# Patient Record
Sex: FEMALE | Race: WHITE | NOT HISPANIC OR LATINO | Employment: OTHER | URBAN - METROPOLITAN AREA
[De-identification: names, ages, dates, MRNs, and addresses within clinical notes are randomized per-mention and may not be internally consistent; named-entity substitution may affect disease eponyms.]

---

## 2017-03-06 ENCOUNTER — AMBULATORY - HEALTHEAST (OUTPATIENT)
Dept: ENDOCRINOLOGY | Facility: CLINIC | Age: 62
End: 2017-03-06

## 2017-04-06 ENCOUNTER — COMMUNICATION - HEALTHEAST (OUTPATIENT)
Dept: ADMINISTRATIVE | Facility: CLINIC | Age: 62
End: 2017-04-06

## 2017-08-07 ENCOUNTER — COMMUNICATION - HEALTHEAST (OUTPATIENT)
Dept: LAB | Facility: CLINIC | Age: 62
End: 2017-08-07

## 2017-08-08 ENCOUNTER — AMBULATORY - HEALTHEAST (OUTPATIENT)
Dept: ENDOCRINOLOGY | Facility: CLINIC | Age: 62
End: 2017-08-08

## 2017-08-08 DIAGNOSIS — M81.0 OSTEOPOROSIS: ICD-10-CM

## 2017-08-09 ENCOUNTER — COMMUNICATION - HEALTHEAST (OUTPATIENT)
Dept: ENDOCRINOLOGY | Facility: CLINIC | Age: 62
End: 2017-08-09

## 2017-08-25 ENCOUNTER — AMBULATORY - HEALTHEAST (OUTPATIENT)
Dept: LAB | Facility: CLINIC | Age: 62
End: 2017-08-25

## 2017-08-25 DIAGNOSIS — M81.0 OSTEOPOROSIS: ICD-10-CM

## 2017-09-01 ENCOUNTER — OFFICE VISIT - HEALTHEAST (OUTPATIENT)
Dept: ENDOCRINOLOGY | Facility: CLINIC | Age: 62
End: 2017-09-01

## 2017-09-01 DIAGNOSIS — M81.0 OSTEOPOROSIS: ICD-10-CM

## 2017-09-01 ASSESSMENT — MIFFLIN-ST. JEOR: SCORE: 961.6

## 2017-09-07 ENCOUNTER — AMBULATORY - HEALTHEAST (OUTPATIENT)
Dept: NURSING | Facility: CLINIC | Age: 62
End: 2017-09-07

## 2017-09-18 ENCOUNTER — RECORDS - HEALTHEAST (OUTPATIENT)
Dept: BONE DENSITY | Facility: CLINIC | Age: 62
End: 2017-09-18

## 2017-09-18 ENCOUNTER — RECORDS - HEALTHEAST (OUTPATIENT)
Dept: ADMINISTRATIVE | Facility: OTHER | Age: 62
End: 2017-09-18

## 2017-09-18 DIAGNOSIS — M81.0 AGE-RELATED OSTEOPOROSIS WITHOUT CURRENT PATHOLOGICAL FRACTURE: ICD-10-CM

## 2017-09-20 ENCOUNTER — COMMUNICATION - HEALTHEAST (OUTPATIENT)
Dept: ENDOCRINOLOGY | Facility: CLINIC | Age: 62
End: 2017-09-20

## 2017-09-26 ENCOUNTER — COMMUNICATION - HEALTHEAST (OUTPATIENT)
Dept: ADMINISTRATIVE | Facility: CLINIC | Age: 62
End: 2017-09-26

## 2017-09-26 ENCOUNTER — COMMUNICATION - HEALTHEAST (OUTPATIENT)
Dept: ENDOCRINOLOGY | Facility: CLINIC | Age: 62
End: 2017-09-26

## 2018-03-01 ENCOUNTER — AMBULATORY - HEALTHEAST (OUTPATIENT)
Dept: PEDIATRICS | Facility: CLINIC | Age: 63
End: 2018-03-01

## 2018-03-08 ENCOUNTER — AMBULATORY - HEALTHEAST (OUTPATIENT)
Dept: NURSING | Facility: CLINIC | Age: 63
End: 2018-03-08

## 2018-03-29 ENCOUNTER — OFFICE VISIT - HEALTHEAST (OUTPATIENT)
Dept: VASCULAR SURGERY | Facility: CLINIC | Age: 63
End: 2018-03-29

## 2018-03-29 DIAGNOSIS — L89.623 DECUBITUS ULCER OF LEFT HEEL, STAGE 3 (H): ICD-10-CM

## 2018-03-29 ASSESSMENT — MIFFLIN-ST. JEOR: SCORE: 957.52

## 2018-04-03 ENCOUNTER — AMBULATORY - HEALTHEAST (OUTPATIENT)
Dept: NURSING | Facility: CLINIC | Age: 63
End: 2018-04-03

## 2018-04-12 ENCOUNTER — OFFICE VISIT - HEALTHEAST (OUTPATIENT)
Dept: VASCULAR SURGERY | Facility: CLINIC | Age: 63
End: 2018-04-12

## 2018-04-12 DIAGNOSIS — L89.623 DECUBITUS ULCER OF LEFT HEEL, STAGE 3 (H): ICD-10-CM

## 2018-04-16 ENCOUNTER — COMMUNICATION - HEALTHEAST (OUTPATIENT)
Dept: VASCULAR SURGERY | Facility: CLINIC | Age: 63
End: 2018-04-16

## 2018-04-17 ENCOUNTER — COMMUNICATION - HEALTHEAST (OUTPATIENT)
Dept: OTHER | Facility: CLINIC | Age: 63
End: 2018-04-17

## 2018-08-23 ENCOUNTER — COMMUNICATION - HEALTHEAST (OUTPATIENT)
Dept: ENDOCRINOLOGY | Facility: CLINIC | Age: 63
End: 2018-08-23

## 2018-08-23 ENCOUNTER — AMBULATORY - HEALTHEAST (OUTPATIENT)
Dept: ENDOCRINOLOGY | Facility: CLINIC | Age: 63
End: 2018-08-23

## 2018-08-23 DIAGNOSIS — M81.0 OSTEOPOROSIS: ICD-10-CM

## 2018-09-27 ENCOUNTER — AMBULATORY - HEALTHEAST (OUTPATIENT)
Dept: LAB | Facility: CLINIC | Age: 63
End: 2018-09-27

## 2018-09-27 DIAGNOSIS — M81.0 OSTEOPOROSIS: ICD-10-CM

## 2018-09-27 LAB — CALCIUM SERPL-MCNC: 9.3 MG/DL (ref 8.5–10.5)

## 2018-09-28 ENCOUNTER — AMBULATORY - HEALTHEAST (OUTPATIENT)
Dept: ENDOCRINOLOGY | Facility: CLINIC | Age: 63
End: 2018-09-28

## 2018-09-28 DIAGNOSIS — E55.9 VITAMIN D DEFICIENCY: ICD-10-CM

## 2018-09-28 LAB
25(OH)D3 SERPL-MCNC: 19.4 NG/ML (ref 30–80)
25(OH)D3 SERPL-MCNC: 19.4 NG/ML (ref 30–80)

## 2018-10-01 ENCOUNTER — COMMUNICATION - HEALTHEAST (OUTPATIENT)
Dept: ADMINISTRATIVE | Facility: CLINIC | Age: 63
End: 2018-10-01

## 2018-10-01 DIAGNOSIS — E55.9 VITAMIN D DEFICIENCY: ICD-10-CM

## 2018-10-05 ENCOUNTER — OFFICE VISIT - HEALTHEAST (OUTPATIENT)
Dept: ENDOCRINOLOGY | Facility: CLINIC | Age: 63
End: 2018-10-05

## 2018-10-05 DIAGNOSIS — M81.0 OSTEOPOROSIS: ICD-10-CM

## 2018-10-05 ASSESSMENT — MIFFLIN-ST. JEOR: SCORE: 963.19

## 2018-11-16 ENCOUNTER — OFFICE VISIT - HEALTHEAST (OUTPATIENT)
Dept: VASCULAR SURGERY | Facility: CLINIC | Age: 63
End: 2018-11-16

## 2018-11-16 DIAGNOSIS — M21.372 LEFT FOOT DROP: ICD-10-CM

## 2018-11-16 DIAGNOSIS — M21.621 TAILOR'S BUNION OF RIGHT FOOT: ICD-10-CM

## 2019-01-25 ENCOUNTER — AMBULATORY - HEALTHEAST (OUTPATIENT)
Dept: ENDOCRINOLOGY | Facility: CLINIC | Age: 64
End: 2019-01-25

## 2019-01-25 DIAGNOSIS — M81.0 OSTEOPOROSIS: ICD-10-CM

## 2019-03-12 ENCOUNTER — OFFICE VISIT - HEALTHEAST (OUTPATIENT)
Dept: VASCULAR SURGERY | Facility: CLINIC | Age: 64
End: 2019-03-12

## 2019-03-12 ENCOUNTER — RECORDS - HEALTHEAST (OUTPATIENT)
Dept: ADMINISTRATIVE | Facility: OTHER | Age: 64
End: 2019-03-12

## 2019-03-12 DIAGNOSIS — L30.9 DERMATITIS: ICD-10-CM

## 2019-03-12 ASSESSMENT — MIFFLIN-ST. JEOR: SCORE: 963.19

## 2019-03-29 ENCOUNTER — AMBULATORY - HEALTHEAST (OUTPATIENT)
Dept: LAB | Facility: CLINIC | Age: 64
End: 2019-03-29

## 2019-03-29 DIAGNOSIS — M81.0 OSTEOPOROSIS: ICD-10-CM

## 2019-03-29 LAB — CALCIUM SERPL-MCNC: 10.7 MG/DL (ref 8.5–10.5)

## 2019-04-01 LAB
25(OH)D3 SERPL-MCNC: 45.5 NG/ML (ref 30–80)
25(OH)D3 SERPL-MCNC: 45.5 NG/ML (ref 30–80)

## 2019-04-05 ENCOUNTER — OFFICE VISIT - HEALTHEAST (OUTPATIENT)
Dept: ENDOCRINOLOGY | Facility: CLINIC | Age: 64
End: 2019-04-05

## 2019-04-05 DIAGNOSIS — M81.0 OSTEOPOROSIS WITHOUT CURRENT PATHOLOGICAL FRACTURE, UNSPECIFIED OSTEOPOROSIS TYPE: ICD-10-CM

## 2019-04-05 ASSESSMENT — MIFFLIN-ST. JEOR: SCORE: 1092.02

## 2019-04-10 ENCOUNTER — COMMUNICATION - HEALTHEAST (OUTPATIENT)
Dept: ENDOCRINOLOGY | Facility: CLINIC | Age: 64
End: 2019-04-10

## 2019-04-10 DIAGNOSIS — E55.9 VITAMIN D DEFICIENCY: ICD-10-CM

## 2019-06-07 ENCOUNTER — RECORDS - HEALTHEAST (OUTPATIENT)
Dept: ADMINISTRATIVE | Facility: OTHER | Age: 64
End: 2019-06-07

## 2019-06-07 ENCOUNTER — COMMUNICATION - HEALTHEAST (OUTPATIENT)
Dept: ENDOCRINOLOGY | Facility: CLINIC | Age: 64
End: 2019-06-07

## 2019-06-07 DIAGNOSIS — E55.9 VITAMIN D DEFICIENCY: ICD-10-CM

## 2019-06-24 ENCOUNTER — AMBULATORY - HEALTHEAST (OUTPATIENT)
Dept: SCHEDULING | Facility: CLINIC | Age: 64
End: 2019-06-24

## 2019-06-24 DIAGNOSIS — M81.0 OSTEOPOROSIS WITHOUT CURRENT PATHOLOGICAL FRACTURE, UNSPECIFIED OSTEOPOROSIS TYPE: ICD-10-CM

## 2019-07-01 ENCOUNTER — OFFICE VISIT - HEALTHEAST (OUTPATIENT)
Dept: PODIATRY | Facility: CLINIC | Age: 64
End: 2019-07-01

## 2019-07-01 DIAGNOSIS — S93.601A SPRAIN OF RIGHT FOOT, INITIAL ENCOUNTER: ICD-10-CM

## 2019-07-01 DIAGNOSIS — S93.401A SPRAIN OF RIGHT ANKLE, UNSPECIFIED LIGAMENT, INITIAL ENCOUNTER: ICD-10-CM

## 2019-07-01 DIAGNOSIS — M76.61 ACHILLES TENDINITIS OF RIGHT LOWER EXTREMITY: ICD-10-CM

## 2019-07-09 ENCOUNTER — OFFICE VISIT - HEALTHEAST (OUTPATIENT)
Dept: PODIATRY | Facility: CLINIC | Age: 64
End: 2019-07-09

## 2019-07-09 ENCOUNTER — RECORDS - HEALTHEAST (OUTPATIENT)
Dept: GENERAL RADIOLOGY | Facility: CLINIC | Age: 64
End: 2019-07-09

## 2019-07-09 DIAGNOSIS — S92.014A CLOSED NONDISPLACED FRACTURE OF BODY OF RIGHT CALCANEUS, INITIAL ENCOUNTER: ICD-10-CM

## 2019-07-09 DIAGNOSIS — M84.374A STRESS FRACTURE OF RIGHT FOOT, INITIAL ENCOUNTER: ICD-10-CM

## 2019-07-09 DIAGNOSIS — S92.014A NONDISPLACED FRACTURE OF BODY OF RIGHT CALCANEUS, INITIAL ENCOUNTER FOR CLOSED FRACTURE: ICD-10-CM

## 2019-07-09 ASSESSMENT — MIFFLIN-ST. JEOR: SCORE: 1094.24

## 2019-07-15 ENCOUNTER — COMMUNICATION - HEALTHEAST (OUTPATIENT)
Dept: ADMINISTRATIVE | Facility: CLINIC | Age: 64
End: 2019-07-15

## 2019-07-16 ENCOUNTER — AMBULATORY - HEALTHEAST (OUTPATIENT)
Dept: SURGERY | Facility: CLINIC | Age: 64
End: 2019-07-16

## 2019-07-16 ENCOUNTER — COMMUNICATION - HEALTHEAST (OUTPATIENT)
Dept: ENDOCRINOLOGY | Facility: CLINIC | Age: 64
End: 2019-07-16

## 2019-07-16 DIAGNOSIS — S92.014A CLOSED NONDISPLACED FRACTURE OF BODY OF RIGHT CALCANEUS, INITIAL ENCOUNTER: ICD-10-CM

## 2019-07-17 ENCOUNTER — AMBULATORY - HEALTHEAST (OUTPATIENT)
Dept: ENDOCRINOLOGY | Facility: CLINIC | Age: 64
End: 2019-07-17

## 2019-07-17 DIAGNOSIS — M81.0 OSTEOPOROSIS WITHOUT CURRENT PATHOLOGICAL FRACTURE, UNSPECIFIED OSTEOPOROSIS TYPE: ICD-10-CM

## 2019-07-26 ENCOUNTER — AMBULATORY - HEALTHEAST (OUTPATIENT)
Dept: LAB | Facility: CLINIC | Age: 64
End: 2019-07-26

## 2019-07-26 DIAGNOSIS — M81.0 OSTEOPOROSIS WITHOUT CURRENT PATHOLOGICAL FRACTURE, UNSPECIFIED OSTEOPOROSIS TYPE: ICD-10-CM

## 2019-07-29 ENCOUNTER — COMMUNICATION - HEALTHEAST (OUTPATIENT)
Dept: ENDOCRINOLOGY | Facility: CLINIC | Age: 64
End: 2019-07-29

## 2019-07-29 LAB
25(OH)D3 SERPL-MCNC: 54 NG/ML (ref 30–80)
25(OH)D3 SERPL-MCNC: 54 NG/ML (ref 30–80)

## 2019-07-30 ENCOUNTER — OFFICE VISIT - HEALTHEAST (OUTPATIENT)
Dept: PODIATRY | Facility: CLINIC | Age: 64
End: 2019-07-30

## 2019-07-30 ENCOUNTER — COMMUNICATION - HEALTHEAST (OUTPATIENT)
Dept: ENDOCRINOLOGY | Facility: CLINIC | Age: 64
End: 2019-07-30

## 2019-07-30 DIAGNOSIS — M25.371 ANKLE INSTABILITY, RIGHT: ICD-10-CM

## 2019-07-30 DIAGNOSIS — S92.014A CLOSED NONDISPLACED FRACTURE OF BODY OF RIGHT CALCANEUS, INITIAL ENCOUNTER: ICD-10-CM

## 2019-08-12 ENCOUNTER — COMMUNICATION - HEALTHEAST (OUTPATIENT)
Dept: VASCULAR SURGERY | Facility: CLINIC | Age: 64
End: 2019-08-12

## 2019-08-13 ENCOUNTER — OFFICE VISIT - HEALTHEAST (OUTPATIENT)
Dept: PODIATRY | Facility: CLINIC | Age: 64
End: 2019-08-13

## 2019-08-13 DIAGNOSIS — M79.2 NERVE PAIN: ICD-10-CM

## 2019-08-13 DIAGNOSIS — S92.014A CLOSED NONDISPLACED FRACTURE OF BODY OF RIGHT CALCANEUS, INITIAL ENCOUNTER: ICD-10-CM

## 2019-08-13 DIAGNOSIS — S90.31XA CONTUSION OF RIGHT FOOT, INITIAL ENCOUNTER: ICD-10-CM

## 2019-08-13 ASSESSMENT — MIFFLIN-ST. JEOR: SCORE: 1085.67

## 2019-09-10 ENCOUNTER — OFFICE VISIT - HEALTHEAST (OUTPATIENT)
Dept: PODIATRY | Facility: CLINIC | Age: 64
End: 2019-09-10

## 2019-09-10 DIAGNOSIS — R60.1 GENERALIZED EDEMA: ICD-10-CM

## 2019-09-10 DIAGNOSIS — M84.374D STRESS FRACTURE OF RIGHT FOOT WITH ROUTINE HEALING, SUBSEQUENT ENCOUNTER: ICD-10-CM

## 2019-09-10 DIAGNOSIS — S92.014A CLOSED NONDISPLACED FRACTURE OF BODY OF RIGHT CALCANEUS, INITIAL ENCOUNTER: ICD-10-CM

## 2019-09-10 RX ORDER — VILAZODONE HYDROCHLORIDE 20 MG/1
20 TABLET ORAL DAILY
Refills: 2 | Status: SHIPPED | COMMUNITY
Start: 2019-08-21 | End: 2022-05-09

## 2019-09-10 ASSESSMENT — MIFFLIN-ST. JEOR: SCORE: 1162.78

## 2019-09-25 ENCOUNTER — COMMUNICATION - HEALTHEAST (OUTPATIENT)
Dept: ENDOCRINOLOGY | Facility: CLINIC | Age: 64
End: 2019-09-25

## 2019-10-04 ENCOUNTER — COMMUNICATION - HEALTHEAST (OUTPATIENT)
Dept: ADMINISTRATIVE | Facility: CLINIC | Age: 64
End: 2019-10-04

## 2019-10-11 ENCOUNTER — COMMUNICATION - HEALTHEAST (OUTPATIENT)
Dept: ADMINISTRATIVE | Facility: CLINIC | Age: 64
End: 2019-10-11

## 2019-10-17 ENCOUNTER — OFFICE VISIT - HEALTHEAST (OUTPATIENT)
Dept: ENDOCRINOLOGY | Facility: CLINIC | Age: 64
End: 2019-10-17

## 2019-10-17 DIAGNOSIS — M81.0 OSTEOPOROSIS WITHOUT CURRENT PATHOLOGICAL FRACTURE, UNSPECIFIED OSTEOPOROSIS TYPE: ICD-10-CM

## 2019-11-11 ENCOUNTER — SURGERY - HEALTHEAST (OUTPATIENT)
Dept: SURGERY | Facility: CLINIC | Age: 64
End: 2019-11-11

## 2019-11-11 ENCOUNTER — ANESTHESIA - HEALTHEAST (OUTPATIENT)
Dept: SURGERY | Facility: CLINIC | Age: 64
End: 2019-11-11

## 2019-11-11 ASSESSMENT — MIFFLIN-ST. JEOR: SCORE: 1101.54

## 2019-11-15 ENCOUNTER — OFFICE VISIT - HEALTHEAST (OUTPATIENT)
Dept: GERIATRICS | Facility: CLINIC | Age: 64
End: 2019-11-15

## 2019-11-15 DIAGNOSIS — S06.9X0S TRAUMATIC BRAIN INJURY, WITHOUT LOSS OF CONSCIOUSNESS, SEQUELA (H): ICD-10-CM

## 2019-11-15 DIAGNOSIS — G43.909 MIGRAINE WITHOUT STATUS MIGRAINOSUS, NOT INTRACTABLE, UNSPECIFIED MIGRAINE TYPE: ICD-10-CM

## 2019-11-15 DIAGNOSIS — F41.9 ANXIETY: ICD-10-CM

## 2019-11-15 DIAGNOSIS — N32.81 OAB (OVERACTIVE BLADDER): ICD-10-CM

## 2019-11-15 DIAGNOSIS — M21.372 LEFT FOOT DROP: ICD-10-CM

## 2019-11-15 DIAGNOSIS — D62 ANEMIA DUE TO BLOOD LOSS, ACUTE: ICD-10-CM

## 2019-11-15 DIAGNOSIS — S72.142A CLOSED INTERTROCHANTERIC FRACTURE OF HIP, LEFT, INITIAL ENCOUNTER (H): ICD-10-CM

## 2019-11-18 ENCOUNTER — OFFICE VISIT - HEALTHEAST (OUTPATIENT)
Dept: GERIATRICS | Facility: CLINIC | Age: 64
End: 2019-11-18

## 2019-11-18 ENCOUNTER — RECORDS - HEALTHEAST (OUTPATIENT)
Dept: LAB | Facility: CLINIC | Age: 64
End: 2019-11-18

## 2019-11-18 ENCOUNTER — AMBULATORY - HEALTHEAST (OUTPATIENT)
Dept: ENDOCRINOLOGY | Facility: CLINIC | Age: 64
End: 2019-11-18

## 2019-11-18 DIAGNOSIS — F41.9 ANXIETY: ICD-10-CM

## 2019-11-18 DIAGNOSIS — M21.372 LEFT FOOT DROP: ICD-10-CM

## 2019-11-18 DIAGNOSIS — N18.1 CHRONIC KIDNEY DISEASE, STAGE I: ICD-10-CM

## 2019-11-18 DIAGNOSIS — D62 ANEMIA DUE TO BLOOD LOSS, ACUTE: ICD-10-CM

## 2019-11-18 DIAGNOSIS — M25.552 LEFT HIP PAIN: ICD-10-CM

## 2019-11-18 DIAGNOSIS — S06.9X0S TRAUMATIC BRAIN INJURY, WITHOUT LOSS OF CONSCIOUSNESS, SEQUELA (H): ICD-10-CM

## 2019-11-18 DIAGNOSIS — S72.142A CLOSED INTERTROCHANTERIC FRACTURE OF HIP, LEFT, INITIAL ENCOUNTER (H): ICD-10-CM

## 2019-11-18 DIAGNOSIS — S72.90XA FRACTURE OF FEMUR (H): ICD-10-CM

## 2019-11-18 DIAGNOSIS — M81.0 OSTEOPOROSIS: ICD-10-CM

## 2019-11-18 LAB
ANION GAP SERPL CALCULATED.3IONS-SCNC: 10 MMOL/L (ref 5–18)
BUN SERPL-MCNC: 10 MG/DL (ref 8–22)
CALCIUM SERPL-MCNC: 9.3 MG/DL (ref 8.5–10.5)
CHLORIDE BLD-SCNC: 104 MMOL/L (ref 98–107)
CO2 SERPL-SCNC: 25 MMOL/L (ref 22–31)
CREAT SERPL-MCNC: 0.77 MG/DL (ref 0.6–1.1)
ERYTHROCYTE [DISTWIDTH] IN BLOOD BY AUTOMATED COUNT: 14.7 % (ref 11–14.5)
GFR SERPL CREATININE-BSD FRML MDRD: >60 ML/MIN/1.73M2
GLUCOSE BLD-MCNC: 68 MG/DL (ref 70–125)
HCT VFR BLD AUTO: 27.9 % (ref 35–47)
HGB BLD-MCNC: 8.5 G/DL (ref 12–16)
MCH RBC QN AUTO: 27.3 PG (ref 27–34)
MCHC RBC AUTO-ENTMCNC: 30.5 G/DL (ref 32–36)
MCV RBC AUTO: 90 FL (ref 80–100)
PLATELET # BLD AUTO: 407 THOU/UL (ref 140–440)
PMV BLD AUTO: 10.3 FL (ref 8.5–12.5)
POTASSIUM BLD-SCNC: 4.4 MMOL/L (ref 3.5–5)
RBC # BLD AUTO: 3.11 MILL/UL (ref 3.8–5.4)
SODIUM SERPL-SCNC: 139 MMOL/L (ref 136–145)
VIT B12 SERPL-MCNC: 368 PG/ML (ref 213–816)
WBC: 9.5 THOU/UL (ref 4–11)

## 2019-11-20 ENCOUNTER — OFFICE VISIT - HEALTHEAST (OUTPATIENT)
Dept: GERIATRICS | Facility: CLINIC | Age: 64
End: 2019-11-20

## 2019-11-20 DIAGNOSIS — S72.142A CLOSED INTERTROCHANTERIC FRACTURE OF HIP, LEFT, INITIAL ENCOUNTER (H): ICD-10-CM

## 2019-11-20 DIAGNOSIS — M21.372 LEFT FOOT DROP: ICD-10-CM

## 2019-11-20 DIAGNOSIS — S06.9X0S TRAUMATIC BRAIN INJURY, WITHOUT LOSS OF CONSCIOUSNESS, SEQUELA (H): ICD-10-CM

## 2019-11-20 DIAGNOSIS — F41.9 ANXIETY: ICD-10-CM

## 2019-11-20 DIAGNOSIS — D62 ANEMIA DUE TO BLOOD LOSS, ACUTE: ICD-10-CM

## 2019-11-20 LAB — TSH RECEP AB SER-ACNC: <0.9 IU/L

## 2019-11-22 ENCOUNTER — COMMUNICATION - HEALTHEAST (OUTPATIENT)
Dept: ENDOCRINOLOGY | Facility: CLINIC | Age: 64
End: 2019-11-22

## 2019-11-22 ENCOUNTER — OFFICE VISIT - HEALTHEAST (OUTPATIENT)
Dept: ENDOCRINOLOGY | Facility: CLINIC | Age: 64
End: 2019-11-22

## 2019-11-22 DIAGNOSIS — M80.00XA OSTEOPOROSIS WITH CURRENT PATHOLOGICAL FRACTURE, UNSPECIFIED OSTEOPOROSIS TYPE, INITIAL ENCOUNTER: ICD-10-CM

## 2019-11-22 DIAGNOSIS — S72.142A CLOSED INTERTROCHANTERIC FRACTURE OF HIP, LEFT, INITIAL ENCOUNTER (H): ICD-10-CM

## 2019-11-25 ENCOUNTER — OFFICE VISIT - HEALTHEAST (OUTPATIENT)
Dept: GERIATRICS | Facility: CLINIC | Age: 64
End: 2019-11-25

## 2019-11-25 ENCOUNTER — RECORDS - HEALTHEAST (OUTPATIENT)
Dept: LAB | Facility: CLINIC | Age: 64
End: 2019-11-25

## 2019-11-25 DIAGNOSIS — N32.81 OAB (OVERACTIVE BLADDER): ICD-10-CM

## 2019-11-25 DIAGNOSIS — D62 ANEMIA DUE TO BLOOD LOSS, ACUTE: ICD-10-CM

## 2019-11-25 DIAGNOSIS — S72.142A CLOSED INTERTROCHANTERIC FRACTURE OF HIP, LEFT, INITIAL ENCOUNTER (H): ICD-10-CM

## 2019-11-25 DIAGNOSIS — G43.909 MIGRAINE WITHOUT STATUS MIGRAINOSUS, NOT INTRACTABLE, UNSPECIFIED MIGRAINE TYPE: ICD-10-CM

## 2019-11-25 DIAGNOSIS — F41.9 ANXIETY: ICD-10-CM

## 2019-11-25 DIAGNOSIS — G89.4 CHRONIC PAIN SYNDROME: ICD-10-CM

## 2019-11-25 DIAGNOSIS — F51.01 PRIMARY INSOMNIA: ICD-10-CM

## 2019-11-25 LAB — TSH SERPL DL<=0.005 MIU/L-ACNC: 2.56 UIU/ML (ref 0.3–5)

## 2019-11-27 ENCOUNTER — OFFICE VISIT - HEALTHEAST (OUTPATIENT)
Dept: GERIATRICS | Facility: CLINIC | Age: 64
End: 2019-11-27

## 2019-11-27 DIAGNOSIS — S06.9X0S TRAUMATIC BRAIN INJURY, WITHOUT LOSS OF CONSCIOUSNESS, SEQUELA (H): ICD-10-CM

## 2019-11-27 DIAGNOSIS — S72.142A CLOSED INTERTROCHANTERIC FRACTURE OF HIP, LEFT, INITIAL ENCOUNTER (H): ICD-10-CM

## 2019-11-27 DIAGNOSIS — G43.909 MIGRAINE WITHOUT STATUS MIGRAINOSUS, NOT INTRACTABLE, UNSPECIFIED MIGRAINE TYPE: ICD-10-CM

## 2019-11-27 DIAGNOSIS — M25.552 LEFT HIP PAIN: ICD-10-CM

## 2019-11-27 DIAGNOSIS — D62 ANEMIA DUE TO BLOOD LOSS, ACUTE: ICD-10-CM

## 2019-12-02 ENCOUNTER — AMBULATORY - HEALTHEAST (OUTPATIENT)
Dept: GERIATRICS | Facility: CLINIC | Age: 64
End: 2019-12-02

## 2019-12-06 ENCOUNTER — AMBULATORY - HEALTHEAST (OUTPATIENT)
Dept: ENDOCRINOLOGY | Facility: CLINIC | Age: 64
End: 2019-12-06

## 2020-01-02 ENCOUNTER — COMMUNICATION - HEALTHEAST (OUTPATIENT)
Dept: ADMINISTRATIVE | Facility: CLINIC | Age: 65
End: 2020-01-02

## 2020-01-21 ENCOUNTER — AMBULATORY - HEALTHEAST (OUTPATIENT)
Dept: ENDOCRINOLOGY | Facility: CLINIC | Age: 65
End: 2020-01-21

## 2020-02-10 ENCOUNTER — OFFICE VISIT - HEALTHEAST (OUTPATIENT)
Dept: INTERNAL MEDICINE | Facility: CLINIC | Age: 65
End: 2020-02-10

## 2020-02-10 DIAGNOSIS — F51.01 PRIMARY INSOMNIA: ICD-10-CM

## 2020-02-10 DIAGNOSIS — S72.142A CLOSED INTERTROCHANTERIC FRACTURE OF HIP, LEFT, INITIAL ENCOUNTER (H): ICD-10-CM

## 2020-02-10 DIAGNOSIS — F41.9 ANXIETY: ICD-10-CM

## 2020-02-10 DIAGNOSIS — F32.A DEPRESSION, UNSPECIFIED DEPRESSION TYPE: ICD-10-CM

## 2020-02-10 DIAGNOSIS — M81.0 AGE RELATED OSTEOPOROSIS, UNSPECIFIED PATHOLOGICAL FRACTURE PRESENCE: ICD-10-CM

## 2020-02-10 DIAGNOSIS — G89.4 CHRONIC PAIN SYNDROME: ICD-10-CM

## 2020-02-10 DIAGNOSIS — M21.372 LEFT FOOT DROP: ICD-10-CM

## 2020-02-10 DIAGNOSIS — M54.40 ACUTE LEFT-SIDED LOW BACK PAIN WITH SCIATICA, SCIATICA LATERALITY UNSPECIFIED: ICD-10-CM

## 2020-02-10 ASSESSMENT — PATIENT HEALTH QUESTIONNAIRE - PHQ9: SUM OF ALL RESPONSES TO PHQ QUESTIONS 1-9: 17

## 2020-02-10 ASSESSMENT — ANXIETY QUESTIONNAIRES
GAD7 TOTAL SCORE: 16
5. BEING SO RESTLESS THAT IT IS HARD TO SIT STILL: SEVERAL DAYS
6. BECOMING EASILY ANNOYED OR IRRITABLE: NEARLY EVERY DAY
IF YOU CHECKED OFF ANY PROBLEMS ON THIS QUESTIONNAIRE, HOW DIFFICULT HAVE THESE PROBLEMS MADE IT FOR YOU TO DO YOUR WORK, TAKE CARE OF THINGS AT HOME, OR GET ALONG WITH OTHER PEOPLE: EXTREMELY DIFFICULT
3. WORRYING TOO MUCH ABOUT DIFFERENT THINGS: NEARLY EVERY DAY
2. NOT BEING ABLE TO STOP OR CONTROL WORRYING: NEARLY EVERY DAY
7. FEELING AFRAID AS IF SOMETHING AWFUL MIGHT HAPPEN: MORE THAN HALF THE DAYS
4. TROUBLE RELAXING: MORE THAN HALF THE DAYS
1. FEELING NERVOUS, ANXIOUS, OR ON EDGE: MORE THAN HALF THE DAYS

## 2020-02-10 ASSESSMENT — MIFFLIN-ST. JEOR: SCORE: 1113.55

## 2020-02-17 ENCOUNTER — COMMUNICATION - HEALTHEAST (OUTPATIENT)
Dept: INTERNAL MEDICINE | Facility: CLINIC | Age: 65
End: 2020-02-17

## 2020-02-17 DIAGNOSIS — M54.40 ACUTE LEFT-SIDED LOW BACK PAIN WITH SCIATICA, SCIATICA LATERALITY UNSPECIFIED: ICD-10-CM

## 2020-02-17 DIAGNOSIS — S72.142A CLOSED INTERTROCHANTERIC FRACTURE OF HIP, LEFT, INITIAL ENCOUNTER (H): ICD-10-CM

## 2020-02-21 ENCOUNTER — AMBULATORY - HEALTHEAST (OUTPATIENT)
Dept: ENDOCRINOLOGY | Facility: CLINIC | Age: 65
End: 2020-02-21

## 2020-02-26 ENCOUNTER — RECORDS - HEALTHEAST (OUTPATIENT)
Dept: ADMINISTRATIVE | Facility: OTHER | Age: 65
End: 2020-02-26

## 2020-02-27 ENCOUNTER — AMBULATORY - HEALTHEAST (OUTPATIENT)
Dept: ENDOCRINOLOGY | Facility: CLINIC | Age: 65
End: 2020-02-27

## 2020-02-27 DIAGNOSIS — M81.0 OSTEOPOROSIS: ICD-10-CM

## 2020-02-28 ENCOUNTER — RECORDS - HEALTHEAST (OUTPATIENT)
Dept: ADMINISTRATIVE | Facility: OTHER | Age: 65
End: 2020-02-28

## 2020-03-20 ENCOUNTER — AMBULATORY - HEALTHEAST (OUTPATIENT)
Dept: ENDOCRINOLOGY | Facility: CLINIC | Age: 65
End: 2020-03-20

## 2020-03-20 ENCOUNTER — RECORDS - HEALTHEAST (OUTPATIENT)
Dept: ADMINISTRATIVE | Facility: OTHER | Age: 65
End: 2020-03-20

## 2020-03-20 DIAGNOSIS — M81.0 OSTEOPOROSIS: ICD-10-CM

## 2020-04-13 ENCOUNTER — COMMUNICATION - HEALTHEAST (OUTPATIENT)
Dept: INTERNAL MEDICINE | Facility: CLINIC | Age: 65
End: 2020-04-13

## 2020-04-13 DIAGNOSIS — G90.522 COMPLEX REGIONAL PAIN SYNDROME I OF LEFT LOWER LIMB: ICD-10-CM

## 2020-04-20 ENCOUNTER — COMMUNICATION - HEALTHEAST (OUTPATIENT)
Dept: INTERNAL MEDICINE | Facility: CLINIC | Age: 65
End: 2020-04-20

## 2020-04-20 DIAGNOSIS — B00.9 HERPES SIMPLEX VIRUS INFECTION: ICD-10-CM

## 2020-04-23 ENCOUNTER — AMBULATORY - HEALTHEAST (OUTPATIENT)
Dept: ENDOCRINOLOGY | Facility: CLINIC | Age: 65
End: 2020-04-23

## 2020-04-28 ENCOUNTER — HOSPITAL ENCOUNTER (OUTPATIENT)
Dept: PALLIATIVE MEDICINE | Facility: OTHER | Age: 65
Discharge: HOME | End: 2020-04-28
Attending: PHYSICIAN ASSISTANT

## 2020-04-28 DIAGNOSIS — M54.40 CHRONIC LEFT-SIDED LOW BACK PAIN WITH SCIATICA, SCIATICA LATERALITY UNSPECIFIED: ICD-10-CM

## 2020-04-28 DIAGNOSIS — G89.29 CHRONIC LEFT-SIDED LOW BACK PAIN WITH SCIATICA, SCIATICA LATERALITY UNSPECIFIED: ICD-10-CM

## 2020-04-28 DIAGNOSIS — G62.9 PERIPHERAL POLYNEUROPATHY: ICD-10-CM

## 2020-04-28 DIAGNOSIS — M25.552 LEFT HIP PAIN: ICD-10-CM

## 2020-04-28 DIAGNOSIS — Z79.891 LONG TERM (CURRENT) USE OF OPIATE ANALGESIC: ICD-10-CM

## 2020-05-01 ENCOUNTER — AMBULATORY - HEALTHEAST (OUTPATIENT)
Dept: ENDOCRINOLOGY | Facility: CLINIC | Age: 65
End: 2020-05-01

## 2020-05-01 DIAGNOSIS — M80.00XA OSTEOPOROSIS WITH CURRENT PATHOLOGICAL FRACTURE, UNSPECIFIED OSTEOPOROSIS TYPE, INITIAL ENCOUNTER: ICD-10-CM

## 2020-05-01 DIAGNOSIS — S72.90XA FRACTURE OF FEMUR (H): ICD-10-CM

## 2020-05-01 DIAGNOSIS — N18.1 CHRONIC KIDNEY DISEASE, STAGE I: ICD-10-CM

## 2020-05-04 ENCOUNTER — COMMUNICATION - HEALTHEAST (OUTPATIENT)
Dept: PALLIATIVE MEDICINE | Facility: OTHER | Age: 65
End: 2020-05-04

## 2020-05-04 DIAGNOSIS — G89.4 CHRONIC PAIN SYNDROME: ICD-10-CM

## 2020-05-15 ENCOUNTER — COMMUNICATION - HEALTHEAST (OUTPATIENT)
Dept: INTERNAL MEDICINE | Facility: CLINIC | Age: 65
End: 2020-05-15

## 2020-05-15 DIAGNOSIS — G90.522 COMPLEX REGIONAL PAIN SYNDROME I OF LEFT LOWER LIMB: ICD-10-CM

## 2020-05-18 ENCOUNTER — OFFICE VISIT - HEALTHEAST (OUTPATIENT)
Dept: INTERNAL MEDICINE | Facility: CLINIC | Age: 65
End: 2020-05-18

## 2020-05-18 DIAGNOSIS — E87.1 HYPONATREMIA: ICD-10-CM

## 2020-05-18 DIAGNOSIS — G89.4 CHRONIC PAIN SYNDROME: ICD-10-CM

## 2020-05-18 LAB
AMPHETAMINES UR QL SCN: ABNORMAL
ANION GAP SERPL CALCULATED.3IONS-SCNC: 10 MMOL/L (ref 5–18)
BARBITURATES UR QL: ABNORMAL
BASOPHILS # BLD AUTO: 0 THOU/UL (ref 0–0.2)
BASOPHILS NFR BLD AUTO: 1 % (ref 0–2)
BENZODIAZ UR QL: ABNORMAL
BUN SERPL-MCNC: 13 MG/DL (ref 8–22)
CALCIUM SERPL-MCNC: 9.1 MG/DL (ref 8.5–10.5)
CANNABINOIDS UR QL SCN: ABNORMAL
CHLORIDE BLD-SCNC: 101 MMOL/L (ref 98–107)
CO2 SERPL-SCNC: 25 MMOL/L (ref 22–31)
COCAINE UR QL: ABNORMAL
CREAT SERPL-MCNC: 0.79 MG/DL (ref 0.6–1.1)
CREAT UR-MCNC: 47.4 MG/DL
EOSINOPHIL # BLD AUTO: 0.2 THOU/UL (ref 0–0.4)
EOSINOPHIL NFR BLD AUTO: 3 % (ref 0–6)
ERYTHROCYTE [DISTWIDTH] IN BLOOD BY AUTOMATED COUNT: 14.1 % (ref 11–14.5)
GFR SERPL CREATININE-BSD FRML MDRD: >60 ML/MIN/1.73M2
GLUCOSE BLD-MCNC: 96 MG/DL (ref 70–125)
HCT VFR BLD AUTO: 38.5 % (ref 35–47)
HGB BLD-MCNC: 12.5 G/DL (ref 12–16)
LYMPHOCYTES # BLD AUTO: 2 THOU/UL (ref 0.8–4.4)
LYMPHOCYTES NFR BLD AUTO: 27 % (ref 20–40)
MCH RBC QN AUTO: 28.5 PG (ref 27–34)
MCHC RBC AUTO-ENTMCNC: 32.5 G/DL (ref 32–36)
MCV RBC AUTO: 88 FL (ref 80–100)
METHADONE UR QL SCN: ABNORMAL
MONOCYTES # BLD AUTO: 0.5 THOU/UL (ref 0–0.9)
MONOCYTES NFR BLD AUTO: 6 % (ref 2–10)
NEUTROPHILS # BLD AUTO: 4.8 THOU/UL (ref 2–7.7)
NEUTROPHILS NFR BLD AUTO: 64 % (ref 50–70)
OPIATES UR QL SCN: ABNORMAL
OXYCODONE UR QL: ABNORMAL
PCP UR QL SCN: ABNORMAL
PLATELET # BLD AUTO: 278 THOU/UL (ref 140–440)
PMV BLD AUTO: 8.3 FL (ref 7–10)
POTASSIUM BLD-SCNC: 4.5 MMOL/L (ref 3.5–5)
RBC # BLD AUTO: 4.38 MILL/UL (ref 3.8–5.4)
SODIUM SERPL-SCNC: 136 MMOL/L (ref 136–145)
WBC: 7.5 THOU/UL (ref 4–11)

## 2020-05-19 ENCOUNTER — COMMUNICATION - HEALTHEAST (OUTPATIENT)
Dept: INTERNAL MEDICINE | Facility: CLINIC | Age: 65
End: 2020-05-19

## 2020-05-19 ENCOUNTER — COMMUNICATION - HEALTHEAST (OUTPATIENT)
Dept: PALLIATIVE MEDICINE | Facility: OTHER | Age: 65
End: 2020-05-19

## 2020-05-19 DIAGNOSIS — R11.0 NAUSEA: ICD-10-CM

## 2020-05-19 LAB — ETHANOL UR CFM-MCNC: <10 MG/DL

## 2020-05-21 ENCOUNTER — AMBULATORY - HEALTHEAST (OUTPATIENT)
Dept: ENDOCRINOLOGY | Facility: CLINIC | Age: 65
End: 2020-05-21

## 2020-05-21 LAB
6MAM UR CFM-MCNC: <10 NG/ML
CODEINE UR CFM-MCNC: <20 NG/ML
HYDROCODONE UR CFM-MCNC: >4000 NG/ML
HYDROMORPHONE UR CFM-MCNC: 234 NG/ML
MORPHINE UR CFM-MCNC: <20 NG/ML
NORHYDROCODONE UR CFM-MCNC: >4000 NG/ML
NOROXYCODONE UR CFM-MCNC: <20 NG/ML
NOROXYMORPHONE UR QL SCN: <20 NG/ML
OXYCODONE UR CFM-MCNC: <20 NG/ML
OXYMORPHONE UR CFM-MCNC: <20 NG/ML

## 2020-05-26 ENCOUNTER — COMMUNICATION - HEALTHEAST (OUTPATIENT)
Dept: INTERNAL MEDICINE | Facility: CLINIC | Age: 65
End: 2020-05-26

## 2020-05-28 ENCOUNTER — COMMUNICATION - HEALTHEAST (OUTPATIENT)
Dept: SCHEDULING | Facility: CLINIC | Age: 65
End: 2020-05-28

## 2020-06-03 ENCOUNTER — HOSPITAL ENCOUNTER (OUTPATIENT)
Dept: PALLIATIVE MEDICINE | Facility: OTHER | Age: 65
Discharge: HOME | End: 2020-06-03
Attending: PHYSICIAN ASSISTANT

## 2020-06-03 DIAGNOSIS — M21.372 LEFT FOOT DROP: ICD-10-CM

## 2020-06-03 DIAGNOSIS — G89.4 CHRONIC PAIN SYNDROME: ICD-10-CM

## 2020-06-03 DIAGNOSIS — G90.522 COMPLEX REGIONAL PAIN SYNDROME I OF LEFT LOWER LIMB: ICD-10-CM

## 2020-06-03 DIAGNOSIS — F11.90 CHRONIC, CONTINUOUS USE OF OPIOIDS: ICD-10-CM

## 2020-06-03 DIAGNOSIS — M51.369 DEGENERATION OF LUMBAR INTERVERTEBRAL DISC: ICD-10-CM

## 2020-06-04 ENCOUNTER — COMMUNICATION - HEALTHEAST (OUTPATIENT)
Dept: INTERNAL MEDICINE | Facility: CLINIC | Age: 65
End: 2020-06-04

## 2020-06-04 ENCOUNTER — COMMUNICATION - HEALTHEAST (OUTPATIENT)
Dept: PALLIATIVE MEDICINE | Facility: OTHER | Age: 65
End: 2020-06-04

## 2020-06-04 DIAGNOSIS — J30.2 SEASONAL ALLERGIC RHINITIS, UNSPECIFIED TRIGGER: ICD-10-CM

## 2020-06-09 RX ORDER — IPRATROPIUM BROMIDE 42 UG/1
SPRAY, METERED NASAL
Qty: 15 ML | Refills: 2 | Status: SHIPPED | OUTPATIENT
Start: 2020-06-09 | End: 2023-09-25

## 2020-06-16 ENCOUNTER — RECORDS - HEALTHEAST (OUTPATIENT)
Dept: ADMINISTRATIVE | Facility: OTHER | Age: 65
End: 2020-06-16

## 2020-06-17 ENCOUNTER — COMMUNICATION - HEALTHEAST (OUTPATIENT)
Dept: PALLIATIVE MEDICINE | Facility: OTHER | Age: 65
End: 2020-06-17

## 2020-06-17 DIAGNOSIS — G89.4 CHRONIC PAIN SYNDROME: ICD-10-CM

## 2020-06-25 ENCOUNTER — AMBULATORY - HEALTHEAST (OUTPATIENT)
Dept: ENDOCRINOLOGY | Facility: CLINIC | Age: 65
End: 2020-06-25

## 2020-06-26 ENCOUNTER — COMMUNICATION - HEALTHEAST (OUTPATIENT)
Dept: ADMINISTRATIVE | Facility: CLINIC | Age: 65
End: 2020-06-26

## 2020-06-30 ENCOUNTER — COMMUNICATION - HEALTHEAST (OUTPATIENT)
Dept: ENDOCRINOLOGY | Facility: CLINIC | Age: 65
End: 2020-06-30

## 2020-07-01 ENCOUNTER — HOSPITAL ENCOUNTER (OUTPATIENT)
Dept: PALLIATIVE MEDICINE | Facility: OTHER | Age: 65
Discharge: HOME | End: 2020-07-01
Attending: PHYSICIAN ASSISTANT

## 2020-07-01 DIAGNOSIS — G89.4 CHRONIC PAIN SYNDROME: ICD-10-CM

## 2020-07-01 DIAGNOSIS — G89.29 CHRONIC LEFT-SIDED LOW BACK PAIN WITH SCIATICA, SCIATICA LATERALITY UNSPECIFIED: ICD-10-CM

## 2020-07-01 DIAGNOSIS — M54.40 CHRONIC LEFT-SIDED LOW BACK PAIN WITH SCIATICA, SCIATICA LATERALITY UNSPECIFIED: ICD-10-CM

## 2020-07-01 DIAGNOSIS — G62.9 PERIPHERAL POLYNEUROPATHY: ICD-10-CM

## 2020-07-01 DIAGNOSIS — F11.90 CHRONIC, CONTINUOUS USE OF OPIOIDS: ICD-10-CM

## 2020-07-01 ASSESSMENT — MIFFLIN-ST. JEOR: SCORE: 1089.06

## 2020-07-15 ENCOUNTER — COMMUNICATION - HEALTHEAST (OUTPATIENT)
Dept: PALLIATIVE MEDICINE | Facility: OTHER | Age: 65
End: 2020-07-15

## 2020-07-15 DIAGNOSIS — G89.4 CHRONIC PAIN SYNDROME: ICD-10-CM

## 2020-07-23 ENCOUNTER — COMMUNICATION - HEALTHEAST (OUTPATIENT)
Dept: PALLIATIVE MEDICINE | Facility: OTHER | Age: 65
End: 2020-07-23

## 2020-07-23 DIAGNOSIS — G89.4 CHRONIC PAIN SYNDROME: ICD-10-CM

## 2020-07-30 ENCOUNTER — AMBULATORY - HEALTHEAST (OUTPATIENT)
Dept: ENDOCRINOLOGY | Facility: CLINIC | Age: 65
End: 2020-07-30

## 2020-08-07 ENCOUNTER — COMMUNICATION - HEALTHEAST (OUTPATIENT)
Dept: PALLIATIVE MEDICINE | Facility: OTHER | Age: 65
End: 2020-08-07

## 2020-08-07 DIAGNOSIS — G89.4 CHRONIC PAIN SYNDROME: ICD-10-CM

## 2020-08-11 ENCOUNTER — COMMUNICATION - HEALTHEAST (OUTPATIENT)
Dept: INTERNAL MEDICINE | Facility: CLINIC | Age: 65
End: 2020-08-11

## 2020-08-11 DIAGNOSIS — N32.81 OVERACTIVE BLADDER: ICD-10-CM

## 2020-08-13 ENCOUNTER — HOSPITAL ENCOUNTER (OUTPATIENT)
Dept: PALLIATIVE MEDICINE | Facility: OTHER | Age: 65
Discharge: HOME | End: 2020-08-13
Attending: PHYSICIAN ASSISTANT

## 2020-08-13 DIAGNOSIS — G90.522 COMPLEX REGIONAL PAIN SYNDROME I OF LEFT LOWER LIMB: ICD-10-CM

## 2020-08-13 DIAGNOSIS — G89.4 CHRONIC PAIN SYNDROME: ICD-10-CM

## 2020-08-13 DIAGNOSIS — Z79.891 LONG TERM (CURRENT) USE OF OPIATE ANALGESIC: ICD-10-CM

## 2020-08-14 ENCOUNTER — COMMUNICATION - HEALTHEAST (OUTPATIENT)
Dept: PALLIATIVE MEDICINE | Facility: OTHER | Age: 65
End: 2020-08-14

## 2020-08-20 ENCOUNTER — COMMUNICATION - HEALTHEAST (OUTPATIENT)
Dept: PALLIATIVE MEDICINE | Facility: OTHER | Age: 65
End: 2020-08-20

## 2020-08-27 ENCOUNTER — COMMUNICATION - HEALTHEAST (OUTPATIENT)
Dept: PALLIATIVE MEDICINE | Facility: OTHER | Age: 65
End: 2020-08-27

## 2020-08-27 DIAGNOSIS — G89.4 CHRONIC PAIN SYNDROME: ICD-10-CM

## 2020-08-31 ENCOUNTER — AMBULATORY - HEALTHEAST (OUTPATIENT)
Dept: ENDOCRINOLOGY | Facility: CLINIC | Age: 65
End: 2020-08-31

## 2020-09-01 ENCOUNTER — COMMUNICATION - HEALTHEAST (OUTPATIENT)
Dept: ENDOCRINOLOGY | Facility: CLINIC | Age: 65
End: 2020-09-01

## 2020-09-14 ENCOUNTER — COMMUNICATION - HEALTHEAST (OUTPATIENT)
Dept: ADMINISTRATIVE | Facility: CLINIC | Age: 65
End: 2020-09-14

## 2020-09-18 ENCOUNTER — COMMUNICATION - HEALTHEAST (OUTPATIENT)
Dept: ADMINISTRATIVE | Facility: CLINIC | Age: 65
End: 2020-09-18

## 2020-09-25 ENCOUNTER — OFFICE VISIT - HEALTHEAST (OUTPATIENT)
Dept: INTERNAL MEDICINE | Facility: CLINIC | Age: 65
End: 2020-09-25

## 2020-09-25 DIAGNOSIS — R11.0 NAUSEA: ICD-10-CM

## 2020-09-25 DIAGNOSIS — R10.13 ABDOMINAL PAIN, EPIGASTRIC: ICD-10-CM

## 2020-09-25 DIAGNOSIS — K21.9 GASTROESOPHAGEAL REFLUX DISEASE WITHOUT ESOPHAGITIS: ICD-10-CM

## 2020-09-25 LAB
ALBUMIN SERPL-MCNC: 4.3 G/DL (ref 3.5–5)
ALP SERPL-CCNC: 109 U/L (ref 45–120)
ALT SERPL W P-5'-P-CCNC: 14 U/L (ref 0–45)
ANION GAP SERPL CALCULATED.3IONS-SCNC: 11 MMOL/L (ref 5–18)
AST SERPL W P-5'-P-CCNC: 15 U/L (ref 0–40)
BASOPHILS # BLD AUTO: 0.1 THOU/UL (ref 0–0.2)
BASOPHILS NFR BLD AUTO: 1 % (ref 0–2)
BILIRUB SERPL-MCNC: 0.3 MG/DL (ref 0–1)
BUN SERPL-MCNC: 17 MG/DL (ref 8–22)
C REACTIVE PROTEIN LHE: 0.1 MG/DL (ref 0–0.8)
CALCIUM SERPL-MCNC: 9.9 MG/DL (ref 8.5–10.5)
CHLORIDE BLD-SCNC: 104 MMOL/L (ref 98–107)
CO2 SERPL-SCNC: 26 MMOL/L (ref 22–31)
CREAT SERPL-MCNC: 1.26 MG/DL (ref 0.6–1.1)
EOSINOPHIL # BLD AUTO: 0.1 THOU/UL (ref 0–0.4)
EOSINOPHIL NFR BLD AUTO: 1 % (ref 0–6)
ERYTHROCYTE [DISTWIDTH] IN BLOOD BY AUTOMATED COUNT: 13.3 % (ref 11–14.5)
GFR SERPL CREATININE-BSD FRML MDRD: 43 ML/MIN/1.73M2
GLUCOSE BLD-MCNC: 94 MG/DL (ref 70–125)
HCT VFR BLD AUTO: 40.5 % (ref 35–47)
HGB BLD-MCNC: 13.3 G/DL (ref 12–16)
LIPASE SERPL-CCNC: <9 U/L (ref 0–52)
LYMPHOCYTES # BLD AUTO: 2 THOU/UL (ref 0.8–4.4)
LYMPHOCYTES NFR BLD AUTO: 19 % (ref 20–40)
MCH RBC QN AUTO: 29.8 PG (ref 27–34)
MCHC RBC AUTO-ENTMCNC: 32.8 G/DL (ref 32–36)
MCV RBC AUTO: 91 FL (ref 80–100)
MONOCYTES # BLD AUTO: 0.5 THOU/UL (ref 0–0.9)
MONOCYTES NFR BLD AUTO: 4 % (ref 2–10)
NEUTROPHILS # BLD AUTO: 7.7 THOU/UL (ref 2–7.7)
NEUTROPHILS NFR BLD AUTO: 75 % (ref 50–70)
PLATELET # BLD AUTO: 367 THOU/UL (ref 140–440)
PMV BLD AUTO: 7.6 FL (ref 7–10)
POTASSIUM BLD-SCNC: 4 MMOL/L (ref 3.5–5)
PROT SERPL-MCNC: 7.4 G/DL (ref 6–8)
RBC # BLD AUTO: 4.45 MILL/UL (ref 3.8–5.4)
SODIUM SERPL-SCNC: 141 MMOL/L (ref 136–145)
WBC: 10.3 THOU/UL (ref 4–11)

## 2020-09-28 ENCOUNTER — HOSPITAL ENCOUNTER (OUTPATIENT)
Dept: ULTRASOUND IMAGING | Facility: CLINIC | Age: 65
Discharge: HOME | End: 2020-09-28

## 2020-09-28 ENCOUNTER — COMMUNICATION - HEALTHEAST (OUTPATIENT)
Dept: INTERNAL MEDICINE | Facility: CLINIC | Age: 65
End: 2020-09-28

## 2020-09-28 DIAGNOSIS — R10.13 ABDOMINAL PAIN, EPIGASTRIC: ICD-10-CM

## 2020-09-29 ENCOUNTER — AMBULATORY - HEALTHEAST (OUTPATIENT)
Dept: LAB | Facility: CLINIC | Age: 65
End: 2020-09-29

## 2020-09-29 DIAGNOSIS — R11.0 NAUSEA: ICD-10-CM

## 2020-09-29 DIAGNOSIS — K21.9 GASTROESOPHAGEAL REFLUX DISEASE WITHOUT ESOPHAGITIS: ICD-10-CM

## 2020-09-30 ENCOUNTER — COMMUNICATION - HEALTHEAST (OUTPATIENT)
Dept: INTERNAL MEDICINE | Facility: CLINIC | Age: 65
End: 2020-09-30

## 2020-09-30 ENCOUNTER — COMMUNICATION - HEALTHEAST (OUTPATIENT)
Dept: PALLIATIVE MEDICINE | Facility: OTHER | Age: 65
End: 2020-09-30

## 2020-09-30 DIAGNOSIS — G89.4 CHRONIC PAIN SYNDROME: ICD-10-CM

## 2020-09-30 LAB — H PYLORI AG STL QL IA: NEGATIVE

## 2020-10-01 ENCOUNTER — AMBULATORY - HEALTHEAST (OUTPATIENT)
Dept: ENDOCRINOLOGY | Facility: CLINIC | Age: 65
End: 2020-10-01

## 2020-10-07 ENCOUNTER — HOSPITAL ENCOUNTER (OUTPATIENT)
Dept: PALLIATIVE MEDICINE | Facility: OTHER | Age: 65
Discharge: HOME | End: 2020-10-07
Attending: PHYSICIAN ASSISTANT

## 2020-10-07 DIAGNOSIS — F41.9 ANXIETY: ICD-10-CM

## 2020-10-07 DIAGNOSIS — G89.4 CHRONIC PAIN SYNDROME: ICD-10-CM

## 2020-10-07 DIAGNOSIS — G90.522 COMPLEX REGIONAL PAIN SYNDROME I OF LEFT LOWER LIMB: ICD-10-CM

## 2020-10-07 DIAGNOSIS — F11.90 CHRONIC, CONTINUOUS USE OF OPIOIDS: ICD-10-CM

## 2020-10-07 ASSESSMENT — MIFFLIN-ST. JEOR: SCORE: 1118.09

## 2020-10-08 ENCOUNTER — COMMUNICATION - HEALTHEAST (OUTPATIENT)
Dept: PALLIATIVE MEDICINE | Facility: OTHER | Age: 65
End: 2020-10-08

## 2020-10-08 DIAGNOSIS — G89.4 CHRONIC PAIN SYNDROME: ICD-10-CM

## 2020-10-09 ENCOUNTER — AMBULATORY - HEALTHEAST (OUTPATIENT)
Dept: LAB | Facility: CLINIC | Age: 65
End: 2020-10-09

## 2020-10-09 DIAGNOSIS — M81.0 OSTEOPOROSIS: ICD-10-CM

## 2020-10-09 LAB
6MAM UR QL: NOT DETECTED
7AMINOCLONAZEPAM UR QL: NOT DETECTED
A-OH ALPRAZ UR QL: NOT DETECTED
ALPRAZ UR QL: NOT DETECTED
AMPHET UR QL SCN: NOT DETECTED
BARBITURATES UR QL: NOT DETECTED
BUPRENORPHINE UR QL: NOT DETECTED
BZE UR QL: NOT DETECTED
CALCIUM SERPL-MCNC: 9.1 MG/DL (ref 8.5–10.5)
CARBOXYTHC UR QL: NOT DETECTED
CARISOPRODOL UR QL: NOT DETECTED
CLONAZEPAM UR QL: NOT DETECTED
CODEINE UR QL: PRESENT
CREAT UR-MCNC: 107.5 MG/DL (ref 20–400)
DIAZEPAM UR QL: NOT DETECTED
ETHYL GLUCURONIDE UR QL: NOT DETECTED
FENTANYL UR QL: NOT DETECTED
HYDROCODONE UR QL: PRESENT
HYDROMORPHONE UR QL: NOT DETECTED
LORAZEPAM UR QL: NOT DETECTED
MDA UR QL: NOT DETECTED
MDEA UR QL: NOT DETECTED
MDMA UR QL: NOT DETECTED
ME-PHENIDATE UR QL: NOT DETECTED
MEPERIDINE UR QL: NOT DETECTED
METHADONE UR QL: NOT DETECTED
METHAMPHET UR QL: NOT DETECTED
MIDAZOLAM UR QL SCN: NOT DETECTED
MORPHINE UR QL: PRESENT
NORBUPRENORPHINE UR QL CFM: NOT DETECTED
NORDIAZEPAM UR QL: NOT DETECTED
NORFENTANYL UR QL: NOT DETECTED
NORHYDROCODONE UR QL CFM: PRESENT
NOROXYCODONE UR QL CFM: NOT DETECTED
NOROXYMORPHONE UR QL SCN: NOT DETECTED
OXAZEPAM UR QL: NOT DETECTED
OXYCODONE UR QL: NOT DETECTED
OXYMORPHONE UR QL: NOT DETECTED
PATHOLOGY STUDY: NORMAL
PCP UR QL: NOT DETECTED
PHENTERMINE UR QL: NOT DETECTED
PROPOXYPH UR QL: NOT DETECTED
SERVICE CMNT-IMP: NORMAL
TAPENTADOL UR QL SCN: NOT DETECTED
TAPENTADOL UR QL SCN: NOT DETECTED
TEMAZEPAM UR QL: NOT DETECTED
TRAMADOL UR QL: NOT DETECTED
ZOLPIDEM UR QL: NOT DETECTED

## 2020-10-12 LAB — 25(OH)D3 SERPL-MCNC: 29.6 NG/ML (ref 30–80)

## 2020-10-13 ENCOUNTER — COMMUNICATION - HEALTHEAST (OUTPATIENT)
Dept: PALLIATIVE MEDICINE | Facility: OTHER | Age: 65
End: 2020-10-13

## 2020-10-13 DIAGNOSIS — F11.90 CHRONIC, CONTINUOUS USE OF OPIOIDS: ICD-10-CM

## 2020-10-15 ENCOUNTER — COMMUNICATION - HEALTHEAST (OUTPATIENT)
Dept: ENDOCRINOLOGY | Facility: CLINIC | Age: 65
End: 2020-10-15

## 2020-10-15 ENCOUNTER — AMBULATORY - HEALTHEAST (OUTPATIENT)
Dept: ENDOCRINOLOGY | Facility: CLINIC | Age: 65
End: 2020-10-15

## 2020-10-15 DIAGNOSIS — M81.0 AGE RELATED OSTEOPOROSIS, UNSPECIFIED PATHOLOGICAL FRACTURE PRESENCE: ICD-10-CM

## 2020-10-17 ENCOUNTER — COMMUNICATION - HEALTHEAST (OUTPATIENT)
Dept: INTERNAL MEDICINE | Facility: CLINIC | Age: 65
End: 2020-10-17

## 2020-10-17 DIAGNOSIS — K21.9 GASTROESOPHAGEAL REFLUX DISEASE WITHOUT ESOPHAGITIS: ICD-10-CM

## 2020-10-17 LAB
6MAM UR CFM-MCNC: <10 NG/ML
CODEINE UR CFM-MCNC: 1813 NG/ML
HYDROCODONE UR CFM-MCNC: 433 NG/ML
HYDROMORPHONE UR CFM-MCNC: <20 NG/ML
MORPHINE UR CFM-MCNC: 270 NG/ML
NORHYDROCODONE UR CFM-MCNC: 467 NG/ML
NOROXYCODONE UR CFM-MCNC: <20 NG/ML
NOROXYMORPHONE UR QL SCN: <20 NG/ML
OXYCODONE UR CFM-MCNC: <20 NG/ML
OXYMORPHONE UR CFM-MCNC: <20 NG/ML

## 2020-10-23 ENCOUNTER — OFFICE VISIT - HEALTHEAST (OUTPATIENT)
Dept: ENDOCRINOLOGY | Facility: CLINIC | Age: 65
End: 2020-10-23

## 2020-10-23 DIAGNOSIS — M81.0 AGE RELATED OSTEOPOROSIS, UNSPECIFIED PATHOLOGICAL FRACTURE PRESENCE: ICD-10-CM

## 2020-10-23 DIAGNOSIS — E56.9 VITAMIN DEFICIENCY: ICD-10-CM

## 2020-10-26 ENCOUNTER — COMMUNICATION - HEALTHEAST (OUTPATIENT)
Dept: INTERNAL MEDICINE | Facility: CLINIC | Age: 65
End: 2020-10-26

## 2020-10-26 DIAGNOSIS — R11.0 NAUSEA: ICD-10-CM

## 2020-10-28 ENCOUNTER — COMMUNICATION - HEALTHEAST (OUTPATIENT)
Dept: PALLIATIVE MEDICINE | Facility: OTHER | Age: 65
End: 2020-10-28

## 2020-10-28 DIAGNOSIS — G89.4 CHRONIC PAIN SYNDROME: ICD-10-CM

## 2020-11-05 ENCOUNTER — AMBULATORY - HEALTHEAST (OUTPATIENT)
Dept: ENDOCRINOLOGY | Facility: CLINIC | Age: 65
End: 2020-11-05

## 2020-11-05 DIAGNOSIS — M81.0 AGE RELATED OSTEOPOROSIS, UNSPECIFIED PATHOLOGICAL FRACTURE PRESENCE: ICD-10-CM

## 2020-11-10 ENCOUNTER — COMMUNICATION - HEALTHEAST (OUTPATIENT)
Dept: PALLIATIVE MEDICINE | Facility: OTHER | Age: 65
End: 2020-11-10

## 2020-11-12 ENCOUNTER — COMMUNICATION - HEALTHEAST (OUTPATIENT)
Dept: PHARMACY | Facility: OTHER | Age: 65
End: 2020-11-12

## 2020-11-12 ENCOUNTER — HOSPITAL ENCOUNTER (OUTPATIENT)
Dept: PHARMACY | Facility: OTHER | Age: 65
Discharge: HOME | End: 2020-11-12
Attending: PHARMACIST

## 2020-11-12 DIAGNOSIS — N32.81 OAB (OVERACTIVE BLADDER): ICD-10-CM

## 2020-11-12 DIAGNOSIS — G89.4 CHRONIC PAIN SYNDROME: ICD-10-CM

## 2020-11-12 DIAGNOSIS — M81.0 AGE RELATED OSTEOPOROSIS, UNSPECIFIED PATHOLOGICAL FRACTURE PRESENCE: ICD-10-CM

## 2020-11-12 DIAGNOSIS — F32.A DEPRESSION, UNSPECIFIED DEPRESSION TYPE: ICD-10-CM

## 2020-11-12 DIAGNOSIS — F41.9 ANXIETY: ICD-10-CM

## 2020-11-12 DIAGNOSIS — Z79.891 LONG TERM (CURRENT) USE OF OPIATE ANALGESIC: ICD-10-CM

## 2020-11-12 DIAGNOSIS — R11.0 NAUSEA: ICD-10-CM

## 2020-11-12 DIAGNOSIS — E56.9 VITAMIN DEFICIENCY: ICD-10-CM

## 2020-11-12 DIAGNOSIS — H10.45 CHRONIC ALLERGIC CONJUNCTIVITIS: ICD-10-CM

## 2020-11-12 DIAGNOSIS — G90.522 COMPLEX REGIONAL PAIN SYNDROME I OF LEFT LOWER LIMB: ICD-10-CM

## 2020-11-12 PROCEDURE — 99607 MTMS BY PHARM ADDL 15 MIN: CPT | Performed by: PHARMACIST

## 2020-11-12 PROCEDURE — 99605 MTMS BY PHARM NP 15 MIN: CPT | Performed by: PHARMACIST

## 2020-11-24 ENCOUNTER — COMMUNICATION - HEALTHEAST (OUTPATIENT)
Dept: PALLIATIVE MEDICINE | Facility: OTHER | Age: 65
End: 2020-11-24

## 2020-11-24 DIAGNOSIS — G89.4 CHRONIC PAIN SYNDROME: ICD-10-CM

## 2020-12-03 ENCOUNTER — HOSPITAL ENCOUNTER (OUTPATIENT)
Dept: PHARMACY | Facility: OTHER | Age: 65
Discharge: HOME | End: 2020-12-03
Attending: PHARMACIST

## 2020-12-03 ENCOUNTER — COMMUNICATION - HEALTHEAST (OUTPATIENT)
Dept: PHARMACY | Facility: OTHER | Age: 65
End: 2020-12-03

## 2020-12-03 DIAGNOSIS — F32.A DEPRESSION, UNSPECIFIED DEPRESSION TYPE: ICD-10-CM

## 2020-12-03 DIAGNOSIS — G90.522 COMPLEX REGIONAL PAIN SYNDROME I OF LEFT LOWER LIMB: ICD-10-CM

## 2020-12-03 DIAGNOSIS — Z79.891 LONG TERM (CURRENT) USE OF OPIATE ANALGESIC: ICD-10-CM

## 2020-12-03 DIAGNOSIS — F41.9 ANXIETY: ICD-10-CM

## 2020-12-03 PROCEDURE — 99607 MTMS BY PHARM ADDL 15 MIN: CPT | Performed by: PHARMACIST

## 2020-12-03 PROCEDURE — 99606 MTMS BY PHARM EST 15 MIN: CPT | Performed by: PHARMACIST

## 2020-12-08 ENCOUNTER — COMMUNICATION - HEALTHEAST (OUTPATIENT)
Dept: PALLIATIVE MEDICINE | Facility: OTHER | Age: 65
End: 2020-12-08

## 2020-12-08 DIAGNOSIS — G89.4 CHRONIC PAIN SYNDROME: ICD-10-CM

## 2020-12-10 ENCOUNTER — AMBULATORY - HEALTHEAST (OUTPATIENT)
Dept: ENDOCRINOLOGY | Facility: CLINIC | Age: 65
End: 2020-12-10

## 2020-12-16 ENCOUNTER — HOSPITAL ENCOUNTER (OUTPATIENT)
Dept: PALLIATIVE MEDICINE | Facility: OTHER | Age: 65
Discharge: HOME | End: 2020-12-16
Attending: PHYSICIAN ASSISTANT

## 2020-12-16 DIAGNOSIS — F11.90 CHRONIC, CONTINUOUS USE OF OPIOIDS: ICD-10-CM

## 2020-12-16 DIAGNOSIS — G89.4 CHRONIC PAIN SYNDROME: ICD-10-CM

## 2020-12-16 DIAGNOSIS — G90.522 COMPLEX REGIONAL PAIN SYNDROME I OF LEFT LOWER LIMB: ICD-10-CM

## 2020-12-16 RX ORDER — LAMOTRIGINE 200 MG/1
200 TABLET ORAL DAILY
Status: SHIPPED | COMMUNITY
Start: 2020-11-24 | End: 2021-10-28

## 2020-12-16 ASSESSMENT — MIFFLIN-ST. JEOR: SCORE: 1116.28

## 2020-12-22 ENCOUNTER — COMMUNICATION - HEALTHEAST (OUTPATIENT)
Dept: PALLIATIVE MEDICINE | Facility: OTHER | Age: 65
End: 2020-12-22

## 2020-12-30 ENCOUNTER — COMMUNICATION - HEALTHEAST (OUTPATIENT)
Dept: PALLIATIVE MEDICINE | Facility: OTHER | Age: 65
End: 2020-12-30

## 2021-01-13 ENCOUNTER — COMMUNICATION - HEALTHEAST (OUTPATIENT)
Dept: ENDOCRINOLOGY | Facility: CLINIC | Age: 66
End: 2021-01-13

## 2021-01-13 ENCOUNTER — HOSPITAL ENCOUNTER (OUTPATIENT)
Dept: PALLIATIVE MEDICINE | Facility: OTHER | Age: 66
Discharge: HOME | End: 2021-01-13
Attending: PHYSICIAN ASSISTANT

## 2021-01-13 DIAGNOSIS — G90.522 COMPLEX REGIONAL PAIN SYNDROME I OF LEFT LOWER LIMB: ICD-10-CM

## 2021-01-13 DIAGNOSIS — F11.90 CHRONIC, CONTINUOUS USE OF OPIOIDS: ICD-10-CM

## 2021-01-13 DIAGNOSIS — G89.4 CHRONIC PAIN SYNDROME: ICD-10-CM

## 2021-01-13 ASSESSMENT — MIFFLIN-ST. JEOR: SCORE: 1116.28

## 2021-01-15 ENCOUNTER — AMBULATORY - HEALTHEAST (OUTPATIENT)
Dept: ENDOCRINOLOGY | Facility: CLINIC | Age: 66
End: 2021-01-15

## 2021-01-15 DIAGNOSIS — Z92.29 PERSONAL HISTORY OF OTHER DRUG THERAPY: ICD-10-CM

## 2021-01-15 DIAGNOSIS — M81.0 AGE RELATED OSTEOPOROSIS, UNSPECIFIED PATHOLOGICAL FRACTURE PRESENCE: ICD-10-CM

## 2021-01-22 ENCOUNTER — AMBULATORY - HEALTHEAST (OUTPATIENT)
Dept: LAB | Facility: CLINIC | Age: 66
End: 2021-01-22

## 2021-01-22 DIAGNOSIS — M81.0 AGE RELATED OSTEOPOROSIS, UNSPECIFIED PATHOLOGICAL FRACTURE PRESENCE: ICD-10-CM

## 2021-01-22 DIAGNOSIS — E56.9 VITAMIN DEFICIENCY: ICD-10-CM

## 2021-01-25 LAB
25(OH)D3 SERPL-MCNC: 56.1 NG/ML (ref 30–80)
25(OH)D3 SERPL-MCNC: 56.1 NG/ML (ref 30–80)

## 2021-01-26 ENCOUNTER — AMBULATORY - HEALTHEAST (OUTPATIENT)
Dept: ENDOCRINOLOGY | Facility: CLINIC | Age: 66
End: 2021-01-26

## 2021-02-05 ENCOUNTER — COMMUNICATION - HEALTHEAST (OUTPATIENT)
Dept: INTERNAL MEDICINE | Facility: CLINIC | Age: 66
End: 2021-02-05

## 2021-02-11 ENCOUNTER — HOSPITAL ENCOUNTER (OUTPATIENT)
Dept: PALLIATIVE MEDICINE | Facility: OTHER | Age: 66
Discharge: HOME | End: 2021-02-11
Attending: PHYSICIAN ASSISTANT

## 2021-02-11 DIAGNOSIS — F11.90 CHRONIC, CONTINUOUS USE OF OPIOIDS: ICD-10-CM

## 2021-02-11 DIAGNOSIS — G89.4 CHRONIC PAIN SYNDROME: ICD-10-CM

## 2021-02-11 DIAGNOSIS — G90.522 COMPLEX REGIONAL PAIN SYNDROME I OF LEFT LOWER LIMB: ICD-10-CM

## 2021-02-11 ASSESSMENT — MIFFLIN-ST. JEOR: SCORE: 1116.28

## 2021-03-11 ENCOUNTER — HOSPITAL ENCOUNTER (OUTPATIENT)
Dept: PALLIATIVE MEDICINE | Facility: OTHER | Age: 66
Discharge: HOME | End: 2021-03-11
Attending: PHYSICIAN ASSISTANT

## 2021-03-11 ENCOUNTER — COMMUNICATION - HEALTHEAST (OUTPATIENT)
Dept: PALLIATIVE MEDICINE | Facility: OTHER | Age: 66
End: 2021-03-11

## 2021-03-11 DIAGNOSIS — F11.90 CHRONIC, CONTINUOUS USE OF OPIOIDS: ICD-10-CM

## 2021-03-11 DIAGNOSIS — G89.4 CHRONIC PAIN SYNDROME: ICD-10-CM

## 2021-03-11 DIAGNOSIS — G90.522 COMPLEX REGIONAL PAIN SYNDROME I OF LEFT LOWER LIMB: ICD-10-CM

## 2021-03-11 LAB
ANION GAP SERPL CALCULATED.3IONS-SCNC: 9 MMOL/L (ref 5–18)
BUN SERPL-MCNC: 16 MG/DL (ref 8–22)
CALCIUM SERPL-MCNC: 9.1 MG/DL (ref 8.5–10.5)
CHLORIDE BLD-SCNC: 104 MMOL/L (ref 98–107)
CO2 SERPL-SCNC: 28 MMOL/L (ref 22–31)
CREAT SERPL-MCNC: 0.88 MG/DL (ref 0.6–1.1)
GFR SERPL CREATININE-BSD FRML MDRD: >60 ML/MIN/1.73M2
GLUCOSE BLD-MCNC: 88 MG/DL (ref 70–125)
POTASSIUM BLD-SCNC: 4.5 MMOL/L (ref 3.5–5)
SODIUM SERPL-SCNC: 141 MMOL/L (ref 136–145)

## 2021-03-11 ASSESSMENT — MIFFLIN-ST. JEOR: SCORE: 1116.28

## 2021-03-12 ENCOUNTER — COMMUNICATION - HEALTHEAST (OUTPATIENT)
Dept: PALLIATIVE MEDICINE | Facility: OTHER | Age: 66
End: 2021-03-12

## 2021-03-15 ENCOUNTER — COMMUNICATION - HEALTHEAST (OUTPATIENT)
Dept: INTERNAL MEDICINE | Facility: CLINIC | Age: 66
End: 2021-03-15

## 2021-03-15 DIAGNOSIS — Z12.31 VISIT FOR SCREENING MAMMOGRAM: ICD-10-CM

## 2021-03-18 ENCOUNTER — OFFICE VISIT - HEALTHEAST (OUTPATIENT)
Dept: INTERNAL MEDICINE | Facility: CLINIC | Age: 66
End: 2021-03-18

## 2021-03-18 DIAGNOSIS — M54.50 CHRONIC BILATERAL LOW BACK PAIN WITHOUT SCIATICA: ICD-10-CM

## 2021-03-18 DIAGNOSIS — G89.29 CHRONIC BILATERAL LOW BACK PAIN WITHOUT SCIATICA: ICD-10-CM

## 2021-03-18 DIAGNOSIS — G89.4 CHRONIC PAIN SYNDROME: ICD-10-CM

## 2021-04-01 ENCOUNTER — COMMUNICATION - HEALTHEAST (OUTPATIENT)
Dept: PALLIATIVE MEDICINE | Facility: OTHER | Age: 66
End: 2021-04-01

## 2021-04-02 ENCOUNTER — COMMUNICATION - HEALTHEAST (OUTPATIENT)
Dept: PALLIATIVE MEDICINE | Facility: OTHER | Age: 66
End: 2021-04-02

## 2021-04-02 ENCOUNTER — HOSPITAL ENCOUNTER (OUTPATIENT)
Dept: MRI IMAGING | Facility: CLINIC | Age: 66
Discharge: HOME | End: 2021-04-02

## 2021-04-02 DIAGNOSIS — M54.50 CHRONIC BILATERAL LOW BACK PAIN WITHOUT SCIATICA: ICD-10-CM

## 2021-04-02 DIAGNOSIS — G89.29 CHRONIC BILATERAL LOW BACK PAIN WITHOUT SCIATICA: ICD-10-CM

## 2021-04-06 ENCOUNTER — COMMUNICATION - HEALTHEAST (OUTPATIENT)
Dept: PALLIATIVE MEDICINE | Facility: OTHER | Age: 66
End: 2021-04-06

## 2021-04-06 ENCOUNTER — COMMUNICATION - HEALTHEAST (OUTPATIENT)
Dept: INTERNAL MEDICINE | Facility: CLINIC | Age: 66
End: 2021-04-06

## 2021-04-06 DIAGNOSIS — G89.4 CHRONIC PAIN SYNDROME: ICD-10-CM

## 2021-04-07 ENCOUNTER — COMMUNICATION - HEALTHEAST (OUTPATIENT)
Dept: INTERNAL MEDICINE | Facility: CLINIC | Age: 66
End: 2021-04-07

## 2021-04-15 ENCOUNTER — HOSPITAL ENCOUNTER (OUTPATIENT)
Dept: PALLIATIVE MEDICINE | Facility: OTHER | Age: 66
Discharge: HOME | End: 2021-04-15
Attending: PHYSICIAN ASSISTANT

## 2021-04-15 DIAGNOSIS — M47.816 SPONDYLOSIS OF LUMBAR REGION WITHOUT MYELOPATHY OR RADICULOPATHY: ICD-10-CM

## 2021-04-15 DIAGNOSIS — G90.522 COMPLEX REGIONAL PAIN SYNDROME I OF LEFT LOWER LIMB: ICD-10-CM

## 2021-04-15 DIAGNOSIS — F11.90 CHRONIC, CONTINUOUS USE OF OPIOIDS: ICD-10-CM

## 2021-04-15 DIAGNOSIS — G89.4 CHRONIC PAIN SYNDROME: ICD-10-CM

## 2021-04-15 RX ORDER — DIAZEPAM 5 MG
TABLET ORAL
Qty: 2 TABLET | Refills: 0 | Status: SHIPPED | OUTPATIENT
Start: 2021-04-15 | End: 2021-10-28

## 2021-04-15 ASSESSMENT — MIFFLIN-ST. JEOR: SCORE: 1116.28

## 2021-04-16 ENCOUNTER — COMMUNICATION - HEALTHEAST (OUTPATIENT)
Dept: INTERNAL MEDICINE | Facility: CLINIC | Age: 66
End: 2021-04-16

## 2021-04-16 DIAGNOSIS — R11.0 NAUSEA: ICD-10-CM

## 2021-04-19 RX ORDER — ONDANSETRON 4 MG/1
TABLET, FILM COATED ORAL
Qty: 30 TABLET | Refills: 0 | Status: SHIPPED | OUTPATIENT
Start: 2021-04-19 | End: 2021-07-23

## 2021-05-04 ENCOUNTER — COMMUNICATION - HEALTHEAST (OUTPATIENT)
Dept: INTERNAL MEDICINE | Facility: CLINIC | Age: 66
End: 2021-05-04

## 2021-05-04 DIAGNOSIS — N32.81 OVERACTIVE BLADDER: ICD-10-CM

## 2021-05-05 ENCOUNTER — COMMUNICATION - HEALTHEAST (OUTPATIENT)
Dept: ENDOCRINOLOGY | Facility: CLINIC | Age: 66
End: 2021-05-05

## 2021-05-05 DIAGNOSIS — M81.0 AGE RELATED OSTEOPOROSIS, UNSPECIFIED PATHOLOGICAL FRACTURE PRESENCE: ICD-10-CM

## 2021-05-05 RX ORDER — TOLTERODINE 4 MG/1
4 CAPSULE, EXTENDED RELEASE ORAL
Qty: 90 CAPSULE | Refills: 1 | Status: SHIPPED | OUTPATIENT
Start: 2021-05-05 | End: 2021-10-31

## 2021-05-13 ENCOUNTER — HOSPITAL ENCOUNTER (OUTPATIENT)
Dept: PALLIATIVE MEDICINE | Facility: OTHER | Age: 66
Discharge: HOME | End: 2021-05-13
Attending: PHYSICIAN ASSISTANT
Payer: MEDICARE

## 2021-05-13 DIAGNOSIS — G90.522 COMPLEX REGIONAL PAIN SYNDROME I OF LEFT LOWER LIMB: ICD-10-CM

## 2021-05-13 DIAGNOSIS — G89.4 CHRONIC PAIN SYNDROME: ICD-10-CM

## 2021-05-13 DIAGNOSIS — F11.90 CHRONIC, CONTINUOUS USE OF OPIOIDS: ICD-10-CM

## 2021-05-13 DIAGNOSIS — M47.817 LUMBOSACRAL SPONDYLOSIS WITHOUT MYELOPATHY: ICD-10-CM

## 2021-05-13 ASSESSMENT — MIFFLIN-ST. JEOR: SCORE: 1116.28

## 2021-05-24 ENCOUNTER — RECORDS - HEALTHEAST (OUTPATIENT)
Dept: ADMINISTRATIVE | Facility: CLINIC | Age: 66
End: 2021-05-24

## 2021-05-26 ENCOUNTER — RECORDS - HEALTHEAST (OUTPATIENT)
Dept: ADMINISTRATIVE | Facility: CLINIC | Age: 66
End: 2021-05-26

## 2021-05-27 VITALS — HEIGHT: 60 IN | BODY MASS INDEX: 28.47 KG/M2 | WEIGHT: 145 LBS

## 2021-05-27 VITALS — OXYGEN SATURATION: 95 % | SYSTOLIC BLOOD PRESSURE: 140 MMHG | DIASTOLIC BLOOD PRESSURE: 82 MMHG | HEART RATE: 81 BPM

## 2021-05-27 ASSESSMENT — PATIENT HEALTH QUESTIONNAIRE - PHQ9: SUM OF ALL RESPONSES TO PHQ QUESTIONS 1-9: 17

## 2021-05-27 NOTE — PROGRESS NOTES
[facility]  ENDOCRINOLOGY    Osteoporosis Follow Up 4/7/2019    Conrad Zhu, 1955, [id number]          Reason for visit      1. Osteoporosis without current pathological fracture, unspecified osteoporosis type        History     Conrad Zhu is a very pleasant 63 y.o. old female who presents for follow up.   SUMMARY:  1. OSTEOPOROSIS. The diagnosis was made based on fragility fracture of her left 5th metatarsals, Right wrist-Colles type s/p ORIF in 2014. She also had a baseline DXA scan confirming osteoporosis.   The patient has the following risk factors for osteoporosis:   Age, gender, , BMI-she keeps her weight close to underweight range since adolescence but denied eating disorders. The patient is not on high risk medications such as glucocorticoids, anti-coagulants, chemotherapy, levothyroxine. BUT she is on gabapentin.   The following high- risk conditions have been ruled out: celiac disease, gastric bypass, hyperparathyroidism, inflammatory bowel disease, hyperthyroidism, rheumatoid arthritis, lupus, chronic kidney disease. I suspect an eating disorder but she vehemently denies.   Gyn History: Patient denied any prior hysterectomy, ovariectomy, breast cancer or family history of breast cancer Menopause was at age: 48 years. There has been a height loss of 0 inches.   Patient is currently on calcium supplements: 600 mg/day and vitamin D supplements 1000 IU/day. Dairy intake: She has lactose intolerance so no dairy for many years.   Investigations so far:   DXA scan dated 3/26/2014: (attached to chart):   Left Femoral Neck T-Score: -2.5   Right femoral Neck T-Score: -2.3   Total Left femoral T-Score: -2.8   Total Right femoral T-score: -2.4   A-P Spine T-Score: -1.9        TODAY:  Conrad returns today in f/u for Osteoporosis.  She reports that she is not interested in getting a Prolia injection today.  She had a tooth extraction last year, and it didn't heal as fast as the Dentist thought  "that it should have.  He felt that it was because of the Prolia. She has not had an injection now since 2017, and had 5 in total.  She reports that she has continued to have problems with her jaw and is now getting expensive implants done. She does not mention any further problems with healing, however. She has been on no treatment since 2017, and is due for another Dexa Scan this year. Her last scan did show improvement in her T scores with the Prolia injections.     Risk Factors     The following high- risk conditions have been ruled out: celiac disease, eating disorders, gastric bypass, hyperparathyroidism, inflammatory bowel disease, hyperthyroidism, rheumatoid arthritis, lupus, chronic kidney disease.    Conrad Zhu has the following risk factors: Age, Female gender and     She is not on high risk medications such as glucocorticoids, anti-coagulants, anti-convulsants, chemotherapy or levothyroxine.    Patient deniesHysterectomy, Oophrectomy, Breast cancer and Family history of breast cancer.        Past Medical History     Patient Active Problem List   Diagnosis     History of cocaine abuse     Anxiety     Nausea     OAB (overactive bladder)     Low back pain     Chronic pain     PN (peripheral neuropathy)     Insomnia     Migraine headache     Osteoporosis     Former smoker     Decubitus ulcer of left heel, stage 3 (H)       Family History       family history includes Cancer in her brother, father, mother, and sister.    Social History      reports that she has quit smoking. She has never used smokeless tobacco. She reports that she does not drink alcohol or use drugs.      Review of Systems     Patient denies current pain, limited mobility, fractures.   Remainder per HPI.      Vital Signs     BP 96/64 (Patient Site: Right Arm, Patient Position: Sitting, Cuff Size: Adult Regular)   Pulse 78   Ht 4' 11\" (1.499 m)   Wt 141 lb 6.4 oz (64.1 kg)   BMI 28.56 kg/m      Physical Exam     GENERAL:  " Normal, NIRD  EYES:  Pupils equal, round and reactive to light; no proptosis, lid lag or  periorbital edema.  THYROID:  Thyroid is normal.  No tenderness or bruit  NECK: No lymph nodes  MUSCULOSKELETAL: No joint abnormalities, FROM in all four extremities. No kyphosis. Muscle strength grossly normal without evidence of wasting.  HEART:  Regular rate and rhythm without murmur.  LUNGS:  Clear to auscultation.  ABDOMEN:Soft, non-tender, no masses or organomegaly  NEURO:  Patella Reflexes were normal.No tremors  SKIN:  No acanthosis nigricans or vitiligo        Assessment     1. Osteoporosis without current pathological fracture, unspecified osteoporosis type        Plan     Pt will get another Dexa Scan done this year. Based upon findings, we will determine what needs to happen next, whether further treatment or continue on Holiday.         Total visit minutes:25  Time spent counseling and coordination of care:23    Blanca Duarte   Endocrinology  4/7/2019  8:13 AM      Current Medications     Outpatient Medications Prior to Visit   Medication Sig Dispense Refill     hydrOXYzine HCl (ATARAX) 25 MG tablet Take 25 mg by mouth 3 (three) times a day as needed for itching.       asenapine (SAPHRIS) 5 mg Subl SL tablet Place 5 mg under the tongue as needed.       buPROPion (WELLBUTRIN XL) 150 MG 24 hr tablet Take 150 mg by mouth daily.  2     clonazePAM (KLONOPIN) 0.5 MG tablet Take 1 tablet (0.5 mg total) by mouth 3 (three) times a day as needed for anxiety. 60 tablet 3     DENOSUMAB (PROLIA SUBQ) Inject under the skin every 6 (six) months.       desvenlafaxine succinate (PRISTIQ) 100 MG 24 hr tablet Take 100 mg by mouth daily.       ergocalciferol (ERGOCALCIFEROL) 50,000 unit capsule Take one cap on Sunday and Thursday 24 capsule 2     famciclovir (FAMVIR) 250 MG tablet TAKE 1 TABLET BY MOUTH 2 TIMES DAILY FOR 10 DAYS.       ipratropium (ATROVENT) 42 mcg (0.06 %) nasal spray 2 sprays into each nostril.       lamoTRIgine  (LAMICTAL) 100 MG tablet Take 1 tablet (100 mg total) by mouth daily. 90 tablet 3     ondansetron (ZOFRAN) 4 MG tablet Take 4 mg by mouth.       traZODone (DESYREL) 100 MG tablet Take 1 tablet (100 mg total) by mouth bedtime. (Patient taking differently: Take 200 mg by mouth at bedtime. ) 90 tablet 1     ammonium lactate (AMLACTIN) 12 % cream Apply two times a day 385 g 2     FLUZONE QUAD [id number], PF, 60 mcg (15 mcg x 4)/0.5 mL Syrg injection TO BE ADMINISTERED BY PHARMACIST FOR IMMUNIZATION  0     SHINGRIX, PF, 50 mcg/0.5 mL SusR TO BE ADMINISTERED BY PHARMACIST FOR IMMUNIZATION  0     Facility-Administered Medications Prior to Visit   Medication Dose Route Frequency Provider Last Rate Last Dose     lidocaine 2 % jelly (XYLOCAINE)   Topical PRN Mark Lechuga DPM   1 application at 11/16/18 1051         Lab Results     TSH   Date Value Ref Range Status   11/23/2015 0.51 0.30 - 5.00 uIU/mL Final     PTH   Date Value Ref Range Status   03/19/2015 38 10 - 86 pg/mL Final     Calcium   Date Value Ref Range Status   03/29/2019 10.7 (H) 8.5 - 10.5 mg/dL Final     Phosphorus   Date Value Ref Range Status   03/19/2015 4.1 2.5 - 4.5 mg/dL Final     Iron   Date Value Ref Range Status   06/12/2014 86 42 - 175 ug/dL Final           Imaging Results   Last DEXA scan:  Results for orders placed in visit on 09/18/17   DXA Bone Density Scan    Narrative 9/18/2017      RE: Conrad Zhu  YOB: 1955        Dear Blanca Duarte,    Patient Profile:  62 y.o. female, postmenopausal, is here for the follow up bone density   test.   History of fractures - Yes;  Wrist and Foot. Family history of   osteoporosis - None.  Family history of hip fracture: None. Smoking   history - Past. Osteoporosis treatment past -  Yes;  HRT. Osteoporosis   treatment current - Yes;  Prolia.  Chronic medical problems - Liver   disease and Spine surgery. High risk medications -  None.      Assessment:    1. The spine bone density L1-L4  with T-score -0.9.  2. Femoral bone densities show left femoral neck T- score -1.7 and right   femoral neck T-score -1.8.  3. Trabecular bone score indicates moderate trabecular bone architecture.      62 y.o. female with LOW BONE DENSITY (OSTEOPENIA) currently receiving   Prolia.     Since the previous scan done on July 28, 2015, there has been a 3.8%   increase in the lumbar spine.  Additionally, there has been a 5.5%   increase in the left total hip along with a 5.3% increase in the right   total hip.  This represents an excellent response to treatment.    Recommendations:  Ongoing evaluation of current treatment is recommended with follow up bone   density scan in 2 years.      Bone densitometry was performed on your patient using our [employer]   densitometer. The results are summarized and a copy of the actual scans   are included for your review. In conformity with the International Society   of Clinical Densitometry's most recent position statement for DXA   interpretation (2015), the diagnosis will be made on the lowest measured   T-score of the lumbar spine, femoral neck, total proximal femur or 33%   radius. Note the change in terminology for diagnostic classification from   OSTEOPENIA to LOW BONE MASS. All trending for sequential exams will be   done using multiple vertebrae or the total proximal femur. Fracture risk   is based on the WHO Fracture Risk Assessment Tool (FRAX). If additional   information is needed or if you would like to discuss the results, please   do not hesitate to call me.       Thank you for referring this patient to [facility] Osteoporosis Services.   We are happy to be of service in support of you and your practice. If you   have any questions or suggestions to improve our service, please call me   at [phone].     Sincerely,     Kamila Day M.D. [place].  Osteoporosis Services, [facility]

## 2021-05-28 ASSESSMENT — ANXIETY QUESTIONNAIRES: GAD7 TOTAL SCORE: 16

## 2021-05-29 ENCOUNTER — RECORDS - HEALTHEAST (OUTPATIENT)
Dept: ADMINISTRATIVE | Facility: CLINIC | Age: 66
End: 2021-05-29

## 2021-05-30 ENCOUNTER — RECORDS - HEALTHEAST (OUTPATIENT)
Dept: ADMINISTRATIVE | Facility: CLINIC | Age: 66
End: 2021-05-30

## 2021-05-30 NOTE — TELEPHONE ENCOUNTER
She asked if a refill of Tylenol 3 could be sent to [id number].

## 2021-05-30 NOTE — PROGRESS NOTES
FOOT AND ANKLE SURGERY/PODIATRY CONSULT NOTE         ASSESSMENT:   Achilles tendinitis right  Peroneal Tendinitis right      TREATMENT:  -Patient's symptoms appear to be greatest along the distal achilles tendon and along peroneal tendons. There is mild forefoot pain along the central metatarsals. I personally reviewed her x-rays of the right ankle which are negative for fracture, dislocation.     -I have referred her for an MRI of the right ankle. Recommend limited walking in a CAM boot. Rx Naproxen and Norco.    -She will follow-up with me after the MRI report is available and we will be guided by the results.      Mark Lechuga, SHENG  [facility] Foot & Ankle Surgery/Podiatry      HPI: I was asked to see Conrad Zhu today for right foot and ankle pain which started one month ago after the patient injured her foot. She describes toe toes bending back towards her foot. She has been seen in urgent care and with an orthopedic surgeon but continues to have pain. Previous x-rays with [facility] were negative. She currently describes severe pain along the right achilles tendon and requests pain medication and anti-inflammatory medication.     Past Medical History:   Diagnosis Date     Anxiety      Chronic pain 8/14/2016     Depression      Former smoker      History of cocaine abuse      Insomnia 8/14/2016     Liver disease      Low back pain 8/14/2016     Migraine headache 8/14/2016     Osteoporosis 8/14/2016     PN (peripheral neuropathy) 8/14/2016       Past Surgical History:   Procedure Laterality Date     EXPLORATORY LAPAROTOMY       TUBAL LIGATION         Allergies   Allergen Reactions     Compazine [Prochlorperazine]      Pt stated mouth freezes         Current Outpatient Medications:      buPROPion (WELLBUTRIN XL) 150 MG 24 hr tablet, Take 150 mg by mouth daily., Disp: , Rfl: 2     clonazePAM (KLONOPIN) 0.5 MG tablet, Take 1 tablet (0.5 mg total) by mouth 3 (three) times a day as needed for anxiety., Disp: 60  tablet, Rfl: 3     desvenlafaxine succinate (PRISTIQ) 100 MG 24 hr tablet, Take 100 mg by mouth daily., Disp: , Rfl:      ergocalciferol (VITAMIN D2) 50,000 unit capsule, TAKE ONE CAPSULE BY MOUTH ON SUNDAY AND THURSDAY., Disp: 16 capsule, Rfl: 0     ipratropium (ATROVENT) 42 mcg (0.06 %) nasal spray, 2 sprays into each nostril., Disp: , Rfl:      lamoTRIgine (LAMICTAL) 100 MG tablet, Take 1 tablet (100 mg total) by mouth daily., Disp: 90 tablet, Rfl: 3     ondansetron (ZOFRAN) 4 MG tablet, Take 4 mg by mouth., Disp: , Rfl:      traZODone (DESYREL) 100 MG tablet, Take 1 tablet (100 mg total) by mouth bedtime. (Patient taking differently: Take 200 mg by mouth at bedtime. ), Disp: 90 tablet, Rfl: 1     asenapine (SAPHRIS) 5 mg Subl SL tablet, Place 5 mg under the tongue as needed., Disp: , Rfl:      famciclovir (FAMVIR) 250 MG tablet, TAKE 1 TABLET BY MOUTH 2 TIMES DAILY FOR 10 DAYS., Disp: , Rfl:      HYDROcodone-acetaminophen 5-325 mg per tablet, Take 1 tablet by mouth every 4 (four) hours as needed for pain., Disp: 18 tablet, Rfl: 0     naproxen (NAPROSYN) 500 MG tablet, Take 1 tablet (500 mg total) by mouth 2 (two) times a day with meals for 14 days., Disp: 28 tablet, Rfl: 0    Current Facility-Administered Medications:      lidocaine 2 % jelly (XYLOCAINE), , Topical, PRN, Mark Lechuga, DPM, 1 application at 11/16/18 1051    Family History   Problem Relation Age of Onset     Cancer Mother      Cancer Father      Cancer Sister      Cancer Brother        Social History     Socioeconomic History     Marital status: Single     Spouse name: Not on file     Number of children: Not on file     Years of education: Not on file     Highest education level: Not on file   Occupational History     Not on file   Social Needs     Financial resource strain: Not on file     Food insecurity:     Worry: Not on file     Inability: Not on file     Transportation needs:     Medical: Not on file     Non-medical: Not on file    Tobacco Use     Smoking status: Former Smoker     Smokeless tobacco: Never Used   Substance and Sexual Activity     Alcohol use: No     Drug use: No     Sexual activity: Not on file   Lifestyle     Physical activity:     Days per week: Not on file     Minutes per session: Not on file     Stress: Not on file   Relationships     Social connections:     Talks on phone: Not on file     Gets together: Not on file     Attends Hindu service: Not on file     Active member of club or organization: Not on file     Attends meetings of clubs or organizations: Not on file     Relationship status: Not on file     Intimate partner violence:     Fear of current or ex partner: Not on file     Emotionally abused: Not on file     Physically abused: Not on file     Forced sexual activity: Not on file   Other Topics Concern     Not on file   Social History Narrative     Not on file       Review of Systems - Patient denies fever, chills, rash, wound, stiffness, limping, numbness, weakness, heart burn, blood in stool, chest pain with activity, calf pain when walking, shortness of breath with activity, chronic cough, easy bleeding/bruising, swelling of ankles, excessive thirst, fatigue, depression, anxiety.       OBJECTIVE:  Appearance: alert, well appearing, and in no distress.    Vitals:    07/01/19 0919   BP: 148/78   Pulse: 84   Resp: 18   Temp: 98.5  F (36.9  C)       BMI= There is no height or weight on file to calculate BMI.    General appearance: Patient is alert and fully cooperative with history & exam.  No sign of distress is noted during the visit.     Psychiatric: Affect is pleasant & appropriate.  Patient appears motivated to improve health.     Respiratory: Breathing is regular & unlabored while sitting.     HEENT: Hearing is intact to spoken word.  Speech is clear.  No gross evidence of visual impairment that would impact ambulation.      Vascular: Dorsalis pedis and posterior tibial pulses are palpable. There is  pedal hair growth right.  CFT < 3 sec from anterior tibial surface to distal digits right. There is no appreciable edema noted.  Dermatologic: Turgor and texture are within normal limits. No coloration or temperature changes. No primary or secondary lesions noted.  Neurologic: All epicritic and proprioceptive sensations are grossly intact right.  Musculoskeletal: Pain along distal achilles tendon both proximal and at insertion to calcaneus right. Pain to palpation also along peroneal tendons right proximal and distal to lateral malleolus. No pain along distal tibia/fibula right. Very mild tenderness along central metatarsals right.     Imaging:     No results found.

## 2021-05-30 NOTE — PROGRESS NOTES
FOOT AND ANKLE SURGERY/PODIATRY Progress Note        ASSESSMENT:   Calcaneal Fracture right   Fracture 4th metatarsal right   Stress fracture right foot      TREATMENT:  -MRI report indicates: 1. nondisplaced incomplete fracture of the calcaneus posterosuperiorly near the posterior subtalar joint. 2.  Contusions or severe stress reactions of the second and third metatarsal shafts. 3.  Trabecular fracture of the distal shaft of the fourth metatarsal. 4.  Low-grade sprains of the anterior talofibular and calcaneofibular ligaments.      -I personally reviewed the patient's x-rays which are negative for cortical reaction.     -Based on the above, I recommend non-weight bearing with immobilization in a CAM boot. Referred for a rolling knee walker. Surgical treatment is not indicated at this time.     -She will follow-up with me in 3 weeks for repeat x-rays on the right foot.    Mark Lechuga DPM  [facility] Foot & Ankle Surgery/Podiatry       HPI: Conrad Zhu was seen again today for right foot pain and to review the MRI report. She reports having increased foot pain over the past few days.       Past Medical History:   Diagnosis Date     Anxiety      Chronic pain 8/14/2016     Depression      Former smoker      History of cocaine abuse      Insomnia 8/14/2016     Liver disease      Low back pain 8/14/2016     Migraine headache 8/14/2016     Osteoporosis 8/14/2016     PN (peripheral neuropathy) 8/14/2016       Past Surgical History:   Procedure Laterality Date     EXPLORATORY LAPAROTOMY       TUBAL LIGATION         Allergies   Allergen Reactions     Compazine [Prochlorperazine]      Pt stated mouth freezes         Current Outpatient Medications:      asenapine (SAPHRIS) 5 mg Subl SL tablet, Place 5 mg under the tongue as needed., Disp: , Rfl:      buPROPion (WELLBUTRIN XL) 150 MG 24 hr tablet, Take 150 mg by mouth daily., Disp: , Rfl: 2     clonazePAM (KLONOPIN) 0.5 MG tablet, Take 1 tablet (0.5 mg total) by mouth  3 (three) times a day as needed for anxiety., Disp: 60 tablet, Rfl: 3     desvenlafaxine succinate (PRISTIQ) 100 MG 24 hr tablet, Take 100 mg by mouth daily., Disp: , Rfl:      ergocalciferol (VITAMIN D2) 50,000 unit capsule, TAKE ONE CAPSULE BY MOUTH ON SUNDAY AND THURSDAY., Disp: 16 capsule, Rfl: 0     famciclovir (FAMVIR) 250 MG tablet, TAKE 1 TABLET BY MOUTH 2 TIMES DAILY FOR 10 DAYS., Disp: , Rfl:      HYDROcodone-acetaminophen 5-325 mg per tablet, Take 1 tablet by mouth every 4 (four) hours as needed for pain., Disp: 18 tablet, Rfl: 0     ipratropium (ATROVENT) 42 mcg (0.06 %) nasal spray, 2 sprays into each nostril., Disp: , Rfl:      lamoTRIgine (LAMICTAL) 100 MG tablet, Take 1 tablet (100 mg total) by mouth daily., Disp: 90 tablet, Rfl: 3     naproxen (NAPROSYN) 500 MG tablet, Take 1 tablet (500 mg total) by mouth 2 (two) times a day with meals for 14 days., Disp: 28 tablet, Rfl: 0     ondansetron (ZOFRAN) 4 MG tablet, Take 4 mg by mouth., Disp: , Rfl:      traZODone (DESYREL) 100 MG tablet, Take 1 tablet (100 mg total) by mouth bedtime. (Patient taking differently: Take 200 mg by mouth at bedtime. ), Disp: 90 tablet, Rfl: 1    Current Facility-Administered Medications:      lidocaine 2 % jelly (XYLOCAINE), , Topical, PRN, Mark Lechuga, DPM, 1 application at 11/16/18 1051    Family History   Problem Relation Age of Onset     Cancer Mother      Cancer Father      Cancer Sister      Cancer Brother        Social History     Socioeconomic History     Marital status: Single     Spouse name: Not on file     Number of children: Not on file     Years of education: Not on file     Highest education level: Not on file   Occupational History     Not on file   Social Needs     Financial resource strain: Not on file     Food insecurity:     Worry: Not on file     Inability: Not on file     Transportation needs:     Medical: Not on file     Non-medical: Not on file   Tobacco Use     Smoking status: Former Smoker      Smokeless tobacco: Never Used   Substance and Sexual Activity     Alcohol use: No     Drug use: No     Sexual activity: Not on file   Lifestyle     Physical activity:     Days per week: Not on file     Minutes per session: Not on file     Stress: Not on file   Relationships     Social connections:     Talks on phone: Not on file     Gets together: Not on file     Attends Sikh service: Not on file     Active member of club or organization: Not on file     Attends meetings of clubs or organizations: Not on file     Relationship status: Not on file     Intimate partner violence:     Fear of current or ex partner: Not on file     Emotionally abused: Not on file     Physically abused: Not on file     Forced sexual activity: Not on file   Other Topics Concern     Not on file   Social History Narrative     Not on file       10 point Review of Systems is negative        Vitals:    07/09/19 0804   BP: 144/79   Pulse: 70   Temp: 98.7  F (37.1  C)       BMI= Body mass index is 27.77 kg/m .    OBJECTIVE:  General appearance: Patient is alert and fully cooperative with history & exam.  No sign of distress is noted during the visit.  Vascular: Dorsalis pedis and posterior tibial pulses are palpable. There is pedal hair growth right.  CFT < 3 sec from anterior tibial surface to distal digits right. There is no appreciable edema noted.  Dermatologic: Turgor and texture are within normal limits. No coloration or temperature changes. No primary or secondary lesions noted.  Neurologic: All epicritic and proprioceptive sensations are grossly intact right.  Musculoskeletal: Moderate pain along lateral ankle and calcaneus, distal achilles tendon and central metatarsals right. Full ROM STJ/ankle right.     Imaging:     Mr Ankle With Contrast Right    Result Date: 7/7/2019  EXAM DATE:         07/06/2019 EXAM: MRI ANKLE RIGHT WITHOUT CONTRAST LOCATION: [facility] DATE/TIME: 07/06/2019, 7:30 AM INDICATION: Ankle pain,  recent injury. COMPARISON: 06/26/2019 x-ray. TECHNIQUE: Unenhanced. INTERPRETATION: TENDONS: Peroneal tendons are intact. No tendinopathy. No significant tendon sheath fluid. Medial flexor tendons are intact. No tendinopathy. Trace tibialis posterior tendon sheath fluid. Anterior tendons are intact. No tendinopathy. No abnormal tendon sheath fluid. Achilles tendon is intact. No tendinopathy. LIGAMENTS: Edema along the anterior talofibular ligament compatible with low-grade sprain. Edema along the calcaneofibular ligament compatible with low-grade sprain. Posterior talofibular ligament intact. Syndesmotic inferior tibiofibular ligaments are intact. Deltoid ligament intact. JOINTS AND BONES: No focal osteochondral lesion. No articular cartilage defect. No effusion. Nondisplaced incomplete fracture through the calcaneus body posteriorly and superiorly near and deep to the posterior facet. Surrounding marrow edema. Findings are best seen on series 4 and 7 images 11 and 12. Small posterior subtalar joint effusion. Adjacent soft tissue edema. Marrow edema involving the second and third metatarsal shafts. Marrow edema and likely trabecular fracture involving the distal shaft of the fourth metatarsal. CONCLUSION: 1.  Nondisplaced incomplete fracture of the calcaneus posterosuperiorly near the posterior subtalar joint. 2.  Contusions or severe stress reactions of the second and third metatarsal shafts. 3.  Trabecular fracture of the distal shaft of the fourth metatarsal. 4.  Low-grade sprains of the anterior talofibular and calcaneofibular ligaments.

## 2021-05-30 NOTE — PROGRESS NOTES
FOOT AND ANKLE SURGERY/PODIATRY Progress Note        ASSESSMENT:   Calcaneal Fracture right   Fracture 4th metatarsal right   Stress fracture right foot  Ankle Instability right         TREATMENT:  -Moderate pain continues along right 2nd and 3rd metatarsal shafts, mild pain along lateral subtalar joint. I personally reviewed the patient's x-rays which are negative for cortical reaction or fracture.     -Based on the patient's symptoms, I recommend she continue to stay limited walking in a CAM boot. She was referred for a new CAM boot today.     -She will follow-up with me in 3 weeks for repeat x-rays on the right foot.     Mark Lechuga, SHENG  [facility] Foot & Ankle Surgery/Podiatry       HPI: Conrad Zhu was seen again today for right calcaneal fracture and metatarsal stress fractures. She describes having moderate to severe pain and requests pain medication today. She has used the CAM boot but intermittently and would like a new CAM boot.    Past Medical History:   Diagnosis Date     Anxiety      Chronic pain 8/14/2016     Depression      Former smoker      History of cocaine abuse      Insomnia 8/14/2016     Liver disease      Low back pain 8/14/2016     Migraine headache 8/14/2016     Osteoporosis 8/14/2016     PN (peripheral neuropathy) 8/14/2016       Past Surgical History:   Procedure Laterality Date     EXPLORATORY LAPAROTOMY       TUBAL LIGATION         Allergies   Allergen Reactions     Compazine [Prochlorperazine]      Pt stated mouth freezes         Current Outpatient Medications:      asenapine (SAPHRIS) 5 mg Subl SL tablet, Place 5 mg under the tongue as needed., Disp: , Rfl:      clonazePAM (KLONOPIN) 0.5 MG tablet, Take 1 tablet (0.5 mg total) by mouth 3 (three) times a day as needed for anxiety. (Patient taking differently: Take 0.5 mg by mouth 2 (two) times a day as needed for anxiety.    ), Disp: 60 tablet, Rfl: 3     desvenlafaxine succinate (PRISTIQ) 100 MG 24 hr tablet, Take 100 mg by  mouth daily.    , Disp: , Rfl:      ergocalciferol (VITAMIN D2) 50,000 unit capsule, TAKE ONE CAPSULE BY MOUTH ON SUNDAY AND THURSDAY., Disp: 16 capsule, Rfl: 0     ipratropium (ATROVENT) 42 mcg (0.06 %) nasal spray, 2 sprays into each nostril., Disp: , Rfl:      lamoTRIgine (LAMICTAL) 100 MG tablet, Take 1 tablet (100 mg total) by mouth daily., Disp: 90 tablet, Rfl: 3     ondansetron (ZOFRAN) 4 MG tablet, Take 4 mg by mouth., Disp: , Rfl:      traZODone (DESYREL) 100 MG tablet, Take 1 tablet (100 mg total) by mouth bedtime. (Patient taking differently: Take 200 mg by mouth at bedtime. ), Disp: 90 tablet, Rfl: 1    Current Facility-Administered Medications:      lidocaine 2 % jelly (XYLOCAINE), , Topical, PRN, Mark Lechuga DPMAYA, 1 application at 11/16/18 1051    Family History   Problem Relation Age of Onset     Cancer Mother      Cancer Father      Cancer Sister      Cancer Brother        Social History     Socioeconomic History     Marital status: Single     Spouse name: Not on file     Number of children: Not on file     Years of education: Not on file     Highest education level: Not on file   Occupational History     Not on file   Social Needs     Financial resource strain: Not on file     Food insecurity:     Worry: Not on file     Inability: Not on file     Transportation needs:     Medical: Not on file     Non-medical: Not on file   Tobacco Use     Smoking status: Former Smoker     Smokeless tobacco: Never Used   Substance and Sexual Activity     Alcohol use: No     Drug use: No     Sexual activity: Not on file   Lifestyle     Physical activity:     Days per week: Not on file     Minutes per session: Not on file     Stress: Not on file   Relationships     Social connections:     Talks on phone: Not on file     Gets together: Not on file     Attends Confucianism service: Not on file     Active member of club or organization: Not on file     Attends meetings of clubs or organizations: Not on file      Relationship status: Not on file     Intimate partner violence:     Fear of current or ex partner: Not on file     Emotionally abused: Not on file     Physically abused: Not on file     Forced sexual activity: Not on file   Other Topics Concern     Not on file   Social History Narrative     Not on file       10 point Review of Systems is negative        Vitals:    07/30/19 1012   BP: 138/63   Pulse: 75   Temp: 98.5  F (36.9  C)       BMI= There is no height or weight on file to calculate BMI.    OBJECTIVE:  General appearance: Patient is alert and fully cooperative with history & exam.  No sign of distress is noted during the visit.  Vascular: Dorsalis pedis and posterior tibial pulses are palpable. There is pedal hair growth right.  CFT < 3 sec from anterior tibial surface to distal digits right. There is no appreciable edema noted.  Dermatologic: Turgor and texture are within normal limits. No coloration or temperature changes. No primary or secondary lesions noted.  Neurologic: All epicritic and proprioceptive sensations are grossly intact right.  Musculoskeletal: Pain along 2nd and 3rd metatarsal shafts right and along lateral calcaneus right. There is available ROM of the right STJ and ankle joint with mild discomfort.     Imaging:   Xr Foot Right 3 Or More Vws Standing    Result Date: 7/9/2019  Increased calcaneal inclination. No fracture or cortical reaction noted.     Mr Ankle With Contrast Right    Result Date: 7/7/2019  EXAM DATE:         07/06/2019 EXAM: MRI ANKLE RIGHT WITHOUT CONTRAST LOCATION: [facility] DATE/TIME: 07/06/2019, 7:30 AM INDICATION: Ankle pain, recent injury. COMPARISON: 06/26/2019 x-ray. TECHNIQUE: Unenhanced. INTERPRETATION: TENDONS: Peroneal tendons are intact. No tendinopathy. No significant tendon sheath fluid. Medial flexor tendons are intact. No tendinopathy. Trace tibialis posterior tendon sheath fluid. Anterior tendons are intact. No tendinopathy. No abnormal  tendon sheath fluid. Achilles tendon is intact. No tendinopathy. LIGAMENTS: Edema along the anterior talofibular ligament compatible with low-grade sprain. Edema along the calcaneofibular ligament compatible with low-grade sprain. Posterior talofibular ligament intact. Syndesmotic inferior tibiofibular ligaments are intact. Deltoid ligament intact. JOINTS AND BONES: No focal osteochondral lesion. No articular cartilage defect. No effusion. Nondisplaced incomplete fracture through the calcaneus body posteriorly and superiorly near and deep to the posterior facet. Surrounding marrow edema. Findings are best seen on series 4 and 7 images 11 and 12. Small posterior subtalar joint effusion. Adjacent soft tissue edema. Marrow edema involving the second and third metatarsal shafts. Marrow edema and likely trabecular fracture involving the distal shaft of the fourth metatarsal. CONCLUSION: 1.  Nondisplaced incomplete fracture of the calcaneus posterosuperiorly near the posterior subtalar joint. 2.  Contusions or severe stress reactions of the second and third metatarsal shafts. 3.  Trabecular fracture of the distal shaft of the fourth metatarsal. 4.  Low-grade sprains of the anterior talofibular and calcaneofibular ligaments.

## 2021-05-31 ENCOUNTER — RECORDS - HEALTHEAST (OUTPATIENT)
Dept: ADMINISTRATIVE | Facility: CLINIC | Age: 66
End: 2021-05-31

## 2021-05-31 VITALS — WEIGHT: 110.9 LBS | BODY MASS INDEX: 21.77 KG/M2 | HEIGHT: 60 IN
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
## 2021-06-01 VITALS — WEIGHT: 110 LBS | BODY MASS INDEX: 21.6 KG/M2 | HEIGHT: 60 IN

## 2021-06-01 NOTE — PROGRESS NOTES
FOOT AND ANKLE SURGERY/PODIATRY Progress Note        ASSESSMENT:   Contusion right foot   Calcaneal Fracture right   Fracture 4th metatarsal right   Stress fracture right foot      TREATMENT:  Contusion right foot: No pain along right forefoot. I recommend she continue to ambulate in regular shoes and gradually increase activity level.     Calcaneal Fracture right: No pain along the right calcaneus today. She will return to regular shoes at this time.     -I have referred her to Tanisha Mayorga for left leg swelling. She is discharged from my care at this time but encouraged to return as needed.     Mark Lechuga, SHENG  [facility] Foot & Ankle Surgery/Podiatry       HPI: Conrad Zhu was seen again today for follow-up right foot pain. Since our last visit she has noticed improvement with no pain with walking. She is concerned about swelling in her left leg and foot. Denies trauma.     Past Medical History:   Diagnosis Date     Anxiety      Chronic pain 8/14/2016     Depression      Former smoker      History of cocaine abuse      Insomnia 8/14/2016     Liver disease      Low back pain 8/14/2016     Migraine headache 8/14/2016     Osteoporosis 8/14/2016     PN (peripheral neuropathy) 8/14/2016       Past Surgical History:   Procedure Laterality Date     EXPLORATORY LAPAROTOMY       TUBAL LIGATION         Allergies   Allergen Reactions     Compazine [Prochlorperazine]      Pt stated mouth freezes         Current Outpatient Medications:      calcium carbonate-vitamin D3 (OS-JUSTIN 250+ D) 250-125 mg-unit Tab per tablet, Take 1 tablet by mouth 2 (two) times a day., Disp: , Rfl:      clonazePAM (KLONOPIN) 0.5 MG tablet, Take 0.5 mg by mouth 2 (two) times a day., Disp: , Rfl:      tolterodine (DETROL LA) 4 MG ER capsule, Take 4 mg by mouth daily., Disp: , Rfl:      ZOLMitriptan (ZOMIG) 5 mg nasal solution, 1 spray into each nostril as needed., Disp: , Rfl:      acetaminophen-codeine (TYLENOL #4) 300-60 mg per tablet, Take  300 mg of codeine by mouth daily., Disp: , Rfl: 0     AFLURIA QD 2019-20,3YR UP,,PF, 60 mcg (15 mcg x 4)/0.5 mL Syrg, 60 mcg by SUBMUCOSAL INJ route once., Disp: , Rfl: 0     asenapine (SAPHRIS) 5 mg Subl SL tablet, Place 5 mg under the tongue as needed., Disp: , Rfl:      clonazePAM (KLONOPIN) 0.5 MG tablet, Take 1 tablet (0.5 mg total) by mouth 3 (three) times a day as needed for anxiety. (Patient taking differently: Take 0.5 mg by mouth 2 (two) times a day as needed for anxiety.    ), Disp: 60 tablet, Rfl: 3     ipratropium (ATROVENT) 42 mcg (0.06 %) nasal spray, 2 sprays into each nostril., Disp: , Rfl:      lamoTRIgine (LAMICTAL) 100 MG tablet, Take 1 tablet (100 mg total) by mouth daily., Disp: 90 tablet, Rfl: 3     ondansetron (ZOFRAN) 4 MG tablet, Take 4 mg by mouth., Disp: , Rfl:      penicillin VK (PEN VK) 500 MG tablet, Take 500 mg by mouth daily., Disp: , Rfl: 0     traZODone (DESYREL) 100 MG tablet, Take 1 tablet (100 mg total) by mouth bedtime. (Patient taking differently: Take 200 mg by mouth at bedtime. ), Disp: 90 tablet, Rfl: 1     VIIBRYD 20 mg Tab tablet, Take 20 mg by mouth daily., Disp: , Rfl: 2    Current Facility-Administered Medications:      lidocaine 2 % jelly (XYLOCAINE), , Topical, PRN, Mark Lechuga, DPMAYA, 1 application at 11/16/18 1051    Family History   Problem Relation Age of Onset     Cancer Mother      Cancer Father      Cancer Sister      Cancer Brother        Social History     Socioeconomic History     Marital status: Single     Spouse name: Not on file     Number of children: Not on file     Years of education: Not on file     Highest education level: Not on file   Occupational History     Not on file   Social Needs     Financial resource strain: Not on file     Food insecurity:     Worry: Not on file     Inability: Not on file     Transportation needs:     Medical: Not on file     Non-medical: Not on file   Tobacco Use     Smoking status: Former Smoker     Smokeless  tobacco: Never Used   Substance and Sexual Activity     Alcohol use: No     Drug use: No     Sexual activity: Not on file   Lifestyle     Physical activity:     Days per week: Not on file     Minutes per session: Not on file     Stress: Not on file   Relationships     Social connections:     Talks on phone: Not on file     Gets together: Not on file     Attends Jainism service: Not on file     Active member of club or organization: Not on file     Attends meetings of clubs or organizations: Not on file     Relationship status: Not on file     Intimate partner violence:     Fear of current or ex partner: Not on file     Emotionally abused: Not on file     Physically abused: Not on file     Forced sexual activity: Not on file   Other Topics Concern     Not on file   Social History Narrative     Not on file       10 point Review of Systems is negative        Vitals:    09/10/19 1005   BP: 119/76   Pulse: 69   Resp: 16   Temp: 98.3  F (36.8  C)       BMI= Body mass index is 31.71 kg/m .    OBJECTIVE:  General appearance: Patient is alert and fully cooperative with history & exam.  No sign of distress is noted during the visit.  Vascular: Dorsalis pedis and posterior tibial pulses are palpable. There is pedal hair growth bilateral.  CFT < 3 sec from anterior tibial surface to distal digits bilateral. Mild edema left leg and foot.   Dermatologic: Turgor and texture are within normal limits. No coloration or temperature changes. No primary or secondary lesions noted.  Neurologic: All epicritic and proprioceptive sensations are grossly intact bilateral.  Musculoskeletal: No pain along base of the 2nd digit right and 2nd and 3rd MPJ's or metatarsal heads. No pain along right calcaneus. Full STJ ROM right without pain. No pain along left calf.     Imaging:     Xr Foot Right 3 Or More Vws Standing    Result Date: 7/30/2019  No cortical reaction, fracture, neoplasm noted.

## 2021-06-02 ENCOUNTER — RECORDS - HEALTHEAST (OUTPATIENT)
Dept: ADMINISTRATIVE | Facility: CLINIC | Age: 66
End: 2021-06-02

## 2021-06-02 VITALS — BODY MASS INDEX: 22.78 KG/M2 | HEIGHT: 59 IN | WEIGHT: 113 LBS

## 2021-06-02 VITALS — BODY MASS INDEX: 28.51 KG/M2 | WEIGHT: 141.4 LBS | HEIGHT: 59 IN

## 2021-06-02 VITALS — BODY MASS INDEX: 22.78 KG/M2 | WEIGHT: 113 LBS | HEIGHT: 59 IN

## 2021-06-02 NOTE — PROGRESS NOTES
[facility]  ENDOCRINOLOGY    Osteoporosis Follow Up 10/21/2019    Conrad Zhu, 1955, [id number]          Reason for visit      1. Osteoporosis without current pathological fracture, unspecified osteoporosis type        History     Conrad Zhu is a very pleasant 64 y.o. old female who presents for follow up.   SUMMARY:  1. OSTEOPOROSIS. The diagnosis was made based on fragility fracture of her left 5th metatarsals, Right wrist-Colles type s/p ORIF in 2014. She also had a baseline DXA scan confirming osteoporosis.   The patient has the following risk factors for osteoporosis:   Age, gender, , BMI-she keeps her weight close to underweight range since adolescence but denied eating disorders. The patient is not on high risk medications such as glucocorticoids, anti-coagulants, chemotherapy, levothyroxine. BUT she is on gabapentin.   The following high- risk conditions have been ruled out: celiac disease, gastric bypass, hyperparathyroidism, inflammatory bowel disease, hyperthyroidism, rheumatoid arthritis, lupus, chronic kidney disease. I suspect an eating disorder but she vehemently denies.   Gyn History: Patient denied any prior hysterectomy, ovariectomy, breast cancer or family history of breast cancer Menopause was at age: 48 years. There has been a height loss of 0 inches.   Patient is currently on calcium supplements: 600 mg/day and vitamin D supplements 1000 IU/day. Dairy intake: She has lactose intolerance so no dairy for many years.   Investigations so far:   DXA scan dated 3/26/2014: (attached to chart):   Left Femoral Neck T-Score: -2.5   Right femoral Neck T-Score: -2.3   Total Left femoral T-Score: -2.8   Total Right femoral T-score: -2.4   A-P Spine T-Score: -1.9   TODAY:  Conrad returns today in f/u for Osteoporosis. She has recently had a Dexa Scan done, which shows: Since the previous bone density dated  September 18, 2017, there has been a   -5.7 % change in the bone density of  the spine.  Additionally there has been no statistically significant % change in the hips bilaterally. 64 y.o. female with LOW BONE DENSITY (OSTEOPENIA) and MODERATE fracture   risk, adjusted for the TBS, with major osteoporotic fracture risk 13.5 % and hip fracture risk 1.6 %. She has been off of the Prolia after only 3 years on the treatment. She stopped in 2017 after she had some dental work done and it didn't heal in a timely manner.  She is currently in the midst of getting an implant, but the post is in an healed.  Her last Vit D level was 54 and Calcium level was 9.6.     Risk Factors     The following high- risk conditions have been ruled out: celiac disease, eating disorders, gastric bypass, hyperparathyroidism, inflammatory bowel disease, hyperthyroidism, rheumatoid arthritis, lupus, chronic kidney disease.     Conrad Zhu has the following risk factors: Age, Female gender and      She is not on high risk medications such as glucocorticoids, anti-coagulants, anti-convulsants, chemotherapy or levothyroxine.    Patient deniesHysterectomy, Oophrectomy, Breast cancer and Family history of breast cancer.      Past Medical History     Patient Active Problem List   Diagnosis     Anxiety     Nausea     OAB (overactive bladder)     Low back pain     Chronic pain     PN (peripheral neuropathy)     Insomnia     Migraine headache     Osteoporosis     Former smoker     Decubitus ulcer of left heel, stage 3 (H)     Achilles tendinitis of right lower extremity     Sprain of right ankle, unspecified ligament, initial encounter     Sprain of right foot, initial encounter     Closed nondisplaced fracture of body of right calcaneus, initial encounter     Corneal scarring     Left foot drop     Left leg weakness     Leukocytosis     TBI (traumatic brain injury) (H)     Tear film insufficiency       Family History       family history includes Cancer in her brother, father, mother, and sister.    Social History       reports that she has quit smoking. She has never used smokeless tobacco. She reports that she does not drink alcohol or use drugs.      Review of Systems     Patient denies current pain, limited mobility, fractures.   Remainder per HPI.      Vital Signs     /80 (Patient Site: Right Arm, Patient Position: Sitting, Cuff Size: Adult Regular)   Pulse 88     Physical Exam     GENERAL:  Normal, NIRD  EYES:  Pupils equal, round and reactive to light; no proptosis, lid lag or  periorbital edema.  THYROID:  Thyroid is normal.  No tenderness or bruit  NECK: No lymph nodes  MUSCULOSKELETAL: No joint abnormalities, FROM in all four extremities. No kyphosis. Muscle strength grossly normal without evidence of wasting.  HEART:  Regular rate and rhythm without murmur.  LUNGS:  Clear to auscultation.  ABDOMEN:Soft, non-tender, no masses or organomegaly  NEURO:  Patella Reflexes were normal.No tremors  SKIN:  No acanthosis nigricans or vitiligo        Assessment     1. Osteoporosis without current pathological fracture, unspecified osteoporosis type        Plan     She has agreed to restart Prolia therapy, in December when she will have had full time for healing in her mouth. We will get a PA started and this will be in place before she restarts therapy.       Total visit minutes:25  Time spent counseling and coordination of care:23    Blanca TORRES Endocrinology  10/21/2019  11:53 AM      Current Medications     Outpatient Medications Prior to Visit   Medication Sig Dispense Refill     prasterone, DHEA, (DHEA ORAL) Take by mouth.       AFLURIA QD 2019-20,3YR UP,,PF, 60 mcg (15 mcg x 4)/0.5 mL Syrg 60 mcg by SUBMUCOSAL INJ route once.  0     asenapine (SAPHRIS) 5 mg Subl SL tablet Place 5 mg under the tongue as needed.       calcium carbonate-vitamin D3 (OS-JUSTIN 250+ D) 250-125 mg-unit Tab per tablet Take 1 tablet by mouth 2 (two) times a day.       clonazePAM (KLONOPIN) 0.5 MG tablet Take 0.5 mg by mouth 2 (two)  times a day.       ipratropium (ATROVENT) 42 mcg (0.06 %) nasal spray 2 sprays into each nostril.       lamoTRIgine (LAMICTAL) 100 MG tablet Take 1 tablet (100 mg total) by mouth daily. 90 tablet 3     ondansetron (ZOFRAN) 4 MG tablet Take 4 mg by mouth.       tolterodine (DETROL LA) 4 MG ER capsule Take 4 mg by mouth daily.       traZODone (DESYREL) 100 MG tablet Take 1 tablet (100 mg total) by mouth bedtime. (Patient taking differently: Take 200 mg by mouth at bedtime. ) 90 tablet 1     VIIBRYD 20 mg Tab tablet Take 20 mg by mouth daily.  2     ZOLMitriptan (ZOMIG) 5 mg nasal solution 1 spray into each nostril as needed.       acetaminophen-codeine (TYLENOL #4) 300-60 mg per tablet Take 300 mg of codeine by mouth daily.  0     clonazePAM (KLONOPIN) 0.5 MG tablet Take 1 tablet (0.5 mg total) by mouth 3 (three) times a day as needed for anxiety. (Patient taking differently: Take 0.5 mg by mouth 2 (two) times a day as needed for anxiety.       ) 60 tablet 3     penicillin VK (PEN VK) 500 MG tablet Take 500 mg by mouth daily.  0     Facility-Administered Medications Prior to Visit   Medication Dose Route Frequency Provider Last Rate Last Dose     lidocaine 2 % jelly (XYLOCAINE)   Topical PRN Mark Lechuga DPM   1 application at 11/16/18 1051         Lab Results     TSH   Date Value Ref Range Status   11/23/2015 0.51 0.30 - 5.00 uIU/mL Final     PTH   Date Value Ref Range Status   03/19/2015 38 10 - 86 pg/mL Final     Calcium   Date Value Ref Range Status   04/15/2019 8.7 8.5 - 10.5 mg/dL Final     Phosphorus   Date Value Ref Range Status   03/19/2015 4.1 2.5 - 4.5 mg/dL Final     Iron   Date Value Ref Range Status   06/12/2014 86 42 - 175 ug/dL Final           Imaging Results   Last DEXA scan:  Results for orders placed in visit on 06/24/19   DXA Bone Density Scan    Narrative 9/23/2019      RE: Conrad Zhu  YOB: 1955        Dear Blanca Duarte,    Patient Profile:  64 y.o. female,  postmenopausal, is here for the follow up bone density   test.   History of fractures - Yes;  Wrist and Knee and both legs. Family history   of osteoporosis - None.  Family history of hip fracture: None. Smoking   history - No. Osteoporosis treatment past -  Yes;  HRT and Prolia.   Osteoporosis treatment current - No.  Chronic medical problems - Chronic   low back problems and Liver disease. High risk medications -  None.      Assessment:    1. The spine bone density L1-L4 with T-score -1.4.  2. Femoral bone densities show left femoral neck T- score -1.9 and right   femoral neck T-score -1.8.  3. Trabecular bone score indicates good trabecular bone architecture.      64 y.o. female with LOW BONE DENSITY (OSTEOPENIA) and MODERATE fracture   risk, adjusted for the TBS, with major osteoporotic fracture risk 13.5 %   and hip fracture risk 1.6 %.     Since the previous bone density dated  September 18, 2017, there has been   a   -5.7 % change in the bone density of the spine.  Additionally there   has been no statistically significant % change in the hips bilaterally.                     Recommendations:  Appropriate calcium, vitamin D supplements, along with balance and weight   bearing exercise recommended with follow up bone density scan in 2 years.      Bone densitometry was performed on your patient using our [employer]   densitometer. The results are summarized and a copy of the actual scans   are included for your review. In conformity with the International Society   of Clinical Densitometry's most recent position statement for DXA   interpretation (2015), the diagnosis will be made on the lowest measured   T-score of the lumbar spine, femoral neck, total proximal femur or 33%   radius. Note the change in terminology for diagnostic classification from   OSTEOPENIA to LOW BONE MASS. All trending for sequential exams will be   done using multiple vertebrae or the total proximal femur. Fracture risk   is based on  the WHO Fracture Risk Assessment Tool (FRAX). If additional   information is needed or if you would like to discuss the results, please   do not hesitate to call me.       Thank you for referring this patient to [facility].   We are happy to be of service in support of you and your practice. If you   have any questions or suggestions to improve our service, please call me   at [phone].     Sincerely,     Kamila Day M.D. C.CSACHIN.  Osteoporosis Services, [facility]

## 2021-06-02 NOTE — TELEPHONE ENCOUNTER
Syeda patient    She received her DXA results and was informed she needs to be seen asap. She is already scheduled for a f/u w/Syeda, but it's not until 04.03.2020.   Does Syeda want to see her sooner? Is Syeda able to advise over the phone in the mean time if it's okay for her to wait until 04/2020?    Conrad @ [phone]

## 2021-06-02 NOTE — TELEPHONE ENCOUNTER
I called LM for the pt to c/b to inform of the below;    Hmmmmmmmm - we probably need to talk about this, my dear - when are you following up with me?    Please call with questions or contact us using [employer].

## 2021-06-02 NOTE — TELEPHONE ENCOUNTER
Date: 10/17/2019 Status: Skip   Time: 2:20 PM Length: 20   Visit Type: OFFICE VISIT [[id number]] Copay: $20.00   Provider: John Duarte NP Department: [facility]   Referring Provider: JOHN CARSON CSN: [id number]   Notes: osteo follow-up // ok per madhav

## 2021-06-02 NOTE — TELEPHONE ENCOUNTER
I sent the report and findings to her on 9/25 through My Chart - she should be able to find it there.

## 2021-06-03 VITALS — BODY MASS INDEX: 27.48 KG/M2 | WEIGHT: 140 LBS | HEIGHT: 60 IN

## 2021-06-03 VITALS
HEART RATE: 69 BPM | BODY MASS INDEX: 31.65 KG/M2 | RESPIRATION RATE: 16 BRPM | DIASTOLIC BLOOD PRESSURE: 76 MMHG | SYSTOLIC BLOOD PRESSURE: 119 MMHG | HEIGHT: 59 IN | WEIGHT: 157 LBS | TEMPERATURE: 98.3 F

## 2021-06-03 VITALS
WEIGHT: 140 LBS | TEMPERATURE: 97 F | RESPIRATION RATE: 17 BRPM | HEART RATE: 87 BPM | SYSTOLIC BLOOD PRESSURE: 131 MMHG | OXYGEN SATURATION: 93 % | DIASTOLIC BLOOD PRESSURE: 65 MMHG | BODY MASS INDEX: 27.34 KG/M2

## 2021-06-03 VITALS — BODY MASS INDEX: 28.22 KG/M2 | WEIGHT: 140 LBS | HEIGHT: 59 IN

## 2021-06-03 VITALS — WEIGHT: 140 LBS | HEIGHT: 60 IN | BODY MASS INDEX: 27.48 KG/M2

## 2021-06-03 VITALS — DIASTOLIC BLOOD PRESSURE: 80 MMHG | SYSTOLIC BLOOD PRESSURE: 136 MMHG | HEART RATE: 88 BPM

## 2021-06-03 NOTE — ANESTHESIA POSTPROCEDURE EVALUATION
Patient: Conrad Zhu  INTERNAL FIXATION, FRACTURE, HIP, WITH PINS OR NAILS, USING HANA TABLE, LEFT  Anesthesia type: general    Patient location: PACU  Last vitals:   Vitals Value Taken Time   /67 11/11/2019  5:30 PM   Temp 37.8  C (100.1  F) 11/11/2019  5:00 PM   Pulse 78 11/11/2019  5:32 PM   Resp 9 11/11/2019  5:32 PM   SpO2 95 % 11/11/2019  5:32 PM   Vitals shown include unvalidated device data.  Post vital signs: stable  Level of consciousness: awake and responds to simple questions  Post-anesthesia pain: pain controlled  Post-anesthesia nausea and vomiting: some in PACU, improved prior to discharge  Pulmonary: unassisted, return to baseline  Cardiovascular: stable and blood pressure at baseline  Hydration: adequate  Anesthetic events: no    QCDR Measures:  ASA# 11 - Monica-op Cardiac Arrest: ASA11B - Patient did NOT experience unanticipated cardiac arrest  ASA# 12 - Monica-op Mortality Rate: ASA12B - Patient did NOT die  ASA# 13 - PACU Re-Intubation Rate: ASA13B - Patient did NOT require a new airway mgmt  ASA# 10 - Composite Anes Safety: ASA10A - No serious adverse event    Additional Notes:

## 2021-06-03 NOTE — TELEPHONE ENCOUNTER
Date: 11/29/2019 Status: Skip   Time: 11:20 AM Length: 15   Visit Type: NURSE VISIT [[id number]] Copay: $0.00   Provider: [facility] Department: [facility]   Referring Provider: JOHN CARSON CSN: [id number]   Notes: Syeda pt - evenity injection, PA Injection

## 2021-06-03 NOTE — ANESTHESIA CARE TRANSFER NOTE
Last vitals:   Vitals:    11/11/19 1621   BP: 167/76   Pulse: 93   Resp: 16   Temp: 37.7  C (99.8  F)   SpO2: 99%     Patient's level of consciousness is awake  Spontaneous respirations: yes  Maintains airway independently: yes  Dentition unchanged: yes  Oropharynx: oropharynx clear of all foreign objects    QCDR Measures:  ASA# 20 - Surgical Safety Checklist: WHO surgical safety checklist completed prior to induction    PQRS# 430 - Adult PONV Prevention: 4558F - Pt received => 2 anti-emetic agents (different classes) preop & intraop  ASA# 8 - Peds PONV Prevention: NA - Not pediatric patient, not GA or 2 or more risk factors NOT present  PQRS# 424 - Monica-op Temp Management: 4559F - At least one body temp DOCUMENTED => 35.5C or 95.9F within required timeframe  PQRS# 426 - PACU Transfer Protocol: - Transfer of care checklist used  ASA# 14 - Acute Post-op Pain: ASA14B - Patient did NOT experience pain >= 7 out of 10

## 2021-06-03 NOTE — PROGRESS NOTES
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 HEPATOBILIARY: Normal. PANCREAS: Normal. SPLEEN: Normal. ADRENAL GLANDS: Normal. KIDNEYS/BLADDER: Benign bilateral renal cysts. The bladder is distended. BOWEL: There are a few sigmoid colonic diverticula. No obvious bowel mass or bowel dilation. LYMPH NODES: Normal. VASCULATURE: Unremarkable. PELVIC ORGANS: Normal uterus and adnexa. OTHER: None. MUSCULOSKELETAL: Acute mildly displaced intertrochanteric fracture of the left hip. Mild varus angulation. No visible underlying bone lesion.     1.  No evidence of malignancy in the abdomen or pelvis. 2.  Acute intertrochanteric fracture of the left hip. No visible underlying bone lesion. Consider MRI to further evaluate.             LAURA Monroe

## 2021-06-03 NOTE — PROGRESS NOTES
[facility]  ENDOCRINOLOGY    Endocrine Note 11/26/2019    Conrad Zhu, 1955, [id number]          Reason for visit      1. Osteoporosis with current pathological fracture, unspecified osteoporosis type, initial encounter    2. Closed intertrochanteric fracture of hip, left, initial encounter (H)        History     Conrad Zhu is a very pleasant 64 y.o. old female who presents for follow up.  SUMMARY:  Conrad returns today to discuss a change in therapy for Osteoporosis.  She has been without treatment for over a year because of some dental problems that she had after taking Prolia. She was not healing at the time. She was recently seen in clinic and still wanted to go without therapy. Unfortunately, she has just fractured a hip.  She would be a good candidate for Evenity and is here to discuss therapy.     Past Medical History     Patient Active Problem List   Diagnosis     Anxiety     Nausea     OAB (overactive bladder)     Low back pain     Chronic pain     PN (peripheral neuropathy)     Insomnia     Migraine headache     Osteoporosis     Former smoker     Decubitus ulcer of left heel, stage 3 (H)     Achilles tendinitis of right lower extremity     Sprain of right ankle, unspecified ligament, initial encounter     Sprain of right foot, initial encounter     Closed nondisplaced fracture of body of right calcaneus, initial encounter     Corneal scarring     Left foot drop     Left leg weakness     Leukocytosis     TBI (traumatic brain injury) (H)     Tear film insufficiency     Closed intertrochanteric fracture of hip, left, initial encounter (H)     Left hip pain     Pre-operative general physical examination     Anemia due to blood loss, acute     Acute post-operative pain       Family History       family history includes Cancer in her brother, father, mother, and sister.    Social History      reports that she has quit smoking. She smoked 0.00 packs per day. She has never used smokeless tobacco.  She reports that she does not drink alcohol or use drugs.      Review of Systems     Patient has no polyuria or polydipsia, no chest pain, dyspnea or TIA's, no numbness, tingling or pain in extremities  Remainder negative except as noted in HPI.      Vital Signs     /68 (Patient Site: Right Arm, Patient Position: Sitting, Cuff Size: Adult Regular)   Pulse 76   Wt Readings from Last 3 Encounters:   11/25/19 153 lb (69.4 kg)   11/15/19 140 lb (63.5 kg)   11/11/19 140 lb (63.5 kg)       Physical Exam     GENERAL:  Normal, NIRD,  EYES:  Pupils equal, round and reactive to light; no proptosis, lid lag or  periorbital edema.  THYROID:  Thyroid is normal.  No tenderness or bruit  NECK: No lymph nodes  MUSCULOSKELETAL:  Muscle strength grossly normal without evidence of wasting.  HEART:  Regular rate and rhythm without murmur.  LUNGS:  Clear to auscultation.  ABDOMEN: Soft, non-tender, no masses or organomegaly  NEURO:  Patella Reflexes were normal.No tremors  SKIN:  No acanthosis nigricans or vitiligo      Assessment     1. Osteoporosis with current pathological fracture, unspecified osteoporosis type, initial encounter    2. Closed intertrochanteric fracture of hip, left, initial encounter (H)        Mitesh Martines is all for starting the Evenity. We will get it ordered with PA and alert her when this has been accomplished.     Total Time:25  Time spent in counseling and coordination of care:23  Blanca TORRES Endocrinology  11/26/2019  7:25 AM        Lab Results   Lab Results: personally reviewed.   Lab Requisition on 11/18/2019   Component Date Value     Sodium 11/18/2019 139      Potassium 11/18/2019 4.4      Chloride 11/18/2019 104      CO2 11/18/2019 25      Anion Gap, Calculation 11/18/2019 10      Glucose 11/18/2019 68*     Calcium 11/18/2019 9.3      BUN 11/18/2019 10      Creatinine 11/18/2019 0.77      GFR MDRD Af Amer 11/18/2019 >60      GFR MDRD Non Af Amer 11/18/2019 >60      TSH Receptor Antibody  11/18/2019 <0.90      Vitamin B-12 11/18/2019 368      WBC 11/18/2019 9.5      RBC 11/18/2019 3.11*     Hemoglobin 11/18/2019 8.5*     Hematocrit 11/18/2019 27.9*     MCV 11/18/2019 90      MCH 11/18/2019 27.3      MCHC 11/18/2019 30.5*     RDW 11/18/2019 14.7*     Platelets 11/18/2019 407      MPV 11/18/2019 10.3    Admission on 11/11/2019, Discharged on 11/14/2019   Component Date Value     SYSTOLIC BLOOD PRESSURE 11/11/2019 137      DIASTOLIC BLOOD PRESSURE 11/11/2019 71      VENTRICULAR RATE 11/11/2019 84      ATRIAL RATE 11/11/2019 84      P-R INTERVAL 11/11/2019 172      QRS DURATION 11/11/2019 78      Q-T INTERVAL 11/11/2019 358      QTC CALCULATION (BEZET) 11/11/2019 423      P Axis 11/11/2019 53      R AXIS 11/11/2019 27      T AXIS 11/11/2019 56      MUSE DIAGNOSIS 11/11/2019                      Value:Normal sinus rhythm  Normal ECG  When compared with ECG of 22-MAY-2018 16:45,  No significant change was found  Confirmed by GARRY TURPIN MD LOC: ([id number]) on 11/12/2019 3:51:56 PM       Sodium 11/11/2019 133*     Potassium 11/11/2019 3.4*     Chloride 11/11/2019 96*     CO2 11/11/2019 26      Anion Gap, Calculation 11/11/2019 11      Glucose 11/11/2019 136*     Calcium 11/11/2019 9.5      BUN 11/11/2019 12      Creatinine 11/11/2019 0.70      GFR MDRD Af Amer 11/11/2019 >60      GFR MDRD Non Af Amer 11/11/2019 >60      INR 11/11/2019 0.98      Color, UA 11/11/2019 Straw      Clarity, UA 11/11/2019 Clear      Glucose, UA 11/11/2019 Negative      Bilirubin, UA 11/11/2019 Negative      Ketones, UA 11/11/2019 Negative      Specific Gravity, UA 11/11/2019 1.010      Blood, UA 11/11/2019 Small*     pH, UA 11/11/2019 7.0      Protein, UA 11/11/2019 Negative      Urobilinogen, UA 11/11/2019 <2.0 E.U./dL      Nitrite, UA 11/11/2019 Negative      Leukocytes, UA 11/11/2019 Negative      Bacteria, UA 11/11/2019 None Seen      RBC, UA 11/11/2019 5-10*     WBC, UA 11/11/2019 0-5      Squam Epithel, UA 11/11/2019  5-10*     WBC 11/11/2019 20.3*     RBC 11/11/2019 4.32      Hemoglobin 11/11/2019 12.1      Hematocrit 11/11/2019 36.6      MCV 11/11/2019 85      MCH 11/11/2019 28.0      MCHC 11/11/2019 33.1      RDW 11/11/2019 13.8      Platelets 11/11/2019 331      MPV 11/11/2019 10.8      Total Neutrophils % 11/11/2019 96*     Lymphocytes % 11/11/2019 3*     Monocytes % 11/11/2019 1*     Eosinophils %  11/11/2019 0      Basophils % 11/11/2019 0      Total Neutrophils Absolu* 11/11/2019 19.5*     Lymphocytes Absolute 11/11/2019 0.6*     Monocytes Absolute 11/11/2019 0.2      Eosinophils Absolute 11/11/2019 0.0      Basophils Absolute 11/11/2019 0.0      Platelet Estimate 11/11/2019 Normal      Amphetamines 11/11/2019 Screen Negative      Benzodiazepines 11/11/2019 Screen Negative      Opiates 11/11/2019 Screen Positive (Confirmation available on request)*     Phencyclidine 11/11/2019 Screen Negative      THC 11/11/2019 Screen Negative      Barbiturates 11/11/2019 Screen Negative      Cocaine Metabolite 11/11/2019 Screen Negative      Methadone 11/11/2019 Screen Negative      Oxycodone 11/11/2019 Screen Negative      Creatinine, Urine 11/11/2019 43.7      Case Report 11/11/2019                      Value:Surgical Pathology                                Case: [id number]                                   Authorizing Provider:  Oz Bolden MD      Collected:           11/11/2019 1531              Ordering Location:     [facility] OR Received:            11/12/2019 0742              Pathologist:           Mesfin Bliss MD                                                        Specimen:    Femur, Left, LEFT PROXIMAL FEMUR REEMINGS                                                   Final Diagnosis 11/11/2019                      Value:LEFT PROXIMAL FEMUR REAMINGS:    - MIXED FIBROCARTILAGINOUS AND INFLAMMATORY INFILTRATE CONSISTENT WITH CLINICAL HISTORY    - NO EVIDENCE OF MALIGNANCY     Comment 11/11/2019                       Value:The clinical history has been reviewed. Given the possibility of a pathologic fracture, additional studies were performed. There is no significant increase in CD20/CD79a(+) B-cells. Rare clusters of (+) plasma cells are identified of uncertain significance and kappa and lambda in situ hybridization studies were performed and demonstrate no evidence of clonality. CD56 is negative for neuroendocrine differentiation and a subset of myeloid/lymphoid disorders. There is no increase in CD34(+) blasts or CD3/GATA3(+) T-cells. Bandar keratin and pankeratin stains are negative for carcinoma. All controls stain appropriately. Clinical correlation is suggested.     Microscopic Description 11/11/2019                      Value:Microscopic examination performed, substantiating the above diagnosis.     Clinical Information 11/11/2019                      Value:Pre-op Diagnosis:  Left hip pain [M25.552]     Gross Description 11/11/2019                      Value:The specimen was received fresh, in a container labeled with the patient's name and designated left proximal femur reemings (placed in formalin at 1620 on 11/11/19). The specimen consists of friable grayish-brown soft tissue and blood clot measuring 4.5 x 3.2 x 1.2 cm. SS,RS-2C  KDP:db     Special Stains 11/11/2019                      Value:ROX  FDA Disclaimer:    The In-Situ Hybridization test/s was/were developed and the performance characteristics determined by [facility]. It has not been cleared or approved by the [place] Food and Drug Administration.    The Food and Drug Administration has determined that clearance or approval is not necessary, because this test is used for clinical purposes. This test should not be regarded as investigational or research.    [facility] is certified for the performance of high-complexity clinical testing under the Clinical Laboratory Improvement Amendments of 1988 (CLIA), and in keeping  with the certification requirements, [employer] has verified this test's accuracy and precision or validity of the method. Additional information is available upon request.     Charges 11/11/2019                      Value:CPT: 44137, 59104, 73014, 97189, 88341 x8   ICD-10: M25.552     Result Flag 11/11/2019       Sodium 11/12/2019 139      Potassium 11/12/2019 4.4      Chloride 11/12/2019 102      CO2 11/12/2019 31      Anion Gap, Calculation 11/12/2019 6      Glucose 11/12/2019 118      Calcium 11/12/2019 9.3      BUN 11/12/2019 10      Creatinine 11/12/2019 0.71      GFR MDRD Af Amer 11/12/2019 >60      GFR MDRD Non Af Amer 11/12/2019 >60      WBC 11/12/2019 10.8      RBC 11/12/2019 3.44*     Hemoglobin 11/12/2019 9.6*     Hematocrit 11/12/2019 29.5*     MCV 11/12/2019 86      MCH 11/12/2019 27.9      MCHC 11/12/2019 32.5      RDW 11/12/2019 14.1      Platelets 11/12/2019 244      MPV 11/12/2019 9.9      Neutrophils % 11/12/2019 79*     Lymphocytes % 11/12/2019 10*     Monocytes % 11/12/2019 11*     Eosinophils % 11/12/2019 0      Basophils % 11/12/2019 0      Neutrophils Absolute 11/12/2019 8.6*     Lymphocytes Absolute 11/12/2019 1.1      Monocytes Absolute 11/12/2019 1.1*     Eosinophils Absolute 11/12/2019 0.0      Basophils Absolute 11/12/2019 0.0      Hemoglobin 11/14/2019 8.0*   Lab on 07/26/2019   Component Date Value     Vitamin D, Total (25-Hyd* 07/26/2019 54.0        Fingerstick Blood Glucose: No results for input(s): POCGLUFGR in the last 48 hours.    Last Hbg A1C: No results found for: HGBA1C   Recent Labs     11/25/19  0845   TSH 2.56     No results for input(s): CORTPRE, [id number], [id number] in the last 72 hours.        Invalid input(s): LABALBU        No results found for: TESTOSTERONE  Untested Testosterone, Free  No results found for: LABTEST    Current Medications     Outpatient Medications Prior to Visit   Medication Sig Dispense Refill     acetaminophen (TYLENOL) 500 MG tablet  Take 2 tablets (1,000 mg total) by mouth 3 (three) times a day. (Patient taking differently: Take 1,000 mg by mouth 4 (four) times a day.       )  0     ARTIFICIAL TEARS,PG-HYPM-GLYC, 1-0.2-0.2 % Drop INSTILL 1 DROP INTO BOTH EYES AS NEEDED FOR DRY EYES  99     asenapine (SAPHRIS) 5 mg Subl SL tablet Place 5 mg under the tongue as needed.       aspirin 81 mg chewable tablet Chew 1 tablet (81 mg total) 2 (two) times a day with meals.  0     cholecalciferol, vitamin D3, 2,000 unit Tab Take 2,000 Units by mouth daily.       clonazePAM (KLONOPIN) 0.5 MG tablet Take 0.5 mg by mouth 2 (two) times a day.       DHEA 50 mg-60 mg calcium Tab Take 1 tablet by mouth daily.  99     HYDROcodone-acetaminophen (XODOL) 5-300 mg per tablet TAKE 1 TO 2 TABLETS BY MOUTH EVERY 4 TO 6 HOURS AS NEEDED FOR PAIN  0     hydrOXYzine pamoate (VISTARIL) 25 MG capsule Take 50 mg by mouth 4 (four) times a day. Give with tylenol        ipratropium (ATROVENT) 42 mcg (0.06 %) nasal spray 2 sprays into each nostril daily as needed.              lamoTRIgine (LAMICTAL) 100 MG tablet Take 1 tablet (100 mg total) by mouth daily. 90 tablet 3     melatonin 3 mg Tab tablet Take 6 mg by mouth at bedtime.       ondansetron (ZOFRAN) 4 MG tablet Take 4 mg by mouth every 8 (eight) hours as needed.              polyvinyl alcohol (LIQUIFILM TEARS) 1.4 % ophthalmic solution Administer 1 drop to both eyes as needed for dry eyes.       senna-docusate (PERICOLACE) 8.6-50 mg tablet Take 1 tablet by mouth 2 (two) times a day.  0     tolterodine (DETROL LA) 4 MG ER capsule Take 4 mg by mouth daily with lunch.              traZODone (DESYREL) 100 MG tablet Take 200 mg by mouth at bedtime.       VIIBRYD 20 mg Tab tablet Take 20 mg by mouth daily.  2     ZOLMitriptan (ZOMIG) 5 mg nasal solution 1 spray into each nostril as needed.       HYDROmorphone (DILAUDID) 2 MG tablet Take 1-2 tablets (2-4 mg total) by mouth every 4 (four) hours as needed for pain. Take 1 tab for pain  1-6/10, take 2 tabs for pain 7-10/10. 42 tablet 0     Facility-Administered Medications Prior to Visit   Medication Dose Route Frequency Provider Last Rate Last Dose     denosumab 60 mg (PROLIA 60 mg/ml)  60 mg Subcutaneous Once Blanca Duarte NP         denosumab 60 mg (PROLIA 60 mg/ml)  60 mg Subcutaneous Q6 Months Blanca Duarte NP         [START ON 12/2/2019] romosozumab-aqqg injection 210 mg (EVENITY)  210 mg Subcutaneous Q30 Days Blanca Duarte, NP

## 2021-06-03 NOTE — PROGRESS NOTES
"[facility]      Facility:    [facility] [[id number]] Code Status: FULL CODE      Chief Complaint/Reason for Visit:   Chief Complaint   Patient presents with     Review Of Multiple Medical Conditions       HPI:   Conrad is a 64 y.o. female who is recent transfer from [facility] with an admission on 11/11/2019 and discharged 11/14/2019.  She is past medical history of overactive bladder, peripheral neuropathy, migraine headaches, osteoporosis, TBI \"a left midshaft tibial fracture 4 weeks ago with a total fall, presents with left intertrochanteric femur fracture.  She apparently had a spontaneous fracture, and to maneuver herself down a flight of stairs to get help.  Her fracture was surgically fixed with us so failed medullary nailing and underwent left proximal femur bone biopsy which was negative for malignancy.  She was given weightbearing as tolerated, aspirin for DVT prophylaxis and had a ANUJA of hemoglobin of 8 at discharge.  She was discharged to the TCU for rehab.    Past Medical History:  Past Medical History:   Diagnosis Date     Anxiety      Chronic pain 8/14/2016     Depression      Former smoker      History of cocaine abuse (H)      Insomnia 8/14/2016     Liver disease      Low back pain 8/14/2016     Migraine headache 8/14/2016     Osteoporosis 8/14/2016     PN (peripheral neuropathy) 8/14/2016     PONV (postoperative nausea and vomiting)            Surgical History:  Past Surgical History:   Procedure Laterality Date     EXPLORATORY LAPAROTOMY       TUBAL LIGATION         Family History:   Family History   Problem Relation Age of Onset     Cancer Mother      Cancer Father      Cancer Sister      Cancer Brother        Social History:    Social History     Socioeconomic History     Marital status: Single     Spouse name: Not on file     Number of children: Not on file     Years of education: Not on file     Highest education level: Not on file "   Occupational History     Not on file   Social Needs     Financial resource strain: Not on file     Food insecurity:     Worry: Not on file     Inability: Not on file     Transportation needs:     Medical: Not on file     Non-medical: Not on file   Tobacco Use     Smoking status: Former Smoker     Packs/day: 0.00     Smokeless tobacco: Never Used   Substance and Sexual Activity     Alcohol use: No     Drug use: No     Sexual activity: Not on file   Lifestyle     Physical activity:     Days per week: Not on file     Minutes per session: Not on file     Stress: Not on file   Relationships     Social connections:     Talks on phone: Not on file     Gets together: Not on file     Attends L[employer] service: Not on file     Active member of club or organization: Not on file     Attends meetings of clubs or organizations: Not on file     Relationship status: Not on file     Intimate partner violence:     Fear of current or ex partner: Not on file     Emotionally abused: Not on file     Physically abused: Not on file     Forced sexual activity: Not on file   Other Topics Concern     Not on file   Social History Narrative     Not on file          Review of Systems   Constitutional: Positive for activity change. Negative for appetite change, chills, diaphoresis, fatigue and fever.   HENT: Negative for congestion and hearing loss.    Eyes: Negative.    Respiratory: Negative for shortness of breath and wheezing.    Cardiovascular: Negative.    Gastrointestinal: Positive for constipation. Negative for abdominal distention, abdominal pain, blood in stool, diarrhea and nausea.   Endocrine: Negative.    Genitourinary: Negative for difficulty urinating.   Musculoskeletal:        L LE pain   Skin: Positive for wound.        L hip, LE pain   Allergic/Immunologic: Negative.    Neurological: Negative for dizziness, seizures, speech difficulty and light-headedness.   Hematological: Negative.    Psychiatric/Behavioral: Positive for sleep  disturbance. Negative for agitation, behavioral problems, decreased concentration and hallucinations. The patient is nervous/anxious.        Vitals:    11/15/19 0920   BP: 131/65   Pulse: 87   Resp: 17   Temp: 97  F (36.1  C)   SpO2: 93%   Weight: 140 lb (63.5 kg)       Physical Exam  Constitutional:       Appearance: Normal appearance.      Comments: Pain control   HENT:      Head: Normocephalic and atraumatic.      Right Ear: External ear normal.      Left Ear: External ear normal.      Nose: No congestion or rhinorrhea.      Mouth/Throat:      Mouth: Mucous membranes are moist.      Pharynx: No oropharyngeal exudate or posterior oropharyngeal erythema.   Eyes:      General:         Right eye: No discharge.         Left eye: No discharge.   Neck:      Musculoskeletal: Normal range of motion and neck supple.   Cardiovascular:      Rate and Rhythm: Normal rate.      Heart sounds: Normal heart sounds. No murmur. No friction rub. No gallop.    Pulmonary:      Effort: No respiratory distress.      Breath sounds: No wheezing or rales.      Comments: Dim, RA  Abdominal:      General: Bowel sounds are normal. There is no distension.      Tenderness: There is no abdominal tenderness. There is no guarding.      Comments: Has periodic constipation, prefers using pills   Genitourinary:     Comments: No limitations  Musculoskeletal:      Right lower leg: No edema.      Left lower leg: No edema.      Comments: L LE pain, WBAT   Neurological:      Mental Status: She is alert and oriented to person, place, and time.   Psychiatric:         Mood and Affect: Mood normal.      Comments: Has hx of TBI and depression         Medication List:  Current Outpatient Medications   Medication Sig     hydrOXYzine pamoate (VISTARIL) 25 MG capsule Take 25 mg by mouth 4 (four) times a day. Give with tylenol     melatonin 3 mg Tab tablet Take 6 mg by mouth at bedtime.     acetaminophen (TYLENOL) 500 MG tablet Take 2 tablets (1,000 mg total) by  mouth 3 (three) times a day. (Patient taking differently: Take 1,000 mg by mouth 4 (four) times a day.       )     asenapine (SAPHRIS) 5 mg Subl SL tablet Place 5 mg under the tongue as needed.     aspirin 81 mg chewable tablet Chew 1 tablet (81 mg total) 2 (two) times a day with meals.     cholecalciferol, vitamin D3, 2,000 unit Tab Take 2,000 Units by mouth daily.     clonazePAM (KLONOPIN) 0.5 MG tablet Take 0.5 mg by mouth 2 (two) times a day.     HYDROmorphone (DILAUDID) 2 MG tablet Take 1-2 tablets (2-4 mg total) by mouth every 4 (four) hours as needed for pain. Take 1 tab for pain 1-6/10, take 2 tabs for pain 7-10/10.     ipratropium (ATROVENT) 42 mcg (0.06 %) nasal spray 2 sprays into each nostril daily as needed.            lamoTRIgine (LAMICTAL) 100 MG tablet Take 1 tablet (100 mg total) by mouth daily.     ondansetron (ZOFRAN) 4 MG tablet Take 4 mg by mouth every 8 (eight) hours as needed.            polyvinyl alcohol (LIQUIFILM TEARS) 1.4 % ophthalmic solution Administer 1 drop to both eyes as needed for dry eyes.     senna-docusate (PERICOLACE) 8.6-50 mg tablet Take 1 tablet by mouth 2 (two) times a day.     tolterodine (DETROL LA) 4 MG ER capsule Take 4 mg by mouth daily with lunch.            traZODone (DESYREL) 100 MG tablet Take 200 mg by mouth at bedtime.     VIIBRYD 20 mg Tab tablet Take 20 mg by mouth daily.     ZOLMitriptan (ZOMIG) 5 mg nasal solution 1 spray into each nostril as needed.       Labs:  Results for orders placed or performed during the hospital encounter of 11/11/19   Basic metabolic panel   Result Value Ref Range    Sodium 139 136 - 145 mmol/L    Potassium 4.4 3.5 - 5.0 mmol/L    Chloride 102 98 - 107 mmol/L    CO2 31 22 - 31 mmol/L    Anion Gap, Calculation 6 5 - 18 mmol/L    Glucose 118 70 - 125 mg/dL    Calcium 9.3 8.5 - 10.5 mg/dL    BUN 10 8 - 22 mg/dL    Creatinine 0.71 0.60 - 1.10 mg/dL    GFR MDRD Af Amer >60 >60 mL/min/1.73m2    GFR MDRD Non Af Amer >60 >60 mL/min/1.73m2      Lab Results   Component Value Date    WBC 10.8 11/12/2019    HGB 8.0 (L) 11/14/2019    HCT 29.5 (L) 11/12/2019    MCV 86 11/12/2019     11/12/2019     Lab Results   Component Value Date    TSH 0.51 11/23/2015     Vitamin D, Total (25-Hydroxy)   Date Value Ref Range Status   07/26/2019 54.0 30.0 - 80.0 ng/mL Final     Lab Results   Component Value Date    [id number] 822 (H) 06/12/2014       Assessment/Plan:    Intertrochanteric fracture of left proximal femur without definite mass lesion: Given weightbearing as tolerated, incisional cares as directed, follow-up with Ortho as ordered    History of left foot drop: Pending bracing with [facility]    Anticoagulation: continue aspirin 81 mg twice daily duration per Ortho    ABLA: last Hgb 8.0 on 11/14/19, recheck    Pain control: increase Tylenol to 1000 mg 4 times daily with Vistaril 25 mg 4 times daily.  Continue Dilaudid 1-2 tabs every 4 hours as needed, encourage aggressive icing    Constipation: Continue 07 S1 tab twice daily    Insomnia: Continue trazodone 200 mg at bedtime, start melatonin 6 mg    Overactive bladder: Continue Detrol 4 mg daily with lunch    Depression with TBI hx: continue Viibyd 20mg daily and lamotrigine 100mg daily    Anxiety: Continue clonazepam 0.5 mg twice daily.  Requesting psych evaluation    History of migraines: Continue zolmitriptan 5mg 1 spray each nostril PRN    Osteopenia: Continue Prolia as ordered    Vit D def: continue D3 2000U daily    Disposition: plans to return home.  [facility] 25/30    The care plan has been reviewed and all orders signed. Changes to care plan, if any, as noted. Otherwise, continue care plan of care.  The total time spent with this patient was 35 minutes, with greater than 50% spent in counseling and coordination of care that included multiple issues per pain control, psych evaluation, follow-up with Ortho and therapy which lasted 20 minutes.      Post Discharge Medication Reconciliation Status:  discharge medications reconciled and changed, per note/orders (see AVS)    Electronically signed by: Tree Thomas NP

## 2021-06-03 NOTE — ANESTHESIA PREPROCEDURE EVALUATION
Anesthesia Evaluation        Airway   Mallampati: II  Neck ROM: full   Pulmonary - negative ROS and normal exam                          Cardiovascular - negative ROS and normal exam   Neuro/Psych    (+) neuromuscular disease,  depression, anxiety/panic attacks,     Endo/Other - negative ROS      GI/Hepatic/Renal       Other findings: Hx hepatitis, peripheral neuropathy, psych hx      Dental    (+) poor dentition and lower dentures                       Anesthesia Plan  Planned anesthetic: general endotracheal    ASA 3   Induction: intravenous   Anesthetic plan and risks discussed with: patient    Post-op plan: routine recovery
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
## 2021-06-03 NOTE — PROGRESS NOTES
"[facility]      Facility:    [facility] [[id number]] Code Status: FULL CODE      Chief Complaint/Reason for Visit:   Chief Complaint   Patient presents with     Review Of Multiple Medical Conditions       HPI:   Conrad is a 64 y.o. female who is recent transfer from [facility] with an admission on 11/11/2019 and discharged 11/14/2019.  She is past medical history of overactive bladder, peripheral neuropathy, migraine headaches, osteoporosis, TBI \"a left midshaft tibial fracture 4 weeks ago with a total fall, presents with left intertrochanteric femur fracture.  She apparently had a spontaneous fracture, and to maneuver herself down a flight of stairs to get help.  Her fracture was surgically fixed with us so failed medullary nailing and underwent left proximal femur bone biopsy which was negative for malignancy.  She was given weightbearing as tolerated, aspirin for DVT prophylaxis and had a ANUJA of hemoglobin of 8 at discharge.  She was discharged to the TCU for rehab.    Today plan to reassess pain control, therapy progress and     Past Medical History:  Past Medical History:   Diagnosis Date     Anxiety      Chronic pain 8/14/2016     Depression      Former smoker      History of cocaine abuse (H)      Insomnia 8/14/2016     Liver disease      Low back pain 8/14/2016     Migraine headache 8/14/2016     Osteoporosis 8/14/2016     PN (peripheral neuropathy) 8/14/2016     PONV (postoperative nausea and vomiting)            Surgical History:  Past Surgical History:   Procedure Laterality Date     EXPLORATORY LAPAROTOMY       TUBAL LIGATION         Family History:   Family History   Problem Relation Age of Onset     Cancer Mother      Cancer Father      Cancer Sister      Cancer Brother        Social History:    Social History     Socioeconomic History     Marital status: Single     Spouse name: Not on file     Number of children: Not on file     Years of " education: Not on file     Highest education level: Not on file   Occupational History     Not on file   Social Needs     Financial resource strain: Not on file     Food insecurity:     Worry: Not on file     Inability: Not on file     Transportation needs:     Medical: Not on file     Non-medical: Not on file   Tobacco Use     Smoking status: Former Smoker     Packs/day: 0.00     Smokeless tobacco: Never Used   Substance and Sexual Activity     Alcohol use: No     Drug use: No     Sexual activity: Not on file   Lifestyle     Physical activity:     Days per week: Not on file     Minutes per session: Not on file     Stress: Not on file   Relationships     Social connections:     Talks on phone: Not on file     Gets together: Not on file     Attends Synagogue service: Not on file     Active member of club or organization: Not on file     Attends meetings of clubs or organizations: Not on file     Relationship status: Not on file     Intimate partner violence:     Fear of current or ex partner: Not on file     Emotionally abused: Not on file     Physically abused: Not on file     Forced sexual activity: Not on file   Other Topics Concern     Not on file   Social History Narrative     Not on file          Review of Systems   Constitutional: Positive for activity change. Negative for appetite change, chills, diaphoresis, fatigue and fever.   HENT: Negative for congestion and hearing loss.    Eyes: Negative.    Respiratory: Negative for shortness of breath and wheezing.    Cardiovascular: Negative.    Gastrointestinal: Positive for constipation. Negative for abdominal distention, abdominal pain, blood in stool, diarrhea and nausea.        Improved   Endocrine: Negative.    Genitourinary: Negative for difficulty urinating.   Musculoskeletal:        L LE pain   Skin: Positive for wound.        L hip, LE pain   Allergic/Immunologic: Negative.    Neurological: Negative for dizziness, seizures, speech difficulty and  light-headedness.   Hematological: Negative.    Psychiatric/Behavioral: Negative for agitation, behavioral problems, decreased concentration, hallucinations and sleep disturbance. The patient is nervous/anxious.         Chronic       Vitals:    11/25/19 1842   BP: 128/79   Pulse: 74   Resp: 16   Temp: 97  F (36.1  C)   SpO2: 95%   Weight: 153 lb (69.4 kg)       Physical Exam  Constitutional:       Appearance: Normal appearance.      Comments: Pain control issues   HENT:      Head: Normocephalic and atraumatic.      Right Ear: External ear normal.      Left Ear: External ear normal.      Nose: No congestion or rhinorrhea.      Mouth/Throat:      Mouth: Mucous membranes are moist.      Pharynx: No oropharyngeal exudate or posterior oropharyngeal erythema.   Eyes:      General:         Right eye: No discharge.         Left eye: No discharge.   Neck:      Musculoskeletal: Normal range of motion and neck supple.   Cardiovascular:      Rate and Rhythm: Normal rate.      Heart sounds: Normal heart sounds. No murmur. No friction rub. No gallop.    Pulmonary:      Effort: No respiratory distress.      Breath sounds: No wheezing or rales.      Comments: Dim, RA  Abdominal:      General: Bowel sounds are normal. There is no distension.      Tenderness: There is no abdominal tenderness. There is no guarding.      Comments: Constipation improved   Genitourinary:     Comments: No limitations  Musculoskeletal:      Right lower leg: No edema.      Left lower leg: No edema.      Comments: L LE pain has not improved though has no functional limitations, WBAT.    Skin:     General: Skin is warm and dry.      Findings: No erythema.      Comments: Staples intact in L LE   Neurological:      Mental Status: She is alert and oriented to person, place, and time.   Psychiatric:         Mood and Affect: Mood normal.      Comments: Has hx of TBI and depression         Medication List:  Current Outpatient Medications   Medication Sig      ferrous sulfate 325 (65 FE) MG tablet Take 1 tablet by mouth daily with breakfast.     oxyCODONE (ROXICODONE) 5 MG immediate release tablet Take 5 mg by mouth every 4 (four) hours as needed for pain.     acetaminophen (TYLENOL) 500 MG tablet Take 2 tablets (1,000 mg total) by mouth 3 (three) times a day. (Patient taking differently: Take 1,000 mg by mouth 4 (four) times a day.       )     asenapine (SAPHRIS) 5 mg Subl SL tablet Place 5 mg under the tongue as needed.     aspirin 81 mg chewable tablet Chew 1 tablet (81 mg total) 2 (two) times a day with meals.     cholecalciferol, vitamin D3, 2,000 unit Tab Take 2,000 Units by mouth daily.     clonazePAM (KLONOPIN) 0.5 MG tablet Take 0.5 mg by mouth 2 (two) times a day.     hydrOXYzine pamoate (VISTARIL) 25 MG capsule Take 50 mg by mouth 4 (four) times a day. Give with tylenol      ipratropium (ATROVENT) 42 mcg (0.06 %) nasal spray 2 sprays into each nostril daily as needed.            lamoTRIgine (LAMICTAL) 100 MG tablet Take 1 tablet (100 mg total) by mouth daily.     melatonin 3 mg Tab tablet Take 6 mg by mouth at bedtime.     ondansetron (ZOFRAN) 4 MG tablet Take 4 mg by mouth every 8 (eight) hours as needed.            polyvinyl alcohol (LIQUIFILM TEARS) 1.4 % ophthalmic solution Administer 1 drop to both eyes as needed for dry eyes.     senna-docusate (PERICOLACE) 8.6-50 mg tablet Take 1 tablet by mouth 2 (two) times a day.     tolterodine (DETROL LA) 4 MG ER capsule Take 4 mg by mouth daily with lunch.            traZODone (DESYREL) 100 MG tablet Take 200 mg by mouth at bedtime.     VIIBRYD 20 mg Tab tablet Take 20 mg by mouth daily.     ZOLMitriptan (ZOMIG) 5 mg nasal solution 1 spray into each nostril as needed.       Labs:  Results for orders placed or performed in visit on 11/18/19   Basic Metabolic Panel   Result Value Ref Range    Sodium 139 136 - 145 mmol/L    Potassium 4.4 3.5 - 5.0 mmol/L    Chloride 104 98 - 107 mmol/L    CO2 25 22 - 31 mmol/L     Anion Gap, Calculation 10 5 - 18 mmol/L    Glucose 68 (L) 70 - 125 mg/dL    Calcium 9.3 8.5 - 10.5 mg/dL    BUN 10 8 - 22 mg/dL    Creatinine 0.77 0.60 - 1.10 mg/dL    GFR MDRD Af Amer >60 >60 mL/min/1.73m2    GFR MDRD Non Af Amer >60 >60 mL/min/1.73m2     Lab Results   Component Value Date    WBC 9.5 11/18/2019    HGB 8.5 (L) 11/18/2019    HCT 27.9 (L) 11/18/2019    MCV 90 11/18/2019     11/18/2019     Lab Results   Component Value Date    TSH 2.56 11/25/2019     Vitamin D, Total (25-Hydroxy)   Date Value Ref Range Status   07/26/2019 54.0 30.0 - 80.0 ng/mL Final     Lab Results   Component Value Date    PRIONWKZ95 368 11/18/2019       Assessment/Plan:    Intertrochanteric fracture of left proximal femur without definite mass lesion: Given weightbearing as tolerated, incisional cares as directed, follow-up with Ortho on 11/27    History of left foot drop: Pending bracing with [facility]    Anticoagulation: continue aspirin 81 mg twice daily duration per Ortho    ABLA: last Hgb 8.5 on 11/18, continue FeSO4 325mg, recheck CBC with PCP    Pain control: continue tylenol 1000 mg 4 times daily with Vistaril 50 mg 4 times daily (increased today). Was using dilaudid frequently (4-5x with 2 tabs daily), now switched her to oxycodone 5 mg every 4 hours as needed with frequent icing. Per therapy she has no limitations per pain.  Had mtg with nurse manager to address concerns. Ordered xray as well to verify no changes     Constipation: Continue senna S tab twice daily, denies issue    Insomnia: Continue trazodone 200 mg at bedtime and melatonin 6 mg, sleeping improved    Overactive bladder: Continue Detrol 4 mg daily with lunch    Depression with TBI hx: continue Viibyd 20mg daily and lamotrigine 100mg daily    Anxiety: Continue clonazepam 0.5 mg twice daily.  Requesting psych evaluation    History of migraines: Continue zolmitriptan 5mg 1 spray each nostril PRN    Osteopenia: Continue Prolia as ordered    Vit D def:  continue D3 2000U daily    Disposition: plans to return home.  [facility] 25/30      Electronically signed by: Tree Thomas NP

## 2021-06-04 VITALS
HEART RATE: 74 BPM | OXYGEN SATURATION: 95 % | RESPIRATION RATE: 16 BRPM | TEMPERATURE: 97 F | BODY MASS INDEX: 29.88 KG/M2 | DIASTOLIC BLOOD PRESSURE: 79 MMHG | SYSTOLIC BLOOD PRESSURE: 128 MMHG | WEIGHT: 153 LBS

## 2021-06-04 VITALS
OXYGEN SATURATION: 94 % | BODY MASS INDEX: 29.88 KG/M2 | HEART RATE: 79 BPM | RESPIRATION RATE: 18 BRPM | SYSTOLIC BLOOD PRESSURE: 131 MMHG | DIASTOLIC BLOOD PRESSURE: 81 MMHG | WEIGHT: 153 LBS | TEMPERATURE: 98 F

## 2021-06-04 VITALS — WEIGHT: 145 LBS | BODY MASS INDEX: 28.8 KG/M2

## 2021-06-04 VITALS
HEIGHT: 60 IN | WEIGHT: 144.4 LBS | HEART RATE: 76 BPM | DIASTOLIC BLOOD PRESSURE: 82 MMHG | BODY MASS INDEX: 28.35 KG/M2 | SYSTOLIC BLOOD PRESSURE: 120 MMHG

## 2021-06-04 VITALS
BODY MASS INDEX: 30.78 KG/M2 | SYSTOLIC BLOOD PRESSURE: 160 MMHG | HEART RATE: 92 BPM | WEIGHT: 155 LBS | DIASTOLIC BLOOD PRESSURE: 90 MMHG

## 2021-06-04 VITALS — BODY MASS INDEX: 27.29 KG/M2 | HEIGHT: 60 IN | WEIGHT: 139 LBS

## 2021-06-04 VITALS — SYSTOLIC BLOOD PRESSURE: 122 MMHG | HEART RATE: 76 BPM | DIASTOLIC BLOOD PRESSURE: 68 MMHG

## 2021-06-04 NOTE — TELEPHONE ENCOUNTER
Patient is wondering if it will be okay for her to received a Epidural cortisone.     Conrad @ [phone]

## 2021-06-05 ENCOUNTER — RECORDS - HEALTHEAST (OUTPATIENT)
Dept: INTERNAL MEDICINE | Facility: CLINIC | Age: 66
End: 2021-06-05

## 2021-06-05 VITALS
DIASTOLIC BLOOD PRESSURE: 79 MMHG | TEMPERATURE: 98.8 F | WEIGHT: 139.6 LBS | OXYGEN SATURATION: 98 % | HEART RATE: 83 BPM | BODY MASS INDEX: 27.72 KG/M2 | SYSTOLIC BLOOD PRESSURE: 127 MMHG

## 2021-06-05 VITALS — BODY MASS INDEX: 28.47 KG/M2 | HEIGHT: 60 IN | WEIGHT: 145 LBS

## 2021-06-05 VITALS — BODY MASS INDEX: 28.54 KG/M2 | WEIGHT: 145.4 LBS | HEIGHT: 60 IN

## 2021-06-05 VITALS — HEIGHT: 60 IN | WEIGHT: 145 LBS | BODY MASS INDEX: 28.47 KG/M2

## 2021-06-05 VITALS — WEIGHT: 145 LBS | HEIGHT: 60 IN | BODY MASS INDEX: 28.47 KG/M2

## 2021-06-05 DIAGNOSIS — M79.609 PAIN IN SOFT TISSUES OF LIMB: ICD-10-CM

## 2021-06-06 NOTE — TELEPHONE ENCOUNTER
Controlled Substance Refill Request  Medication Name:   Requested Prescriptions     Pending Prescriptions Disp Refills     acetaminophen-codeine (TYLENOL-CODEINE #4) 300-60 mg per tablet 28 tablet 0     Sig: Take 1 tablet by mouth every 6 (six) hours as needed for pain.     Date Last Fill: 2/10/2020  [facility]ed [facility]  Submit electronically to pharmacy  Controlled Substance Agreement on file:   Encounter-Level CSA Scan Date - 07/13/2016:    Scan on 7/13/2016: HE        Last office visit:  2/10/2020

## 2021-06-06 NOTE — TELEPHONE ENCOUNTER
Refilled this. She needs to see the pain clinic. No further refills will be given. Signed script at my desk.

## 2021-06-06 NOTE — TELEPHONE ENCOUNTER
Patient notified of clinician's message and verbalized understanding. No further questions at this time.       Sofie refilled this. She needs to see the pain clinic. No further refills will be given. Signed script at .  Lilly Hussein CMA ............... 11:42 AM, 02/17/20

## 2021-06-06 NOTE — PATIENT INSTRUCTIONS - HE
Keep moving as able.    Continue seeing [facility] for your injections and physical therapy.    I have prescribed a week's worth of Tylenol No. 4 for pain control.    I will place the referral for the pain clinic.  They will call you to set up this appointment.    I will plan on seeing you back in about 3 months for a annual physical.    Please let me know if issues arise before then.
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
## 2021-06-07 NOTE — TELEPHONE ENCOUNTER
Patient calls, voicemail requesting that an order be entered for UDS.  She intends to go over to [facility] to provid this but was told they do not have an order to process this.    Will send to provide to determine if new order is needed

## 2021-06-07 NOTE — TELEPHONE ENCOUNTER
Medication Request  Medication name: Famciclovir 250 mg, 1 two times a day  [facility]  Reason for request: Patient is having an outbreak of cold sores around her mouth.  She needs this medication sent to her pharmacy with additional refills please.  When did you use medication last?:  Currently using medication from a previous provider.  Patient offered appointment:  patient declined  Okay to leave a detailed message: yes

## 2021-06-08 NOTE — TELEPHONE ENCOUNTER
Call placed to patient to notify the the PA denial is in process, patient also provides additional information that she has tried both tramadol and hydrocodone in the past on several occasions, neither of which provided any pain relief, were not effective.

## 2021-06-08 NOTE — PATIENT INSTRUCTIONS - HE
Your blood work is pending, it will be available via my chart within 24-48 hours, including your drug screen.    Try to stay home, stay safe. Continue wearing your mask when out in public.    Keep your pain clinic appointment.    Update your psychiatrist about stopping the Clonazepam.

## 2021-06-08 NOTE — TELEPHONE ENCOUNTER
Patient was offered metoclopramide (Reglan) instead but had already picked up Zofran for $20. She stated that the price was fine for her but next time she would consider Reglan if needed. No further action needed here.
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
## 2021-06-08 NOTE — TELEPHONE ENCOUNTER
Who is calling:  patient  Reason for Call:  Calling to cancel message. States she grabbed the wrong bottle and she's sorry to even bother . She has plenty of medication  Date of last appointment with primary care:   Okay to leave a detailed message: Yes

## 2021-06-08 NOTE — TELEPHONE ENCOUNTER
Pt called in ask the status of the medication.  Inform the Pt the request is sent to the provider.  Pt states she is in pain.   Inform the Pt if the pain is sever to go to the ED.  Pt verbalized understand, no other concern at this time.      Lewis Giles RN, [facility] Triage/Med Refill 5/15/2020 6:34 PM

## 2021-06-08 NOTE — TELEPHONE ENCOUNTER
RN cannot approve Refill Request    RN can NOT refill this medication med is not covered by policy/route to provider. Last office visit: 5/18/2020 Sofie Huitron CNP Last Physical: Visit date not found Last MTM visit: Visit date not found Last visit same specialty: 5/18/2020 Sofie Huitron CNP.  Next visit within 3 mo: Visit date not found  Next physical within 3 mo: Visit date not found      Graciela Coleman, [facility] Triage/Med Refill 6/8/2020    Requested Prescriptions   Pending Prescriptions Disp Refills     ipratropium (ATROVENT) 42 mcg (0.06 %) nasal spray [Pharmacy Med Name: IPRATROPIUM 0.06% SPRAY] 15 mL 2     Sig: INHALE 2 SPRAYS IN THE NOSTRIL(S) 3 TIMES DAILY.       Nasal Spray Protocol Passed - 6/4/2020  2:30 PM        Passed - Patient has had office visit/physical in last 1 year     Last office visit with prescriber/PCP: 5/18/2020 Sofie Huitron CNP OR same dept: 5/18/2020 Sofie Huitron CNP OR same specialty: 5/18/2020 Sofie Huitron CNP Last physical: Visit date not found Last MTM visit: Visit date not found    Next visit within 3 mo: Visit date not found  Next physical within 3 mo: Visit date not found  Prescriber OR PCP: Sofie Huitron CNP  Last diagnosis associated with med order: There are no diagnoses linked to this encounter.            Ipratropium/Tiotropium/Umeclidinium Refill Protocol Passed - 6/4/2020  2:30 PM        Passed - PCP or prescribing provider visit in last 6 months     Last office visit with prescriber/PCP: 5/18/2020 OR same dept: 5/18/2020 Sofie Huitron CNP OR same specialty: 5/18/2020 Sofie Huitron CNP Last physical: Visit date not found Last MTM visit: Visit date not found     Next appt within 3 mo: Visit date not found  Next physical within 3 mo: Visit date not found  Prescriber OR PCP: Sofie Catie Waletzko, CNP  Last diagnosis associated with med order: There are no diagnoses  linked to this encounter.  If protocol passes may refill for 6 months if within 3 months of last provider visit (or a total of 9 months).

## 2021-06-08 NOTE — TELEPHONE ENCOUNTER
RECEIVED A CALL FROM MIMI, CHECKING IN TO SEE IF THE ADDITIONAL INFORMATION HAS BEEN PROVIDED IN REGARDS TO THE FORMULARY OPTIONS.

## 2021-06-08 NOTE — TELEPHONE ENCOUNTER
Central PA team  [phone]  Pool: HE PA MED ([id number])          MEDICATION APPEAL has been initiated.       PA form completed and faxed insurance via Cover My Meds     Key:  MANUAL FAX     Medication:  BUPRENORPHINE 5 MCG/AC    Insurance:  [employer] [place]        Response will be received via fax and may take up to 5-10 business days depending on plan

## 2021-06-08 NOTE — TELEPHONE ENCOUNTER
PLEASE EXPEDITE   [facility]  [address]            [place] [address]  P. [phone]   F. [phone]    Date: 06/10/2020  RE: Prior Authorization Appeals    Insurance Company: [employer] Medicare Advantage  Insurance ID #: [id number]   Fax #: [phone]    Patient Name:  Conrad Zhu       :  1955      Medication Requested:  Buprenorphine 5mcg patch            To Whom It May Concern,  [facility] specializes in management of chronic pain issues. All of our providers have specifically trained in pain. The goal for our patients is to reach their highest level of function. Standard therapies for the patients in the pain center include: interventional procedures, non-opioid and when appropriate opioid medication at the lowest possible dose, physical therapy, behavior health, acupuncture. Many patients will work with a PharmD in an MTM visit for concerns about medication. We provide education and referrals for nutrition or functional medicine.    All of the formulary alternatives listed in the denial letter are schedule II opioids. My goal as a provider is to keep her morphine equivalents lowest with a scheduled III medication. The Buprenorphine patch was specifically selected in regards to CDC guidelines and following opioid precautions including agreement, , and UDT results.                                 Please reconsider the denial of this medication.       Sincerely,          Sherron Ruiz PA-C

## 2021-06-08 NOTE — TELEPHONE ENCOUNTER
Medication Question or Clarification  Who is calling: Patient  What medication are you calling about (include dose and sig)?:   acetaminophen-codeine (TYLENOL-CODEINE #4) 300-60 mg per tablet  60 tablet  0  4/13/2020      Sig - Route: Take 1 tablet by mouth every 6 (six) hours as needed for pain. - Oral     Class: Print         Who prescribed the medication?: Sofie Huitron CNP  What is your question/concern?: Patient states she has a pain clinic appointment on 6/3/2020, but will need a bridge of this medication until this date.  Please advise.  [facility]ed [facility]  Okay to leave a detailed message?: Yes
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
## 2021-06-08 NOTE — TELEPHONE ENCOUNTER
Fax received from [facility] for Ondansetron. Not covered by insurance.     Start PA for Ondansetron 4 mg tablet.  Lilly Hussein CMA ............... 2:44 PM, 05/26/20

## 2021-06-08 NOTE — TELEPHONE ENCOUNTER
PA denied. Are you willing to write a letter of necessity for Zofran to be used? Per the below letter, the covered alternatives are metoclopramide or prochlorperazine.

## 2021-06-08 NOTE — TELEPHONE ENCOUNTER
RN cannot approve Refill Request    RN can NOT refill this medication med is not covered by policy/route to provider       . Last office visit: 5/18/2020 Sofie Huitron CNP Last Physical: Visit date not found Last MTM visit: Visit date not found Last visit same specialty: 5/18/2020 Sofie Huitron CNP.  Next visit within 3 mo: Visit date not found  Next physical within 3 mo: Visit date not found      Karrie Pelayo, [facility] Triage/Med Refill 5/20/2020    Requested Prescriptions   Pending Prescriptions Disp Refills     ondansetron (ZOFRAN) 4 MG tablet [Pharmacy Med Name: ONDANSETRON HCL 4 MG TABLET] 30 tablet 0     Sig: TAKE 1 TABLET BY MOUTH EVERY 8 HOURS IF NEEDED FOR NAUSEA/VOMITING       There is no refill protocol information for this order

## 2021-06-08 NOTE — TELEPHONE ENCOUNTER
Order pended, if appropriate, to bridge the requested med to the 06/03 pain clinic appt reported by the patient.

## 2021-06-08 NOTE — TELEPHONE ENCOUNTER
Prior Authorization Request  Who s requesting:  Pharmacy  Pharmacy Name and Location: [facility] [phone]  Medication Name: Buprenorphine 5mcg patch  Insurance Plan: Blue Cross Medicare Advantage  Insurance Member ID Number:  [id number]  CoverMyMeds Key: N/A  Informed patient that prior authorizations can take up to 10 business days for response:   Yes  Okay to leave a detailed message: Yes

## 2021-06-08 NOTE — TELEPHONE ENCOUNTER
Can we please call the patient and see if she is okay with Reglan instead of Zofran? She is allergic to Compazine. Thank you.

## 2021-06-08 NOTE — TELEPHONE ENCOUNTER
Appeal please; all the formulary alternatives listed are schedule 2 opioids and my goal is to keep her at lower MME with schedule 3 medication.  Thanks.

## 2021-06-08 NOTE — TELEPHONE ENCOUNTER
----- Message from Sherron Ruiz PA-C sent at 5/19/2020  7:23 AM CDT -----  Please request confirmation for opiates (expected, patient prescribed T#3)

## 2021-06-08 NOTE — TELEPHONE ENCOUNTER
Central PA team  [phone]  Pool: HE PA MED ([id number])          PA has been initiated.       PA form completed and faxed insurance via Cover My Meds     Brewer:  ALEE MACIEL (Key: [id number])     Medication:  Ondansetron 4 mg tablet    Insurance:  BLUE CROSS BL[employer] [place] MEDICARE        Response will be received via fax and may take up to 5-10 business days depending on plan

## 2021-06-08 NOTE — TELEPHONE ENCOUNTER
Order placed for alcohol and quantitative opioid UTD. Call placed to [facility] lab to inform them of the additional orders.     Ida WALL CMA  12:33 PM  5/19/2020

## 2021-06-08 NOTE — TELEPHONE ENCOUNTER
Central PA team  [phone]  Pool: HE PA MED ([id number])          PA has been initiated.       PA form completed and faxed insurance via Cover My Meds     Brewer:  ALEE MACIEL (Key: [id number])     Medication:  Buprenorphine 5mcg patch    Insurance:  BLUE CROSS BLUE [employer]IELD MEDICARE        Response will be received via fax and may take up to 5-10 business days depending on plan

## 2021-06-08 NOTE — TELEPHONE ENCOUNTER
I received a phone a clinical Pharmacist from [facility] and he is wondering why preferred alternatives would not be appropriate for the patient's therapy. If the provider could provide rational that can be provided to the review clinician ASAP    FENTANYL PATCH - Found information that patient had tried in 2013:4. FentaNYL 25 MCG/HR Transdermal Patch 72 Hour; APPLY 1 PATCH EVERY 3 DAYS; Therapy:   [id number] to (Evaluate:06Oct2013); Last Rx:06Sep2013. This was provided to the reviewer on phone.    HYDROCODONE ER CAPSULES: Provided that she did try Hydrocodone-Acetaminophen 5-325mg, 5-300mg  METHADONE  MORPHINE SULFATE ER TABLETS  NUCYNTA ER TABLET  TRAMADOL ER TABLETS  XTAMPZA ER     [phone] REFERENCE NUMBER [id number] FOR THE REQUEST, MIMI VALERIO [facility]

## 2021-06-08 NOTE — PATIENT INSTRUCTIONS - HE
Start Butrans 5 mcg/hr patch - discussed utilizing the information found on www.butrans.com for placement of patch, disposal of patch, common side effects, etc.  Call nurse line with any questions as you start taking it.  Ok to take T#4 2 tabs twice a day with above medication.  Will plan to slowly reduce this medication as we increase the dose of the patch.  Will consider referral to warm pool therapy in the future   Continue with psychiatrist and psychologist for behavioral health plan.  Ask if duloxetine has been prescribed in the past.    Opioid agreement and consent form mailed out today. Please complete the forms, keep the yellow copy and return the white copy in the prestamped envelope. Discussed bringing these copies and your most recent After Visit Summary (AVS) from our appointment to the pharmacy when you are refilling controlled medications to assist with any additional information the pharmacist may require.   Call [facility] for left hip follow-up and they can also assess your left foot position and revise the brace as you are not using it.  Follow-up with Sherron in 4 weeks OVL to assess medication change.

## 2021-06-08 NOTE — TELEPHONE ENCOUNTER
Approved.  Goal to reduce dosing of T#4 with titration of butrans which was recently started.  Will review on 07/01/20 visit.

## 2021-06-09 NOTE — TELEPHONE ENCOUNTER
Medication being requested: Tylenol #4  Last visit date: 7/1/20.  Provider: RONALD  Next visit date: 8/13/20.  Provider: RONALD  Expected follow up: 4-6 weeks  MTM visit (Pain Center) date: No  UDT date: Alcohol UDT 5/2020  Agreement date: 6/2020   snipped in:  TRIED NUMEROUS TIMES EVEN LOGGING OUT AND BACK IN, SORRY WASN'T WORKING  Pertinent between visit information about requested medication (telephone, mychart, prior authorization, concerns, comments): No  Script being sent to provider by nurse- dates and quantity:   7/16/20-7/31/20  Requested Prescriptions     Pending Prescriptions Disp Refills     acetaminophen-codeine (TYLENOL-CODEINE #4) 300-60 mg per tablet 75 tablet 0     Sig: Take 1-2 tablets by mouth every 8 (eight) hours as needed for pain.     [facility]  Standing orders for withdrawal protocol implemented: ROMEL

## 2021-06-09 NOTE — TELEPHONE ENCOUNTER
Patient received the Evinity injection yesterday and noticed that the area she received the injection is hot/warm to the touch. She also felt 2 knots in the area that has done down, but she can still feel it. It felt itchy yesterday also. She has never expedience this before with the injection.     Please advise on what to do.     Conrad @ [phone]

## 2021-06-09 NOTE — PROGRESS NOTES
"Conrad Zhu is a 64 y.o. female who is being evaluated via a billable video visit.      The patient has been notified of following:     \"This video visit will be conducted via a call between you and your physician/provider. We have found that certain health care needs can be provided without the need for an in-person physical exam.  This service lets us provide the care you need with a video conversation.  If a prescription is necessary we can send it directly to your pharmacy.  If lab work is needed we can place an order for that and you can then stop by our lab to have the test done at a later time.    Video visits are billed at different rates depending on your insurance coverage. Please reach out to your insurance provider with any questions.    If during the course of the call the physician/provider feels a video visit is not appropriate, you will not be charged for this service.\"    Patient has given verbal consent to a Video visit? Yes  How would you like to obtain your AVS? AVS Preference: MyChart.  Patient would like the video invitation sent by: Text to cell phone  Will anyone else be joining your video visit? No        Video Start Time: 1:23 PM    Additional provider notes:   PAIN CENTER PROGRESS NOTE    Subjective:   Conrad Zhu is a 64 y.o. 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  Consult was 04/28/20.    Major issues:  1. Chronic, continuous use of opioids    2. Chronic pain syndrome    3. Chronic left-sided low back pain with sciatica, sciatica laterality unspecified    4. Peripheral polyneuropathy      Pain location and description: 8/10, constant sharp, radiating pain in lower back, hips, and peripheral neuropathy.  Function rated 8.     Radiation of pain: lower back to bilateral " "hips  Gait disturbance: Denies recent falls, antalgic. Uses a cane prn.  Exacerbating factors: Standing, sitting, walking, laying down, reaching, twisting, lifting, squatting, bending, stretching, going up and down stairs, riding in the car, getting out of car or bed  Alleviating factors: Medications, ice on neck and lower back takes an hour and helps.  Associated symptoms: Chronic LLE weakness, numbness in legs, swelling in left ankle/calf.  Denies fever/chills, rash, bowel or bladder incontinence.  Functional pain symptoms: Pain interferes with sleep, mood, ADLs, relationship/social, work, sexual health.  Adverse effects of medications: None reported  Current treatment efficacy: Fair.  Takes T#4 2 tabs two times a day prn pain, reports she has a high tolerance and doesn't work that well but is helping to take edge off.  Butrans 5 mcg/hr patch is on her left arm not helpful.  Current treatment compliance: New to pain center.  Reports this past month taking old prescription medications (see below).      Since the last Pain Center visit, the patient denies any use of anticoagulant medications. Denies any new diagnostic testing, new treatments or new medical conditions, any changes in medications, or any ED/urgent care visits.  Patient denies the chance of being pregnant or wanting to become pregnant.    She states since last visit her pain is 24/7 and she did make an appointment at [facility] for next week to see [facility] physician Dr. Tree Ontiveros.  Foot and ankle and lateral part swollen and is really painful - she states her legs are \"in bad shape.\"  She states her pain is sharp, constant and if she touches it is tender.  Pain is waking her several times a night.  She states she was up at 0400 and took T#4 for 2 hours slept. She states walking is worse and her left foot is everted badly and wants to know why.  She did see Dr. Hernandez and x-ray of hip was normal.   She is using ice and voltaren doesn't help so " doesn't use it.  T#4 continues to take it and usually taking 2 tabs two times a day which helps for 4 hours at a time.  She reports there have been several times this past 2 weeks she has taken a third dose of 2 tabs as her pain has been increased and her pain is waking her and cannot get back to sleep without it. She wanted to be honest and report to provider as she recent reviewed agreement and consent.      Consult recommendations:  Discontinue clonazepam refills due to concurrent opioid use.  Trial of Butrans 5 mcg/hr patch  SCS trial, patient not interested  Trial duloxetine if not previously prescribed by psychiatry. Continue with therapist and psychiatrist.  Pt home exercises, referral to [facility] when open  Reviewed medical cannabis for pain if above options fail.    She last filled clonazepam in April and has #6 tabs left; she states she weaned off daily dosing but she has taken sparingly for anxiety.  She hasn't taken any this month as she wanted to ask if she may.  She has a psychiatrist and has not asked if there are other options in place of this.  Has previous failed hydroxyzine, buspar.  She does not have Narcan at home.  Review again CDC guideline and risk of respiratory depression by combining this with opioids, especially if she would like to increase her Butrans patch.    Review of Systems  Constitutional: Weight loss 11 pounds. Denies fever, chills, night sweats, lethargy, weight gain  Musculoskeletal: Positive for back pain, left hip and foot joint pain.  Denies joint swelling, recent falls.  Gastrointestional: Denies difficulty swallowing, change in appetite, abdominal pain, constipation, nausea, vomiting, diarrhea, GERD, fecal incontinence.  Genitourinary: Denies urinary incontinence, dysuria, hematuria, UTI, frequency, hesitancy, change in libido  Neurologic: Denies headaches, confusion, seizure, weakness, changes in balance, changes in speech.  Psychiatric: Depression, anxiety.   "Denies memory loss, psychoses, suicidal ideation, substance use/abuse.     Objective:     Vitals:    07/01/20 1328   Weight: 139 lb (63 kg)   Height: 4' 11.5\" (1.511 m)   PainSc:   8     Physical Exam  Constitutional- General appearance: Normal.  Well developed, comfortable, overweight and appearance reflects stated age.  No acute distress or pain behaviors noted.  Presents alone.  Psychiatric- Judgment and insight: Normal.  Speech: Normal rhythm.  Thought process: Normal.  No abnormal thoughts reported. Alert & Oriented to person, place, and time.  Recent and remote memory: Normal.  Mood and affect: Normal.  Observed mood: appropriate.   Respiratory- Breathing is non-labored; normal rhythm and rate.  Dermatologic- Exposed skin is clean, dry, and intact to inspection.  Musculoskeletal- Gait and station: Antalgic, able to get from sitting to standing position with difficulty     Lab:  Last UDT on 05/18/20 was reviewed and was positive for opioids, hydrocodone which was unexpected.  Per patient report she took an old prescription - was last prescribed 12/17/2019 for #15 tabs.  Will plan to recheck random UDT at next clinic visit.    [place]  Reviewed on 07/01/20 as expected    Imaging:  CT Hip Without Contrast Left 01/11/2020  IMPRESSION:   1.  Postoperative changes of short IM dima and dynamic screw fixation of an intertrochanteric fracture of the left hip. The fracture is in good anatomic alignment. There is evidence of some new bone formation along the superolateral margin of the   fracture, but it is still visualized along the majority of its course.  2.  Ununited and unfixed facture fragment involving the lesser trochanter. This was seen on the original film 11/11/2019 and has not significantly changed.  3.  Superimposed degenerative change left hip joint.  4.  No evidence for fracture or migration of the fixation appliances.  5.  Exam otherwise negative and unchanged.     MRI Ankle right without contrast " 07/06/2019     MRI Cervical, thoracic, and lumbar spine without contrast 10/13/17:  IMPRESSION:   CONCLUSION:  MRI CERVICAL SPINE:  1.  Mild to moderate degenerative changes most pronounced C5-C6 and C6-C7 where there is mild spinal canal and mild to moderate foraminal narrowing.  2.  No high-grade spinal canal narrowing, spinal cord compression, or spinal cord signal abnormality.     MRI THORACIC SPINE:  1.  Minor degenerative changes. No stenosis.  2.  Normal spinal cord.     MRI LUMBAR SPINE:  1.  Normal spinal cord and conus. No high-grade central spinal canal stenosis in the lumbar spine.  2.  Interval changes of laminectomy on the left at L4-L5. Adequate central canal at this level. Moderate left and mild right foraminal narrowing unchanged.  3.  At L3-L4 a left-sided herniation narrows the lateral recess, unchanged from prior. The disc bulge also contacts and slightly displaces the exiting L3 nerve root on the left, not changed.  4.  At L2-L3 a left-sided herniation contacts and may slightly displace the traversing nerve root, unchanged.       Assessment:   Conrad Zhu is a 64 y.o. female seen in clinic today for multi-site pain in left leg/foot secondary to mononeuritis multiplex from [facility] in 2013, lumbar stenosis and history of L4 hemilaminectomy, and left hip pain secondary to fracture and surgery 11/2019 following [facility].  She is having difficulty with pain control on short acting opioid and we discuss trial of Butrans patch to see if pain can be controlled for extended period of time on less daily MME and she is agreeable; review possible risks and side effects of medication in detail with increase recommended from 5 mcg/hr to 10 mcg/hr.  We will titrate dose of Butrans with goal of reducing daily dose of T#4 over time.  We discuss opioid agreement and not to self-escalate her dose of medication, will monitor closely.  She has discontinued use of clonzepam from daily use to prn, none  in past month.  She asks what to do if she needs one for anxiety and is instructed to see psychiatrist for recommendations.  We review indications for narcan.  Will continue to monitor this with her behavioral health providers.       Plan:   Increase Butrans to 10 mcg/hr patch - call nurse line with any side effects on increase.  This may require a PA for dose change, please attempt to fill at the pharmacy now.  If insurance will pay for it, ok to increase dose at next patch change (07/04/20)  If not, continue to use 5 mcg/hr patch until it is approved.  Ok to take T#4 2 tabs twice a day with above medication. Will allow 3rd dose on days with severe pain or increased activity.  #75 tabs to last 15 days. Will plan to slowly reduce this medication as we increase the dose of the patch.  Will consider referral to warm pool therapy in the future.  Plan to start [facility] for hip and foot/ankle pain as scheduled tomorrow in [place].   Continue with psychiatrist and psychologist for behavioral health plan. Reviewed clonazepam today and CDC guideline to avoid use of opioids and benzos together.  Review with psychiatrist other options for anxiety as needed.    Keep appointment as scheduled with Dr. Ontiveros at [facility] next week for foot/ankle evaluation.  Follow-up with Sherron in 4-6 weeks OVL to assess medication change.    Sherron Ruiz PA-C  [facility]   [address]  [place] [address]  Ph: [phone]  Fax: [phone]      Video-Visit Details    Type of service:  Video Visit    Video End Time (time video stopped):  2:01 PM  Originating Location (pt. Location): Home    Distant Location (provider location):  [facility]     Platform used for Video Visit: Doxjarod Ruiz PA-C

## 2021-06-09 NOTE — TELEPHONE ENCOUNTER
Patient left message this am, that she just applied her last Butrans today. Has not gotten any pain relief from it.    Plan:   Increase Butrans to 10 mcg/hr patch - call nurse line with any side effects on increase.  This may require a PA for dose change, please attempt to fill at the pharmacy now.  If insurance will pay for it, ok to increase dose at next patch change (07/04/20)  If not, continue to use 5 mcg/hr patch until it is approved.       I spoke with patient just now. She is on her last 10 mcg patch. Will need to change again next Thurs. Has not had any relief of pain. Had discussed with provider increasing to 20 mcg if no relief. Patient denies any side effects from current patch. Denies sedation. Informed patient that provider out of office until next week. Patient is just fine with discussing when Sherron is back next week. Will need a refill of Tylenol #4 next Thurs also. Patient states her #75 tabs last 14 days. Taking 4-8 tabs a day, depending on pain.    Last OV, 7/1 WITH rw  Next OV, 8/13 WITH rw.  Last UDS, 5/2020  Lab:  Last UDT on 05/18/20 was reviewed and was positive for opioids, hydrocodone which was unexpected.  Per patient report she took an old prescription - was last prescribed 12/17/2019 for #15 tabs.  Will plan to recheck random UDT at next clinic visit.Due to Covid, not rechecked.   AGREEMENT 6/20/20    Will review with provider about increase in Butrans., and how long Tylenol #4 should last??

## 2021-06-09 NOTE — PATIENT INSTRUCTIONS - HE
Increase Butrans to 10 mcg/hr patch - call nurse line with any side effects on increase.  This may require a PA for dose change, please attempt to fill at the pharmacy now.  If insurance will pay for it, ok to increase dose at next patch change (07/04/20)  If not, continue to use 5 mcg/hr patch until it is approved.  Ok to take T#4 2 tabs twice a day with above medication. Will allow 3rd dose on days with severe pain or increased activity.  #75 tabs to last 15 days. Will plan to slowly reduce this medication as we increase the dose of the patch.  Will consider referral to warm pool therapy in the future.  Plan to start [facility] for hip and foot/ankle pain as scheduled tomorrow in [place].   Continue with psychiatrist and psychologist for behavioral health plan. Reviewed clonazepam today and CDC guideline to avoid use of opioids and benzos together.  Review with psychiatrist other options for anxiety as needed.    Keep appointment as scheduled with Dr. Ontiveros at [facility] next week for foot/ankle evaluation.  Follow-up with Sherron in 4-6 weeks OVL to assess medication change.

## 2021-06-09 NOTE — TELEPHONE ENCOUNTER
Pt states last night at the injection site,  Her arm was warm/hot, no redness some itchiness and had two knots under the skin. Pt states this morning the symptoms are better, area is not as warm, still no redness or drainage and knots have went down some. Explained it could be irritation from injection and to watch it over the weekend, if area becomes more warm, red or has drainage or pt develops fever, pt should be seen in urgent care to have this further evaluated. Pt states symptoms do seem to be improving but has not had this reaction to injection in the past. Pt agrees to have symptoms further evaluated if they get worse in an urgent care.

## 2021-06-10 ENCOUNTER — HOSPITAL ENCOUNTER (OUTPATIENT)
Dept: PALLIATIVE MEDICINE | Facility: OTHER | Age: 66
Discharge: HOME | End: 2021-06-10
Attending: PHYSICIAN ASSISTANT
Payer: MEDICARE

## 2021-06-10 DIAGNOSIS — G43.009 MIGRAINE WITHOUT AURA AND WITHOUT STATUS MIGRAINOSUS, NOT INTRACTABLE: ICD-10-CM

## 2021-06-10 DIAGNOSIS — F11.90 CHRONIC, CONTINUOUS USE OF OPIOIDS: ICD-10-CM

## 2021-06-10 DIAGNOSIS — G89.4 CHRONIC PAIN SYNDROME: ICD-10-CM

## 2021-06-10 DIAGNOSIS — G90.522 COMPLEX REGIONAL PAIN SYNDROME I OF LEFT LOWER LIMB: ICD-10-CM

## 2021-06-10 RX ORDER — ACETAMINOPHEN AND CODEINE PHOSPHATE 300; 60 MG/1; MG/1
1-2 TABLET ORAL EVERY 8 HOURS PRN
Qty: 180 TABLET | Refills: 0 | Status: SHIPPED | OUTPATIENT
Start: 2021-06-15 | End: 2021-08-05

## 2021-06-10 RX ORDER — ACETAMINOPHEN AND CODEINE PHOSPHATE 300; 60 MG/1; MG/1
1-2 TABLET ORAL EVERY 8 HOURS PRN
Qty: 180 TABLET | Refills: 0 | Status: SHIPPED | OUTPATIENT
Start: 2021-07-15 | End: 2021-08-05

## 2021-06-10 ASSESSMENT — MIFFLIN-ST. JEOR: SCORE: 1116.28

## 2021-06-10 NOTE — TELEPHONE ENCOUNTER
Prior Authorization Request  Who s requesting:  Pharmacy  [facility] Location[facility]  Medication Name: buprenorphine HCL (BELBUCA) 450 mcg Film buccal film  Insurance Plan: BLUE CROSS MEDICARE ADVANTAGE  Insurance Member ID Number:  [id number]   CoverMyMeds Key: [id number]/A  Informed patient that prior authorizations can take up to 10 business days for response:   Yes  Okay to leave a detailed message: Yes

## 2021-06-10 NOTE — TELEPHONE ENCOUNTER
Yes ok to continue titration of Butrans but next dose up is 15 mcg and discussed that 20 mcg is the max.  Will approve 15 mcg patch until our visit and also the T#4 is a 2 week supply, please queue up both for approval, thanks.

## 2021-06-10 NOTE — PATIENT INSTRUCTIONS - HE
Discontinue Butrans patch.  Start Belbuca 450 mcg every 12 hours - discussed possible side effects and proper use.  Can look at www.belbuca.com for additional resources and FAQs.  This dose may need to be adjusted in follow-up for pain control, but call nurse line with any side effect concerns.  Ok to take T#4 2 tabs twice a day with above medication. Will allow 3rd dose on days with severe pain or increased activity.  #75 tabs to last 15 days.  Next refill sent to fill 08/28/20  Will consider referral to warm pool therapy in the future.  Plan to start [facility] for hip and foot/ankle pain in [place] as able.   Send office note from [facility] foot doctor and also patient plans to see Dr. Digna Ontiveros for evaluation.  If there is nothing left for surgeons to do, we discussed trial of lumbar sympathetic block to treat the foot/ankle pain - information about procedure will be mailed to review.  Continue with psychiatrist and psychologist for behavioral health plan.   Follow-up with Sherron in 4-6 weeks OVL in office to assess medication change.

## 2021-06-10 NOTE — PROGRESS NOTES
"Conrad Zhu is a 64 y.o. female who is being evaluated via a billable video visit.      The patient has been notified of following:     \"This video visit will be conducted via a call between you and your physician/provider. We have found that certain health care needs can be provided without the need for an in-person physical exam.  This service lets us provide the care you need with a video conversation.  If a prescription is necessary we can send it directly to your pharmacy.  If lab work is needed we can place an order for that and you can then stop by our lab to have the test done at a later time.  Video visits are billed at different rates depending on your insurance coverage. Please reach out to your insurance provider with any questions.\"    Patient has given verbal consent to a Video visit? Yes  How would you like to obtain your AVS? AVS Preference: Christy.  If dropped by the video visit, the video invitation should be sent to: [phone] DOXIMITY  Will anyone else be joining your video visit? No  Distant Location (provider location):  [facility]   Platform used for Video Visit: [facility]     Pain score: 6  What does your pain feel like: constant ache and numbness  Does the pain interfere with:  Work: no  Walking/distance: yes  Sleep: yes  Daily activities: yes  Relationships/social life: yes  Mood: yes  F= 5              "

## 2021-06-10 NOTE — TELEPHONE ENCOUNTER
Central PA team  [phone]  Pool: HE PA MED ([id number])          PA has been initiated.       PA form completed and faxed insurance via Cover My Meds     Key:  [id number]     Medication:  BELBUCA    Insurance:  [facility] MEDICARE        Response will be received via fax and may take up to 5-10 business days depending on plan

## 2021-06-10 NOTE — TELEPHONE ENCOUNTER
Medication being requested: T #4  Last visit date: 7/1   Provider: RONALD  Next visit date: 8/13   Provider: Ronald  Expected follow up: 4-6 weeks  MTM visit (Pain Center) date: ric  UDT date: from primary 5/18/20  Agreement date:  6/9/20   (Last fill date; name; strength; provider; MME; quantity):   reviewed, T #4 last filled 7/31 14 day supply so will be due 8/14.  Pertinent between visit information about requested medication (telephone, mychart, prior authorization, concerns, comments):Ok to take T#4 2 tabs twice a day with above medication. Will allow 3rd dose on days with severe pain or increased activity. 8/7 call: Patient requesting to have T#4 order sent in for  on day of appointment 8/13 as she is leaving town early Friday morning after appointment. Pharmacy will need to order in stock to fill this rx so she is requesting that the order be sent on today or Monday to allow for the order time.  Script being sent to provider by nurse- dates and quantity:   Requested Prescriptions     Pending Prescriptions Disp Refills     acetaminophen-codeine (TYLENOL-CODEINE #4) 300-60 mg per tablet 75 tablet 0     Sig: Take 1-2 tablets by mouth every 8 (eight) hours as needed for pain.     [facility]  Standing orders for withdrawal protocol implemented: ric

## 2021-06-10 NOTE — TELEPHONE ENCOUNTER
Refill request: tylenol #4    Last visit date: 8/13 Provider: Zana  Next visit date: 10/07  Provider: Zana  Expected follow up: 8 weeks  MTM visit (Pain Center) date: no  CSA signed 06/09/2020.  UDT from outside provider on 5/18/20   (Last fill date; name; strength; provider; MME; quantity):  8/13-13 days  Pertinent between visit information about requested medication (telephone, mychart, prior authorization, concerns, comments): PA approval for [facility] on 8/20  Script being sent to provider by nurse- dates and quantity: prescription already sent to the pharmacy  Pharmacy cued:   Standing orders for withdrawal protocol implemented: NA

## 2021-06-10 NOTE — PROGRESS NOTES
Video Start Time: 1441 PM    Additional provider notes:   PAIN CENTER PROGRESS NOTE    Subjective:   Conrad Zhu is a 64 y.o. 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  Consult was 04/28/20.    Major issues:  1. Long term (current) use of opiate analgesic    2. Chronic pain syndrome    3. Complex regional pain syndrome i of left lower limb      Pain location and description: 6/10, constant aching, numb pain in left foot/ankle and lower back.  Function rated 5.     Radiation of pain: lower back to bilateral hips  Gait disturbance: Denies recent falls, antalgic. Uses a cane prn.  Exacerbating factors: Standing, sitting, walking, laying down, reaching, twisting, lifting, squatting, bending, stretching, going up and down stairs, riding in the car, getting out of car or bed  Alleviating factors: Medications, ice on neck and lower back takes an hour and helps.  Associated symptoms: Chronic LLE weakness, numbness in legs, swelling in left ankle/calf.  Denies fever/chills, rash, bowel or bladder incontinence.  Functional pain symptoms: Pain interferes with sleep, mood, ADLs, relationship/social, work, sexual health.  Adverse effects of medications: None reported  Current treatment efficacy: Fair.  Takes T#4 2 tabs two times a day prn pain, reports she has a high tolerance and doesn't work that well but is helping to take edge off.  Butrans 15 mcg/hr patch is on her left arm not helpful.  Current treatment compliance: New to pain center.  Reports this past month taking old prescription medications (see below).      Since the last Pain Center visit, the patient denies any use of anticoagulant medications. Denies any new diagnostic testing, new treatments or new medical conditions, any changes in  medications, or any ED/urgent care visits.  Patient denies the chance of being pregnant or wanting to become pregnant.  Reports pain as unchanged, still no better and she states she feels more numbness in her foot.  She cannot feel her plantar foot but she states when she gets a pedicure it still tickles.  She reports swelling, denies color changes, feels warm to the touch.  She states no hair or toenail changes.  She denies previous sympathetic block, she did have Botox which did not help.  She had LESI x 4 without relief.      She was scheduled at [facility] with Dr. Tree Ontiveros.  She reports she saw another foot doctor as he is no longer there; she states she will sign a release for visit.  She was advised to do PT and no further recommendations. She will also see Dr. Digna Ontiveros through [facility] who did her original foot surgery back in 2014.  She has not gone to  PT yet as she has been hesitant due to COVID.  She is not really doing much outside of her house.  She did see Dr. Hernandez and x-ray of hip was normal.   She is using ice and voltaren doesn't help so doesn't use it.  T#4 continues to take it and usually taking 2 tabs two-three times a day which helps for 4 hours at a time. We review other extended release opioid options as she is not having good relief.  She states in the past oxycodone did not help her postop.     Consult recommendations:  Discontinue clonazepam refills due to concurrent opioid use.  Trial of Butrans 5 mcg/hr patch  SCS trial, patient not interested  Trial duloxetine if not previously prescribed by psychiatry. Continue with therapist and psychiatrist.  Pt home exercises, referral to capri corona when open  Reviewed medical cannabis for pain if above options fail.    States her sleep is poor; she is limited to less than 4 hours and then wakes.  Sometimes able to get back to sleep.  She takes trazodone 300 mg at night.  Recalls possible trial of nortriptyline in the  past unsure the dose or why it was stopped.    Review of Systems  Constitutional: Sleep interruption due to pain.  Denies fever, chills, night sweats, lethargy, weight gain  Musculoskeletal: Positive for back pain, left hip and foot joint pain.  Denies joint swelling, recent falls.  Gastrointestional: Denies difficulty swallowing, change in appetite, abdominal pain, constipation, nausea, vomiting, diarrhea, GERD, fecal incontinence.  Genitourinary: Denies urinary incontinence, dysuria, hematuria, UTI, frequency, hesitancy, change in libido  Neurologic: Denies headaches, confusion, seizure, weakness, changes in balance, changes in speech.  Psychiatric: Depression, anxiety.  Denies memory loss, psychoses, suicidal ideation, substance use/abuse.     Objective:     Vitals:    08/13/20 1429   PainSc:   6     Physical Exam  Constitutional- General appearance: Normal.  Well developed, uncomfortable, overweight and appearance reflects stated age.  No acute distress or pain behaviors noted.  Presents alone.  Psychiatric- Judgment and insight: Normal.  Speech: Normal rhythm.  Thought process: Normal.  No abnormal thoughts reported. Alert & Oriented to person, place, and time.  Recent and remote memory: Normal.  Mood and affect: Normal.  Observed mood: appropriate.   Respiratory- Breathing is non-labored; normal rhythm and rate.  Dermatologic- Exposed skin is clean, dry, and intact to inspection.  Musculoskeletal- Gait and station: Seated for entire visit.    Lab:  Last UDT on 05/18/20 was reviewed and was positive for opioids, hydrocodone which was unexpected.  Per patient report she took an old prescription - was last prescribed 12/17/2019 for #15 tabs.  Will plan to recheck random UDT at next clinic visit.    [place]  Reviewed on 08/13/20 as expected    Imaging:  CT Hip Without Contrast Left 01/11/2020  IMPRESSION:   1.  Postoperative changes of short IM dima and dynamic screw fixation of an intertrochanteric fracture of the  left hip. The fracture is in good anatomic alignment. There is evidence of some new bone formation along the superolateral margin of the   fracture, but it is still visualized along the majority of its course.  2.  Ununited and unfixed facture fragment involving the lesser trochanter. This was seen on the original film 11/11/2019 and has not significantly changed.  3.  Superimposed degenerative change left hip joint.  4.  No evidence for fracture or migration of the fixation appliances.  5.  Exam otherwise negative and unchanged.     MRI Ankle right without contrast 07/06/2019     MRI Cervical, thoracic, and lumbar spine without contrast 10/13/17:  IMPRESSION:   CONCLUSION:  MRI CERVICAL SPINE:  1.  Mild to moderate degenerative changes most pronounced C5-C6 and C6-C7 where there is mild spinal canal and mild to moderate foraminal narrowing.  2.  No high-grade spinal canal narrowing, spinal cord compression, or spinal cord signal abnormality.     MRI THORACIC SPINE:  1.  Minor degenerative changes. No stenosis.  2.  Normal spinal cord.     MRI LUMBAR SPINE:  1.  Normal spinal cord and conus. No high-grade central spinal canal stenosis in the lumbar spine.  2.  Interval changes of laminectomy on the left at L4-L5. Adequate central canal at this level. Moderate left and mild right foraminal narrowing unchanged.  3.  At L3-L4 a left-sided herniation narrows the lateral recess, unchanged from prior. The disc bulge also contacts and slightly displaces the exiting L3 nerve root on the left, not changed.  4.  At L2-L3 a left-sided herniation contacts and may slightly displace the traversing nerve root, unchanged.       Assessment:   Conrad Zhu is a 64 y.o. female seen in clinic today for multi-site pain in left leg/foot secondary to mononeuritis multiplex from rhabdo in 2013, lumbar stenosis and history of L4 hemilaminectomy, and left hip pain secondary to fracture and surgery 11/2019 following [facility].   She is having difficulty with pain control on short acting opioid and we discuss trial of Butrans patch which has been titrated without any benefit.  Review option to change to Belbuca films with higher dosing options and she is agreeable; review possible side effects.  Goal to reduce reliance on T#4 for pain control.  She is following orthopedics for foot recommendations, review option for lumbar sympathetic block if PT is not effective and she will consider.  She is hesitant on idea of SCS trial.       Plan:   Discontinue Butrans patch.  Start Belbuca 450 mcg every 12 hours - discussed possible side effects and proper use.  Can look at www.belbuca.com for additional resources and FAQs.  This dose may need to be adjusted in follow-up for pain control, but call nurse line with any side effect concerns.  Ok to take T#4 2 tabs twice a day with above medication. Will allow 3rd dose on days with severe pain or increased activity.  #75 tabs to last 15 days.  Next refill sent to fill 08/28/20  Will consider referral to warm pool therapy in the future.  Plan to start [facility] for hip and foot/ankle pain in [place] as able.   Send office note from [facility] doctor and also patient plans to see Dr. Digna Ontiveros for evaluation.  If there is nothing left for surgeons to do, we discussed trial of lumbar sympathetic block to treat the foot/ankle pain - information about procedure will be mailed to review.  Continue with psychiatrist and psychologist for behavioral health plan.   Follow-up with Sherron in 4-6 weeks OVL in office to assess medication change.    [facility]   [address]  [place] [address]  Ph: [phone]  Fax: [phone]      Video-Visit Details    Type of service:  Video Visit    Video End Time (time video stopped): 1516  PM  Originating Location (pt. Location): Home    Distant Location (provider location):  [facility]      Platform used for Video Visit: [facility]      Sherron Ruiz PA-C

## 2021-06-10 NOTE — TELEPHONE ENCOUNTER
Patient left a message that they are out of town going to be back next week, but the medication is not able to be filled.  RN called pharmacy to find out which medication is being denied and what need the next step is.  Pharmacy stated the [facility] needs a prior authorization and the pharmacy is going to resend the PA request so this can be initiated.

## 2021-06-11 NOTE — TELEPHONE ENCOUNTER
Refill request: tylenol #4  No significant changes since last refilled    CSA signed 06/09/2020.  UDT: from outside clinic on 5/18    Last filled on 9/1 and 9/5 for a total of 75 tabs    Requested Prescriptions     Pending Prescriptions Disp Refills     acetaminophen-codeine (TYLENOL-CODEINE #4) 300-60 mg per tablet 75 tablet 0     Sig: Take 1-2 tablets by mouth every 8 (eight) hours as needed for pain.     [facility]

## 2021-06-11 NOTE — TELEPHONE ENCOUNTER
fyi- patient is now on [employer] as of 09.01.2020. Please make sure to check with her new insurance for PA if it's needed for her injection. She is scheduled for 10.01.2020

## 2021-06-11 NOTE — TELEPHONE ENCOUNTER
Patient is scheduled for Flu shot and a Pneumonia injection on Monday at [facility].   She is wondering if these injections will interfere with her Evenity injections.     Please call her to discuss further @ [phone]

## 2021-06-11 NOTE — TELEPHONE ENCOUNTER
Approved with full quantity as she typically gets 2 week refills.  Will adjust after visit as needed.

## 2021-06-11 NOTE — TELEPHONE ENCOUNTER
H&P reviewed  After examining the patient I find no changes in the patients condition since the H&P had been written      Vitals:    05/17/21 1043   BP: 131/77   Pulse: 68   Resp: 18   Temp: (!) 97 2 °F (36 2 °C)   SpO2: 98% Refill request: tylenol #4  Last ov: 8/13  Next ov: 10/07  CSA signed 06/09/2020.  Last filled on 9/17-13 days    Cued as a bridge to apt  Requested Prescriptions     Pending Prescriptions Disp Refills     acetaminophen-codeine (TYLENOL-CODEINE #4) 300-60 mg per tablet 13 tablet 0     Sig: Take 1-2 tablets by mouth every 8 (eight) hours as needed for pain.     [facility]

## 2021-06-12 NOTE — TELEPHONE ENCOUNTER
It is not clear to me why the pharmacy needs a new RX for something sent yesterday, did they delete it off her profile?  Just trying to understand before I send a duplicate RX.  Thanks.

## 2021-06-12 NOTE — TELEPHONE ENCOUNTER
Updated Vit D rx to be 50,000 international unit(s) twice a week, sent to pharmacy per Syeda's notes below. Pt notified this was updated on status and will  new script.

## 2021-06-12 NOTE — TELEPHONE ENCOUNTER
Called and informed pt, she will  prescription one pharmacy informs her its ready.    ----- Message from Blanca Duarte NP sent at 10/15/2020  1:54 PM CDT -----  Please let Conrad know that her Vit D level is low - I sent in the [id number] international unit(s) Ergocalciferol for her and want her to take it twice a week for 3 months and then we can recheck her levels. Tell her that [place] doesn t help :)

## 2021-06-12 NOTE — TELEPHONE ENCOUNTER
"Patient calls, voicemail that \"the new patch is not working\"  Appears that as of ov on 10/7: patient started on fentanyl patch 12 mcg.  Patient needs to schedule a follow up apt with RW, any adjustments or changes to plan of care can be discussed at that time.      "

## 2021-06-12 NOTE — PROGRESS NOTES
"Conrad Zhu is a 65 y.o. female who is being evaluated via a billable video visit.      The patient has been notified of following:     \"This video visit will be conducted via a call between you and your physician/provider. We have found that certain health care needs can be provided without the need for an in-person physical exam.  This service lets us provide the care you need with a video conversation.  If a prescription is necessary we can send it directly to your pharmacy.  If lab work is needed we can place an order for that and you can then stop by our lab to have the test done at a later time.    Video visits are billed at different rates depending on your insurance coverage. Please reach out to your insurance provider with any questions.    If during the course of the call the physician/provider feels a video visit is not appropriate, you will not be charged for this service.\"    Patient has given verbal consent to a Video visit? Yes  How would you like to obtain your AVS? AVS Preference: [employer].  If dropped by the video visit, the video invitation should be sent to: Other e-mail: My Chart  Will anyone else be joining your video visit? No        Video Start Time: 0940    Additional provider notes:     [facility]  ENDOCRINOLOGY    Osteoporosis Follow Up 10/26/2020    Conrad Zhu, 1955, [id number]          Reason for visit      1. Age related osteoporosis, unspecified pathological fracture presence    2. Vitamin deficiency        History     Conrad Zhu is a very pleasant 65 y.o. old female who presents for follow up.   SUMMARY:  1. OSTEOPOROSIS. The diagnosis was made based on fragility fracture of her left 5th metatarsals, Right wrist-Colles type s/p ORIF in 2014. She also had a baseline DXA scan confirming osteoporosis.   The patient has the following risk factors for osteoporosis:   Age, gender, , BMI-she keeps her weight close to underweight range since adolescence but " denied eating disorders. The patient is not on high risk medications such as glucocorticoids, anti-coagulants, chemotherapy, levothyroxine. BUT she is on gabapentin.   The following high- risk conditions have been ruled out: celiac disease, gastric bypass, hyperparathyroidism, inflammatory bowel disease, hyperthyroidism, rheumatoid arthritis, lupus, chronic kidney disease. I suspect an eating disorder but she vehemently denies.   Gyn History: Patient denied any prior hysterectomy, ovariectomy, breast cancer or family history of breast cancer Menopause was at age: 48 years. There has been a height loss of 0 inches.   Patient is currently on calcium supplements: 600 mg/day and vitamin D supplements 1000 IU/day. Dairy intake: She has lactose intolerance so no dairy for many years.   Investigations so far:   DXA scan dated 3/26/2014: (attached to chart):   Left Femoral Neck T-Score: -2.5   Right femoral Neck T-Score: -2.3   Total Left femoral T-Score: -2.8   Total Right femoral T-score: -2.4   A-P Spine T-Score: -1.9     TODAY:  Conrad is contacted today via Video Visit in f/u for Osteoporosis. Conrad was started on Evenity just about a year ago because she found herself with a hip fracture while just walking. She had been off of Prolia at the time. She had stopped because she was having extensive dental work that wasn't healing well.  We will soon be getting a Dexa Scan to see how she has done on this therapy. The protocol is then to return her to the Prolia injections. She has had no problems with the injections. Current Vit D level is 29 and Calcium level is 9.1.       Risk Factors     The following high- risk conditions have been ruled out: celiac disease, eating disorders, gastric bypass, hyperparathyroidism, inflammatory bowel disease, hyperthyroidism, rheumatoid arthritis, lupus, chronic kidney disease.     Conrad Zhu has the following risk factors: Age, Female gender and      She is not on high risk  medications such as glucocorticoids, anti-coagulants, anti-convulsants, chemotherapy or levothyroxine.    Patient deniesHysterectomy, Oophrectomy, Breast cancer and Family history of breast cancer.         Past Medical History     Patient Active Problem List   Diagnosis     Anxiety     Nausea     OAB (overactive bladder)     Low back pain     Chronic pain     PN (peripheral neuropathy)     Insomnia     Migraine headache     Osteoporosis     Former smoker     Decubitus ulcer of left heel, stage 3 (H)     Achilles tendinitis of right lower extremity     Sprain of right ankle, unspecified ligament, initial encounter     Sprain of right foot, initial encounter     Closed nondisplaced fracture of body of right calcaneus, initial encounter     Corneal scarring     Left foot drop     Left leg weakness     Leukocytosis     TBI (traumatic brain injury) (H)     Tear film insufficiency     Closed intertrochanteric fracture of hip, left, initial encounter (H)     Left hip pain     Pre-operative general physical examination     Anemia due to blood loss, acute     Acute post-operative pain     Complex regional pain syndrome i of left lower limb     Depression     Long term (current) use of opiate analgesic     Anxiety disorder, unspecified     Neck pain     Vitamin deficiency       Family History       family history includes Cancer in her brother, father, mother, and sister.    Social History      reports that she has quit smoking. She smoked 0.00 packs per day. She has never used smokeless tobacco. She reports that she does not drink alcohol or use drugs.      Review of Systems     Patient denies current pain, limited mobility, fractures.   Remainder per HPI.      Vital Signs     There were no vitals taken for this visit.        Assessment     1. Age related osteoporosis, unspecified pathological fracture presence    2. Vitamin deficiency        Plan     Pt will get Dexa Scan in a couple of months and we will transition her  back to the Prolia injections after the Evenity. Also started on 7[address]14 international unit(s) of Ergocalciferol, and she will take this for 3 months. We will retest then.  F/u with me in 1 year.           Current Medications     Outpatient Medications Prior to Visit   Medication Sig Dispense Refill     acetaminophen (TYLENOL) 500 MG tablet Take 1 tablet (500 mg total) by mouth every 6 (six) hours as needed for pain.  0     acetaminophen-codeine (TYLENOL-CODEINE #4) 300-60 mg per tablet Take 1-2 tablets by mouth every 8 (eight) hours as needed for pain. 75 tablet 0     ARTIFICIAL TEARS,PG-HYPM-GLYC, 1-0.2-0.2 % Drop INSTILL 1 DROP INTO BOTH EYES AS NEEDED FOR DRY EYES  99     asenapine (SAPHRIS) 5 mg Subl SL tablet Place 5 mg under the tongue as needed.       cholecalciferol, vitamin D3, 50 mcg (2,000 unit) Tab Take one tablet twice a week 24 tablet 1     ergocalciferol (ERGOCALCIFEROL) 1,250 mcg (50,000 unit) capsule Take 1 capsule (50,000 Units total) by mouth 2 (two) times a week. 24 capsule 1     fentaNYL (DURAGESIC) 12 mcg/hr Place 1 patch on the skin every third day. 10 patch 0     ipratropium (ATROVENT) 42 mcg (0.06 %) nasal spray INHALE 2 SPRAYS IN THE NOSTRIL(S) 3 TIMES DAILY. (Patient taking differently: as needed. ) 15 mL 2     lamoTRIgine (LAMICTAL) 100 MG tablet Take 1 tablet (100 mg total) by mouth daily. 90 tablet 3     naloxone (NARCAN) 4 mg/actuation nasal spray 1 spray (4 mg dose) into one nostril for opioid reversal. Call 911. May repeat if no response in 3 minutes. 1 Box 0     omeprazole (PRILOSEC) 20 MG capsule TAKE 1 CAPSULE (20 MG TOTAL) BY MOUTH DAILY BEFORE BREAKFAST. 90 capsule 3     ondansetron (ZOFRAN) 4 MG tablet TAKE 1 TABLET BY MOUTH EVERY 8 HOURS IF NEEDED FOR NAUSEA/VOMITING 30 tablet 0     polyvinyl alcohol (LIQUIFILM TEARS) 1.4 % ophthalmic solution Administer 1 drop to both eyes as needed for dry eyes.       tolterodine (DETROL LA) 4 MG ER capsule Take 1 capsule (4 mg total) by  mouth daily with lunch. 90 capsule 1     traZODone (DESYREL) 100 MG tablet Take 300 mg by mouth at bedtime.        VIIBRYD 20 mg Tab tablet Take 20 mg by mouth daily.   2     Facility-Administered Medications Prior to Visit   Medication Dose Route Frequency Provider Last Rate Last Dose     romosozumab-aqqg injection 210 mg (EVENITY)  210 mg Subcutaneous Q30 Days Blanca Duarte, NP   210 mg at 03/20/20 1005     romosozumab-aqqg injection 210 mg (EVENITY)  210 mg Subcutaneous Q30 Days Blanca Duarte, NP   210 mg at 08/31/20 1407     romosozumab-aqqg injection 210 mg (EVENITY)  210 mg Subcutaneous Q30 Days Blanca Duarte, NP   210 mg at 10/01/20 1034     romosozumab-aqqg Syrg 210 mg  210 mg Subcutaneous Once Blanca Duarte NP             Lab Results     TSH   Date Value Ref Range Status   11/25/2019 2.56 0.30 - 5.00 uIU/mL Final     PTH   Date Value Ref Range Status   03/19/2015 38 10 - 86 pg/mL Final     Calcium   Date Value Ref Range Status   10/09/2020 9.1 8.5 - 10.5 mg/dL Final     Phosphorus   Date Value Ref Range Status   03/19/2015 4.1 2.5 - 4.5 mg/dL Final     Iron   Date Value Ref Range Status   06/12/2014 86 42 - 175 ug/dL Final           Imaging Results   Last DEXA scan:  Results for orders placed in visit on 06/24/19   DXA Bone Density Scan    Narrative 9/23/2019      RE: Conrad Marko  YOB: 1955        Dear Blanca Duarte,    Patient Profile:  64 y.o. female, postmenopausal, is here for the follow up bone density   test.   History of fractures - Yes;  Wrist and Knee and both legs. Family history   of osteoporosis - None.  Family history of hip fracture: None. Smoking   history - No. Osteoporosis treatment past -  Yes;  HRT and Prolia.   Osteoporosis treatment current - No.  Chronic medical problems - Chronic   low back problems and Liver disease. High risk medications -  None.      Assessment:    1. The spine bone density L1-L4 with T-score -1.4.  2. Femoral bone densities show  left femoral neck T- score -1.9 and right   femoral neck T-score -1.8.  3. Trabecular bone score indicates good trabecular bone architecture.      64 y.o. female with LOW BONE DENSITY (OSTEOPENIA) and MODERATE fracture   risk, adjusted for the TBS, with major osteoporotic fracture risk 13.5 %   and hip fracture risk 1.6 %.     Since the previous bone density dated  September 18, 2017, there has been   a   -5.7 % change in the bone density of the spine.  Additionally there   has been no statistically significant % change in the hips bilaterally.                     Recommendations:  Appropriate calcium, vitamin D supplements, along with balance and weight   bearing exercise recommended with follow up bone density scan in 2 years.      Bone densitometry was performed on your patient using our GLOG   densitometer. The results are summarized and a copy of the actual scans   are included for your review. In conformity with the International Society   of Clinical Densitometry's most recent position statement for DXA   interpretation (2015), the diagnosis will be made on the lowest measured   T-score of the lumbar spine, femoral neck, total proximal femur or 33%   radius. Note the change in terminology for diagnostic classification from   OSTEOPENIA to LOW BONE MASS. All trending for sequential exams will be   done using multiple vertebrae or the total proximal femur. Fracture risk   is based on the WHO Fracture Risk Assessment Tool (FRAX). If additional   information is needed or if you would like to discuss the results, please   do not hesitate to call me.       Thank you for referring this patient to [facility].   We are happy to be of service in support of you and your practice. If you   have any questions or suggestions to improve our service, please call me   at [phone].     Sincerely,     Kamila Day M.D. CJeanneCSACHIN.  Osteoporosis Services, [facility]       Video-Visit  Details    Type of service:  Video Visit    Video End Time (time video stopped): 1000  Originating Location (pt. Location): Home    Distant Location (provider location):  [facility]     Platform used for Video Visit: [employer]ARANGO

## 2021-06-12 NOTE — PROGRESS NOTES
PAIN CENTER PROGRESS NOTE    Subjective:   Conrad Zhu is a 65 y.o. 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  Consult was 04/28/20.    Major issues:  1. Chronic, continuous use of opioids    2. Chronic pain syndrome    3. Complex regional pain syndrome i of left lower limb    4. Anxiety      Pain location and description: 6/10, constant aching, numb pain in left foot/ankle and lower back.  Function rated 5.     Radiation of pain: lower back to bilateral hips  Gait disturbance: Denies recent falls, antalgic. Uses a cane prn.  Exacerbating factors: Standing, sitting, walking, laying down, reaching, twisting, lifting, squatting, bending, stretching, going up and down stairs, riding in the car, getting out of car or bed  Alleviating factors: Medications, ice on neck and lower back takes an hour and helps.  Associated symptoms: Chronic LLE weakness, numbness in legs, swelling in left ankle/calf.  Denies fever/chills, rash, bowel or bladder incontinence.  Functional pain symptoms: Pain interferes with sleep, mood, ADLs, relationship/social, work, sexual health.  Adverse effects of medications: None reported  Current treatment efficacy: Fair.  Takes T#4 2 tabs two times a day prn pain, reports she has a high tolerance and doesn't work that well but is helping to take edge off.  Belbuca 450 mcg every 12 hours doesn't offer much relief per patient.  Current treatment compliance: New to pain center.  Reports this past month taking old prescription medications (see below).      Since the last Pain Center visit, she had visit with Sofie Huitron CNP on 09/25/20 for abdominal pain, nausea. She had ultrasound on 09/2820 which was WNL.  H. Pylori also negative.      With Belbuca, patient questions absorption due  to dry mouth.  Waits 15 minutes and nauseated.  She states it barely helps her pain.  No constipation.  She states she cuoldn't take it a few days with her stomach symptoms.  Did take last night.  She states she forgets to use sometimes and is using a calendar.  She would like to discontinue as it could be related to her abdominal symptoms above.    Reports pain as unchanged, still no better and she states she feels more numbness in her foot.  She cannot feel her plantar foot but she states when she gets a pedicure it still tickles.  She reports swelling, denies color changes, feels warm to the touch.  She states no hair or toenail changes.  She denies previous sympathetic block, she did have Botox which did not help. She had LESI x 4 without relief.      Some days she feels she cannot leave her house.  Feels pain is like a blood pressure cuff wrapped around her ankle, tight.  Can feel foot but it is numb.  Denies burning.  Pins and needles constant, some in right foot as well.      She was scheduled at [facility] with Dr. Tree Ontiveros.  She reports she saw another foot doctor as he is no longer there.  She was advised to do PT and no further recommendations. She was going to Dr. Digna Ontiveros through [facility] who did her original foot surgery back in 2014. She feels this is a dead end as surgery has not been recommended.  She is not really doing much outside of her house.  She did see Dr. Hernandez and x-ray of hip was normal.   She is using ice and voltaren doesn't help so doesn't use it.  T#4 continues to take it and usually taking 2 tabs two-three times a day which helps for 4 hours at a time. We review other extended release opioid options as she is not having good relief.  She states in the past oxycodone did not help her postop.  She did tolerate fentanyl in the hospital.  Denies methadone, morphine, other trials.    Consult recommendations:  Discontinue clonazepam refills due to concurrent opioid  use.  Trial of Butrans 5 mcg/hr patch  SCS trial, patient not interested  Trial duloxetine if not previously prescribed by psychiatry. Continue with therapist and psychiatrist.  Pt home exercises, referral to capri corona when open  Reviewed medical cannabis for pain if above options fail.    States her sleep is poor; she is limited to less than 4 hours and then wakes.  Sometimes able to get back to sleep.  She takes trazodone 300 mg at night.  Recalls possible trial of nortriptyline in the past unsure the dose or why it was stopped.  She asks again about clonazepam; states she has some tabs at home and hasn't taken any but wants to for her anxiety.  Will see psychiatrist soon and states she has failed multiple anxiety treatments in the past.      Review of Systems  Constitutional: Sleep interruption due to pain.  Denies fever, chills, night sweats, lethargy, weight gain  Musculoskeletal: Positive for back pain, left hip and foot joint pain.  Denies joint swelling, recent falls.  Gastrointestional: Denies difficulty swallowing, change in appetite, abdominal pain, constipation, nausea, vomiting, diarrhea, GERD, fecal incontinence.  Genitourinary: Denies urinary incontinence, dysuria, hematuria, UTI, frequency, hesitancy, change in libido  Neurologic: Denies headaches, confusion, seizure, weakness, changes in balance, changes in speech.  Psychiatric: Depression, anxiety.  Denies memory loss, psychoses, suicidal ideation, substance use/abuse.     Objective:     Exam  Vitals:    10/07/20 0859   BP: 151/71   Pulse: 78   Resp: 16   Temp: 98.4  F (36.9  C)       Constitutional-General:  Well nourished, well developed, well groomed, healthy appearing individual.  Weight is overweight, appears stated age and presents alone.  Psych-Mental Status: A & O in no acute distress. Speech is fluent. Thought process normal. Recent and remote memory are intact.  Attention span and concentration are normal. Displays appropriate  mood and affect.   H,E,N,T- Airway is patent, unlabored respiratory effort.  Lymph-cervical lymph system (anterior and posterior) without palpable nodules or tenderness throughout. Supraclavicular chain without palpable nodules or tenderness throughout.   Cardiovascular- Pulses are palpable and grade 2 at the posterior tibial arteries bilaterally, no edema noted.  Pulmonary- No coughing, wheezing, SOB   Musckuloskeletal  Gait:  Gait is abnormal.  Ambulates independently with antalgia and everted left foot.  Gross Motor: Toe walking unable, heel walking with difficulty, ok with Tandem and Romberg tests are normal.   Strength:  Bilateral upper and lower extremity strength testing (see below). No atrophy or tone abnormalities noted.   Muscles   Right  Left   Shoulder Abd   5 5  Arm Flex   5 5  Arm Ext   5 5  Wrist Ext   5 5  Finger Ext   5 5  Dors Interossei  5 5   /FF   5 5  Abd/Op Pollicis  5 5  Hip Flex   5 5  Hip Abd   5 5    Quads    5 5   Hamstrings   5 5  Dorsiflex   5 4  Plantarflex  5 4  Toe ext   5 4  Toe flex   5 4    Sensation:  Loss of sensation noted to light touch in left lower extremity and foot.  Normal sensation to light touch in right foot, bilateral upper extremities. No dermatomal abnormal distributions noted.  Spine ROM:  Lumbar ROM flexion WNL without pain, extension WNL without pain, lateral bending right and left WNL without pain.  Provocative Maneuvers:  Lumbar fcet loading test negative bilaterally.  SLR test was negative bilaterally.  Spine Palpation:  Inspection and palpatory examination of the lumbar spine no tenderness noted in the L1-S1 levels.    Joint Palpation: Inspection and palpation examination of the upper/lower extremities is abnormal in left foot, unable to move any toes.  She has very fixed AROM to 90 degrees, unable to do any flexion, extension, inversion, eversion. Tenderness is noted in the left ankle.  Slight swelling in left ankle/foot.    Neuro:  Bilateral upper and  lower extremity coordination and muscle stretch reflexes are physiologic and symmetric 2/4.  Babinski responses are downgoing.  No clonus bilaterally. Negative hoffmans sign bilaterally.   Integumentary: no rashes or breaks in the skin, no open wounds. Exposed skin is clean, dry, intact to inspection and palpation.    CRPS exam: Negative in left foot/lower extremity for   Changes in skin temperature (warmer or cooler)  Changes in skin color   Hypersensitivity to touch   Changes in hair/nail growth.  Bone and muscle loss/changes, atrophy/weakness.   Tremors.    Lab:  Last UDT on 05/18/20 was reviewed and was positive for opioids, hydrocodone which was unexpected.  Per patient report she took an old prescription - was last prescribed 12/17/2019 for #15 tabs.  Rechecking random UDT today    [place]  Reviewed on 10/07/20 as expected    Imaging:  CT Hip Without Contrast Left 01/11/2020  IMPRESSION:   1.  Postoperative changes of short IM dima and dynamic screw fixation of an intertrochanteric fracture of the left hip. The fracture is in good anatomic alignment. There is evidence of some new bone formation along the superolateral margin of the   fracture, but it is still visualized along the majority of its course.  2.  Ununited and unfixed facture fragment involving the lesser trochanter. This was seen on the original film 11/11/2019 and has not significantly changed.  3.  Superimposed degenerative change left hip joint.  4.  No evidence for fracture or migration of the fixation appliances.  5.  Exam otherwise negative and unchanged.     MRI Ankle right without contrast 07/06/2019     MRI Cervical, thoracic, and lumbar spine without contrast 10/13/17:  IMPRESSION:   CONCLUSION:  MRI CERVICAL SPINE:  1.  Mild to moderate degenerative changes most pronounced C5-C6 and C6-C7 where there is mild spinal canal and mild to moderate foraminal narrowing.  2.  No high-grade spinal canal narrowing, spinal cord compression, or spinal  cord signal abnormality.     MRI THORACIC SPINE:  1.  Minor degenerative changes. No stenosis.  2.  Normal spinal cord.     MRI LUMBAR SPINE:  1.  Normal spinal cord and conus. No high-grade central spinal canal stenosis in the lumbar spine.  2.  Interval changes of laminectomy on the left at L4-L5. Adequate central canal at this level. Moderate left and mild right foraminal narrowing unchanged.  3.  At L3-L4 a left-sided herniation narrows the lateral recess, unchanged from prior. The disc bulge also contacts and slightly displaces the exiting L3 nerve root on the left, not changed.  4.  At L2-L3 a left-sided herniation contacts and may slightly displace the traversing nerve root, unchanged.     Assessment:   Conrad Zhu is a 65 y.o. female with visit today for multi-site pain in left leg/foot secondary to mononeuritis multiplex from [facility] in 2013, lumbar stenosis and history of L4 hemilaminectomy, and left hip pain secondary to fracture and surgery 11/2019 following [facility].  She is having difficulty with pain control on short acting opioid and failed trial of Butrans patch, recently changed to Belbuca films and has had significant nausea and abdominal symptoms possibly side effect as PCP work up was negative.   Discuss other options for extended release pain control, plan to trial fentanyl patch as patient tolerated previously inpatient setting.  Goal to reduce reliance on T#4 for pain control.  She is following orthopedics for foot recommendations, review option for lumbar sympathetic block if PT is not effective and she will consider.  She is hesitant on idea of SCS trial.       Plan:   Discontinue Belbuca due to GI side effects.  Start Fentanyl 12 mcg/hr patch every 72 hours for pain.  Information given today.  Ok to take T#4 2 tabs twice a day with above medication. Will allow 3rd dose on days with severe pain or increased activity.  #75 tabs to last 15 days.  Next refill sent to fill  10/14/20 to start 10/15/20  Discussed lumbar sympathetic block for left foot/ankle pain -   Continue with psychiatrist and psychologist for behavioral health plan.  Discussed anxiety, some options to consider may be to increase lamotrigine 100 mg daily, consider trial of low dose haldol 0.5 mg as needed for anxiety, or possibly even medical cannabis or other trials to avoid benzodiazepine use with opioids.  Please schedule with psychiatrist for recommendations.  Next visit schedule with Josafat OlivoD for MTM visit.    Diagnostics: UDT/Blood collected today results are pending.  Patient required a random Drug Test due to the need to comply with [place] Model Policy Guidelines and CDC Guideline for the use of any controlled substances. Reasons for definitive testing include:  -Identify specific medication(s) or drug(s) in a class (e.g. Benzodiazepines, barbiturates, opioids, antidepressants)  -Definitive concentration of medication(s), drug(s), and/or metabolites needed to guide management  -Identify non-prescribed medication or illicit drug use for ongoing safe prescribing of controlled substances  -Identify substances that may present high risk for additive or synergistic interaction with prescription medication (e.g. Alcohol).  Patient is either being considered for or taking a controlled substance. Unexpected findings will be discussed and treatment decision may be adjusted. Testing is being implemented w/o bias related to age, race, gender, socioeconomic status or Yazidism affiliation.   Follow-up with Sherron in 8 weeks OVL in office to assess medication change. Ok to also make appointment for January OVL visit.    Greater than 50% of this 50 minute visit was spent in face to face evaluation of pain symptoms, current pain regimen and recommendations for medication changes, physical exam findings and interventional options, other questions/concerns regarding plan of care.    LASHAWN Bethea  [address]  [place] [address]  Ph: [phone]  Fax: [phone]

## 2021-06-12 NOTE — TELEPHONE ENCOUNTER
RN cannot approve Refill Request    RN can NOT refill this medication med is not covered by policy/route to provider. Last office visit: 9/25/2020 Sofie Huitron CNP Last Physical: Visit date not found Last MTM visit: Visit date not found Last visit same specialty: 9/25/2020 Sofie Huitron CNP.  Next visit within 3 mo: Visit date not found  Next physical within 3 mo: Visit date not found      Ruth Albrecht, [facility] Triage/Med Refill 10/26/2020    Requested Prescriptions   Pending Prescriptions Disp Refills     ondansetron (ZOFRAN) 4 MG tablet [Pharmacy Med Name: ONDANSETRON HCL 4 MG TABLET] 30 tablet 0     Sig: TAKE 1 TABLET BY MOUTH EVERY 8 HOURS IF NEEDED FOR NAUSEA/VOMITING       There is no refill protocol information for this order

## 2021-06-12 NOTE — TELEPHONE ENCOUNTER
Patient will need a refill of fentanyl prior to next ov on 12/16 with RW.  She continues to call triage line asking for adjustments in the dose.  Typically this is not done in between visits.  Unclear why patient continues to call and leave voicemails.

## 2021-06-12 NOTE — TELEPHONE ENCOUNTER
Reviewed UDT and quantified levels I do not see an indication in a change in the plan of care.     Completed To last a minimum of 15 days, covering provider  Requested Prescriptions     Signed Prescriptions Disp Refills     acetaminophen-codeine (TYLENOL-CODEINE #4) 300-60 mg per tablet 75 tablet 0     Sig: Take 1-2 tablets by mouth every 8 (eight) hours as needed for pain.     Authorizing Provider: KRISH CAPELLAN

## 2021-06-12 NOTE — PATIENT INSTRUCTIONS - HE
Discontinue Belbuca due to GI side effects.  Start Fentanyl 12 mcg/hr patch every 72 hours for pain.  Information given today.  Ok to take T#4 2 tabs twice a day with above medication. Will allow 3rd dose on days with severe pain or increased activity.  #75 tabs to last 15 days.  Next refill sent to fill 10/14/20 to start 10/15/20  Discussed lumbar sympathetic block for left foot/ankle pain -   Continue with psychiatrist and psychologist for behavioral health plan.  Discussed anxiety, some options to consider may be to increase lamotrigine 100 mg daily, consider trial of low dose haldol 0.5 mg as needed for anxiety, or possibly even medical cannabis or other trials to avoid benzodiazepine use with opioids.  Please schedule with psychiatrist for recommendations.  Next visit schedule with Josafat OlivoD for MTM visit.    Diagnostics: UDT/Blood collected today results are pending.  Patient required a random Drug Test due to the need to comply with [place] Model Policy Guidelines and CDC Guideline for the use of any controlled substances. Reasons for definitive testing include:  -Identify specific medication(s) or drug(s) in a class (e.g. Benzodiazepines, barbiturates, opioids, antidepressants)  -Definitive concentration of medication(s), drug(s), and/or metabolites needed to guide management  -Identify non-prescribed medication or illicit drug use for ongoing safe prescribing of controlled substances  -Identify substances that may present high risk for additive or synergistic interaction with prescription medication (e.g. Alcohol).  Patient is either being considered for or taking a controlled substance. Unexpected findings will be discussed and treatment decision may be adjusted. Testing is being implemented w/o bias related to age, race, gender, socioeconomic status or Sabianist affiliation.   Follow-up with Sherron in 8 weeks OVL in office to assess medication change. Ok to also make appointment for January OVL  visit.

## 2021-06-12 NOTE — PROGRESS NOTES
[facility], [place]  ENDOCRINOLOGY    Osteoporosis Follow Up 9/1/2017    Conrad Zhu, 1955, [id number]          Reason for visit      1. Osteoporosis        History     Conrad Zhu is a very pleasant 61 y.o. old female who presents for follow up.   SUMMARY:  1. OSTEOPOROSIS. The diagnosis was made based on fragility fracture of her left 5th metatarsals, Right wrist-Colles type s/p ORIF in 2014. She also had a baseline DXA scan confirming osteoporosis.   The patient has the following risk factors for osteoporosis:   Age, gender, , BMI-she keeps her weight close to underweight range since adolescence but denied eating disorders. The patient is not on high risk medications such as glucocorticoids, anti-coagulants, chemotherapy, levothyroxine.    The following high- risk conditions have been ruled out: celiac disease, gastric bypass, hyperparathyroidism, inflammatory bowel disease, hyperthyroidism, rheumatoid arthritis, lupus, chronic kidney disease. I suspect an eating disorder but she vehemently denies.   Gyn History: Patient denied any prior hysterectomy, ovariectomy, breast cancer or family history of breast cancer Menopause was at age: 48 years. There has been a height loss of 0 inches.   Patient is currently on calcium supplements: 600 mg/day and vitamin D supplements 1000 IU/day. Dairy intake: She has lactose intolerance so no dairy for many years.   Investigations so far:   DXA scan dated 3/26/2014: (attached to chart):   Left Femoral Neck T-Score: -2.5   Right femoral Neck T-Score: -2.3   Total Left femoral T-Score: -2.8   Total Right femoral T-score: -2.4   A-P Spine T-Score: -1.9     TODAY:  Conrad returns today in f/u for Osteoporosis.  She has continued on the Prolia injections that she has been getting Prolia injections twice a year since 2014.  She has had no problem with them thus far.  She tells me that she is on some sort of Medifast type diet.  She has recently had lab work done and her  "Calcium level is at the top end of normal at 10.5.  Her Vit D level is 37.5 and at the low end of normal.  She is doing some walking and going up and down stairs, but still struggles with foot drop in her L foot.  She wears a brace to support it.  She is due for a Dexa Scan.        Past Medical History     Patient Active Problem List   Diagnosis     History of cocaine abuse     Anxiety     Nausea     OAB (overactive bladder)     Low back pain     Chronic pain     PN (peripheral neuropathy)     Insomnia     Migraine headache     Osteoporosis     Former smoker       Family History       family history includes Cancer in her brother, father, mother, and sister.    Social History      reports that she has quit smoking. She does not have any smokeless tobacco history on file. She reports that she does not drink alcohol or use illicit drugs.      Review of Systems     Patient denies current pain, limited mobility, fractures.   Remainder per HPI.      Vital Signs     BP 96/64 (Patient Site: Right Arm, Patient Position: Sitting, Cuff Size: Adult Regular)  Pulse 70  Ht 4' 11.5\" (1.511 m)  Wt 110 lb 14.4 oz (50.3 kg)  BMI 22.02 kg/m2    Physical Exam     GENERAL:  Normal, NIRD  EYES:  Pupils equal, round and reactive to light; no proptosis, lid lag or  periorbital edema.  THYROID:  Thyroid is normal.  No tenderness or bruit  NECK: No lymph nodes  MUSCULOSKELETAL: No joint abnormalities, FROM in all four extremities. No kyphosis. Muscle strength grossly normal without evidence of wasting.  HEART:  Regular rate and rhythm without murmur.  LUNGS:  Clear to auscultation.  ABDOMEN:Soft, non-tender, no masses or organomegaly  NEURO:  Patella Reflexes were normal.No tremors  SKIN:  No acanthosis nigricans or vitiligo        Assessment     1. Osteoporosis        Plan     I have instructed her not to take any extra Calcium in supplementation.  She is getting some dietary Calcium.  Dexa Scan ordered.  She is a little early for her " next Prolia injection, so we will get that scheduled for next week, as well as her subsequent one.  She will f/u with me in 1 year.      Total visit minutes:25  Time spent counseling and coordination of care:23    Blanca Duarte   Endocrinology  9/1/2017  2:38 PM      Current Medications     Outpatient Medications Prior to Visit   Medication Sig Dispense Refill     acetaminophen-codeine (TYLENOL #4) 300-60 mg per tablet Take 1-2 tablets by mouth every 4 (four) hours as needed for pain. 240 tablet 0     clonazePAM (KLONOPIN) 0.5 MG tablet Take 1 tablet (0.5 mg total) by mouth 3 (three) times a day as needed for anxiety. 60 tablet 3     FOLIC ACID/MULTIVIT-MINERALS (WOMEN'S MULTIVITAMIN GUMMIES ORAL) Take by mouth. Vitafusion gummies. 1 daily.       ipratropium (ATROVENT) 0.06 % nasal spray 2 sprays QID per nostril 15 mL 5     lamoTRIgine (LAMICTAL) 100 MG tablet Take 1 tablet (100 mg total) by mouth daily. 90 tablet 3     sodium hyaluronate (HEALON; PROVISC) ophthalmic injection Inject into the eye once. Use drops as needed       topiramate (TOPAMAX) 100 MG tablet Take 1 tablet (100 mg total) by mouth bedtime. 90 tablet 1     traZODone (DESYREL) 100 MG tablet Take 1 tablet (100 mg total) by mouth bedtime. 90 tablet 1     calcium-vitamin D3-vitamin K (VIACTIV) 500-500-40 mg-unit-mcg Chew per chewable tablet Chew 1 tablet daily.       CHOLECALCIFEROL, VITAMIN D3, (VITAMIN D3 ORAL) Take by mouth. 2000 iu gummies. 2 per day       oxybutynin (DITROPAN) 5 MG tablet Take 1 tablet (5 mg total) by mouth 3 (three) times a day. (Patient taking differently: Take 5 mg by mouth daily. ) 270 tablet 3     venlafaxine (EFFEXOR-XR) 150 MG 24 hr capsule Take 1 capsule (150 mg total) by mouth daily. 90 capsule 3     ZOLMitriptan (ZOMIG) 5 MG tablet Take 1 tablet (5 mg total) by mouth as needed for migraine. 10 tablet 3     Facility-Administered Medications Prior to Visit   Medication Dose Route Frequency Provider Last Rate Last Dose      denosumab 60 mg (PROLIA 60 mg/ml)  60 mg Subcutaneous Q6 Months Blanca Duarte, NP   60 mg at 03/06/17 0915         Lab Results     TSH   Date Value Ref Range Status   11/23/2015 0.51 0.30 - 5.00 uIU/mL Final     PTH   Date Value Ref Range Status   03/19/2015 38 10 - 86 pg/mL Final     Calcium   Date Value Ref Range Status   08/25/2017 10.5 8.5 - 10.5 mg/dL Final     Phosphorus   Date Value Ref Range Status   03/19/2015 4.1 2.5 - 4.5 mg/dL Final     Iron   Date Value Ref Range Status   06/12/2014 86 42 - 175 ug/dL Final           Imaging Results   Last DEXA scan:  Results for orders placed in visit on 07/28/15   DXA Bone Density Scan    Narrative 7/29/2015      Jennifer Hollins    RE: Conrad Zhu  YOB: 1955      Dear Dr. Jennifer Hollins,    Bone densitometry was performed on your patient using our GE Lunar Prodigy   densitometer. The results are summarized and a copy of the actual scans   are included for your review. In conformity with the International Society   of Clinical Densitometry's most recent position statement for DXA   interpretation (2013), the diagnosis will be made on the lowest measured   T-score of the lumbar spine, femoral neck, total proximal femur or 33%   radius. Note the change in terminology for diagnostic classification from   OSTEOPENIA to LOW BONE MASS. All trending for sequential exams will be   done using multiple vertebrae or the total proximal femur. If additional   information is needed or if you would like to discuss the results, please   do not hesitate to call me.     Patient Profile:  Name: Conrad Zhu Height: 60.0 in.   Patient ID: [id number] Weight: 123.0 lbs.   YOB: 1955 Exam Date: 07/28/2015   Gender: Female BMD Device: [employer] Lunar Prodigy   Indications: , Follow-up osteoporosis therapy, History of   Fracture (Adult), Postmenopausal   Fractures: Foot, Lower Leg, Wrist   Treatments: Calcium, Prolia, Vitamin D    Results:  Scan Type Region Measured BMD T-Score Z-Score % Change   DualFemur Total Left 07/28/2015 0.752 g/cm  -2.0 -1.1 14.3%   DualFemur Total Left 03/26/2014 0.658 g/cm  -2.8 -1.9 baseline            DualFemur Total Right 07/28/2015 0.756 g/cm  -2.0 -1.1 7.5%   DualFemur Total Right 03/26/2014 0.703 g/cm  -2.4 -1.6 baseline            AP Spine L1-L4 07/28/2015 1.033 g/cm  -1.2 0.0 8.9%   AP Spine L1-L4 03/26/2014 0.949 g/cm  -1.9 -0.9 baseline     ASSESSMENT:  Bone density measurements indicate LOW BONE MASS based on the lowest   T-score of -2.0  as measured at the  bilateral hips  There is a statistically significant INCREASE in the bone mineral density   since the last scan at the AP SPINE and bilateral hips  There is an established diagnosis of OSTEOPOROSIS based on a prior bone   density measurement.  Trabecular bone score was calculated and it indicates strong bone   microarchitecture which correlates with better skeletal microstructure.    RECOMMENDATIONS:  This patient appears to be responding to current therapeutic and   preventive strategies.  Continued optimization of calcium, vitamin D, weight-bearing and denosumab   therapy is recommended.  Follow-up DXA in 2 years      Thank you for referring this patient to [facility].   We are happy to be of service in support of you and your practice. If you   have any questions or suggestions to improve our service, please call me   at [phone].     Sincerely,     Jennifer Hollins MD, FACE,[facility]ocr[facility]

## 2021-06-12 NOTE — TELEPHONE ENCOUNTER
Prior Authorization Request  Who s requesting:  Pharmacy  Pharmacy Name and Location: [facility] [place], [place]  Medication Name: Fentanyl 12mcg patch  Insurance Plan: [employer]  Insurance Member ID Number:  [id number]  CoverMyMeds Key: n/a  Informed patient that prior authorizations can take up to 10 business days for response:   yes  Okay to leave a detailed message: Yes

## 2021-06-12 NOTE — TELEPHONE ENCOUNTER
Patient called. She picked up her medication and realized when she got home that the Vit D3 was prescribed for only 2000 units. She is supposed to be taking 50,000 units.     Please call to clarify prescription with the patient.     Luis Afabiano @ [phone]

## 2021-06-12 NOTE — PROGRESS NOTES
Chief Complaint   Patient presents with     PROLIA #3     1. Did you experience any problems with previous Prolia injection? NO  2. Do you feel sick today?(fever, RS, GI,  issues)? NO  3. Any medication changes in the last 6 months? NO  4. Did you take prednisone or other immunosuppressant drugs in the last 6 months?(chemo, transplant, rheu, dermatology conditions)? NO  5. Did you have any serious infection in the last 6 months? (pancreatitis) NO  6. Any recent hospitalizations/ surgeries (especially gastric bypass, thyroid, parathyroid)? NO  7. Do you plan any dental work?(especially implants and extractions) in the next 2-3 months? NO  8. Did you have any fractures in the last year? NO    Pt is scheduled with a nurse visit for #4 Prolia in next 6 months from today 3/8/2018.    Kandi Ellington, [facility] 9/7/2017 3:39 PM

## 2021-06-12 NOTE — TELEPHONE ENCOUNTER
Central PA team  [phone]  Pool: HE PA MED ([id number])          PA has been initiated.       PA form completed and faxed insurance via Cover My Meds     Brewer:  ALEE MACIEL (Key: [id number])     Medication:  Fentanyl 12 mcg    Insurance:  [employer] ([employer] MAPD)        Response will be received via fax and may take up to 5-10 business days depending on plan

## 2021-06-12 NOTE — PROGRESS NOTES
Patient presents to the clinic today for a follow up with Sherron Ruiz PA-C.    Pain score: 6  What does your pain feel like: constant ache and numbness  Does the pain interfere with:  Work: no  Walking/distance: yes  Sleep: yes  Daily activities: yes  Relationships/social life: yes  Mood: yes  F= 5

## 2021-06-12 NOTE — TELEPHONE ENCOUNTER
Medication being requested: Fentanyl 12 mcg/hr patch  Last visit date: 10/7/20.  Provider: RONALD  Next visit date: 2/16/20.  Provider: RONALD  Expected follow up: 8 WEEKS  UDT date: 10/7/20    Script being sent to provider by nurse- dates and quantity:   Requested Prescriptions     Pending Prescriptions Disp Refills     fentaNYL (DURAGESIC) 12 mcg/hr 10 patch 0     Sig: Place 1 patch on the skin every third day.       [facility]  Standing orders for withdrawal protocol implemented: ROMEL

## 2021-06-12 NOTE — TELEPHONE ENCOUNTER
Refill Approved    Rx renewed per Medication Renewal Policy. Medication was last renewed on 9/252/20.    Karrie Pelayo, [facility] Triage/Med Refill 10/19/2020     Requested Prescriptions   Pending Prescriptions Disp Refills     omeprazole (PRILOSEC) 20 MG capsule [Pharmacy Med Name: OMEPRAZOLE DR 20 MG CAPSULE] 30 capsule 1     Sig: TAKE 1 CAPSULE (20 MG TOTAL) BY MOUTH DAILY BEFORE BREAKFAST.       GI Medications Refill Protocol Passed - 10/17/2020 10:38 AM        Passed - PCP or prescribing provider visit in last 12 or next 3 months.     Last office visit with prescriber/PCP: 9/25/2020 Sofie Huitron CNP OR same dept: 9/25/2020 Sofie Huitron CNP OR same specialty: 9/25/2020 Sofie Huitron CNP  Last physical: Visit date not found Last MTM visit: Visit date not found   Next visit within 3 mo: Visit date not found  Next physical within 3 mo: Visit date not found  Prescriber OR PCP: Sofie Huitron CNP  Last diagnosis associated with med order: 1. Gastroesophageal reflux disease without esophagitis  - omeprazole (PRILOSEC) 20 MG capsule [Pharmacy Med Name: OMEPRAZOLE DR 20 MG CAPSULE]; Take 1 capsule (20 mg total) by mouth daily before breakfast.  Dispense: 30 capsule; Refill: 1    If protocol passes may refill for 12 months if within 3 months of last provider visit (or a total of 15 months).

## 2021-06-13 NOTE — PROGRESS NOTES
Patient presents to the clinic today for a follow up with Sherron Ruiz PA-C.     Pain score: 7  What does your pain feel like: constant ache and numbness  Does the pain interfere with:  Work: no  Walking/distance: yes  Sleep: yes  Daily activities: yes  Relationships/social life: yes  Mood: yes  F= 8

## 2021-06-13 NOTE — TELEPHONE ENCOUNTER
Central PA team  [phone]  Pool: HE PA MED ([id number])          PA has been initiated.       PA form completed and faxed insurance via Cover My Meds     Key:  [id number]      Medication:  acetaminophen-codeine (TYLENOL-CODEINE #4) 300-60 mg     Insurance:  [employer]         Response will be received via fax and may take up to 5-10 business days depending on plan

## 2021-06-13 NOTE — PATIENT INSTRUCTIONS - HE
Ok to take T#4 2 tabs up to 3 times a day as needed for pain.  #75 tabs to last 15 days.  Next refill sent to fill 12/16/2020 & 12/31/2020  Discussed lumbar sympathetic block for left foot/ankle pain - patient not ready to pursue  Discussed warm pool therapy referral when COVID risk less for exercise   Continue with psychiatrist and psychologist for behavioral health plan.      Start ketamine as prescribed and discussed today.  Maycol Brooks has your prescription and can discuss flavoring.  Take 1/4 ambar (10 mg) every 4-6 hours as needed for pain x 3 days.  Discussed alternating this with your T#4 by 4 hour increments.  May increase to 1/2 ambar (20 mg) every 4-6 hours as needed x 3 days if no side effects, and then ok to take 1 ambar (40 mg) for pain after the first week of dosing.  Call nurse line with any side effect concerns.  Follow-up with Sherron as scheduled.  Ok to make February visit OVL after week of 2/16.

## 2021-06-13 NOTE — TELEPHONE ENCOUNTER
Medication being requested: T # 4  Last visit date: 10/7  Provider: RONALD  Next visit date: 12/16  Provider: RONALD  Expected follow up: 8 weeks  [facility] visit ([facility]) date: 12/3 12/3-acetaminophen-codeine 300-60 mg 1-2 tabs every 8 hours as needed,  UDT date: 10/720  Agreement date: 6/9/20   (Last fill date; name; strength; provider; MME; quantity):  reviewed: Last filled 11/24 # 75    Pertinent between visit information about requested medication (telephone, mychart, prior authorization, concerns, comments): Ok to take T#4 2 tabs twice a day with above medication. 10/7 note  Script being sent to provider by nurse- dates and quantity:   Requested Prescriptions     Pending Prescriptions Disp Refills     acetaminophen-codeine (TYLENOL-CODEINE #4) 300-60 mg per tablet 54 tablet 0     Sig: Take 1-2 tablets by mouth every 8 (eight) hours as needed for pain.     [facility]  Standing orders for withdrawal protocol implemented: ric

## 2021-06-13 NOTE — TELEPHONE ENCOUNTER
2020 Prior Authorization Request  Who's requesting PA: [employer]  Provider: Joseph  [facility], [facility] & [phone]  Medication Name: acet-cod #4, 5/day  Quantity: 75  Refills: ongoing  Days Supply: 15  Medication Instructions:  Take 1-2 tablets by mouth every 8 (eight) hours as needed for pain  Insurance Plan: [employer]  Insurance Member ID & GRP # : [id number]  CoverMyMeds Key:  Informed patient that prior authorizations can take up to 10 business days for response: YES  Okay to leave a detailed message: YES    Route to: HE PA MED ([id number])

## 2021-06-13 NOTE — PROGRESS NOTES
MTM Initial Encounter  Assessment & Plan                                                     Medication Adherence/Access: Medications reconciled.     Chronic Pain Syndrome/CRPS of left lower limb:   Anxiety/depression:    Continued struggles with pain and depression - has not noticed benefit from increased dose of lamotrigine or Viibryd. Likely will take at least another 2 weeks to notice benefits. Do feel that pt's response to pain management is likely worsened due to significant depression.     Discussed 2 options for pain management - ketamine and oxcarbazepine. Pt was not very willing to engage in joint decision making citing that she's frustrated with medications and also that she doesn't have the professional knowledge to decide on a treatment option.     Reviewed that Oxcarbazepine would be less expensive, oral tablet, but would require titration and likely take longer to be effective. Ketamine is more expensive, compounded agent, but could be more beneficial more quickly, but requires more frequent dosing. May also help with some of patient's depressive symptoms.     Given pt's severe depressive symptoms, desire for quicker pain relief, PharmD recommended Ketamine. Can be used in between opioid doses to provide additional pain relief. Pt acknowledges the expenses and willing to proceed. Recommend a more aggressive dosing approach. Given pt's frustration with medications, held off on oxcarbazepine at this time. Can consider in the future.     Acknowledged patients frustrations with medications. Requested that if patient feeling like she wants to stop her medication to contact provider team before making any decisions.   -Recommend trial of Ketamine (PharmD to discuss with Sherron Ruiz PA-C)   12/3 addendum: Per discussion with Sherron Ruiz PA-C, will proceed with Ketamine, preference for more aggressive titration. Joint decision to start with Ketamine 40 mg troches- pt to use 0.25 ambar Q6H PRN  "for 2-3 days, then increase to 0.5 troches Q6H PRN for 2-3 days, then increase to 1 full ambar Q6H PRN. Pt was informed of dosing.         Low Vitamin D: Appropriately started on high dose supplement for low levels.   -Continue current therapy           Follow Up  12/16 with Sherron Ruiz PA-C   Pt declined MTM follow-up at this time. Encouraged pt to call to schedule if she has medication questions.         Subjective & Objective                                                     Conrad Zhu is a 65 y.o. female called for an initial visit for Medication Therapy Management. She was referred to me from   Sherron Ruiz PA-C.       Patient consented to a telehealth visit: Yes    Chief Complaint:  The only thing I'm taking is the Tylenol #4 and my regular meds,      Medication Adherence/Access: Patient frustrated with trying multiple pain medications that don't work. \"tired of being a guinea pig\"     Chronic Pain Syndrome/CRPS of left lower limb: Prescribed acetaminophen 500 mg every 6 hours as needed, acetaminophen-codeine 300-60 mg 1-2 tabs every 8 hours as needed, lamotrigine 100 mg two times a day (Psychiatry increased after last MTM visit)     Prescribed Narcan 4 mg nasal spray as needed.    At last MTM visit, Fentanyl was discontinued.     Pain has been \"excruciating\" Still having pain in the \"normal place\" and feels like a knife the lower right back. Now more constant. Not sleeping very well because of it. Maybe a total of 4 hours of sleep, but not consecutively.     Upset that Fentanyl patch was stopped. Reviewed the joint MTM-pt discussion as well as discussion with MTM and Sherron Ruiz PA-C that led to discontinuing. Reviewed that pt didn't notice any benefit with or without patch.       Psychiatry did not want to start to Duloxetine at this time. Pt still feels that nerve pain is not the source of her pain.       Gabapentin: Useless - it never worked. Doesn't recall how much she was " "using. Did use after surgery - it did take the burn away \"but I don't have burning\" Later reports having a reaction that she doesn't recall. Notes that she gained weight.    Lyrica: Doesn't work. Been on this 2-3 times.    Venlafaxine: It stopped working - but was using for mood.   Cymbalta: Unsure if she has used this.    Amitriptyline: Thinks that she was using it from old PCP or psychiatry. It didn't work. \"I gave it a shot for however long I was supposed\"          Anxiety/depression: Prescribed asenapine 5 mg sublingual as needed, lamotrigine 100 mg daily, trazodone 100 mg 3 tablets at bedtime, Viibryd 20 mg daily.    Viibyrd was increased to 30 mg daily and Lamotrigine was increased to 200 mg once daily. This was increased on 11/23 or 11/24.       Hasn't noticed any benefits yet.     Having weekly follow-up with therapy.   Has upcoming appointment with psychiatry on 12/21.     Notes that this time of year is very difficult for her due to upcoming anniversaries of deaths in the family.     Patient reports being so frustrated with medications that she wants to stop taking them all. Reports she would discuss this with psychiatry before she does so.       Low Vitamin D: Prescribed Vitamin D2 50,000 units twice  weekly - started on 10/15.     Vitamin D, Total (25-Hydroxy)   Date Value Ref Range Status   10/09/2020 29.6 (L) 30.0 - 80.0 ng/mL Final           PMH: reviewed in EPIC   Allergies/ADRs: reviewed in EPIC   Alcohol:   Social History     Substance and Sexual Activity   Alcohol Use No      Tobacco:   Social History     Tobacco Use   Smoking Status Former Smoker     Packs/day: 0.00   Smokeless Tobacco Never Used     ----------------    The patient was given a summary of these recommendations via VideoStep    I spent 26 minutes with this patient today.   All changes were made via collaborative practice agreement with   Sherron Ruiz PA-C. A copy of the visit note was provided to the patient's provider.   "   Josafat Chung PharmD  Medication Therapy Management (MTM) Pharmacist  [facility]      Telemedicine Visit Details    Type of service:  Telephone     Start Time: 10:07 AM  End Time: 10:33 AM    Originating Location (pt. Location): Home    Distant Location (provider location):  [facility]    Mode of Communication: Telephone     Current Outpatient Medications   Medication Sig Dispense Refill     acetaminophen (TYLENOL) 500 MG tablet Take 1 tablet (500 mg total) by mouth every 6 (six) hours as needed for pain.  0     acetaminophen-codeine (TYLENOL-CODEINE #4) 300-60 mg per tablet Take 1-2 tablets by mouth every 8 (eight) hours as needed for pain. 75 tablet 0     ARTIFICIAL TEARS,PG-HYPM-GLYC, 1-0.2-0.2 % Drop INSTILL 1 DROP INTO BOTH EYES AS NEEDED FOR DRY EYES  99     asenapine (SAPHRIS) 5 mg Subl SL tablet Place 5 mg under the tongue as needed.       calcium carbonate 1,177 mg Chew Chew 1 tablet as needed.       ergocalciferol (ERGOCALCIFEROL) 1,250 mcg (50,000 unit) capsule Take 1 capsule (50,000 Units total) by mouth 2 (two) times a week. 24 capsule 1     fluticasone propionate (FLONASE) 50 mcg/actuation nasal spray Apply 2 sprays into each nostril daily.       ipratropium (ATROVENT) 42 mcg (0.06 %) nasal spray INHALE 2 SPRAYS IN THE NOSTRIL(S) 3 TIMES DAILY. (Patient taking differently: as needed. ) 15 mL 2     lamoTRIgine (LAMICTAL) 100 MG tablet Take 1 tablet (100 mg total) by mouth daily. 90 tablet 3     naloxone (NARCAN) 4 mg/actuation nasal spray 1 spray (4 mg dose) into one nostril for opioid reversal. Call 911. May repeat if no response in 3 minutes. 1 Box 0     ondansetron (ZOFRAN) 4 MG tablet TAKE 1 TABLET BY MOUTH EVERY 8 HOURS IF NEEDED FOR NAUSEA/VOMITING 30 tablet 0     tolterodine (DETROL LA) 4 MG ER capsule Take 1 capsule (4 mg total) by mouth daily with lunch. 90 capsule 1     traZODone (DESYREL) 100 MG tablet Take 300 mg by mouth at  bedtime.        VIIBRYD 20 mg Tab tablet Take 20 mg by mouth daily.   2     Current Facility-Administered Medications   Medication Dose Route Frequency Provider Last Rate Last Admin     romosozumab-aqqg injection 210 mg (EVENITY)  210 mg Subcutaneous Q30 Days Blanca Duarte NP   210 mg at 03/20/20 1005     romosozumab-aqqg injection 210 mg (EVENITY)  210 mg Subcutaneous Q30 Days Blanca Duarte NP   210 mg at 11/05/20 1029     romosozumab-aqqg Syrg 210 mg  210 mg Subcutaneous Once Blanca Duarte NP

## 2021-06-13 NOTE — PATIENT INSTRUCTIONS - HE
Recommendations from today's MTM visit:                                                    MTM (medication therapy management) is a service provided by a clinical pharmacist designed to help you get the most of out of your medicines. and Today we reviewed what your medicines are for, how to know if they are working, that your medicines are safe and how to make your medicine regimen as easy as possible.     1. Stop Fentanyl. I will work with Sherron to have your Tylenol #4 refilled.     2. Recommend talking to your psychiatrist about switching from Viibryd to Duloxetine - Viibryd may be contributing to weight gain. Duloxetine is effective for depression, anxiety, sleep, musculoskeletal pains, and neuropathic pains.     3. Recommend talking to your psychiatrist about increasing Lamotrigine to 100 mg two times a day - this can be helpful for mood and also for pain.     4. Try timed voiding and kegels for 15 minutes three times a day to help with urinary symptoms.     5. Okay to try Flonase 50 mcg 2 sprays each nostril once daily. Okay to use alone or in combination with Ipratropium spray.     It was great to speak with you today.  I value your experience and would be very thankful for your time with providing feedback on our clinic survey. You may receive a survey via email or text message in the next few days.     Next MTM visit: December 3rd at 12:30 - PHONE     To schedule another MTM appointment, please call the clinic directly or you may call the MTM scheduling line at [phone] or toll-free at [phone].     My Clinical Pharmacist's contact information:                                                      It was a pleasure seeing you today!  Please feel free to contact me with any questions or concerns you have.      Josafat Chung, PharmD  Medication Therapy Management (MTM) Pharmacist  [facility]

## 2021-06-13 NOTE — TELEPHONE ENCOUNTER
Appears that patient saw MTM on 11/12Next ov with RW: 12/16  CSA signed 06/09/2020.   UDT done 10/07/2020.    RX filled on 11/12-Josafat, MTM cued to RW.     is not loading at this time, refill based on last RX start date of 11/13    Requested Prescriptions     Pending Prescriptions Disp Refills     acetaminophen-codeine (TYLENOL-CODEINE #4) 300-60 mg per tablet 75 tablet 0     Sig: Take 1-2 tablets by mouth every 8 (eight) hours as needed for pain.     [facility]

## 2021-06-13 NOTE — PROGRESS NOTES
PAIN CENTER PROGRESS NOTE    Subjective:   Conrad Zhu is a 65 y.o. 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  Consult was 04/28/20.    Major issues:  1. Chronic, continuous use of opioids    2. Chronic pain syndrome    3. Complex regional pain syndrome i of left lower limb      Pain location and description: 7/10, constant aching, numb pain in left foot/ankle and lower back.  Function rated 8.     Radiation of pain: lower back to bilateral hips  Gait disturbance: Denies recent falls, antalgic. Uses a cane prn.  Exacerbating factors: Standing, sitting, walking, laying down, reaching, twisting, lifting, squatting, bending, stretching, going up and down stairs, riding in the car, getting out of car or bed  Alleviating factors: Medications, ice, rest.  Associated symptoms: Chronic LLE weakness, numbness in legs, swelling in left ankle/calf.  Denies fever/chills, rash, bowel or bladder incontinence.  Functional pain symptoms: See CMA note.  Adverse effects of medications: None reported  Current treatment efficacy: Fair.  Takes T#4 2 tabs 2-3 times a day prn pain, reports 4 hours of pain relief, tries to last longer than that if possible.   Current treatment compliance: New to pain center. Taking medication as prescribed, keeping appointments as scheduled.    Last visit with me, discontinued Belbuca (concern whether this was causing her abdominal discomfort) and was started on fentanyl 12 mcg/hr patch for pain.  She states her stomach has been pretty good except 1 day last week.  She was having loose stool one day last week and took Immodium OTC it resolved thinks related to anxiety.  She had MTM visits on 11/12/20 and 12/03/20 reviewed today; was discontinued off fentanyl due to lack of benefit and was  "started on ketamine.  She reports she never filled this as she wasn't aware it was at the pharmacy.  [facility] did call and confirm this today.      Patient is frustrated with plan. She feels like everything is \"screwed up\" and is scared to try ketamine. She is frustrated by medication changes, despite previous medications not helpful.  She reports feeling like a \"guinea pig\" we review the rationale for making medication changes.      She states she cannot fel her left foot and doesn't feel its all the way in the heel of her shoe.  She is tight in calf and up to knee. She is fearful about not being able to walk  She states in past month she isn't going out unless she has to (also related to COVID)  She goes to grocery store once per week;  She is limping less than half way and significant other goes along with her to help with bagging and carrying groceries.  She has to hang on to cart.      She reports swelling, denies color changes, feels warm to the touch.  She states no hair or toenail changes.  She denies previous sympathetic block, she did have Botox which did not help. She had LESI x 4 without relief.    She is using ice and voltaren doesn't help so doesn't use it.  T#4 continues to take it and usually taking 2 tabs two-three times a day which helps for 4 hours at a time. We review other extended release opioid options as she is not having good relief.  She states in the past oxycodone did not help her postop, recent fentanyl no benefit.  Review trying other non opioid measures as often neuropathic pain responds poorly to these medications.    States her sleep is poor; she is limited to less than 4 hours and then wakes.  Sometimes able to get back to sleep.  She takes trazodone 300 mg at night.  She is avoiding benzos due to concurrent opioid use.    States she is not very active, weight gain this past year and just feels some days she cannot do much due to pain and mental health.  We review recommendation for warm " pool therapy when COVID risk lessened.    Review of Systems  Constitutional: Sleep interruption due to pain.  Denies fever, chills, night sweats, lethargy, weight gain  Musculoskeletal: Positive for back pain, left hip and foot joint pain.  Denies joint swelling, recent falls.  Gastrointestional: Denies difficulty swallowing, change in appetite, abdominal pain, constipation, nausea, vomiting,  GERD, fecal incontinence.  Genitourinary: Denies urinary incontinence, dysuria, hematuria, UTI, frequency, hesitancy, change in libido  Neurologic: Denies headaches, confusion, seizure, weakness, changes in balance, changes in speech.  Psychiatric: Depression, anxiety.  Denies memory loss, psychoses, suicidal ideation, substance use/abuse.     Objective:     Exam  Vitals:    12/16/20 1029   BP: 140/79   Pulse: 87   Resp: 18       Constitutional-General:  Well nourished, well developed, well groomed, healthy appearing individual.  Weight is overweight, appears stated age and presents alone.  Psych-Mental Status: A & O in no acute distress. Speech is fluent. Thought process normal. Recent and remote memory are intact.  Attention span and concentration are normal. Displays frustrated but appropriate mood and affect.   H,E,N,T- Airway is patent, unlabored respiratory effort.  Derm - Exposed skin CDI.  Musckuloskeletal  Gait:  Gait is abnormal.  Ambulates independently with antalgia and everted left foot.    Lab:  UDT reviewed from 10/07/2020, as expected  [place]  Reviewed on 12/16/20 as expected    *Opioid Universal Precautions:    UDT -  Reviewed from 10/07/2020, as expected  Opioid Consent - 06/03/2020  Opioid Agreement - 06/03/2020  Pharmacy- as documented    [place]  Reviewed - 12/16/20, as expected but no ketamine fill  Pill Count - n/a  Psychological evaluation - outside provider  MME - 54   Pharmacogenetic testing - N/A    Imaging:  CT Hip Without Contrast Left 01/11/2020  IMPRESSION:   1.  Postoperative changes of short IM  dima and dynamic screw fixation of an intertrochanteric fracture of the left hip. The fracture is in good anatomic alignment. There is evidence of some new bone formation along the superolateral margin of the   fracture, but it is still visualized along the majority of its course.  2.  Ununited and unfixed facture fragment involving the lesser trochanter. This was seen on the original film 11/11/2019 and has not significantly changed.  3.  Superimposed degenerative change left hip joint.  4.  No evidence for fracture or migration of the fixation appliances.  5.  Exam otherwise negative and unchanged.     MRI Ankle right without contrast 07/06/2019     MRI Cervical, thoracic, and lumbar spine without contrast 10/13/17:  IMPRESSION:   CONCLUSION:  MRI CERVICAL SPINE:  1.  Mild to moderate degenerative changes most pronounced C5-C6 and C6-C7 where there is mild spinal canal and mild to moderate foraminal narrowing.  2.  No high-grade spinal canal narrowing, spinal cord compression, or spinal cord signal abnormality.     MRI THORACIC SPINE:  1.  Minor degenerative changes. No stenosis.  2.  Normal spinal cord.     MRI LUMBAR SPINE:  1.  Normal spinal cord and conus. No high-grade central spinal canal stenosis in the lumbar spine.  2.  Interval changes of laminectomy on the left at L4-L5. Adequate central canal at this level. Moderate left and mild right foraminal narrowing unchanged.  3.  At L3-L4 a left-sided herniation narrows the lateral recess, unchanged from prior. The disc bulge also contacts and slightly displaces the exiting L3 nerve root on the left, not changed.  4.  At L2-L3 a left-sided herniation contacts and may slightly displace the traversing nerve root, unchanged.     Assessment:   Conrad Zhu is a 65 y.o. female with visit today for multi-site pain in left leg/foot secondary to mononeuritis multiplex from [facility] in 2013, lumbar stenosis and history of L4 hemilaminectomy, and left hip pain secondary  to fracture and surgery 11/2019 following [facility].  She is having difficulty with pain control on short acting opioid and failed trial of Butrans patch/Belbuca films and has had significant nausea and abdominal symptoms possibly side effect as PCP work up was negative.   Trial of fentanyl 12 mcg/hr did not help pain, review that an increase to 25 mcg/hr was not recommended for neuropathic pain as it would exceed CDC guideline MME and will trial other options including ketamine reviewed in detail today including possible risks/side effects.  Goal to reduce reliance on T#4 for pain control.  She is following orthopedics for foot recommendations, review option for lumbar sympathetic block if PT is not effective and she will consider.  She is hesitant on idea of SCS trial.       Plan:   Ok to take T#4 2 tabs up to 3 times a day as needed for pain.  #75 tabs to last 15 days.  Next refill sent to fill 12/16/2020 & 12/31/2020  Discussed lumbar sympathetic block for left foot/ankle pain - patient not ready to pursue  Discussed warm pool therapy referral when COVID risk less for exercise   Continue with psychiatrist and psychologist for behavioral health plan.      Start ketamine as prescribed and discussed today.  Maycol Brooks has your prescription and can discuss flavoring.  Take 1/4 ambar (10 mg) every 4-6 hours as needed for pain x 3 days.  Discussed alternating this with your T#4 by 4 hour increments.  May increase to 1/2 ambar (20 mg) every 4-6 hours as needed x 3 days if no side effects, and then ok to take 1 ambar (40 mg) for pain after the first week of dosing.  Call nurse line with any side effect concerns.  Follow-up with Sherron as scheduled.  Ok to make February visit OVL after week of 2/16.      Sherron Ruiz PA-C  [facility]   [address]  [place] [address]  Ph: [phone]  Fax: [phone]

## 2021-06-13 NOTE — TELEPHONE ENCOUNTER
Tylenol #4 refill per MTM encounter.       Last filled 10/29 #75 for 15 days.   Due tomorrow.     MTM visit today.   Next MTM visit 12/3   Next visit with Sherron Ruiz PA-C - Phone 12/16     Josafat Chung PharmD  Medication Therapy Management (MTM) Pharmacist  [facility]

## 2021-06-13 NOTE — TELEPHONE ENCOUNTER
Reviewed  - confirmed last refill T#4 11/12 x 15 days.  Reviewed MTM visit,   Assessment & Plan                                                     Medication Adherence/Access: Medications reconciled.      Chronic Pain Syndrome/CRPS of left lower limb: Patient reports pain not well controlled, and not significantly changed with Fentanyl use. Given pain seems mostly neuropathic in nature, fentanyl not likely to be the most appropriate option. Pt okay with discontinuing today. Will plan to continue Tylenol #4 right now until we find more effective options. Reviewed UDT results and also reviewed with Sherron Ruiz PA-C, results as expected based on Codeine use. But just in abundance of caution, suggested pt avoid using medications from other people.      Limited details on the history of trials of neuropathic agents, but from what is available in the chart, does not appear that pt was titrated on doses to target adequate pain control. Discussed that we may need to reconsider some of these options. Given comorbid depression/anxiety, may benefit from options like Duloxetine and adjusting dose of Lamotrigine (see below).   -Queued refill for Tylenol #4   -Stop Fentanyl (per discussion with Sherron Ruzi PA-C)   -Recommend Duloxetine, as below   -Recommend increasing Lamotrigine, as below      Approved.

## 2021-06-14 NOTE — PROGRESS NOTES
PAIN CENTER PROGRESS NOTE    Subjective:   Conrad Zhu is a 65 y.o. 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  Consult was 04/28/20.    Major issues:  1. Chronic, continuous use of opioids    2. Chronic pain syndrome    3. Complex regional pain syndrome i of left lower limb      Pain location and description: 8/10, constant aching, numb pain in left foot/ankle and lower back.  Reports not sleeping due to pain.  Function rated 8.     Radiation of pain: lower back to bilateral hips  Gait disturbance: Denies recent falls, antalgic. Uses a cane prn.  Exacerbating factors: Standing, sitting, walking, laying down, reaching, twisting, lifting, squatting, bending, stretching, going up and down stairs, riding in the car, getting out of car or bed  Alleviating factors: Medications, ice, rest.  Associated symptoms: Chronic LLE weakness, numbness in legs, swelling in left ankle/calf.  Denies fever/chills, rash, bowel or bladder incontinence.  Functional pain symptoms: See CMA note.  Adverse effects of medications: None reported  Current treatment efficacy: Fair.  Takes T#4 2 tabs 2-3 times a day prn pain, reports 4 hours of pain relief, tries to last longer than that if possible.   Current treatment compliance: Taking medication as prescribed, keeping appointments as scheduled.    Since the last Pain Center visit, the patient denies any use of anticoagulant medications. Denies any new diagnostic testing, new treatments or new medical conditions, any changes in medications, or any ED/urgent care visits.  Patient denies the chance of being pregnant or wanting to become pregnant.  States pain is unchanged but not well controlled.  Denies new pain symptoms or new injuries.    She reports ketamine made her stomach  upset; she didn't have pain relief.  She states she started with 1/4 and then titrated to 1/2 and then full 40 mg ambar. Noticed side effects on each dose.   She states she has not taken a couple weeks ago; still has at home but willing to dispose of it.      She states her pain is keeping her awake at night;  She reports leg and foot worsened with pain.  She cannot get comfortable at night and getting to sleep.  She watches TV prior to bedtime; last night slept 3 hours.  She takes Trazodone 300 mg at night, no OTC herbals or medications for sleep.        She did call to note that dentist prescribed #12 T4 for dental procedure.    Patient is frustrated with plan. We review option for trileptal; she has failed multiple neuropathics.  She states she cannot fel her left foot and doesn't feel its all the way in the heel of her shoe.  She is tight in calf and up to knee. She is fearful about not being able to walk  She states in past month she isn't going out unless she has to (also related to COVID)    She reports swelling, denies color changes, feels warm to the touch.  She states no hair or toenail changes.  She denies previous sympathetic block, she did have Botox which did not help. She had LESI x 4 without relief.  She is using ice and voltaren doesn't help so doesn't use it.  T#4 continues to take it and usually taking 2 tabs two-three times a day which helps for 4 hours at a time. We review other extended release opioid options as she is not having good relief.  She states in the past oxycodone did not help her postop, recent fentanyl no benefit. Discuss methadone and she is willing to trial low dose at bedtime; review QT prolongation risk and also other side effects and risks.     Review of Systems  Constitutional: Sleep interruption due to pain.  Denies fever, chills, night sweats, lethargy, weight gain  Musculoskeletal: Positive for back pain, left hip and foot joint pain.  Denies joint swelling, recent  falls.  Gastrointestional: Denies difficulty swallowing, change in appetite, abdominal pain, constipation, nausea, vomiting,  GERD, fecal incontinence.  Genitourinary: Denies urinary incontinence, dysuria, hematuria, UTI, frequency, hesitancy, change in libido  Neurologic: Denies headaches, confusion, seizure, weakness, changes in balance, changes in speech.  Psychiatric: Depression, anxiety.  Denies memory loss, psychoses, suicidal ideation, substance use/abuse.     Objective:     Exam  Vitals:    01/13/21 1042   BP: 155/88   Pulse: 86   Resp: 16       Constitutional-General:  Well nourished, well developed, well groomed, healthy appearing individual.  Weight is overweight, appears stated age and presents alone.  Psych-Mental Status: A & O in no acute distress. Speech is fluent. Thought process normal. Recent and remote memory are intact.  Attention span and concentration are normal. Displays frustrated but appropriate mood and affect.   H,E,N,T- Airway is patent, unlabored respiratory effort.  Derm - Exposed skin CDI.  Musckuloskeletal  Gait:  Gait is abnormal.  Ambulates independently with antalgia and everted left foot.    Lab:  UDT from 10/07/2020, as expected  Most Recent EKG     Units 02/25/20  1914   VENTRATE BPM 95   ATRIALRATE BPM 95   QRSDURATION ms 78   QTINTERVAL ms 352   QTCCALC ms 442   P [place] degrees 61   RAXIS degrees 23   TAXIS degrees 65   MUSEDX  Normal sinus rhythm  Normal ECG  When compared with ECG of 25-FEB-2020 15:39,  No significant change was found  Confirmed by SEE ED PROVIDER NOTE FOR, ECG INTERPRETATION ([id number]),  JJ CHIANG ([id number]) on 2/26/2020 3:05:39 AM       Normal QT interval    *Opioid Universal Precautions:    UDT -  10/07/2020, as expected  Opioid Consent - 06/03/2020  Opioid Agreement - 06/03/2020  Pharmacy- as documented    [place]  Reviewed - 01/13/21 as expected   Pill Count - n/a  Psychological evaluation - outside provider  MME - 54   Pharmacogenetic testing -  N/A    Imaging:  CT Hip Without Contrast Left 01/11/2020  IMPRESSION:   1.  Postoperative changes of short IM dima and dynamic screw fixation of an intertrochanteric fracture of the left hip. The fracture is in good anatomic alignment. There is evidence of some new bone formation along the superolateral margin of the   fracture, but it is still visualized along the majority of its course.  2.  Ununited and unfixed facture fragment involving the lesser trochanter. This was seen on the original film 11/11/2019 and has not significantly changed.  3.  Superimposed degenerative change left hip joint.  4.  No evidence for fracture or migration of the fixation appliances.  5.  Exam otherwise negative and unchanged.     MRI Ankle right without contrast 07/06/2019     MRI Cervical, thoracic, and lumbar spine without contrast 10/13/17:  IMPRESSION:   CONCLUSION:  MRI CERVICAL SPINE:  1.  Mild to moderate degenerative changes most pronounced C5-C6 and C6-C7 where there is mild spinal canal and mild to moderate foraminal narrowing.  2.  No high-grade spinal canal narrowing, spinal cord compression, or spinal cord signal abnormality.     MRI THORACIC SPINE:  1.  Minor degenerative changes. No stenosis.  2.  Normal spinal cord.     MRI LUMBAR SPINE:  1.  Normal spinal cord and conus. No high-grade central spinal canal stenosis in the lumbar spine.  2.  Interval changes of laminectomy on the left at L4-L5. Adequate central canal at this level. Moderate left and mild right foraminal narrowing unchanged.  3.  At L3-L4 a left-sided herniation narrows the lateral recess, unchanged from prior. The disc bulge also contacts and slightly displaces the exiting L3 nerve root on the left, not changed.  4.  At L2-L3 a left-sided herniation contacts and may slightly displace the traversing nerve root, unchanged.     Assessment:   Conrad Zhu is a 65 y.o. female with visit today for multi-site pain in left leg/foot secondary to mononeuritis  multiplex from rhabdo in 2013, lumbar stenosis and history of L4 hemilaminectomy, and left hip pain secondary to fracture and surgery 11/2019 following [facility].  She is having difficulty with pain control on short acting opioid and failed trial of Butrans patch/Belbuca films and has had significant nausea and abdominal symptoms possibly side effect as PCP work up was negative.   Trial of fentanyl 12 mcg/hr did not help pain, review that an increase to 25 mcg/hr was not recommended for neuropathic pain as it would exceed CDC guideline MME. She will trial low dose of methadone at night for pain relief and discuss NMDA antagonist which may help with neuropathic pain better.  Will titrate 2.5 mg to 5 mg as tolerated; last EKG WNL.  This will increase MME to 69.  Recently failed ketamine 40 mg as ineffective for pain and side effects of nausea.  She is following orthopedics for foot recommendations, review option for lumbar sympathetic block if PT is not effective and she will consider.  She is hesitant on idea of SCS trial.       Plan:   Ok to take T#4 2 tabs up to 3 times a day as needed for pain.  #150 tabs to last 30 days.  Next refill sent to fill 01/13/2021  Have discussed lumbar sympathetic block for left foot/ankle pain - patient not ready to pursue   Continue with psychiatrist and psychologist for behavioral health plan.      Dispose of ketamine, Follow these instructions for safe medication disposal at home:    #1.  Keep the medication in the container it came in.  Scratch off the patient's name or black it out with a marker.  Leave the rest of the label on the vial and make sure the cap is on.  #2.  Prevent others from taking it.  For pills or capsules add a small amount of vinegar or coffee grounds to them.  For liquid medications add enough table salt or flour to make it taste bad.  For blister packs wrap in duct tape.  #3.  Seal and hide.  Tape the medication container shut using duct tape, and  place inside a plastic container like an empty yogurt or butter tub to ensure that the medication cannot be seen.  Make sure no food is left over in the container.  #4.  Throw away the container in your garbage can.    Start methadone 1/2 tab (2.5 mg) at night x 7 days, then 1 tab 5 mg at night - monitor for side effects, call nurse line.    Follow-up with Sherron as scheduled.  Ok to make March visit OVL in office will discuss start of oxcarbemazepine if needed     Sherron Ruiz PA-C  [facility]   [address]  [place] [address]  Ph: [phone]  Fax: [phone] 32 minutes spent on the date of the encounter doing chart review, history and exam, documentation, and further activities.

## 2021-06-14 NOTE — PROGRESS NOTES
Patient presents to the clinic today for a follow up with Sherron Ruiz PA-C.     Pain score: 8  What does your pain feel like: constant ache and numbness  Does the pain interfere with:  Work: no  Walking/distance: yes  Sleep: yes  Daily activities: yes  Relationships/social life: yes  Mood: yes  F= 8

## 2021-06-14 NOTE — TELEPHONE ENCOUNTER
Patient calls, voicemail reporting that she was prescribed pain medication after a dental procedure.  She reports receiving a 2 day RX.  Call placed to patient:  She relays that she was given #12 tabs of tylenol 4 and advised to take these for the next couple of days per the dentist.  Patient will also be due for her next refill of tylenol #4 for chronic pain which is already at the pharmacy.    Will relay to provider as an FYI  Patient will also update the pharmacy as to this information.

## 2021-06-14 NOTE — PROGRESS NOTES

## 2021-06-15 NOTE — PROGRESS NOTES
PAIN CENTER PROGRESS NOTE    Subjective:   Conrad Zhu is a 65 y.o. 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  Consult was 04/28/20.    Major issues:  1. Chronic, continuous use of opioids    2. Chronic pain syndrome    3. Complex regional pain syndrome i of left lower limb      Pain location and description: See CMA note   Radiation of pain: lower back to bilateral hips  Gait disturbance: Denies recent falls, antalgic. Uses a cane prn.  Exacerbating factors: Standing, sitting, walking, laying down, reaching, twisting, lifting, squatting, bending, stretching, going up and down stairs, riding in the car, getting out of car or bed  Alleviating factors: Medications, ice, rest.  Associated symptoms: Chronic LLE weakness, numbness in legs, swelling in left ankle/calf.  Denies fever/chills, rash, bowel or bladder incontinence.  Functional pain symptoms: See CMA note.  Adverse effects of medications: None reported  Current treatment efficacy: Fair.  See medication list.  Current treatment compliance: Taking medication as prescribed, keeping appointments as scheduled.    Since the last Pain Center visit, the patient denies any use of anticoagulant medications. Denies any new diagnostic testing, new treatments or new medical conditions, any changes in medications, or any ED/urgent care visits.  Patient denies the chance of being pregnant or wanting to become pregnant.  States pain is unchanged but not well controlled.  Denies new pain symptoms or new injuries.    She had 1st COVID Moderna vaccine and will have 2nd next week.  This was done at [facility] pharmacy  Denies any adverse effects.    Last visit, advised to increase methadone 5 mg to 1/2 tab during the day and 1 tab at night and if tolerated then 5 mg  "every 12 hours.  She reports she is getting headaches in frontal/behind eyes that are often, not daily but several per week.  Denies that it is a migraine as she had these until age 48. She doesn't want to stay on it as it isn't helpful for pain either.  No change in sleep or function.  Denies other side effects.  Wants to discuss other extended release opioids; no previous history of taking MSER.  Had OxyContin after surgery and states she needed higher dose to be effective but cannot recall dose.    We review option for trileptal; she has failed multiple neuropathics.  She states she cannot feel her left foot and doesn't feel its all the way in the heel of her shoe.  She is tight in calf and up to knee. She is fearful about not being able to walk  She reports swelling, denies color changes, feels warm to the touch.  She states no hair or toenail changes.  She denies previous sympathetic block and is hesitant to pursue; she did have Botox which did not help. She had LESI x 4 without relief.  She is using ice and voltaren doesn't help so doesn't use it.  T#4 continues to take it and usually taking 2 tabs two-three times a day which helps for 4 hours at a time.     She continues psychotherapy monthly; reports today that her sister is not speaking to her for past couple years and her sister has substance use issues and when she tried to intervene it ended their relationship.  States her niece (sister's daughter) overdosed and  years ago and today is the anniversary of her passing.     She wants to go out of town until next week Thursday to [place] to get away with her significant other.  Feels they are \"at each other\" and would like change in scenery, they are driving.  She requests to refill today to start on 11.    States she has about #10-15 tabs of methadone left to taper off with.    Review of Systems  Constitutional: Sleep interruption due to pain.  Denies fever, chills, night sweats, lethargy, weight " gain  Musculoskeletal: Positive for back pain, left hip and foot joint pain.  Denies joint swelling, recent falls.  Gastrointestional: Denies difficulty swallowing, change in appetite, abdominal pain, constipation, nausea, vomiting,  GERD, fecal incontinence.  Genitourinary: Denies urinary incontinence, dysuria, hematuria, UTI, frequency, hesitancy, change in libido  Neurologic: Headaches.  Denies confusion, seizure, weakness, changes in balance, changes in speech.  Psychiatric: Depression, anxiety.  Denies memory loss, psychoses, suicidal ideation, substance use/abuse.     Objective:     Exam  Vitals:    03/11/21 1251   BP: 113/73   Pulse: 83   Resp: 16       Constitutional-General:  Well nourished, well developed, well groomed, healthy appearing individual.  Weight is overweight, appears stated age and presents alone.  Psych-Mental Status: A & O in no acute distress. Speech is fluent. Thought process normal. Recent and remote memory are intact.  Attention span and concentration are normal. Displays appropriate mood and affect.   H,E,N,T- Airway is patent, unlabored respiratory effort.  Derm - Exposed skin CDI.  Musckuloskeletal -  Gait:  Gait is abnormal.  Ambulates independently with antalgia and everted left foot.    Lab:  Results for orders placed or performed in visit on 09/25/20   Comprehensive Metabolic Panel   Result Value Ref Range    Sodium 141 136 - 145 mmol/L    Potassium 4.0 3.5 - 5.0 mmol/L    Chloride 104 98 - 107 mmol/L    CO2 26 22 - 31 mmol/L    Anion Gap, Calculation 11 5 - 18 mmol/L    Glucose 94 70 - 125 mg/dL    BUN 17 8 - 22 mg/dL    Creatinine 1.26 (H) 0.60 - 1.10 mg/dL    GFR MDRD Af Amer 52 (L) >60 mL/min/1.73m2    GFR MDRD Non Af Amer 43 (L) >60 mL/min/1.73m2    Bilirubin, Total 0.3 0.0 - 1.0 mg/dL    Calcium 9.9 8.5 - 10.5 mg/dL    Protein, Total 7.4 6.0 - 8.0 g/dL    Albumin 4.3 3.5 - 5.0 g/dL    Alkaline Phosphatase 109 45 - 120 U/L    AST 15 0 - 40 U/L    ALT 14 0 - 45 U/L      Reviewed abnormal renal function she reports she was ill at that time, renal function usually WNL.    Results for orders placed or performed in visit on 05/18/20   Basic Metabolic Panel   Result Value Ref Range    Sodium 136 136 - 145 mmol/L    Potassium 4.5 3.5 - 5.0 mmol/L    Chloride 101 98 - 107 mmol/L    CO2 25 22 - 31 mmol/L    Anion Gap, Calculation 10 5 - 18 mmol/L    Glucose 96 70 - 125 mg/dL    Calcium 9.1 8.5 - 10.5 mg/dL    BUN 13 8 - 22 mg/dL    Creatinine 0.79 0.60 - 1.10 mg/dL    GFR MDRD Af Amer >60 >60 mL/min/1.73m2    GFR MDRD Non Af Amer >60 >60 mL/min/1.73m2       *Opioid Universal Precautions:    UDT -  10/07/2020, as expected  Opioid Consent - 06/03/2020  Opioid Agreement - 06/03/2020  Pharmacy- as documented    [place]  Reviewed - 03/11/2021 as expected   Pill Count - n/a  Psychological evaluation - outside provider  MME - 54   Pharmacogenetic testing - N/A    Imaging:  CT Hip Without Contrast Left 01/11/2020  IMPRESSION:   1.  Postoperative changes of short IM dima and dynamic screw fixation of an intertrochanteric fracture of the left hip. The fracture is in good anatomic alignment. There is evidence of some new bone formation along the superolateral margin of the   fracture, but it is still visualized along the majority of its course.  2.  Ununited and unfixed facture fragment involving the lesser trochanter. This was seen on the original film 11/11/2019 and has not significantly changed.  3.  Superimposed degenerative change left hip joint.  4.  No evidence for fracture or migration of the fixation appliances.  5.  Exam otherwise negative and unchanged.     MRI Ankle right without contrast 07/06/2019     MRI Cervical, thoracic, and lumbar spine without contrast 10/13/17:  IMPRESSION:   CONCLUSION:  MRI CERVICAL SPINE:  1.  Mild to moderate degenerative changes most pronounced C5-C6 and C6-C7 where there is mild spinal canal and mild to moderate foraminal narrowing.  2.  No high-grade  spinal canal narrowing, spinal cord compression, or spinal cord signal abnormality.     MRI THORACIC SPINE:  1.  Minor degenerative changes. No stenosis.  2.  Normal spinal cord.     MRI LUMBAR SPINE:  1.  Normal spinal cord and conus. No high-grade central spinal canal stenosis in the lumbar spine.  2.  Interval changes of laminectomy on the left at L4-L5. Adequate central canal at this level. Moderate left and mild right foraminal narrowing unchanged.  3.  At L3-L4 a left-sided herniation narrows the lateral recess, unchanged from prior. The disc bulge also contacts and slightly displaces the exiting L3 nerve root on the left, not changed.  4.  At L2-L3 a left-sided herniation contacts and may slightly displace the traversing nerve root, unchanged.     Assessment:   Conrad Zhu is a 65 y.o. female with visit today for multi-site pain in left leg/foot secondary to [facility], lumbar stenosis and history of L4 hemilaminectomy, and left hip pain secondary to fracture and surgery 11/2019 following [facility].  She is having difficulty with pain control on short acting opioid and failed trial of Butrans patch/Belbuca films and has had significant nausea and abdominal symptoms as side effect.   Trial of fentanyl 12 mcg/hr did not help pain, review that an increase to 25 mcg/hr was not recommended as it would exceed CDC guideline MME with her T#4.  She recently failed methadone as 5 mg every 12 hour caused headaches.  Discuss tapering off and trial of MSER for pain as long as BMP today demonstrates normal renal function.  Otherwise may consider OxyContin or Hysingla trial.  She is advised on possible side effects and risk.  Have discussed option for lumbar sympathetic block and she is hesitant on procedures and idea of SCS trial.       Plan:   Ok to take T#4 2 tabs up to 3 times a day as needed for pain.  #150 tabs to last 30 days.  Next refill sent to fill today to start  03/14/2021  Have discussed lumbar sympathetic block for left foot/ankle pain - patient not ready to pursue.  Discussed any COVID vaccine would need to be 2 weeks apart from other injections/procedures.  Continue with psychiatrist and psychologist for behavioral health plan.      Wean off methadone due to headaches: Take 1/2 tab in am and 1 tab in pm x 3 days, then take 1/2 tab every 12 hours x 3 days, then 1/2 tab daily x 3 days, then done.  If withdrawal symptoms occur, slow down taper to every 5-7 days or call nurse line for recommendations.    Once the methadone is completed, will start extended release Morphine 15 mg 1 tab every 12 hours.  Will check BMP today for kidney function to ensure this is normal.  Will also monitor for side effects discussed today.  Sending in now so that if a prior authorization is needed we have time to complete it.  Discuss handicap parking sticker completion with PCP.  Follow-up with Sherron as scheduled In April.  Ok to make May visit OVL in office     Sherron Ruiz PA-C  [facility]   [address]  [place] [address]  Ph: [phone]  Fax: [phone] 31 minutes spent on the date of the encounter doing chart review, history and exam, documentation, and further activities.

## 2021-06-15 NOTE — PROGRESS NOTES
Patient presents to the clinic today for a follow up with Sherron Ruiz PA-C.     Pain score: 7  What does your pain feel like: constant ache and numbness, headache  Does the pain interfere with:  Work: no  Walking/distance: yes  Sleep: yes  Daily activities: yes  Relationships/social life: yes  Mood: yes  F= 8

## 2021-06-15 NOTE — PROGRESS NOTES
PAIN CENTER PROGRESS NOTE    Subjective:   Conrad Zhu is a 65 y.o. 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  Consult was 04/28/20.    Major issues:  1. Chronic, continuous use of opioids    2. Chronic pain syndrome    3. Complex regional pain syndrome i of left lower limb      Pain location and description: See CMA note   Radiation of pain: lower back to bilateral hips  Gait disturbance: Denies recent falls, antalgic. Uses a cane prn.  Exacerbating factors: Standing, sitting, walking, laying down, reaching, twisting, lifting, squatting, bending, stretching, going up and down stairs, riding in the car, getting out of car or bed  Alleviating factors: Medications, ice, rest.  Associated symptoms: Chronic LLE weakness, numbness in legs, swelling in left ankle/calf.  Denies fever/chills, rash, bowel or bladder incontinence.  Functional pain symptoms: See CMA note.  Adverse effects of medications: None reported  Current treatment efficacy: Fair.  See medication list.  Current treatment compliance: Taking medication as prescribed, keeping appointments as scheduled.    Since the last Pain Center visit, the patient denies any use of anticoagulant medications. Denies any new diagnostic testing, new treatments or new medical conditions, any changes in medications, or any ED/urgent care visits.  Patient denies the chance of being pregnant or wanting to become pregnant.  States pain is unchanged but not well controlled.  Denies new pain symptoms or new injuries.    Started methadone 5 mg 1/2 at night increased to 1 tab at night and reports this initially helped but doesn't feel as much relief now.  She states at first, it helped her sleep with less pain he states she was taking with her trazodone and was getting  "another 2 hours of sleep but now isn't doing that.  Is waking again a couple times a night due to pain.  Has upset stomach once this week had diarrhea but doesn't attribute to medication as this was going on before.  Denies other side effects.      She is back at chiropractor once per week as her lower back has been \"giving her trouble\"    She had headache and took Advil 3 this morning which she states didn't work.  She states this is third headache since last week.  Frontal/eye headaches bilateral. She did have migraines 36-48 years old and denies any migraines now. Unsure the trigger for this.  Denies headaches started with medication change.    We review option for trileptal; she has failed multiple neuropathics.  She states she cannot feel her left foot and doesn't feel its all the way in the heel of her shoe.  She is tight in calf and up to knee. She is fearful about not being able to walk  She reports swelling, denies color changes, feels warm to the touch.  She states no hair or toenail changes.  She denies previous sympathetic block and is hesitant to pursue; she did have Botox which did not help. She had LESI x 4 without relief.  She is using ice and voltaren doesn't help so doesn't use it.  T#4 continues to take it and usually taking 2 tabs two-three times a day which helps for 4 hours at a time.     She continues psychotherapy monthly; reports today that her sister is not speaking to her for past couple years and her sister has substance use issues and when she tried to intervene it ended their relationship.  States her niece (sister's daughter) overdosed and  years ago and today is the anniversary of her passing.     Review of Systems  Constitutional: Sleep interruption due to pain.  Denies fever, chills, night sweats, lethargy, weight gain  Musculoskeletal: Positive for back pain, left hip and foot joint pain.  Denies joint swelling, recent falls.  Gastrointestional: Denies difficulty swallowing, " change in appetite, abdominal pain, constipation, nausea, vomiting,  GERD, fecal incontinence.  Genitourinary: Denies urinary incontinence, dysuria, hematuria, UTI, frequency, hesitancy, change in libido  Neurologic: Headaches.  Denies confusion, seizure, weakness, changes in balance, changes in speech.  Psychiatric: Depression, anxiety.  Denies memory loss, psychoses, suicidal ideation, substance use/abuse.     Objective:     Exam  Vitals:    02/11/21 1350   BP: 169/85   Pulse: 86   Resp: 18       Constitutional-General:  Well nourished, well developed, well groomed, healthy appearing individual.  Weight is overweight, appears stated age and presents alone.  Sitting with left foot on top of purse elevated off the floor.  Psych-Mental Status: A & O in no acute distress. Speech is fluent. Thought process normal. Recent and remote memory are intact.  Attention span and concentration are normal. Displays appropriate mood and affect.   H,E,N,T- Airway is patent, unlabored respiratory effort.  Derm - Exposed skin CDI.  Musckuloskeletal -  Gait:  Gait is abnormal.  Ambulates independently with antalgia and everted left foot.    Lab:  Vitamin D, Total (25-Hydroxy)   Date Value Ref Range Status   01/22/2021 56.1 30.0 - 80.0 ng/mL Final       *Opioid Universal Precautions:    UDT -  10/07/2020, as expected  Opioid Consent - 06/03/2020  Opioid Agreement - 06/03/2020  Pharmacy- as documented    [place]  Reviewed - 02/11/2021 as expected   Pill Count - n/a  Psychological evaluation - outside provider  MME - 54   Pharmacogenetic testing - N/A  EKG 02/25/2020    Imaging:  CT Hip Without Contrast Left 01/11/2020  IMPRESSION:   1.  Postoperative changes of short IM dima and dynamic screw fixation of an intertrochanteric fracture of the left hip. The fracture is in good anatomic alignment. There is evidence of some new bone formation along the superolateral margin of the   fracture, but it is still visualized along the majority of its  course.  2.  Ununited and unfixed facture fragment involving the lesser trochanter. This was seen on the original film 11/11/2019 and has not significantly changed.  3.  Superimposed degenerative change left hip joint.  4.  No evidence for fracture or migration of the fixation appliances.  5.  Exam otherwise negative and unchanged.     MRI Ankle right without contrast 07/06/2019     MRI Cervical, thoracic, and lumbar spine without contrast 10/13/17:  IMPRESSION:   CONCLUSION:  MRI CERVICAL SPINE:  1.  Mild to moderate degenerative changes most pronounced C5-C6 and C6-C7 where there is mild spinal canal and mild to moderate foraminal narrowing.  2.  No high-grade spinal canal narrowing, spinal cord compression, or spinal cord signal abnormality.     MRI THORACIC SPINE:  1.  Minor degenerative changes. No stenosis.  2.  Normal spinal cord.     MRI LUMBAR SPINE:  1.  Normal spinal cord and conus. No high-grade central spinal canal stenosis in the lumbar spine.  2.  Interval changes of laminectomy on the left at L4-L5. Adequate central canal at this level. Moderate left and mild right foraminal narrowing unchanged.  3.  At L3-L4 a left-sided herniation narrows the lateral recess, unchanged from prior. The disc bulge also contacts and slightly displaces the exiting L3 nerve root on the left, not changed.  4.  At L2-L3 a left-sided herniation contacts and may slightly displace the traversing nerve root, unchanged.     Assessment:   Conrad Zhu is a 65 y.o. female with visit today for multi-site pain in left leg/foot secondary to mononeuritis multiplex from [facility] in 2013, lumbar stenosis and history of L4 hemilaminectomy, and left hip pain secondary to fracture and surgery 11/2019 following [facility].  She is having difficulty with pain control on short acting opioid and failed trial of Butrans patch/Belbuca films and has had significant nausea and abdominal symptoms as side effect.   Trial of fentanyl 12  mcg/hr did not help pain, review that an increase to 25 mcg/hr was not recommended for neuropathic pain as it would exceed CDC guideline MME. She will trial adjustment of methadone to 2.5-5 mg in am and 5 mg at night for pain control and will continue T#4, advised to space out dosing by at least 4 hours.  She is advised on possible side effects and risk.  Due to chronic methadone use and the potential for QT prolongation, the patient was given an order to have a 12-lead electrocardiogram done to monitor the QT interval.  This should be completed prior to the next follow-up.   Recently failed ketamine 40 mg as ineffective for pain and side effects of nausea.  She is following orthopedics for foot recommendations, review option for lumbar sympathetic block if PT is not effective and she will consider.  She is hesitant on idea of SCS trial.       Plan:   Ok to take T#4 2 tabs up to 3 times a day as needed for pain.  #150 tabs to last 30 days.  Next refill sent to fill 02/12/2021 to last until 03/14/2021  Have discussed lumbar sympathetic block for left foot/ankle pain - patient not ready to pursue.  Discussed any COVID vaccine would need to be 2 weeks apart from other injections/procedures.  Continue with psychiatrist and psychologist for behavioral health plan.      Increase Methadone to 1/2 tab in am, 1 tab at bedtime.  If tolerated without side effects, ok to increase to 1 full tab every 12 hours.    Due to chronic methadone use and the potential for QT prolongation, the patient was given an order to have a 12-lead electrocardiogram done to monitor the QT interval.  This should be completed prior to the next follow-up.    Follow-up with Sherron as scheduled.  Ok to make April visit OVL in office     Sherron Ruiz PA-C  [facility]   [address]  [place] [address]  Ph: [phone]  Fax: [phone]    35 minutes spent on the date of the encounter doing chart  review, history and exam, documentation, and further activities.

## 2021-06-15 NOTE — TELEPHONE ENCOUNTER
2020 Prior Authorization Request  Who's requesting PA: Pharmacy  Provider: Joseph  Pharmacy Name, Location & Phone # : [facility]/[phone]  Medication Name: mser 15mg, 2/day  Quantity: 60  Refills: ongoing  Days Supply: 30  Medication Instructions: one tab every 12 hrs  Insurance Plan: MEDICA   Insurance Member ID & GRP # : [id number]  CoverMyMeds Key:  Informed patient that prior authorizations can take up to 10 business days for response: YES  Okay to leave a detailed message: YES    Route to: HE PA MED ([id number])

## 2021-06-15 NOTE — PATIENT INSTRUCTIONS - HE
Ok to take T#4 2 tabs up to 3 times a day as needed for pain.  #150 tabs to last 30 days.  Next refill sent to fill today to start 03/14/2021  Have discussed lumbar sympathetic block for left foot/ankle pain - patient not ready to pursue.  Discussed any COVID vaccine would need to be 2 weeks apart from other injections/procedures.  Continue with psychiatrist and psychologist for behavioral health plan.      Wean off methadone due to headaches: Take 1/2 tab in am and 1 tab in pm x 3 days, then take 1/2 tab every 12 hours x 3 days, then 1/2 tab daily x 3 days, then done.  If withdrawal symptoms occur, slow down taper to every 5-7 days or call nurse line for recommendations.    Once the methadone is completed, will start extended release Morphine 15 mg 1 tab every 12 hours.  Will check BMP today for kidney function to ensure this is normal.  Will also monitor for side effects discussed today.  Sending in now so that if a prior authorization is needed we have time to complete it.  Discuss handicap parking sticker completion with PCP.  Follow-up with Sherron as scheduled In April.  Ok to make May visit OVL in office       Patient Education   Morphine Sulfate Oral tablet  What is this medicine?  MORPHINE (MOR feen) is a pain reliever. It is used to treat moderate to severe pain.  This medicine may be used for other purposes; ask your health care provider or pharmacist if you have questions.  What should I tell my health care provider before I take this medicine?  They need to know if you have any of these conditions:    brain tumor    drug abuse or addiction    head injury    heart disease    frequently drink alcohol containing drinks    intestinal disease    kidney disease or problems urinating    kyphoscoliosis    liver disease    lung disease, asthma, or breathing problems    seizures    taken isocarboxazid, phenelzine, tranylcypromine, or selegiline in the past 2 weeks    an unusual or allergic reaction to lactose,  morphine, other medicines, foods, dyes, or preservatives    pregnant or trying to get pregnant    breast-feeding  How should I use this medicine?  Take this medicine by mouth with a glass of water. If the medicine upsets your stomach, take it with food or milk. Follow the directions on the prescription label. Take the medicine at the same time each day. Do not take more than you are told to take.  Talk to your pediatrician regarding the use of this medicine in children. Special care may be needed.  Overdosage: If you think you have taken too much of this medicine contact a poison control center or emergency room at once.  NOTE: This medicine is only for you. Do not share this medicine with others.  What if I miss a dose?  If you miss a dose, take it as soon as you can. If it is almost time for your next dose, take only that dose. Do not take double or extra doses.  What may interact with this medicine?    alcohol    antihistamines    benzodiazepines    certain medicines for depression, anxiety, or psychotic disturbances    certain medicines for sleep    muscle relaxants    naltrexone    narcotic medicines (opiates) for pain    phenobarbital    phenothiazines like perphenazine, thioridazine, chlorpromazine, mesoridazine, fluphenazine, prochlorperazine, promazine, and trifluoperazine    rifampin    tramadol  This list may not describe all possible interactions. Give your health care provider a list of all the medicines, herbs, non-prescription drugs, or dietary supplements you use. Also tell them if you smoke, drink alcohol, or use illegal drugs. Some items may interact with your medicine.  What should I watch for while using this medicine?  Tell your doctor or health care professional if your pain does not go away, if it gets worse, or if you have new or a different type of pain. You may develop tolerance to the medicine. Tolerance means that you will need a higher dose of the medicine for pain relief. Tolerance is  normal and is expected if you take this medicine for a long time.  Do not suddenly stop taking your medicine because you may develop a severe reaction. Your body becomes used to the medicine. This does NOT mean you are addicted. Addiction is a behavior related to getting and using a drug for a non-medical reason. If you have pain, you have a medical reason to take pain medicine. Your doctor will tell you how much medicine to take. If your doctor wants you to stop the medicine, the dose will be slowly lowered over time to avoid any side effects.  You may get drowsy or dizzy when you first start taking the medicine or change doses. Do not drive, use machinery, or do anything that may be dangerous until you know how the medicine affects you. Stand or sit up slowly.  There are different types of narcotic medicines (opiates) for pain. If you take more than one type at the same time, you may have more side effects. Give your health care provider a list of all medicines you use. Your doctor will tell you how much medicine to take. Do not take more medicine than directed. Call emergency for help if you have problems breathing.  This medicine will cause constipation. Try to have a bowel movement at least every 2 to 3 days. If you do not have a bowel movement for 3 days, call your doctor or health care professional.  Your mouth may get dry. Drinking water, chewing sugarless gum, or sucking on hard candy may help. See your dentist every 6 months.  What side effects may I notice from receiving this medicine?  Side effects that you should report to your doctor or health care professional as soon as possible:    allergic reactions like skin rash, itching or hives, swelling of the face, lips, or tongue    breathing problems    change in the amount of urine    confusion    feeling faint or lightheaded    fever, chills    hallucinations    seizures    slow or fast heartbeat    unusually weak or tired  Side effects that usually do  not require medical attention (report to your doctor or health care professional if they continue or are bothersome):    constipation    dizziness    headache    nausea, vomiting    pinpoint pupils    sweating  This list may not describe all possible side effects. Call your doctor for medical advice about side effects. You may report side effects to FDA at 7-480-FDA-9441.  Where should I keep my medicine?  Keep out of the reach of children. This medicine can be abused. Keep it in a safe place to protect it from theft. Do not share this medicine with anyone. Selling or giving away this medicine is dangerous and is against the law.  Store at room temperature between 15 and 30 degrees C (59 and 86 degrees F).  This medicine may cause accidental overdose and death if it is taken by other adults, children, or pets. Flush any unused medicine down the toilet to reduce the chance of harm. Do not use the medicine after the expiration date.  NOTE: This sheet is a summary. It may not cover all possible information. If you have questions about this medicine, talk to your doctor, pharmacist, or health care provider.  NOTE:This sheet is a summary. It may not cover all possible information. If you have questions about this medicine, talk to your doctor, pharmacist, or health care provider. Copyright  2015 Gold Standard         Ok to take T#4 2 tabs up to 3 times a day as needed for pain.  #150 tabs to last 30 days.  Next refill sent to fill today to start 03/14/2021  Have discussed lumbar sympathetic block for left foot/ankle pain - patient not ready to pursue.  Discussed any COVID vaccine would need to be 2 weeks apart from other injections/procedures.  Continue with psychiatrist and psychologist for behavioral health plan.      Wean off methadone due to headaches: Take 1/2 tab in am and 1 tab in pm x 3 days, then take 1/2 tab every 12 hours x 3 days, then 1/2 tab daily x 3 days, then done.  If withdrawal symptoms occur, slow  down taper to every 5-7 days or call nurse line for recommendations.    Once the methadone is completed, will start extended release Morphine 15 mg 1 tab every 12 hours.  Will check BMP today for kidney function to ensure this is normal.  Will also monitor for side effects discussed today.  Sending in now so that if a prior authorization is needed we have time to complete it.  Discuss handicap parking sticker completion with PCP.  Follow-up with Sherron as scheduled In April.  Ok to make May visit OVL in office

## 2021-06-15 NOTE — PROGRESS NOTES
Patient presents to the clinic today for a follow up with Sherron Ruiz PA-C.     Pain score: 5  What does your pain feel like: constant ache and numbness, headache  Does the pain interfere with:  Work: no  Walking/distance: yes  Sleep: yes  Daily activities: yes  Relationships/social life: yes  Mood: yes  F= 8

## 2021-06-15 NOTE — TELEPHONE ENCOUNTER
Central PA team  [phone]  Pool: HE PA MED ([id number])          PA has been initiated.       PA form completed and faxed insurance via Cover My Meds     Brewer:  ALEE MACIEL (Key: [id number])      Medication:  morphine 15 mg TR12    Insurance:  [employer] ([employer])        Response will be received via fax and may take up to 5-10 business days depending on plan

## 2021-06-15 NOTE — TELEPHONE ENCOUNTER
Pt called requesting that her discharge summary be e-mailed or called to her at [email or web address]   Nurse e-mailed summary to the pt.

## 2021-06-15 NOTE — PATIENT INSTRUCTIONS - HE
Ok to take T#4 2 tabs up to 3 times a day as needed for pain.  #150 tabs to last 30 days.  Next refill sent to fill 02/12/2021 to last until 03/14/2021  Have discussed lumbar sympathetic block for left foot/ankle pain - patient not ready to pursue.  Discussed any COVID vaccine would need to be 2 weeks apart from other injections/procedures.  Continue with psychiatrist and psychologist for behavioral health plan.      Increase Methadone to 1/2 tab in am, 1 tab at bedtime.  If tolerated without side effects, ok to increase to 1 full tab every 12 hours.    Due to chronic methadone use and the potential for QT prolongation, the patient was given an order to have a 12-lead electrocardiogram done to monitor the QT interval.  This should be completed prior to the next follow-up.    Follow-up with Sherron as scheduled.  Ok to make April visit OVL in office

## 2021-06-16 PROBLEM — G90.522 COMPLEX REGIONAL PAIN SYNDROME I OF LEFT LOWER LIMB: Status: ACTIVE | Noted: 2018-08-15

## 2021-06-16 PROBLEM — D62 ANEMIA DUE TO BLOOD LOSS, ACUTE: Status: ACTIVE | Noted: 2019-11-12

## 2021-06-16 PROBLEM — S93.401A SPRAIN OF RIGHT ANKLE, UNSPECIFIED LIGAMENT, INITIAL ENCOUNTER: Status: ACTIVE | Noted: 2019-07-01

## 2021-06-16 PROBLEM — F32.A DEPRESSION: Status: ACTIVE | Noted: 2020-02-10

## 2021-06-16 PROBLEM — Z01.818 PRE-OPERATIVE GENERAL PHYSICAL EXAMINATION: Status: ACTIVE | Noted: 2019-11-11

## 2021-06-16 PROBLEM — Z79.891 LONG TERM (CURRENT) USE OF OPIATE ANALGESIC: Status: ACTIVE | Noted: 2020-02-10

## 2021-06-16 PROBLEM — M76.61 ACHILLES TENDINITIS OF RIGHT LOWER EXTREMITY: Status: ACTIVE | Noted: 2019-07-01

## 2021-06-16 PROBLEM — S93.601A SPRAIN OF RIGHT FOOT, INITIAL ENCOUNTER: Status: ACTIVE | Noted: 2019-07-01

## 2021-06-16 PROBLEM — S92.014A CLOSED NONDISPLACED FRACTURE OF BODY OF RIGHT CALCANEUS, INITIAL ENCOUNTER: Status: ACTIVE | Noted: 2019-07-09

## 2021-06-16 PROBLEM — F41.9 ANXIETY DISORDER, UNSPECIFIED: Status: ACTIVE | Noted: 2020-02-10

## 2021-06-16 PROBLEM — G89.18 ACUTE POST-OPERATIVE PAIN: Status: ACTIVE | Noted: 2019-11-12

## 2021-06-16 PROBLEM — S72.142A CLOSED INTERTROCHANTERIC FRACTURE OF HIP, LEFT, INITIAL ENCOUNTER (H): Status: ACTIVE | Noted: 2019-11-11

## 2021-06-16 PROBLEM — M54.2 NECK PAIN: Status: ACTIVE | Noted: 2020-02-10

## 2021-06-16 PROBLEM — M25.552 LEFT HIP PAIN: Status: ACTIVE | Noted: 2019-11-11

## 2021-06-16 PROBLEM — E56.9 VITAMIN DEFICIENCY: Status: ACTIVE | Noted: 2020-10-23

## 2021-06-16 NOTE — TELEPHONE ENCOUNTER
I cannot increase doses without visit; this will need to be discussed when she returns to see me.  Of note, she did see her PCP for increased back pain on 03/18/21 and had recent updated lumbar MRI, no acute changes or progression noted.  I saw her last on 03/11 at which time she was also requesting an early refill for travel, was to start on 03/14.  She will be due prior to when I see her scheduled 04/15 so we could do a bridge in case any changes to dosing are made at visit.  If she does call back with travel info, remind patient she will need to provide documentation of travel location/dates to accommodate.

## 2021-06-16 NOTE — TELEPHONE ENCOUNTER
Medication being requested: Morphine & Tylenol # 4  Last visit date: 3/11  Provider: RONALD  Next visit date: 4/15  Provider: RONALD  Expected follow up: in April  MTM visit (Pain Center) date: ric  UDT date: 10/7/2020  Agreement date: 6/9/2020   (Last fill date; name; strength; provider; MME; quantity):   Order:  morphine 15 mg TR12 60 each 0 3/11/2021 4/10/2021    filled-03/14/2021   Morphine Sulf Er 15 Mg Tablet   60.00  30-refill due date-4/13    Order  acetaminophen-codeine (TYLENOL-CODEINE #4) 300-60 mg per tablet 150 tablet 0 3/14/2021 4/13/2021 No   Sig - Route: Take 1-2 tablets by mouth every 8 (eight) hours as needed for pain      03/11/2021   Acetaminophen-Cod #4 Tablet   150.00  25  Refill due date 3/14    Pertinent between visit information about requested medication (telephone, mychart, prior authorization, concerns, comments):  3/11 visit note:  Plan:   Ok to take T#4 2 tabs up to 3 times a day as needed for pain.  #150 tabs to last 30 days.  Next refill sent to fill today to start 03/14/2021               Morphine 15 mg 1 tab every 12 hours  Script being sent to provider by nurse- dates and quantity:   Requested Prescriptions     Pending Prescriptions Disp Refills     acetaminophen-codeine (TYLENOL-CODEINE #4) 300-60 mg per tablet 18 tablet 0     Sig: Take 1-2 tablets by mouth every 8 (eight) hours as needed for pain.     morphine 15 mg TR12 6 each 0     Sig: Take 15 mg by mouth every 12 (twelve) hours for 3 days.     Pharmacy cued: [facility]  Standing orders for withdrawal protocol implemented: ric

## 2021-06-16 NOTE — TELEPHONE ENCOUNTER
Patient left message on triage line requesting a return call regarding her medications. Returned call to patient and she is requesting that she get her Morphine dose increased, she reports that she is also taking Tylenol # 4, six tablets per day. She also stated she may be going out of town and may need early refills. Informed patient that this request would be sent to her provide who will be returning to the office on Tuesday 4/6. Asked patient to call clinic when she knew if she would indeed be traveling and dates that she would need her medications filled.

## 2021-06-16 NOTE — TELEPHONE ENCOUNTER
RN cannot approve Refill Request    RN can NOT refill this medication med is not covered by policy/route to provider. Last office visit: 3/18/2021 Sofie Huitron CNP Last Physical: Visit date not found Last MTM visit: Visit date not found Last visit same specialty: 3/18/2021 Sofie Huitron CNP.  Next visit within 3 mo: Visit date not found  Next physical within 3 mo: Visit date not found      Yamile Kraus, [facility] Triage/Med Refill 4/16/2021    Requested Prescriptions   Pending Prescriptions Disp Refills     ondansetron (ZOFRAN) 4 MG tablet [Pharmacy Med Name: ONDANSETRON HCL 4 MG TABLET] 30 tablet 0     Sig: TAKE 1 TABLET BY MOUTH EVERY 8 HOURS IF NEEDED FOR NAUSEA/VOMITING       There is no refill protocol information for this order

## 2021-06-16 NOTE — PATIENT INSTRUCTIONS - HE
Discussed increase in Morphine ER take 15 mg in the am and 30 mg (2 tabs) in the pm - ok to fill now.  If you have any issues getting a refill due to prior authorization, call here immediately.  We can continue current doses until we have an approval.  Reduce the T#4 to 2 tabs in the am and 2 tabs in afternoon and 1 tab at bedtime.  This reduction is to stay within CDC guidelines for dosing.  Have discussed lumbar sympathetic block for left foot/ankle pain - patient not ready to pursue.   Recommend lumbar medial branch blocks bilateral L3-4 and L4-5 levels with Dr. Michelet Ontiveros.  Information given today.  Please take Valium 5 mg 1 tablet 1 hour prior to the procedure and then bring 1 tablet to the procedure appointment to take after consent forms are signed.  You will need a  to bring you home as this medication may cause you to feel drowsy.  Continue with psychiatrist and psychologist for behavioral health plan.      Follow-up with Sherron as scheduled in May OVL in office

## 2021-06-16 NOTE — PROGRESS NOTES
Did you experience any problems with previous Prolia injection? No  Any medication change in the last 6 months? No  Did you take prednisone or other immunosupressant drugs in the last 6 months   (chemo, transplant, rheum, dermatology conditions)? No  Did you have any serious infection in the last 6 months? No  Any recent hospitalizations? No  Do you plan any dental work in the next 2-3 months? Yes, lost a tooth - possible root is exposed  How much calcium do you take daily from the diet and supplements? 2000  How much vit D do you take daily? Not sure  Last DXA?     Pt will reschedule prolia injection due having to dental work today - this was discussed with Dr. Carrizales.  She recommends patient return in 1-2 weeks if she's having a crown placed.  If she's having an implant then she is to return 2-3 after the implant and she is healed.

## 2021-06-16 NOTE — PROGRESS NOTES
"PAIN CENTER PROGRESS NOTE    Subjective:   Conrad Zhu is a 65 y.o. 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  Consult was 04/28/20.    Major issues:  1. Chronic, continuous use of opioids    2. Chronic pain syndrome    3. Complex regional pain syndrome i of left lower limb    4. Spondylosis of lumbar region without myelopathy or radiculopathy      Pain location and description: See CMA note   Radiation of pain: lower back to bilateral hips  Gait disturbance: Denies recent falls, antalgic. Uses a cane prn.  Exacerbating factors: Standing, sitting, walking, laying down, reaching, twisting, lifting, squatting, bending, stretching, going up and down stairs, riding in the car, getting out of car or bed  Alleviating factors: Medications, ice, rest.  Associated symptoms: Chronic LLE weakness, numbness in legs, swelling in left ankle/calf.  Denies fever/chills, rash, bowel or bladder incontinence.  Functional pain symptoms: See CMA note.  Adverse effects of medications: Xerostomia.  Denies drowsiness, itching, nausea, constipation.    Current treatment efficacy: Fair.  See medication list.  Current treatment compliance: Taking medication as prescribed, keeping appointments as scheduled.    Since the last Pain Center visit, the patient denies any use of anticoagulant medications. She saw PCP Chioma Huitron on 03/18/2021 for low back pain reviewed today:  \"  Assessment:      Assessment and Plan:  1. Chronic bilateral low back pain without sciatica/Chronic pain syndrome: Two week course. She has tried chiropractic care, ice, heat, without relief. Worse when bending over. No recent falls. She continues to work with the pain clinic for her chronic pain. She currently is on Tylenol #4 with Morphine, will not " "adjust this. Previous lumbar surgery, injections. Will obtain an updated MRI of her lumbar spine.   - MR Lumbar Spine With Without Contrast; Future  - Ice or heat, whichever feels better 15 minutes four times per day.  - Icy hot or biofreeze topically as needed for pain control.  - Continue Chiropractic care.  - Consider a massage.  - Continue pain medications as prescribed.  - Follow up if symptoms persist or worsen. \"    She had MRI on 04/02/2021:  \"IMPRESSION:   1.  Overall stable appearance from 12/28/2019 as above.     2.  There is no evidence for severe spinal canal or severe foraminal stenosis.     3.  Left-sided disc herniations at a few lumbar levels detailed above, unchanged from prior study.     4.  Suggested possible etiology for left L3 radiculopathy at L3-4.\"    She states her lower back pain is axial and at the level of her surgery.  She is going to chiropractor once per week for the last couple of months, is helpful for the day of the treamtent, sometimes lasts a little longer.  She asks about pursuing lumbar radiofrequency for her lower back as she failed multiple LESI and discussed this with her PCP.    States her left foot pain feels like a blood pressure cuff wrapped around the leg tight.  Denies burning, no new numbness, no new weakness.  She states her leg feels tighter than last month and swollen in left foot     Last visit, she discontinued methadone and was to start MSER 15 mg every 12 hours.  She did call nurse line 04/02/201 asking for morphine dose to be increased. She was advised this would not be done between appointments.  She is at MME 84 with opioids.  She thinks the morphine is helping but would like to see it increased; is willing to reduce T#4 dosing to stay within guidelines.      We review option for trileptal; she has failed multiple neuropathics.  She states she cannot feel her left foot and doesn't feel its all the way in the heel of her shoe.  She reports swelling, denies " color changes, feels warm to the touch.  She states no hair or toenail changes.  She denies previous sympathetic block and is hesitant to pursue; she did have Botox which did not help. She had LESI x 4 without relief.  She is using ice and voltaren doesn't help so doesn't use it.  T#4 continues to take it and usually taking 2 tabs two-three times a day which helps for 4 hours at a time.     She continues psychotherapy monthly.    Review of Systems  Constitutional: Sleep interruption due to pain.  Denies fever, chills, night sweats, lethargy, weight gain  Musculoskeletal: Positive for low back pain, left hip and foot joint pain.  Denies joint swelling, recent falls.  Gastrointestional: Denies difficulty swallowing, change in appetite, abdominal pain, constipation, nausea, vomiting,  GERD, fecal incontinence.  Genitourinary: Denies urinary incontinence, dysuria, hematuria, UTI, frequency, hesitancy, change in libido  Neurologic: Headaches.  Denies confusion, seizure, weakness, changes in balance, changes in speech.  Psychiatric: Depression, anxiety.  Denies memory loss, psychoses, suicidal ideation, substance use/abuse.     Objective:     Exam  Vitals:    04/15/21 1245   BP: 110/68   Pulse: 63   Resp: 16     Constitutional-General:  Well nourished, well developed, well groomed, healthy appearing individual.  Weight is overweight, appears stated age and presents alone.  Psych-Mental Status: A & O in no acute distress. Speech is fluent. Thought process normal. Recent and remote memory are intact.  Attention span and concentration are normal. Displays appropriate mood and affect.   H,E,N,T- Airway is patent, unlabored respiratory effort.  Derm - Exposed skin CDI.  Musckuloskeletal -  Gait:  Gait is abnormal.  Ambulates independently with antalgia and everted left foot.    *Opioid [place] Precautions:    UDT -  10/07/2020, as expected  Opioid Consent - 06/03/2020  Opioid Agreement - 06/03/2020  Pharmacy- as documented     [place]  Reviewed - 04/15/2021 as expected   Pill Count - n/a  Psychological evaluation - outside provider  MME - 84  Pharmacogenetic testing - N/A    Labs:  Results for orders placed or performed during the hospital encounter of 03/11/21   Basic Metabolic Panel   Result Value Ref Range    Sodium 141 136 - 145 mmol/L    Potassium 4.5 3.5 - 5.0 mmol/L    Chloride 104 98 - 107 mmol/L    CO2 28 22 - 31 mmol/L    Anion Gap, Calculation 9 5 - 18 mmol/L    Glucose 88 70 - 125 mg/dL    Calcium 9.1 8.5 - 10.5 mg/dL    BUN 16 8 - 22 mg/dL    Creatinine 0.88 0.60 - 1.10 mg/dL    GFR MDRD Af Amer >60 >60 mL/min/1.73m2    GFR MDRD Non Af Amer >60 >60 mL/min/1.73m2     Imaging:  CT Hip Without Contrast Left 01/11/2020  IMPRESSION:   1.  Postoperative changes of short IM dima and dynamic screw fixation of an intertrochanteric fracture of the left hip. The fracture is in good anatomic alignment. There is evidence of some new bone formation along the superolateral margin of the   fracture, but it is still visualized along the majority of its course.  2.  Ununited and unfixed facture fragment involving the lesser trochanter. This was seen on the original film 11/11/2019 and has not significantly changed.  3.  Superimposed degenerative change left hip joint.  4.  No evidence for fracture or migration of the fixation appliances.  5.  Exam otherwise negative and unchanged.     MRI Ankle right without contrast 07/06/2019     MRI Cervical, thoracic, and lumbar spine without contrast 10/13/17:  IMPRESSION:   CONCLUSION:  MRI CERVICAL SPINE:  1.  Mild to moderate degenerative changes most pronounced C5-C6 and C6-C7 where there is mild spinal canal and mild to moderate foraminal narrowing.  2.  No high-grade spinal canal narrowing, spinal cord compression, or spinal cord signal abnormality.     MRI THORACIC SPINE:  1.  Minor degenerative changes. No stenosis.  2.  Normal spinal cord.     MRI LUMBAR SPINE:  1.  Normal spinal cord and conus.  No high-grade central spinal canal stenosis in the lumbar spine.  2.  Interval changes of laminectomy on the left at L4-L5. Adequate central canal at this level. Moderate left and mild right foraminal narrowing unchanged.  3.  At L3-L4 a left-sided herniation narrows the lateral recess, unchanged from prior. The disc bulge also contacts and slightly displaces the exiting L3 nerve root on the left, not changed.  4.  At L2-L3 a left-sided herniation contacts and may slightly displace the traversing nerve root, unchanged.     Assessment:   Conrad Zhu is a 65 y.o. female with visit today for multi-site pain in left leg/foot secondary to mononeuritis multiplex from [facility] in [facility], lumbar stenosis and history of L4 hemilaminectomy, and left hip pain secondary to fracture and surgery 11/2019 following [facility].  She failed a trial of Butrans patch/Belbuca films and has had significant nausea and abdominal symptoms as side effect.   Trial of fentanyl 12 mcg/hr did not help pain, review that an increase to 25 mcg/hr was not recommended as it would exceed CDC guideline MME with her T#4.  She recently failed methadone as 5 mg every 12 hour caused headaches.  Discuss adjusting MSER for pain to 30 mg for one dose and keeping other dose at 15 mg with additional of T#4 not to exceed 5 tabs/day.  Otherwise may consider OxyContin or Hysingla trial.  She is advised on possible side effects and risk.  Have discussed option for lumbar sympathetic block and she is hesitant on procedures and idea of SCS trial.  For lower back pain, she has multiple levels of degeneration and spondylosis, will trial diagnostic LMBB at bilateral L3-4 and L4-5 levels to see if she is a candidate for Lumbar Radiofrequency as she has failed multiple LESI in the past.     Plan:   Discussed increase in Morphine ER take 15 mg in the am and 30 mg (2 tabs) in the pm - ok to fill now.  If you have any issues getting a refill due to prior  authorization, call here immediately.  We can continue current doses until we have an approval.  Reduce the T#4 to 2 tabs in the am and 2 tabs in afternoon and 1 tab at bedtime.  This reduction is to stay within CDC guidelines for dosing.  Have discussed lumbar sympathetic block for left foot/ankle pain - patient not ready to pursue.   Recommend lumbar medial branch blocks bilateral L3-4 and L4-5 levels with Dr. Michelet Ontiveros.  Information given today.  Please take Valium 5 mg 1 tablet 1 hour prior to the procedure and then bring 1 tablet to the procedure appointment to take after consent forms are signed.  You will need a  to bring you home as this medication may cause you to feel drowsy.  Continue with psychiatrist and psychologist for behavioral health plan.      Follow-up with Sherron as scheduled in May OVL in office     LASHAWN [facility]   [address]  [place] [address]  Ph: [phone]  Fax: [phone]    35 minutes spent on the date of the encounter doing chart review, history and exam, documentation, and further activities.

## 2021-06-16 NOTE — TELEPHONE ENCOUNTER
Telephone Encounter by Zandra Santana LPN at 2/17/2020 11:45 AM     Author: Zandra Santana LPN Service: -- Author Type: Licensed Nurse    Filed: 2/17/2020 11:46 AM Encounter Date: 2/17/2020 Status: Signed    : Zandra Santana LPN (Licensed Nurse)       Patient Returning Call  Reason for call:  Patient returning call   Information relayed to patient:  Lilly Hussein CMA           2/17/20 11:42 AM   Note      Patient notified of clinician's message and verbalized understanding. No further questions at this time.         Sofie refilled this. She needs to see the pain clinic. No further refills will be given. Signed script at .  Lilly Hussein CMA ............... 11:42 AM, 02/17/20            Soife Huitron CNP   to Sofie Huitron ([facility]) Care Team Pool          2/17/20 10:46 AM   Note      Refilled this. She needs to see the pain clinic. No further refills will be given. Signed script at my desk.      Patient has additional questions:  No  If YES, what are your questions/concerns:  Patient verbalized understanding above message .  Okay to leave a detailed message?: No

## 2021-06-17 ENCOUNTER — COMMUNICATION - HEALTHEAST (OUTPATIENT)
Dept: INTERNAL MEDICINE | Facility: CLINIC | Age: 66
End: 2021-06-17

## 2021-06-17 DIAGNOSIS — R11.0 NAUSEA: ICD-10-CM

## 2021-06-17 NOTE — TELEPHONE ENCOUNTER
Telephone Encounter by Sherron Ruiz PA-C at 7/16/2020  8:19 AM     Author: Sherron Ruiz PA-C Service: -- Author Type: Physician Assistant    Filed: 7/16/2020  8:22 AM Encounter Date: 7/15/2020 Status: Signed    : Sherron Ruiz PA-C (Physician Assistant)       :    Approved refill.

## 2021-06-17 NOTE — PROGRESS NOTES
FOOT AND ANKLE SURGERY/PODIATRY Progress Note        ASSESSMENT:   Grade 3 Pressure Ulceration left heel       TREATMENT:  -Left heel ulceration is stable. Majority of wound is granular, slough removed with sharp debridement today. The location of the wound is likely due to increased, sustained pressure. Discussed that source of pressure may be continuous pressure when sitting, or with use of AFO. I have referred her to [facility] to have existing AFO inspected.  -Sharp, excisional debridement of the wound into subcutaneous tissue was performed using currette for a total of 1.2 square centimeters. Debridement was done to reduce pressure, remove non-viable, devitalized tissue and promote wound healing. Patient tolerated this well. Endoform with a gauze dressing was applied. She will apply endoform as directed.   -I recommend she remain limited walking on the left foot. Also referred for Prafo boot to be used when non-ambulatory including overnight.   -She will follow-up with me in two weeks.      Mark Lechuga, [facility]      HPI: Conrad Zhu was seen today for a new sore on her left heel. She first noticed the sore one week ago. Currently wearing AFO from [employer]. The patient does not know how the sore started.     Past Medical History:   Diagnosis Date     Anxiety      Chronic pain 8/14/2016     Depression      Former smoker      History of cocaine abuse      Insomnia 8/14/2016     Liver disease      Low back pain 8/14/2016     Migraine headache 8/14/2016     Osteoporosis 8/14/2016     PN (peripheral neuropathy) 8/14/2016       Allergies   Allergen Reactions     Compazine [Prochlorperazine]      Pt stated mouth freezes     Epinephrine      Erythromycin Base      Lyrica [Pregabalin]          Current Outpatient Prescriptions:      acetaminophen-codeine (TYLENOL #4) 300-60 mg per tablet, Take 1-2 tablets by mouth every 4 (four) hours as needed for pain., Disp: 240 tablet, Rfl: 0     clonazePAM  (KLONOPIN) 0.5 MG tablet, Take 1 tablet (0.5 mg total) by mouth 3 (three) times a day as needed for anxiety., Disp: 60 tablet, Rfl: 3     desvenlafaxine succinate (PRISTIQ) 100 MG 24 hr tablet, Take 100 mg by mouth daily., Disp: , Rfl:      FOLIC ACID/MULTIVIT-MINERALS (WOMEN'S MULTIVITAMIN GUMMIES ORAL), Take by mouth. Vitafusion gummies. 1 daily., Disp: , Rfl:      ipratropium (ATROVENT) 0.06 % nasal spray, 2 sprays QID per nostril, Disp: 15 mL, Rfl: 5     lamoTRIgine (LAMICTAL) 100 MG tablet, Take 1 tablet (100 mg total) by mouth daily., Disp: 90 tablet, Rfl: 3     ondansetron (ZOFRAN) 4 MG tablet, Take 4 mg by mouth., Disp: , Rfl:      sodium hyaluronate (HEALON; PROVISC) ophthalmic injection, Inject into the eye once. Use drops as needed, Disp: , Rfl:      traZODone (DESYREL) 100 MG tablet, Take 1 tablet (100 mg total) by mouth bedtime., Disp: 90 tablet, Rfl: 1    Current Facility-Administered Medications:      denosumab 60 mg (PROLIA 60 mg/ml), 60 mg, Subcutaneous, Q6 Months, Blanca Duarte, OMERO, 60 mg at 09/07/17 1423     lidocaine 2 % jelly (XYLOCAINE), , Topical, PRN, Mark Lechuga DPM, 1 application at 03/29/18 1318    Past Surgical History:   Procedure Laterality Date     EXPLORATORY LAPAROTOMY       TUBAL LIGATION         Social History     Social History Narrative       Family History   Problem Relation Age of Onset     Cancer Mother      Cancer Father      Cancer Sister      Cancer Brother        Review of Systems - Negative       OBJECTIVE:  Appearance: alert, well appearing, and in no distress.    Vitals:    03/29/18 1258   BP: 124/86   Pulse: 64   Resp: 14   Temp: 98.5  F (36.9  C)       Vascular: Dorsalis pedis palpableLeft.  Dermatologic:   Wound 03/29/18 left heel  (Active)   Pre Size Length 1 3/29/2018  1:00 PM   Pre Size Width 1.2 3/29/2018  1:00 PM   Pre Total Sq cm 1.2 3/29/2018  1:00 PM   Granular base with minimal slough. No erythema left heel.   Neurologic: Diminished to light touch  Left.  Musculoskeletal: Left foot at 90 degrees to left leg.     Imaging: None    Picture: None

## 2021-06-17 NOTE — PROGRESS NOTES
Chief Complaint   Patient presents with     Prolia #4     1. Did you experience any problems with previous Prolia injection? no  2. Do you feel sick today?(fever, RS, GI,  issues)? no  3. Any medication changes in the last 6 months? no  4. Did you take prednisone or other immunosuppressant drugs in the last 6 months?(chemo, transplant, rheu, dermatology conditions)? no  5. Did you have any serious infection in the last 6 months? (pancreatitis) no  6. Any recent hospitalizations/ surgeries (especially gastric bypass, thyroid, parathyroid)? Oral tooth was pulled. 3 weeks ago and fully healed now   7. Do you plan any dental work?(especially implants and extractions) in the next 2-3 months? no  8. Did you have any fractures in the last year? No    Yearly F/U appointment  with Syeda WIGGINS is made.     Kandi Ellington CMA [facility] 4/3/2018 10:15 AM

## 2021-06-17 NOTE — TELEPHONE ENCOUNTER
Telephone Encounter by Jeniffer Brown at 8/21/2020  2:03 PM     Author: Jeniffer Brown Service: -- Author Type: --    Filed: 8/21/2020  2:04 PM Encounter Date: 8/20/2020 Status: Signed    : Jeniffer Brown APPROVED:    Approval start date: 5/23/2020  Approval end date:  8/21/2021    Pharmacy has been notified of approval and will contact patient when medication is ready for pickup.

## 2021-06-17 NOTE — TELEPHONE ENCOUNTER
Telephone Encounter by Zenaida Ring at 5/28/2020  3:28 PM     Author: Zenaida Ring Service: -- Author Type: --    Filed: 6/3/2020  1:09 PM Encounter Date: 5/26/2020 Status: Addendum    : Zneaida Ring    Related Notes: Original Note by Zenaida Ring filed at 6/3/2020 12:18 PM       PRIOR AUTHORIZATION DENIED    Denial Rational:   Medicare requires you must have a FDA approved diagnosis, If not, use must be used by support in Drug Reference. The provider must provide the cause of the Nausea/Vomiting             Appeal Information: If the provider would like to appeal this denial, please provide a letter of medical necessity and once it has been completed and placed in the patient's chart, notify the [facility] PA Team ([facility] MED [id number]) and the appeal can be initiated on behalf of the patient and provider.  Please also include any therapies that the patient has tried and their outcomes.

## 2021-06-17 NOTE — TELEPHONE ENCOUNTER
Telephone Encounter by Zenaida Ring at 6/4/2020  3:32 PM     Author: Zenaida Ring Service: -- Author Type: --    Filed: 6/4/2020  3:37 PM Encounter Date: 6/4/2020 Status: Signed    : Zenaida Ring       PRIOR AUTHORIZATION DENIED    Denial Rational: The patient needs to try and fail 2 other drugs for their condition. The covered altetrnatives are Fentanyl Patch (requires PA), Hydrocodone Extended-Release Capsule* (Zohydro ER), Methadone tablet, Morphine Sulfate ER Tablet* , Nucynta ER Tab*, Tramadol ER Tablet (Generic Ultram ER)*, Xtampza ER*; *Requires possible prior authorization.          Appeal Information: If the provider would like to appeal this denial, please provide a letter of medical necessity and once it has been completed and placed in the patient's chart, notify the [facility] PA Team ([facility] MED [id number]) and the appeal can be initiated on behalf of the patient and provider.  Please also include any therapies that the patient has tried and their outcomes.

## 2021-06-17 NOTE — PROGRESS NOTES
Patient presents to the clinic today for a follow up with Sherron Ruiz PA-C.     Pain score: 8  What does your pain feel like: constant ache and numbness, headache  Does the pain interfere with:  Work: no  Walking/distance: yes  Sleep: yes  Daily activities: yes  Relationships/social life: yes  Mood: yes  F= 8

## 2021-06-17 NOTE — TELEPHONE ENCOUNTER
RN cannot approve Refill Request    RN can NOT refill this medication med is not covered by policy/route to provider. Last office visit: 3/18/2021 Sofie Huitron CNP Last Physical: Visit date not found Last MTM visit: Visit date not found Last visit same specialty: 3/18/2021 Sofie Huitron CNP.  Next visit within 3 mo: Visit date not found  Next physical within 3 mo: Visit date not found      Evelin Irving [facility] Connection Triage/Med Refill 5/4/2021    Requested Prescriptions   Pending Prescriptions Disp Refills     tolterodine (DETROL LA) 4 MG ER capsule [Pharmacy Med Name: TOLTERODINE TART ER 4 MG CAP] 90 capsule 1     Sig: TAKE 1 CAPSULE (4 MG TOTAL) BY MOUTH DAILY WITH LUNCH.       There is no refill protocol information for this order

## 2021-06-17 NOTE — PROGRESS NOTES
"PAIN CENTER PROGRESS NOTE    Subjective:   Conrad Zhu is a 65 y.o. 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  Consult was 04/28/20.    Major issues:  1. Chronic, continuous use of opioids    2. Chronic pain syndrome    3. Complex regional pain syndrome i of left lower limb    4. Lumbosacral spondylosis without myelopathy      Pain location and description: See CMA note   Radiation of pain: lower back to bilateral hips  Gait disturbance: Denies recent falls, antalgic. Uses a cane prn.  Exacerbating factors: Standing, sitting, walking, laying down, reaching, twisting, lifting, squatting, bending, stretching, going up and down stairs, riding in the car, getting out of car or bed  Alleviating factors: Medications, ice, rest.  Associated symptoms: Chronic LLE weakness, numbness in legs, swelling in left ankle/calf.  Denies fever/chills, rash, bowel or bladder incontinence.  Functional pain symptoms: See CMA note.  Adverse effects of medications: Xerostomia.  Denies drowsiness, itching, nausea, constipation.    Current treatment efficacy: Fair.  See medication list.  Current treatment compliance: Taking medication as prescribed, keeping appointments as scheduled.    Since the last Pain Center visit, the patient denies any use of anticoagulant medications.  We reviewed lumbar MRI on 04/02/2021:  \"IMPRESSION:   1.  Overall stable appearance from 12/28/2019 as above.     2.  There is no evidence for severe spinal canal or severe foraminal stenosis.     3.  Left-sided disc herniations at a few lumbar levels detailed above, unchanged from prior study.     4.  Suggested possible etiology for left L3 radiculopathy at L3-4.\"  She was advised to schedule LMBB with Dr. Ontiveros; she is scheduled next Thursday 05/20 .  " States her lower back pain is currently 0/10 and hasn't been as bad the last few weeks.  She is unsure if the morphine is helping or why it has improved recently. She hasn't had to ice it in at least 2 weeks.  She will keep appointment scheduled, already refilled valium but will reschedule if back pain continues to be reduced.    She states today she has a headache which started last evening and is nauseated.  Took zofran this morning.  She is just not feeling well today and her leg feels tighter than ever.  She states this is noticeable in the last few days without new triggers or activities.  She did travel to [place] this past weekend to get away before her boyfriend's surgery today as he is having dental implants.  She didn't do a lot of walking as the trip was mostly to relax.      States her left foot pain feels like a blood pressure cuff wrapped around the leg tight.  Denies burning, no new numbness, no new weakness. We review option for trileptal; she has concerns this will cause weight gain and leg edema.  She has failed multiple neuropathics.  She states she cannot feel her left foot and doesn't feel its all the way in the heel of her shoe.  She reports swelling, denies color changes, feels warm to the touch.  She states no hair or toenail changes.  She denies previous sympathetic block and is hesitant to pursue; she did have Botox which did not help. She had LESI x 4 without relief.  She is using ice and voltaren doesn't help so doesn't use it.    Last visit, she was to adjust MSER to 15 mg in am and 30 mg at night and T#4 max 5 tablets per day.  She reports she tried it at night first to see how she could tolerate it; she then switched to the morning and cannot tell a difference from the 15 mg dose so would like to reduce back down.  Denies side effects.  She states it doesn't work as well to take less T#4 would rather take 6 tabs a day as she was.  Reports that her T#4 is out, she took last dose last  night.  She is unsure why her refill didn't last until 05/16 - she states she always keeps secure in bottle, did not put in separate location or separate for travel.  States she didn't count after refill at pharmacy,  appears as usual.  She states she did not overtake medications.  States she has not run out of MSER.    We review option for trileptal; she has failed multiple neuropathics.  She states she cannot feel her left foot and doesn't feel its all the way in the heel of her shoe.  She reports swelling, denies color changes, feels warm to the touch.  She states no hair or toenail changes.  She denies previous sympathetic block and is hesitant to pursue; she did have Botox which did not help. She had LESI x 4 without relief.  She is using ice and voltaren doesn't help so doesn't use it.  T#4 continues to take it and usually taking 2 tabs two-three times a day which helps for 4 hours at a time.     She continues psychotherapy monthly.    Review of Systems  Constitutional: Sleep interruption due to pain.  Denies fever, chills, night sweats, lethargy, weight gain  Musculoskeletal: Positive for low back pain, left hip and foot joint pain.  Denies joint swelling, recent falls.  Gastrointestional: Denies difficulty swallowing, change in appetite, abdominal pain, constipation, nausea, vomiting,  GERD, fecal incontinence.  Genitourinary: Denies urinary incontinence, dysuria, hematuria, UTI, frequency, hesitancy, change in libido  Neurologic: Headaches.  Denies confusion, seizure, weakness, changes in balance, changes in speech.  Psychiatric: Depression, anxiety.  Denies memory loss, psychoses, suicidal ideation, substance use/abuse.     Objective:     Exam  Vitals:    05/13/21 1247   BP: (!) 149/100   Pulse: 91   Resp: 18     Constitutional-General:  Well nourished, well developed, well groomed, healthy appearing individual.  Weight is overweight, appears stated age and presents alone.  Psych-Mental Status: A & O in  no acute distress. Speech is fluent. Thought process normal. Recent and remote memory are intact.  Attention span and concentration are normal. Displays appropriate mood and affect.   H,E,N,T- Airway is patent, unlabored respiratory effort.  Derm - Exposed skin CDI.  Musckuloskeletal -  Gait:  Gait is abnormal.  Ambulates independently with antalgia and everted left foot.    *Opioid [place] Precautions:    UDT -  10/07/2020, as expected  Opioid Consent - 06/03/2020  Opioid Agreement - 06/03/2020  Pharmacy- as documented    [place]  Reviewed - 05/13/2021 as expected   Pill Count - n/a  Psychological evaluation - outside provider  MME - 84  Pharmacogenetic testing - N/A    Labs:  Results for orders placed or performed during the hospital encounter of 03/11/21   Basic Metabolic Panel   Result Value Ref Range    Sodium 141 136 - 145 mmol/L    Potassium 4.5 3.5 - 5.0 mmol/L    Chloride 104 98 - 107 mmol/L    CO2 28 22 - 31 mmol/L    Anion Gap, Calculation 9 5 - 18 mmol/L    Glucose 88 70 - 125 mg/dL    Calcium 9.1 8.5 - 10.5 mg/dL    BUN 16 8 - 22 mg/dL    Creatinine 0.88 0.60 - 1.10 mg/dL    GFR MDRD Af Amer >60 >60 mL/min/1.73m2    GFR MDRD Non Af Amer >60 >60 mL/min/1.73m2     Imaging:  CT Hip Without Contrast Left 01/11/2020  IMPRESSION:   1.  Postoperative changes of short IM dima and dynamic screw fixation of an intertrochanteric fracture of the left hip. The fracture is in good anatomic alignment. There is evidence of some new bone formation along the superolateral margin of the   fracture, but it is still visualized along the majority of its course.  2.  Ununited and unfixed facture fragment involving the lesser trochanter. This was seen on the original film 11/11/2019 and has not significantly changed.  3.  Superimposed degenerative change left hip joint.  4.  No evidence for fracture or migration of the fixation appliances.  5.  Exam otherwise negative and unchanged.     MRI Ankle right without contrast  07/06/2019     MRI Cervical, thoracic, and lumbar spine without contrast 10/13/17:  IMPRESSION:   CONCLUSION:  MRI CERVICAL SPINE:  1.  Mild to moderate degenerative changes most pronounced C5-C6 and C6-C7 where there is mild spinal canal and mild to moderate foraminal narrowing.  2.  No high-grade spinal canal narrowing, spinal cord compression, or spinal cord signal abnormality.     MRI THORACIC SPINE:  1.  Minor degenerative changes. No stenosis.  2.  Normal spinal cord.     MRI LUMBAR SPINE:  1.  Normal spinal cord and conus. No high-grade central spinal canal stenosis in the lumbar spine.  2.  Interval changes of laminectomy on the left at L4-L5. Adequate central canal at this level. Moderate left and mild right foraminal narrowing unchanged.  3.  At L3-L4 a left-sided herniation narrows the lateral recess, unchanged from prior. The disc bulge also contacts and slightly displaces the exiting L3 nerve root on the left, not changed.  4.  At L2-L3 a left-sided herniation contacts and may slightly displace the traversing nerve root, unchanged.     Assessment:   Conrad Zhu is a 65 y.o. female with visit today for multi-site pain in left leg/foot secondary to mononeuritis multiplex from [facility] in 2013, lumbar stenosis and history of L4 hemilaminectomy, and left hip pain secondary to fracture and surgery 11/2019 following [facility].  She failed a trial of Butrans patch/Belbuca films and has had significant nausea and abdominal symptoms as side effect.   Trial of fentanyl 12 mcg/hr did not help pain, review that an increase to 25 mcg/hr was not recommended as it would exceed CDC guideline MME with her T#4.  She recently failed methadone as 5 mg every 12 hour caused headaches.  Discuss continuing MSER 15 mg every 12 hours and T#4 up to 6 tabs/day.  Discuss this month she is out of T#4 3 days prior to refill date - will not refill early and she needs to keep close account of her doses and refills with the  pharmacy.  May consider pill count to ensure compliance in the near future.  Have discussed trileptal trial for nerve pain control, she will consider. Have discussed option for lumbar sympathetic block and she is hesitant on procedures and idea of SCS trial.  For lower back pain, she has multiple levels of degeneration and spondylosis, will trial diagnostic LMBB at bilateral L3-4 and L4-5 levels to see if she is a candidate for Lumbar Radiofrequency as she has failed multiple LESI in the past.     Plan:   Reduce Morphine ER take 15 mg in the am and pm   Increase T#4 back to 2 tabs in the am and 2 tabs in afternoon and 2 tabs at bedtime.  #180 tabs to last at least 30 days.  Discussed having pharmacy count refill during  to ensure you have the correct amount.   Both medications to refill on 05/16/2021  Discussed trileptal for pain - would start at 150 mg at bedtime x 7 days, then 150 mg two times a day and increase as tolerated. Reviewed possible side effects today (listed below) 1% occurrence of weight gain reported in studies.  Have discussed lumbar sympathetic block for left foot/ankle pain - patient not ready to pursue.   Recommend lumbar medial branch blocks bilateral L3-4 and L4-5 levels with Dr. Michelet Ontiveros. Please take Valium 5 mg 1 tablet 1 hour prior to the procedure and then bring 1 tablet to the procedure appointment to take after consent forms are signed.  You will need a  to bring you home as this medication may cause you to feel drowsy.  If your pain is 0-1/10 at the procedure time, ok to cancel and we can always reschedule in the future.  Continue with psychiatrist and psychologist for behavioral health plan.      Follow-up with Sherron as scheduled in June OVL in office     Sherron Ruiz PA-C  [facility]   [address]  [place] [address]  Ph: [phone]  Fax: [phone]    41 minutes spent on the date of the encounter doing  chart review, history and exam, documentation, and further activities.

## 2021-06-17 NOTE — TELEPHONE ENCOUNTER
Telephone Encounter by Mitra Herrera RN at 6/17/2020 11:39 AM     Author: Mitra Herrera RN Service: -- Author Type: Registered Nurse    Filed: 6/17/2020 11:46 AM Encounter Date: 6/17/2020 Status: Signed    : Mitra Herrera RN (Registered Nurse)       Medication being requested: tylenol #4  Last visit date: 6/03  Provider: RONALD  Next visit date: 07/01  Provider: RONALD  Expected follow up: 8 weeks  MTM visit (Pain Center) date: no  CSA signed 06/09/2020.   snipped in:    Pertinent between visit information about requested medication (telephone, mychart, prior authorization, concerns, comments):   PA approval on 6/4: buprenorphine  Script being sent to provider by nurse- dates and quantity:   Requested Prescriptions     Pending Prescriptions Disp Refills   ? acetaminophen-codeine (TYLENOL-CODEINE #4) 300-60 mg per tablet 60 tablet 0     Sig: Take 1-2 tablets by mouth 2 (two) times a day as needed for pain.     [facility]  Standing orders for withdrawal protocol implemented: ROMEL

## 2021-06-17 NOTE — PROGRESS NOTES
FOOT AND ANKLE SURGERY/PODIATRY Progress Note        ASSESSMENT:   Resolved Pressure Ulceration left heel   Foot Drop left      TREATMENT:  -No open lesions noted along the posterior left heel. I am pleased with her progression. AFO has recently been adjusted by Ray. I recommend she continue to use the AFO and closely monitor for any skin irritation or skin breakdown.  -The patient is discharged from my care at this time but encouraged to returns as concerns develop.       Mark Lechuga, SHENG  [facility] [place]      HPI: Conrad Zhu was seen again today for a left heel ulceration. Since our last visit she has seen Ray to have the AFO adjusted. This device appears to be comfortable. She has not had drainage from the wound in recent days.      Past Medical History:   Diagnosis Date     Anxiety      Chronic pain 8/14/2016     Depression      Former smoker      History of cocaine abuse      Insomnia 8/14/2016     Liver disease      Low back pain 8/14/2016     Migraine headache 8/14/2016     Osteoporosis 8/14/2016     PN (peripheral neuropathy) 8/14/2016       Allergies   Allergen Reactions     Compazine [Prochlorperazine]      Pt stated mouth freezes         Current Outpatient Prescriptions:      acetaminophen-codeine (TYLENOL #4) 300-60 mg per tablet, Take 1-2 tablets by mouth every 4 (four) hours as needed for pain., Disp: 240 tablet, Rfl: 0     clonazePAM (KLONOPIN) 0.5 MG tablet, Take 1 tablet (0.5 mg total) by mouth 3 (three) times a day as needed for anxiety., Disp: 60 tablet, Rfl: 3     DENOSUMAB (PROLIA SUBQ), Inject under the skin every 6 (six) months., Disp: , Rfl:      desvenlafaxine succinate (PRISTIQ) 100 MG 24 hr tablet, Take 100 mg by mouth daily., Disp: , Rfl:      FOLIC ACID/MULTIVIT-MINERALS (WOMEN'S MULTIVITAMIN GUMMIES ORAL), Take by mouth. Vitafusion gummies. 1 daily., Disp: , Rfl:      ipratropium (ATROVENT) 0.06 % nasal spray, 2 sprays QID per nostril, Disp: 15 mL, Rfl: 5      lamoTRIgine (LAMICTAL) 100 MG tablet, Take 1 tablet (100 mg total) by mouth daily., Disp: 90 tablet, Rfl: 3     ondansetron (ZOFRAN) 4 MG tablet, Take 4 mg by mouth., Disp: , Rfl:      traZODone (DESYREL) 100 MG tablet, Take 1 tablet (100 mg total) by mouth bedtime., Disp: 90 tablet, Rfl: 1    Current Facility-Administered Medications:      denosumab 60 mg (PROLIA 60 mg/ml), 60 mg, Subcutaneous, Q6 Months, Blanca Duarte, OMERO, 60 mg at 04/03/18 1018     lidocaine 2 % jelly (XYLOCAINE), , Topical, PRN, Mark Lechuga DPM, 1 application at 03/29/18 1318    Review of Systems - Negative       OBJECTIVE:  Appearance: alert, well appearing, and in no distress.    Vitals:    04/12/18 1004   BP: 112/62   Pulse: 84       Vascular: Dorsalis pedis palpableLeft.  Dermatologic:    Wound 03/29/18 left heel  (Active)   Pre Size Length 1 4/12/2018  9:58 AM   Pre Size Width 0.8 4/12/2018  9:58 AM   Pre Total Sq cm 1.2 3/29/2018  1:00 PM   Post Size Length 0 4/12/2018 10:00 AM   Post Size Width 0 4/12/2018 10:00 AM   Post Total Sq cm 0 4/12/2018 10:00 AM   Undermined none 4/12/2018  9:58 AM   Tunneling none 4/12/2018  9:58 AM     Neurologic: Diminished to light touch Left.  Musculoskeletal: Foot drop left.     Imaging: None    Picture: None

## 2021-06-17 NOTE — TELEPHONE ENCOUNTER
Telephone Encounter by Zenaida Ring at 6/12/2020 11:40 AM     Author: Zenaida Ring Service: -- Author Type: --    Filed: 6/12/2020 11:45 AM Encounter Date: 6/4/2020 Status: Signed    : Zenaida Ring       MEDICATION APPEAL APPROVED: BUPRENORPHINE 5 MCG/HR PATCH    Approval start date: 03/14/2020  Approval end date:  06/12/2021    Pharmacy has been notified of approval and will contact patient when medication is ready for pickup.

## 2021-06-17 NOTE — TELEPHONE ENCOUNTER
Telephone Encounter by Ria Brink RN at 7/27/2020  2:03 PM     Author: Ria Brink RN Service: -- Author Type: Registered Nurse    Filed: 7/27/2020  2:18 PM Encounter Date: 7/23/2020 Status: Signed    : Ria Brink RN (Registered Nurse)       Medication being requested: Butrans patch 15 mcgs and Tylenol # 4  Last visit date: 7/1  Provider: RONALD  Next visit date: 8/13  Provider: RONALD  Expected follow up: 4-6 weeks  MTM visit (Pain Center) date: na  UDT date: 5/18  from another provider  Agreement date: 6/9/2020   (Last fill date; name; strength; provider; MME; quantity):      Pertinent between visit information about requested medication (telephone, mychart, prior authorization, concerns, comments): see notes from previous week.  Script being sent to provider by nurse- dates and quantity:   Requested Prescriptions     Pending Prescriptions Disp Refills   ? acetaminophen-codeine (TYLENOL-CODEINE #4) 300-60 mg per tablet 75 tablet 0     Sig: Take 1-2 tablets by mouth every 8 (eight) hours as needed for pain.   ? buprenorphine (BUTRANS) 15 mcg/hour PTWK patch 4 patch 0     Sig: Place 1 patch on the skin every 7 days.     [facility]  Standing orders for withdrawal protocol implemented: ric

## 2021-06-17 NOTE — TELEPHONE ENCOUNTER
Telephone Encounter by Jeniffer Brown at 12/22/2020 11:01 AM     Author: Jeniffer Brown Service: -- Author Type: --    Filed: 12/22/2020 11:05 AM Encounter Date: 12/22/2020 Status: Addendum    : Jeniffer Brown    Related Notes: Original Note by Jeniffer Brown filed at 12/22/2020 11:03 AM       No PA needed, medication and quantity (6 tablets per day) is covered under plan.  This is being prescribed below the plan limit.     Called pharmacy and made them aware. Plan runs on rolling fill days.  If patient has had any partial fills or has filled early this throws off her total amount she can fill each time.     Pharmacy will have to fill this medication for the quantity the plan allows for this fill.  There is no back on track override PA team can do.

## 2021-06-17 NOTE — TELEPHONE ENCOUNTER
Telephone Encounter by Zenaida Ring at 10/8/2020 10:29 AM     Author: Zenaida Ring Service: -- Author Type: Patient Access    Filed: 10/8/2020 10:37 AM Encounter Date: 10/8/2020 Status: Signed    : Zenaida Ring (Patient Access)       [place] APPROVED:    Approval start date: 09/08/2020  Approval end date:  10/08/2021    Pharmacy has been notified of approval and will contact patient when medication is ready for pickup. I left a message on the pharmacy voicemail with my direct number should they need further assistance in processing this approval.

## 2021-06-17 NOTE — PATIENT INSTRUCTIONS - HE
Patient Instructions by Madalyn Ocampo CMA at 7/1/2019  9:20 AM     Author: Madalyn Ocampo CMA Service: -- Author Type: Certified Medical Assistant    Filed: 7/1/2019  9:36 AM Encounter Date: 7/1/2019 Status: Signed    : Madalyn Ocampo CMA (Certified Medical Assistant)           [facility]  [address] [place] [address]  [address]  [place] [address]     To schedule an appointment call: [phone]    Magnetic Resonance Imaging (MRI)     You will be asked to hold very still during the scan.     Magnetic resonance imaging (MRI) is a test that lets your doctor see detailed pictures of the inside of your body. MRI combines the use of strong magnets and radio waves to form an MRI image.  How do I get ready for an MRI?    Follow any directions you are given for not eating or drinking before the test.    Ask your provider if you should stop taking any medicine before the test.    Follow your normal daily routine unless your provider tells you otherwise.    You'll be asked to remove your watch, jewelry, hearing aids, credit cards, pens, pocket knives, eyeglasses, and other metal objects.    You may be asked to remove your makeup. Makeup may contain some metal.    Most MRI tests take 30 to 60 minutes. Depending on the type of MRI you are having, the test may take longer. Give yourself extra time to check in.      MRI uses strong magnets. Metal is affected by magnets and can distort the image. The magnet used in MRI can cause metal objects in your body to move. If you have a metal implant, you may not be able to have an MRI unless the implant is certified as MRI safe. People with these implants should not have an MRI:    Ear (cochlear) implants    Certain clips used for brain aneurysms    Certain metal coils put in blood vessels    Most defibrillators    Most pacemakers  Be sure to tell the radiologist or technologist if you:    Have had any previous surgeries    Have a pacemaker, surgical clips, metal  plate or pins, an artificial joint, staples or screws, ear (cochlear) implants, or other implants    Wear a medicated adhesive patch    Have metal splinters in your body    Have implanted nerve stimulators or drug-infusion ports    Have tattoos or body piercings. Some tattoo inks contain metal.    Work with metal    Have braces. You must remove any dental work.    Have a bullet or other metal in your body  Also tell the radiologist or technologist if you:    Are pregnant or think you may be    Are afraid of small, enclosed spaces (claustrophobic)    Are allergic to X-ray dye (contrast medium), iodine, shellfish, or any medicines    Have other allergies    Are breastfeeding    Have a history of cancer    Have any serious health problems. This includes kidney disease or a liver transplant. You may not be able to have the contrast material used for MRI.   What happens during an MRI?    You may be asked to wear a hospital gown.    You may be given earplugs to wear if you need them.    You may be injected with a special dye (contrast) that improves the MRI image.     Youll lie down on a platform that slides into the magnet.  What happens after an MRI?    You can get back to normal activities right away. If you were given contrast, it will pass naturally through your body within a day. You may be told to drink more water or other fluids during this time.     Your doctor will discuss the test results with you during a follow-up appointment or over the phone.    Your next appointment is: __________________  Date Last Reviewed: 6/1/2017 2000-2017 [employer]. [address] [place] [address]. All rights reserved. This information is not intended as a substitute for professional medical care. Always follow your healthcare professional's instructions.

## 2021-06-17 NOTE — TELEPHONE ENCOUNTER
Telephone Encounter by Zenaida Ring at 3/12/2021 12:41 PM     Author: Zenaida Ring Service: -- Author Type: Patient Access    Filed: 3/12/2021 12:42 PM Encounter Date: 3/11/2021 Status: Signed    : Zenaida Ring (Patient Access)       [place] APPROVED:    Approval start date: 02/10/21  Approval end date:  03/12/22    Pharmacy has been notified of approval and will contact patient when medication is ready for pickup.

## 2021-06-18 RX ORDER — ONDANSETRON 4 MG/1
TABLET, FILM COATED ORAL
Qty: 30 TABLET | Refills: 0 | Status: SHIPPED | OUTPATIENT
Start: 2021-06-18 | End: 2021-11-12

## 2021-06-18 NOTE — PATIENT INSTRUCTIONS - HE
Patient Instructions by Sherron Ruiz PA-C at 1/13/2021 10:20 AM     Author: Sherron Ruiz PA-C Service: -- Author Type: Physician Assistant    Filed: 1/13/2021 11:31 AM Date of Service: 1/13/2021 10:20 AM Status: Addendum    : Sherron Ruiz PA-C (Physician Assistant)    Related Notes: Original Note by Sherron Ruiz PA-C (Physician Assistant) filed at 1/13/2021 11:29 AM       Ok to take T#4 2 tabs up to 3 times a day as needed for pain.  #150 tabs to last 30 days.  Next refill sent to fill 01/13/2021  Have discussed lumbar sympathetic block for left foot/ankle pain - patient not ready to pursue   Continue with psychiatrist and psychologist for behavioral health plan.      Dispose of ketamine, Follow these instructions for safe medication disposal at home:    #1.  Keep the medication in the container it came in.  Scratch off the patient's name or black it out with a marker.  Leave the rest of the label on the vial and make sure the cap is on.  #2.  Prevent others from taking it.  For pills or capsules add a small amount of vinegar or coffee grounds to them.  For liquid medications add enough table salt or flour to make it taste bad.  For blister packs wrap in duct tape.  #3.  Seal and hide.  Tape the medication container shut using duct tape, and place inside a plastic container like an empty yogurt or butter tub to ensure that the medication cannot be seen.  Make sure no food is left over in the container.  #4.  Throw away the container in your garbage can.    Start methadone 1/2 tab (2.5 mg) at night x 7 days, then 1 tab 5 mg at night - monitor for side effects, call nurse line.    Follow-up with Sherron as scheduled.  Ok to make March visit OVL in office will discuss start of oxcarbemazepine if needed   Patient Education     Methadone tablets  Brand Names: Dolophine, Methadose  What is this medicine?  METHADONE (METH a done) is a pain reliever. It is used to treat severe pain. The  medicine is also used to prevent withdrawal symptoms in people addicted to other drugs.  How should I use this medicine?  Take this medicine by mouth with a drink of water. If the medicine upsets your stomach, take it with food or milk. Follow the directions on the prescription label. Do not take more medicine than you are told to take.  A special MedGuide will be given to you by the pharmacist with each prescription and refill. Be sure to read this information carefully each time.  Talk to your pediatrician regarding the use of this medicine in children. Special care may be needed.  What side effects may I notice from receiving this medicine?  Side effects that you should report to your doctor or health care professional as soon as possible:    allergic reactions like skin rash, itching or hives, swelling of the face, lips, or tongue    breathing problems    confusion    signs and symptoms of a dangerous change in heartbeat or heart rhythm like chest pain; dizziness; fast or irregular heartbeat; palpitations; feeling faint or lightheaded, falls; breathing problems    signs and symptoms of adrenal insufficiency like nausea; vomiting; loss of appetite; fatigue; weakness; dizziness; low blood pressure    signs and symptoms of low blood pressure like dizziness; feeling faint or lightheaded, falls; unusually weak or tired    signs and symptoms of serotonin syndrome like agitation; confusion; diarrhea; fast or irregular heartbeat; muscle twitching; stiff muscles; trouble walking; increased sweating; high fever; seizures; shivering; vomiting    trouble passing urine or change in the amount of urine  Side effects that usually do not require medical attention (report to your doctor or health care professional if they continue or are bothersome):    constipation    dry mouth    nausea, vomiting    tiredness  What may interact with this medicine?  Do not take this medicine with any of the following medications:    certain  medicines for fungal infections like itraconazole, ketoconazole, posaconazole, voriconazole    certain medicines for irregular heart beat like bepridil, bretylium, dofetilide, dronedarone, quinidine    cisapride    halofantrine    mesoridazine    pimozide    rasagiline    selegiline    thioridazine    ziprasidone  This medicine may also interact with the following medications:    alcohol    antihistamines for allergy, cough and cold    antiviral medicines for HIV or AIDS    arsenic trioxide    atropine    certain antibiotics like clarithromycin, erythromycin, gemifloxacin, levofloxacin, moxifloxacin, ofloxacin, pentamidine, telithromycin, rifampin, rifapentine    certain medicines for anxiety or sleep    certain medicines for bladder problems like oxybutynin, tolterodine    certain medicines for cancer like dasatinib, lapatinib, sunitinib, vorinostat    certain medicines for depression like amitriptyline, desipramine, fluoxetine, sertraline    certain medicines for irregular heart beat like amiodarone, disopyramide, flecainide, procainamide, propafenone, sotalol    certain medicines for malaria like chloroquine, mefloquine    certain medicines for migraine headache like almotriptan, eletriptan, frovatriptan, naratriptan, rizatriptan, sumatriptan, zolmitriptan    certain medicines for nausea or vomiting like dolasetron, droperidol, granisetron, ondansetron    certain medicines for seizures like carbamazepine, phenobarbital, phenytoin, primidone    certain medicines for stomach problems like dicyclomine, hyoscyamine    certain medicines for travel sickness like scopolamine    certain medicines for Parkinson's disease like benztropine, trihexyphenidyl    fluconazole    general anesthetics like halothane, isoflurane, methoxyflurane, propofol    haloperidol    ipratropium    linezolid    local anesthetics like lidocaine, pramoxine, tetracaine    MAOIs like Marplan, Nardil, and Parnate    medicines that relax muscles for  surgery    methylene blue    octreotide    other medicines that prolong the QT interval (cause an abnormal heart rhythm)    other narcotic medicines for pain or cough    peginterferon lenore-2b    phenothiazines like chlorpromazine, prochlorperazine    ranolazine    tacrolimus    vardenafil  What if I miss a dose?  If you miss a dose, take it as soon as you can. If it is almost time for your next dose, take only that dose. Do not take double or extra doses.  Where should I keep my medicine?  Keep out of the reach of children. This medicine can be abused. Keep your medicine in a safe place to protect it from theft. Do not share this medicine with anyone. Selling or giving away this medicine is dangerous and is against the law.  Store at room temperature between 15 and 30 degrees C (59 and 86 degrees F). Keep container tightly closed.  This medicine may cause accidental overdose and death if it is taken by other adults, children, or pets. Flush any unused medicine down the toilet to reduce the chance of harm. Do not use the medicine after the expiration date.  What should I tell my health care provider before I take this medicine?  They need to know if you have any of these conditions:    Roosevelt's disease    brain tumor    drug abuse or addiction    gallbladder disease    head injury    history of irregular heartbeat    if you often drink alcohol    kidney disease    liver disease    low blood pressure    lung or breathing disease, like asthma    mental illness    problems urinating    seizures    stomach or intestine problems    thyroid disease    an unusual or allergic reaction to methadone, other opioid analgesics, other medicines, foods, dyes, or preservatives    pregnant or trying to get pregnant    breast-feeding  What should I watch for while using this medicine?  Tell your doctor or health care professional if your pain does not go away, if it gets worse, or if you have new or a different type of pain. You may  develop tolerance to the medicine. Tolerance means that you will need a higher dose of the medicine for pain relief. Tolerance is normal and is expected if you take this medicine for a long time.  Do not suddenly stop taking your medicine because you may develop a severe reaction. Your body becomes used to the medicine. This does NOT mean you are addicted. Addiction is a behavior related to getting and using a drug for a non-medical reason. If you have pain, you have a medical reason to take pain medicine. Your doctor will tell you how much medicine to take. If your doctor wants you to stop the medicine, the dose will be slowly lowered over time to avoid any side effects.  There are different types of narcotic medicines (opiates). If you take more than one type at the same time or if you are taking another medicine that also causes drowsiness, you may have more side effects. Give your health care provider a list of all medicines you use. Your doctor will tell you how much medicine to take. Do not take more medicine than directed. Call emergency for help if you have problems breathing or unusual sleepiness.  You may get drowsy or dizzy. Do not drive, use machinery, or do anything that needs mental alertness until you know how this medicine affects you. Do not stand or sit up quickly, especially if you are an older patient. This reduces the risk of dizzy or fainting spells. Alcohol may interfere with the effect of this medicine. Avoid alcoholic drinks.  This medicine will cause constipation. Try to have a bowel movement at least every 2 to 3 days. If you do not have a bowel movement for 3 days, call your doctor or health care professional.  Your mouth may get dry. Chewing sugarless gum or sucking hard candy, and drinking plenty of water may help. Contact your doctor if the problem does not go away or is severe.  Women should inform their doctor if they wish to become pregnant or think they might be pregnant. There is  a potential for serious side effects to an unborn child. Talk to your doctor about the benefits and risks of breast-feeding while using this medicine. This medicine does pass into breast milk. Talk to your doctor if you plan to begin or stop using this medicine while breast-feeding or if you plan to stop breast-feeding.  NOTE:This sheet is a summary. It may not cover all possible information. If you have questions about this medicine, talk to your doctor, pharmacist, or health care provider. Copyright  2018 [employer]

## 2021-06-18 NOTE — PATIENT INSTRUCTIONS - HE
Patient Instructions by Sofie Huitron CNP at 3/18/2021  3:00 PM     Author: Sofie Huitron CNP Service: -- Author Type: Nurse Practitioner    Filed: 3/18/2021  3:08 PM Encounter Date: 3/18/2021 Status: Addendum    : Sofie Huitron CNP (Nurse Practitioner)    Related Notes: Original Note by Sofie Huitron CNP (Nurse Practitioner) filed at 3/18/2021  3:08 PM       Continue alternating ice and heat.    Continue Icy hot or biofreeze topically as needed.    Continue working with your chiropractor.    Continue your pain medications.    I have ordered your low back MRI, call [phone] to get this set up.    I will call you with results once back.       Patient Education     Relieving Back Pain  Back pain is a common problem. You can strain back muscles by lifting too much weight or just by moving the wrong way. Back strain can be uncomfortable, even painful. And it can take weeks or months to improve. To help yourself feel better and prevent future back strains, try these tips.  Important: Don't give aspirin to children or teens without first discussing it with your child's healthcare provider.  Ice    Ice reduces muscle pain and swelling. It helps most during the first 24 to 48 hours after an injury.    Wrap an ice pack or a bag of frozen peas in a thin towel. Never put ice directly on your skin.    Place the ice where your back hurts the most.    Dont ice for more than 20 minutes at a time.    You can use ice several times a day.  Medicines  Over-the-counter pain relievers include acetaminophen and anti-inflammatory medicines, which includes aspirin, naproxen, or ibuprofen. They can help ease discomfort. Some also reduce swelling.    Tell your healthcare provider about any medicines you are already taking.    Take medicines only as directed.  Manipulation and massage  Having manipulation by an osteopathic doctor or chiropractor may be helpful. Getting a massage also  may help.   Heat  After the first 48 hours, heat can relax sore muscles and improve blood flow.    Try a warm bath or shower. Or use a heating pad set on low. To prevent a burn, keep a cloth between you and the heating pad.    Dont use a heating pad for more than 15 minutes at a time. Never sleep on a heating pad.  Date Last Reviewed: 6/1/2018 2000-2019 [employer]. [address] [place] [address], [place] [address]. All rights reserved. This information is not intended as a substitute for professional medical care. Always follow your healthcare professional's instructions.

## 2021-06-18 NOTE — PATIENT INSTRUCTIONS - HE
Patient Instructions by Sherron Ruiz PA-C at 5/13/2021 12:40 PM     Author: Sherron Ruiz PA-C Service: -- Author Type: Physician Assistant    Filed: 5/13/2021  1:31 PM Date of Service: 5/13/2021 12:40 PM Status: Addendum    : Sherron Ruiz PA-C (Physician Assistant)    Related Notes: Original Note by Sherron Ruiz PA-C (Physician Assistant) filed at 5/13/2021  1:28 PM       Reduce Morphine ER take 15 mg in the am and pm   Increase T#4 back to 2 tabs in the am and 2 tabs in afternoon and 2 tabs at bedtime.  #180 tabs to last at least 30 days.  Discussed having pharmacy count refill during  to ensure you have the correct amount.   Both medications to refill on 05/16/2021  Discussed trileptal for pain - would start at 150 mg at bedtime x 7 days, then 150 mg two times a day and increase as tolerated. Reviewed possible side effects today (listed below) 1% occurrence of weight gain reported in studies.  Have discussed lumbar sympathetic block for left foot/ankle pain - patient not ready to pursue.   Recommend lumbar medial branch blocks bilateral L3-4 and L4-5 levels with Dr. Michelet Ontiveros. Please take Valium 5 mg 1 tablet 1 hour prior to the procedure and then bring 1 tablet to the procedure appointment to take after consent forms are signed.  You will need a  to bring you home as this medication may cause you to feel drowsy.  If your pain is 0-1/10 at the procedure time, ok to cancel and we can always reschedule in the future.  Continue with psychiatrist and psychologist for behavioral health plan.      Follow-up with Sherron as scheduled in June OVL in office     Patient Education     Trileptal Oral Tablet 150 mg  Uses  This medicine is used for the following purposes:    nerve pain    seizures  Instructions  This medicine may be taken with or without food.  It is very important that you take the medicine at about the same time every day. It will work best if you do  this.  Store at room temperature in a dry place. Do not keep in the bathroom.  Keep the medicine away from heat and light.  It is important that you keep taking each dose of this medicine on time even if you are feeling well.  If you forget to take a dose on time, take it as soon as you remember. If it is almost time for the next dose, do not take the missed dose. Return to your normal dosing schedule. Do not take 2 doses of this medicine at one time.  Please tell your doctor and pharmacist about all the medicines you take. Include both prescription and over-the-counter medicines. Also tell them about any vitamins, herbal medicines, or anything else you take for your health.  Do not suddenly stop taking this medicine. Check with your doctor before stopping.  This medicine may decrease the effectiveness of some birth controls which use hormones (such as birth control pills and patches). Use an extra form of birth control, such as condoms, while on this medicine.  It is very important that you follow your doctor's instructions for all blood tests.  It is very important that you keep all appointments for medical exams and tests while on this medicine.  Cautions  Tell your doctor and pharmacist if you ever had an allergic reaction to a medicine. Symptoms of an allergic reaction can include trouble breathing, skin rash, itching, swelling, or severe dizziness.  Do not use the medication any more than instructed.  Your ability to stay alert or to react quickly may be impaired by this medicine. Do not drive or operate machinery until you know how this medicine will affect you.  Please check with your doctor before drinking alcohol while on this medicine.  Family should check on the patient often. Call the doctor if patient becomes more depressed, has thoughts of suicide, or shows changes in behavior.  Call the doctor if there are any signs of confusion or unusual changes in behavior.  Tell the doctor or pharmacist if you  are pregnant, planning to be pregnant, or breastfeeding.  Ask your pharmacist if this medicine can interact with any of your other medicines. Be sure to tell them about all the medicines you take.  Do not start or stop any other medicines without first speaking to your doctor or pharmacist.  Do not share this medicine with anyone who has not been prescribed this medicine.  This medicine is associated with increased risk of death in certain patients. Please speak with your doctor about the benefits and risks of using this medicine.  This medicine can cause serious side effects in some patients. Important information from the [place] Food and Drug Administration (FDA) is available from your pharmacist. Please review it carefully with your pharmacist to understand the risks associated with this medicine.  Side Effects  The following is a list of some common side effects from this medicine. Please speak with your doctor about what you should do if you experience these or other side effects.    agitated feeling or trouble sleeping    constipation    dizziness    drowsiness or sedation    lack of energy and tiredness    headaches    nausea    stomach upset or abdominal pain    vomiting  Call your doctor or get medical help right away if you notice any of these more serious side effects:    loss of balance    bleeding that is severe or takes longer to stop    unusual bruising or discoloration on skin    changes in memory, mood, or thinking    confusion    depression or feeling sad    fainting    muscle pain    muscle weakness    skin irritation such as redness, itching, rash, or burning    shakiness    red, peeling or blistering skin    light colored stool    suicidal thoughts    unusual or unexplained tiredness or weakness    blurring or changes of vision  A few people may have an allergic reactions to this medicine. Symptoms can include difficulty breathing, skin rash, itching, swelling, or severe dizziness. If you notice  any of these symptoms, seek medical help quickly.  Extra  Please speak with your doctor, nurse, or pharmacist if you have any questions about this medicine.  [email or web address]  IMPORTANT NOTE: This document tells you briefly how to take your medicine, but it does not tell you all there is to know about it.Your doctor or pharmacist may give you other documents about your medicine. Please talk to them if you have any questions.Always follow their advice. There is a more complete description of this medicine available in English.Scan this code on your smartphone or tablet or use the web address below. You can also ask your pharmacist for a printout. If you have any questions, please ask your pharmacist.     2021 I AM AT.

## 2021-06-18 NOTE — PATIENT INSTRUCTIONS - HE
"Patient Instructions by Sherron Ruiz PA-C at 4/28/2020  1:20 PM     Author: Sherron Ruiz PA-C Service: -- Author Type: Physician Assistant    Filed: 4/28/2020  2:41 PM Date of Service: 4/28/2020  1:20 PM Status: Addendum    : Sherron Ruiz PA-C (Physician Assistant)    Related Notes: Original Note by Sherron Ruiz PA-C (Physician Assistant) filed at 4/28/2020  2:39 PM       Plan recommended today:    Follow up in 4 weeks with Sherron STONE    Continue your current regimen of alleviating factors as reviewed today    Please see your current provider for any refill of an opioid prescription; they may choose to provide a refill until we have your urine screen back. They will continue to manage your general health and have requested you see the pain center for pain management. Please discuss any health concerns with your primary care physician.    [place] Precautions are taken with every patient which includes a  report and drug screen. A urine drug screen will be taken for baseline screening.  You must go to [facility] to get this completed, they are open 7-5 M-F or Saturday 9-1.     Behavioral Therapy- Continue with current psychiatrist and psychologist as scheduled.  Ask psychiatrist if you have been on duloxetine/cymbalta in the past as this treats both mood and pain.    Physical and Occupational Therapy- continue home exercises learned.  Discussed referral to [facility] when able    Injections- failed through spine center and recently at [facility].  Discussed spinal cord stimulator option, patient is not interested at this time.    Discussed trial of Butrans patch starting at 5 mcg/hr placing 1 patch on skin for 7 days at a time to see if this helps baseline pain control.  Likely will need to increase patch strength as tolerated, goal to have up to 50% pain reduction.  Please review information in AVS and also website Butrans.com, pick \"I am a " "patient\" and you may review product information there as well. You may need to take T#4 with activity, but goal would be to decrease this medication over time.  Discussed avoiding benzodiazepines with opioids due to risk of respiratory depression, so you will need to discontinue refills of clonazepam as we discussed today.      Discussed idea of medical cannabis for pain - if above medication changes are not helping, will review this more in follow-up.  Patient Education     Buprenorphine transdermal patch  Brand Name: Butrans  What is this medicine?  BUPRENORPHINE (byoo pre NOR feen) is a pain reliever. It is used to treat moderate to severe pain.  How should I use this medicine?  Apply the patch to your skin. Do not cut or damage the patch. A cut or damaged patch can be very dangerous because you may get too much medicine. Select a clean, dry area of skin on your upper outer arm, upper chest, upper back, or the side of the chest. Do not apply the patch to broken, burned, cut, or irritated skin. Use only water to clean the area. Do not use soap or alcohol to clean the skin because this can increase the effects of the medicine. If the area is hairy, clip the hair with scissors, but do not shave.  Take the patch out of its wrapper. Bend the patch along the faint line and slowly peel the outer portion of the liner, which covers the sticky surface of the patch. Press the patch onto the skin and slowly peel off the protective liner. Do not use a patch if the packaging or backing is damaged. Do not touch the sticky part with your fingers. Press the patch to the skin using the palm of your hand. Press the patch to the skin for 15 seconds. Wash your hands at once.  Keep patches far away from children. Do not let children see you apply the patch and do not apply it where children can see it. Do not call the patch a sticker, tattoo, or bandage, as this could encourage the child to mimic your actions. Used patches still " contain medicine. Children or pets can have serious side effects or die from putting used patches in their mouth or on their bodies.  Take off the old patch before putting on a new patch. Apply each new patch to a different area of skin. If a patch comes off or causes irritation, remove it and apply a new patch to a different site. If the edges of the patch start to loosen, first apply first aid tape to the edges of the patch. If problems with the patch not sticking continue, cover the patch with a see-through adhesive dressing (like Bioclusive or Tegaderm). Never cover the patch with any other bandage or tape. To get rid of used patches, fold the patch in half with the sticky sides together. Then, flush it down the toilet. Alternately, you may dispose of the patch in the Patch-Disposal Unit provided. Never throw the patch away in the trash without sealing it in the Patch-Disposal unit. Replace the patch every 7 days. Follow the directions on the prescription label. Do not use more medicine than you are told to use.  A special MedGuide will be given to you by the pharmacist with each prescription and refill. Be sure to read this information carefully each time.  Talk to your pediatrician regarding the use of this medicine in children. Special care may be needed.  If a patch accidentally touches the skin, use only water to clean the area. Do not use soap or alcohol to clean the skin because this can increase the effects of the medicine. If someone accidentally uses a buprenorphine patch and is not awake and alert, immediately call 911 for help. If the person is awake and alert, call a doctor, health care professional, or the Poison Control Center.  What side effects may I notice from receiving this medicine?  Side effects that you should report to your doctor or health care professional as soon as possible:  allergic reactions like skin rash, itching or hives, swelling of the face, lips, or tongue  breathing  problems  confusion  signs and symptoms of a dangerous change in heartbeat or heart rhythm like chest pain; dizziness; fast or irregular heartbeat; palpitations; feeling faint or lightheaded, falls; breathing problems  signs and symptoms of liver injury like dark yellow or brown urine; general ill feeling or flu-like symptoms; light-colored stools; loss of appetite; nausea; right upper belly pain; unusually weak or tired; yellow of the eyes or skin  signs and symptoms of low blood pressure like dizziness; feeling faint or lightheaded, falls; unusually weak or tired  trouble passing urine or change in the amount of urine  Side effects that usually do not require medical attention (report to your doctor or health care professional if they continue or are bothersome):  constipation  dry mouth  itching, redness, or rash at the patch site  nausea, vomiting  tiredness  What may interact with this medicine?  Do not take this medication with any of the following medicines:  cisapride  certain medicines for fungal infections like ketoconazole and itraconazole  dofetilide  dronedarone  pimozide  ritonavir  thioridazine  ziprasidone  This medicine may interact with the following medications:  alcohol  antihistamines for allergy, cough and cold  antiviral medicines for HIV or AIDS  atropine  certain antibiotics like clarithromycin, erythromycin, linezold, rifampin  certain medicines for anxiety or sleep  certain medicines for bladder problems like oxybutynin, tolterodine  certain medicines for depression like amitriptyline, fluoxetine, sertraline  certain medicines for migraine headache like almotriptan, eletriptan, frovatriptan, naratriptan, rizatriptan, sumatriptan, zolmitriptan  certain medicines for nausea or vomiting like dolasetron, ondansetron, palonosetron  certain medicines for Parkinson's disease like benztropine, trihexyphenidyl  certain medicines for seizures like phenobarbital, primidone  certain medicines for  stomach problems like cimetidine, dicyclomine, hyoscyamine  certain medicines for travel sickness like scopolamine  diuretics  general anesthetics like halothane, isoflurane, methoxyflurane, propofol  ipratropium  local anesthetics like lidocaine, pramoxine, tetracaine  MAOIs like Carbex, Eldepryl, Marplan, Nardil, and Parnate  medicines that relax muscles for surgery  methylene blue  other medicines that prolong the QT interval (cause an abnormal heart rhythm)  other narcotic medicines for pain or cough  phenothiazines like chlorpromazine, mesoridazine, prochlorperazine, thioridazine  What if I miss a dose?  If you forget to replace your patch, take off the old patch and put on a new patch as soon as you can. Do not apply an extra patch to your skin. Do not wear more than one patch at the same time unless told to do so by your doctor or health care professional.  Where should I keep my medicine?  Keep out of the reach of children. This medicine can be abused. Keep your medicine in a safe place to protect it from theft. Do not share this medicine with anyone. Selling or giving away this medicine is dangerous and against the law.  Store at room temperature between 59 and 86 degrees F (15 and 30 degrees C). Do not store the patches out of their wrappers.  This medicine may cause accidental overdose and death if it is taken by other adults, children, or pets. Flush any unused medicine down the toilet as instructed above to reduce the chance of harm. Alternately, you may dispose of the patch in the Patch-Disposal Unit provided. Do not use the medicine after the expiration date.  What should I tell my health care provider before I take this medicine?  They need to know if you have any of these conditions:  blockage in your bowel  brain tumor  drink more than 3 alcohol-containing drinks per day  drug abuse or addiction  fever  head injury  kidney disease  liver disease  lung or breathing disease, like asthma  thyroid  disease  trouble passing urine or change in the amount of urine  an unusual or allergic reaction to buprenorphine, other medicines, foods, dyes, or preservatives  pregnant or trying to get pregnant  breast-feeding  What should I watch for while using this medicine?  Other pain medicine may be needed when you first start using the patch because the patch can take some time to start working. Tell your doctor or health care professional if your pain does not go away, if it gets worse, or if you have new or a different type of pain. You may develop tolerance to the medicine. Tolerance means that you will need a higher dose of the medicine for pain relief. Tolerance is normal and is expected if you take the medicine for a long time.  Do not suddenly stop taking your medicine because you may develop a severe reaction. Your body becomes used to the medicine. This does NOT mean you are addicted. Addiction is a behavior related to getting and using a drug for a non-medical reason. If you have pain, you have a medical reason to take pain medicine. Your doctor will tell you how much medicine to take. If your doctor wants you to stop the medicine, the dose will be slowly lowered over time to avoid any side effects.  If you are also taking a narcotic medicine for pain or cough or another medicine that also causes drowsiness, you may have more side effects. Give your health care provider a list of all medicines you use. Your doctor will tell you how much medicine to take. Do not take more medicine than directed. Call emergency for help if you have problems breathing or unusual sleepiness.  You may get drowsy or dizzy. Do not drive, use machinery, or do anything that needs mental alertness until you know how this medicine affects you. Do not stand or sit up quickly, especially if you are an older patient. This reduces the risk of dizzy or fainting spells. Alcohol may interfere with the effect of this medicine. Avoid alcoholic  drinks.  The medicine will cause constipation. Try to have a bowel movement at least every 2 to 3 days. If you do not have a bowel movement for 3 days, call your doctor or health care professional.  Your mouth may get dry. Chewing sugarless gum or sucking hard candy, and drinking plenty of water may help. Contact your doctor if the problem does not go away or is severe.  This medicine patch is sensitive to certain body heat changes. If your skin gets too hot, more medicine will come out of the patch and can cause a deadly overdose. Call your healthcare provider if you get a fever. Do not take hot baths. Do not sunbathe. Do not use hot tubs, saunas, hair dryers, heating pads, electric blankets, heated waterbeds, or tanning lamps. Do not do exercise that increases your body temperature.  NOTE:This sheet is a summary. It may not cover all possible information. If you have questions about this medicine, talk to your doctor, pharmacist, or health care provider. Copyright  2018 [employer]vier

## 2021-06-18 NOTE — PATIENT INSTRUCTIONS - HE
Patient Instructions by Sofie Huitron CNP at 9/25/2020  2:10 PM     Author: Sofie Huitron CNP Service: -- Author Type: Nurse Practitioner    Filed: 9/25/2020  2:27 PM Encounter Date: 9/25/2020 Status: Addendum    : Sofie Huitron CNP (Nurse Practitioner)    Related Notes: Original Note by Sofie Huitron CNP (Nurse Practitioner) filed at 9/25/2020  2:27 PM       Try over-the-counter Gas-X (simethicone) for your burping.    Start the omeprazole, half hour prior to breakfast daily.    Your labs are processing, I will release them via my chart once they are available.    Scheduling will call you to set up your abdominal ultrasound.    Follow-up over the weekend if symptoms worsen as discussed today.  Patient Education     Epigastric Pain (Uncertain Cause)     Epigastric pain can be a sign of disease in the upper abdomen. Common causes include:    Acid reflux (stomach acid flowing up into the esophagus)    Gastritis (irritation of the stomach lining)    Peptic Ulcer Disease    Inflammation of the pancreas    Gallstone    Infection in the gallbladder  Pain may be dull or burning. It may spread upward to the chest or to the back. There may be other symptoms such as belching, bloating, cramps or hunger pains. There may be weight loss or poor appetite, nausea or vomiting.  Since the diagnosis of your pain is not certain yet, further tests may sometimes be needed. Sometimes the doctor will treat you for the most likely condition to see if there is improvement before doing further tests.  Home care  Medicines    Antacids help neutralize the normal acids in your stomach. Examples are Maalox, Mylanta, Rolaids, and Tums. If you dont like the liquid, you can also try a chewable one. You may find one works better than another for you. Overuse can cause diarrhea or constipation.    Acid blockers (H2 blockers) decrease acid production. Examples are cimetidine (Tagamet), famotidine  (Pepcid) and ranitidine (Zantac).    Acid inhibitors (PPIs) decrease acid production in a different way than the blockers. You may find they work better, but can take a little longer to take effect.  Examples are omeprazole (Prilosec), lansoprazole (Prevacid), pantoprazole (Protonix), rabeprazole (Aciphex), and esomeprazole (Nexium).    Take an antacid 30-60 minutes after eating and at bedtime, but not at the same time as an acid blocker.    Try not to take NSAIDs. Aspirin may also cause problems, but if taking it for your heart or other medical reasons, talk to your doctor before stopping it; you do not want to cause a worse problem, like a heart attack or stroke.  Diet    If certain foods seem to cause your spasm, try to avoid them.     Eat slowly and chew food well before swallowing. Symptoms of gastritis can be worsened by certain foods. Limit or avoid fatty, fried, and spicy foods, as well as coffee, chocolate, mint, and foods with high acid content such as tomatoes and citrus fruit and juices (orange, grapefruit, lemon).    Avoid alcohol, caffeine, and tobacco, which can delay healing and worsen your problem.    Try eating smaller meals with snacks in between  Follow-up care  Follow up with your healthcare provider or as advised.  When to seek medical advice  Call your healthcare provider right away if any of the following occur:    Stomach pain worsens or moves to the right lower part of the abdomen    Chest pain appears, or if it worsens or spreads to the chest, back, neck, shoulder, or arm    Frequent vomiting (cant keep down liquids)    Blood in the stool or vomit (red or black color)    Feeling weak or dizzy, fainting, or having trouble breathing    Fever of 100.4 F (38 C) or higher, or as directed by your healthcare provider    Abdominal swelling  Date Last Reviewed: 9/25/2015 2000-2017 [employer]. [address] [place] [address], [place] [address]. All rights reserved. This information is not  intended as a substitute for professional medical care. Always follow your healthcare professional's instructions.

## 2021-06-19 NOTE — LETTER
Letter by Blanca Duarte NP at      Author: Blanca Duarte NP Service: -- Author Type: --    Filed:  Encounter Date: 9/25/2019 Status: Signed         Conrad Zhu  [id number] [facility] [address]             September 25, 2019         Dear Ms. Zhu,    Below are the results from your recent visit:    Resulted Orders   DXA Bone Density Scan    Narrative    9/23/2019      RE: Conrad Zhu  YOB: 1955        Dear Blanca Duarte,    Patient Profile:  64 y.o. female, postmenopausal, is here for the follow up bone density   test.   History of fractures - Yes;  Wrist and Knee and both legs. Family history   of osteoporosis - None.  Family history of hip fracture: None. Smoking   history - No. Osteoporosis treatment past -  Yes;  HRT and Prolia.   Osteoporosis treatment current - No.  Chronic medical problems - Chronic   low back problems and Liver disease. High risk medications -  None.      Assessment:    1. The spine bone density L1-L4 with T-score -1.4.  2. Femoral bone densities show left femoral neck T- score -1.9 and right   femoral neck T-score -1.8.  3. Trabecular bone score indicates good trabecular bone architecture.      64 y.o. female with LOW BONE DENSITY (OSTEOPENIA) and MODERATE fracture   risk, adjusted for the TBS, with major osteoporotic fracture risk 13.5 %   and hip fracture risk 1.6 %.     Since the previous bone density dated  September 18, 2017, there has been   a   -5.7 % change in the bone density of the spine.  Additionally there   has been no statistically significant % change in the hips bilaterally.                     Recommendations:  Appropriate calcium, vitamin D supplements, along with balance and weight   bearing exercise recommended with follow up bone density scan in 2 years.      Bone densitometry was performed on your patient using our [employer]   densitometer. The results are summarized and a copy of the actual scans   are included  for your review. In conformity with the International Society   of Clinical Densitometry's most recent position statement for DXA   interpretation (2015), the diagnosis will be made on the lowest measured   T-score of the lumbar spine, femoral neck, total proximal femur or 33%   radius. Note the change in terminology for diagnostic classification from   OSTEOPENIA to LOW BONE MASS. All trending for sequential exams will be   done using multiple vertebrae or the total proximal femur. Fracture risk   is based on the WHO Fracture Risk Assessment Tool (FRAX). If additional   information is needed or if you would like to discuss the results, please   do not hesitate to call me.       Thank you for referring this patient to [facility].   We are happy to be of service in support of you and your practice. If you   have any questions or suggestions to improve our service, please call me   at [phone].     Sincerely,     Kamila Day M.D. CJeanneC.REYNALDO.  Osteoporosis Services, [facility] - we probably need to talk about this, my dear - when are you following up with me?    Please call with questions or contact us using [employer].    Sincerely,        Electronically signed by Blanca Duarte NP

## 2021-06-19 NOTE — LETTER
"Letter by Tree Thomas NP at      Author: Tree Thomas NP Service: -- Author Type: --    Filed:  Encounter Date: 11/25/2019 Status: Signed         Patient: Conrad Zhu   MR Number: [id number]   YOB: 1955   Date of Visit: 11/25/2019     [facility]      Facility:    [facility] [place] [facility] [[id number]] Code Status: FULL CODE      Chief Complaint/Reason for Visit:   Chief Complaint   Patient presents with   ? Review Of Multiple Medical Conditions       HPI:   Conrad is a 64 y.o. female who is recent transfer from [facility] with an admission on 11/11/2019 and discharged 11/14/2019.  She is past medical history of overactive bladder, peripheral neuropathy, migraine headaches, osteoporosis, TBI \"a left midshaft tibial fracture 4 weeks ago with a total fall, presents with left intertrochanteric femur fracture.  She apparently had a spontaneous fracture, and to maneuver herself down a flight of stairs to get help.  Her fracture was surgically fixed with us so failed medullary nailing and underwent left proximal femur bone biopsy which was negative for malignancy.  She was given weightbearing as tolerated, aspirin for DVT prophylaxis and had a ANUJA of hemoglobin of 8 at discharge.  She was discharged to the TCU for rehab.    Today plan to reassess pain control, therapy progress and     Past Medical History:  Past Medical History:   Diagnosis Date   ? Anxiety    ? Chronic pain 8/14/2016   ? Depression    ? Former smoker    ? History of cocaine abuse (H)    ? Insomnia 8/14/2016   ? Liver disease    ? Low back pain 8/14/2016   ? Migraine headache 8/14/2016   ? Osteoporosis 8/14/2016   ? PN (peripheral neuropathy) 8/14/2016   ? PONV (postoperative nausea and vomiting)            Surgical History:  Past Surgical History:   Procedure Laterality Date   ? EXPLORATORY LAPAROTOMY     ? TUBAL LIGATION         Family History:   Family History   Problem " Relation Age of Onset   ? Cancer Mother    ? Cancer Father    ? Cancer Sister    ? Cancer Brother        Social History:    Social History     Socioeconomic History   ? Marital status: Single     Spouse name: Not on file   ? Number of children: Not on file   ? Years of education: Not on file   ? Highest education level: Not on file   Occupational History   ? Not on file   Social Needs   ? Financial resource strain: Not on file   ? Food insecurity:     Worry: Not on file     Inability: Not on file   ? Transportation needs:     Medical: Not on file     Non-medical: Not on file   Tobacco Use   ? Smoking status: Former Smoker     Packs/day: 0.00   ? Smokeless tobacco: Never Used   Substance and Sexual Activity   ? Alcohol use: No   ? Drug use: No   ? Sexual activity: Not on file   Lifestyle   ? Physical activity:     Days per week: Not on file     Minutes per session: Not on file   ? Stress: Not on file   Relationships   ? Social connections:     Talks on phone: Not on file     Gets together: Not on file     Attends [employer] service: Not on file     Active member of club or organization: Not on file     Attends meetings of clubs or organizations: Not on file     Relationship status: Not on file   ? Intimate partner violence:     Fear of current or ex partner: Not on file     Emotionally abused: Not on file     Physically abused: Not on file     Forced sexual activity: Not on file   Other Topics Concern   ? Not on file   Social History Narrative   ? Not on file          Review of Systems   Constitutional: Positive for activity change. Negative for appetite change, chills, diaphoresis, fatigue and fever.   HENT: Negative for congestion and hearing loss.    Eyes: Negative.    Respiratory: Negative for shortness of breath and wheezing.    Cardiovascular: Negative.    Gastrointestinal: Positive for constipation. Negative for abdominal distention, abdominal pain, blood in stool, diarrhea and nausea.        Improved    Endocrine: Negative.    Genitourinary: Negative for difficulty urinating.   Musculoskeletal:        L LE pain   Skin: Positive for wound.        L hip, LE pain   Allergic/Immunologic: Negative.    Neurological: Negative for dizziness, seizures, speech difficulty and light-headedness.   Hematological: Negative.    Psychiatric/Behavioral: Negative for agitation, behavioral problems, decreased concentration, hallucinations and sleep disturbance. The patient is nervous/anxious.         Chronic       Vitals:    11/25/19 1842   BP: 128/79   Pulse: 74   Resp: 16   Temp: 97  F (36.1  C)   SpO2: 95%   Weight: 153 lb (69.4 kg)       Physical Exam  Constitutional:       Appearance: Normal appearance.      Comments: Pain control issues   HENT:      Head: Normocephalic and atraumatic.      Right Ear: External ear normal.      Left Ear: External ear normal.      Nose: No congestion or rhinorrhea.      Mouth/Throat:      Mouth: Mucous membranes are moist.      Pharynx: No oropharyngeal exudate or posterior oropharyngeal erythema.   Eyes:      General:         Right eye: No discharge.         Left eye: No discharge.   Neck:      Musculoskeletal: Normal range of motion and neck supple.   Cardiovascular:      Rate and Rhythm: Normal rate.      Heart sounds: Normal heart sounds. No murmur. No friction rub. No gallop.    Pulmonary:      Effort: No respiratory distress.      Breath sounds: No wheezing or rales.      Comments: Dim, RA  Abdominal:      General: Bowel sounds are normal. There is no distension.      Tenderness: There is no abdominal tenderness. There is no guarding.      Comments: Constipation improved   Genitourinary:     Comments: No limitations  Musculoskeletal:      Right lower leg: No edema.      Left lower leg: No edema.      Comments: L LE pain has not improved though has no functional limitations, WBAT.    Skin:     General: Skin is warm and dry.      Findings: No erythema.      Comments: Staples intact in L LE    Neurological:      Mental Status: She is alert and oriented to person, place, and time.   Psychiatric:         Mood and Affect: Mood normal.      Comments: Has hx of TBI and depression         Medication List:  Current Outpatient Medications   Medication Sig   ? ferrous sulfate 325 (65 FE) MG tablet Take 1 tablet by mouth daily with breakfast.   ? oxyCODONE (ROXICODONE) 5 MG immediate release tablet Take 5 mg by mouth every 4 (four) hours as needed for pain.   ? acetaminophen (TYLENOL) 500 MG tablet Take 2 tablets (1,000 mg total) by mouth 3 (three) times a day. (Patient taking differently: Take 1,000 mg by mouth 4 (four) times a day.       )   ? asenapine (SAPHRIS) 5 mg Subl SL tablet Place 5 mg under the tongue as needed.   ? aspirin 81 mg chewable tablet Chew 1 tablet (81 mg total) 2 (two) times a day with meals.   ? cholecalciferol, vitamin D3, 2,000 unit Tab Take 2,000 Units by mouth daily.   ? clonazePAM (KLONOPIN) 0.5 MG tablet Take 0.5 mg by mouth 2 (two) times a day.   ? hydrOXYzine pamoate (VISTARIL) 25 MG capsule Take 50 mg by mouth 4 (four) times a day. Give with tylenol    ? ipratropium (ATROVENT) 42 mcg (0.06 %) nasal spray 2 sprays into each nostril daily as needed.          ? lamoTRIgine (LAMICTAL) 100 MG tablet Take 1 tablet (100 mg total) by mouth daily.   ? melatonin 3 mg Tab tablet Take 6 mg by mouth at bedtime.   ? ondansetron (ZOFRAN) 4 MG tablet Take 4 mg by mouth every 8 (eight) hours as needed.          ? polyvinyl alcohol (LIQUIFILM TEARS) 1.4 % ophthalmic solution Administer 1 drop to both eyes as needed for dry eyes.   ? senna-docusate (PERICOLACE) 8.6-50 mg tablet Take 1 tablet by mouth 2 (two) times a day.   ? tolterodine (DETROL LA) 4 MG ER capsule Take 4 mg by mouth daily with lunch.          ? traZODone (DESYREL) 100 MG tablet Take 200 mg by mouth at bedtime.   ? VIIBRYD 20 mg Tab tablet Take 20 mg by mouth daily.   ? ZOLMitriptan (ZOMIG) 5 mg nasal solution 1 spray into each  nostril as needed.       Labs:  Results for orders placed or performed in visit on 11/18/19   Basic Metabolic Panel   Result Value Ref Range    Sodium 139 136 - 145 mmol/L    Potassium 4.4 3.5 - 5.0 mmol/L    Chloride 104 98 - 107 mmol/L    CO2 25 22 - 31 mmol/L    Anion Gap, Calculation 10 5 - 18 mmol/L    Glucose 68 (L) 70 - 125 mg/dL    Calcium 9.3 8.5 - 10.5 mg/dL    BUN 10 8 - 22 mg/dL    Creatinine 0.77 0.60 - 1.10 mg/dL    GFR MDRD Af Amer >60 >60 mL/min/1.73m2    GFR MDRD Non Af Amer >60 >60 mL/min/1.73m2     Lab Results   Component Value Date    WBC 9.5 11/18/2019    HGB 8.5 (L) 11/18/2019    HCT 27.9 (L) 11/18/2019    MCV 90 11/18/2019     11/18/2019     Lab Results   Component Value Date    TSH 2.56 11/25/2019     Vitamin D, Total (25-Hydroxy)   Date Value Ref Range Status   07/26/2019 54.0 30.0 - 80.0 ng/mL Final     Lab Results   Component Value Date    [id number] 368 11/18/2019       Assessment/Plan:    Intertrochanteric fracture of left proximal femur without definite mass lesion: Given weightbearing as tolerated, incisional cares as directed, follow-up with Ortho on 11/27    History of left foot drop: Pending bracing with [facility]    Anticoagulation: continue aspirin 81 mg twice daily duration per Ortho    ABLA: last Hgb 8.5 on 11/18, continue FeSO4 325mg, recheck CBC with PCP    Pain control: continue tylenol 1000 mg 4 times daily with Vistaril 50 mg 4 times daily (increased today). Was using dilaudid frequently (4-5x with 2 tabs daily), now switched her to oxycodone 5 mg every 4 hours as needed with frequent icing. Per therapy she has no limitations per pain.  Had mtg with nurse manager to address concerns. Ordered xray as well to verify no changes     Constipation: Continue senna S tab twice daily, denies issue    Insomnia: Continue trazodone 200 mg at bedtime and melatonin 6 mg, sleeping improved    Overactive bladder: Continue Detrol 4 mg daily with lunch    Depression with TBI hx:  continue Viibyd 20mg daily and lamotrigine 100mg daily    Anxiety: Continue clonazepam 0.5 mg twice daily.  Requesting psych evaluation    History of migraines: Continue zolmitriptan 5mg 1 spray each nostril PRN    Osteopenia: Continue Prolia as ordered    Vit D def: continue D3 2000U daily    Disposition: plans to return home.  [facility] 25/30      Electronically signed by: Tree Thomas NP

## 2021-06-19 NOTE — LETTER
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 TECHNIQUE: CT scan of the abdomen and pelvis was performed following injection of IV contrast. Multiplanar reformats were obtained. Dose reduction techniques were used. CONTRAST: Iohexol (Omni) 100 mL FINDINGS: LOWER CHEST: Normal. HEPATOBILIARY: Normal. PANCREAS: Normal. SPLEEN: Normal. ADRENAL GLANDS: Normal. KIDNEYS/BLADDER: Benign bilateral renal cysts. The bladder is distended. BOWEL: There are a few sigmoid colonic diverticula. No obvious bowel mass or bowel dilation. LYMPH NODES: Normal. VASCULATURE: Unremarkable. PELVIC ORGANS: Normal uterus and adnexa. OTHER: None. MUSCULOSKELETAL: Acute mildly displaced intertrochanteric fracture of the left hip. Mild varus angulation. No visible underlying bone lesion.     1.  No evidence of malignancy in the abdomen or pelvis. 2.  Acute intertrochanteric fracture of the left hip. No visible underlying bone lesion. Consider MRI to further evaluate.             LAURA Monroe

## 2021-06-19 NOTE — LETTER
"Letter by Tree Thomas NP at      Author: Tree Thomas NP Service: -- Author Type: --    Filed:  Encounter Date: 11/15/2019 Status: Signed         Patient: Conrad Zhu   MR Number: [id number]   YOB: 1955   Date of Visit: 11/15/2019     [facility]      Facility:    [facility] [place] [facility] [[id number]] Code Status: FULL CODE      Chief Complaint/Reason for Visit:   Chief Complaint   Patient presents with   ? Review Of Multiple Medical Conditions       HPI:   Conrad is a 64 y.o. female who is recent transfer from [facility] with an admission on 11/11/2019 and discharged 11/14/2019.  She is past medical history of overactive bladder, peripheral neuropathy, migraine headaches, osteoporosis, TBI \"a left midshaft tibial fracture 4 weeks ago with a total fall, presents with left intertrochanteric femur fracture.  She apparently had a spontaneous fracture, and to maneuver herself down a flight of stairs to get help.  Her fracture was surgically fixed with us so failed medullary nailing and underwent left proximal femur bone biopsy which was negative for malignancy.  She was given weightbearing as tolerated, aspirin for DVT prophylaxis and had a ANUJA of hemoglobin of 8 at discharge.  She was discharged to the TCU for rehab.    Past Medical History:  Past Medical History:   Diagnosis Date   ? Anxiety    ? Chronic pain 8/14/2016   ? Depression    ? Former smoker    ? History of cocaine abuse (H)    ? Insomnia 8/14/2016   ? Liver disease    ? Low back pain 8/14/2016   ? Migraine headache 8/14/2016   ? Osteoporosis 8/14/2016   ? PN (peripheral neuropathy) 8/14/2016   ? PONV (postoperative nausea and vomiting)            Surgical History:  Past Surgical History:   Procedure Laterality Date   ? EXPLORATORY LAPAROTOMY     ? TUBAL LIGATION         Family History:   Family History   Problem Relation Age of Onset   ? Cancer Mother    ? Cancer Father    ? " Cancer Sister    ? Cancer Brother        Social History:    Social History     Socioeconomic History   ? Marital status: Single     Spouse name: Not on file   ? Number of children: Not on file   ? Years of education: Not on file   ? Highest education level: Not on file   Occupational History   ? Not on file   Social Needs   ? Financial resource strain: Not on file   ? Food insecurity:     Worry: Not on file     Inability: Not on file   ? Transportation needs:     Medical: Not on file     Non-medical: Not on file   Tobacco Use   ? Smoking status: Former Smoker     Packs/day: 0.00   ? Smokeless tobacco: Never Used   Substance and Sexual Activity   ? Alcohol use: No   ? Drug use: No   ? Sexual activity: Not on file   Lifestyle   ? Physical activity:     Days per week: Not on file     Minutes per session: Not on file   ? Stress: Not on file   Relationships   ? Social connections:     Talks on phone: Not on file     Gets together: Not on file     Attends Confucianist service: Not on file     Active member of club or organization: Not on file     Attends meetings of clubs or organizations: Not on file     Relationship status: Not on file   ? Intimate partner violence:     Fear of current or ex partner: Not on file     Emotionally abused: Not on file     Physically abused: Not on file     Forced sexual activity: Not on file   Other Topics Concern   ? Not on file   Social History Narrative   ? Not on file          Review of Systems   Constitutional: Positive for activity change. Negative for appetite change, chills, diaphoresis, fatigue and fever.   HENT: Negative for congestion and hearing loss.    Eyes: Negative.    Respiratory: Negative for shortness of breath and wheezing.    Cardiovascular: Negative.    Gastrointestinal: Positive for constipation. Negative for abdominal distention, abdominal pain, blood in stool, diarrhea and nausea.   Endocrine: Negative.    Genitourinary: Negative for difficulty urinating.    Musculoskeletal:        L LE pain   Skin: Positive for wound.        L hip, LE pain   Allergic/Immunologic: Negative.    Neurological: Negative for dizziness, seizures, speech difficulty and light-headedness.   Hematological: Negative.    Psychiatric/Behavioral: Positive for sleep disturbance. Negative for agitation, behavioral problems, decreased concentration and hallucinations. The patient is nervous/anxious.        Vitals:    11/15/19 0920   BP: 131/65   Pulse: 87   Resp: 17   Temp: 97  F (36.1  C)   SpO2: 93%   Weight: 140 lb (63.5 kg)       Physical Exam  Constitutional:       Appearance: Normal appearance.      Comments: Pain control   HENT:      Head: Normocephalic and atraumatic.      Right Ear: External ear normal.      Left Ear: External ear normal.      Nose: No congestion or rhinorrhea.      Mouth/Throat:      Mouth: Mucous membranes are moist.      Pharynx: No oropharyngeal exudate or posterior oropharyngeal erythema.   Eyes:      General:         Right eye: No discharge.         Left eye: No discharge.   Neck:      Musculoskeletal: Normal range of motion and neck supple.   Cardiovascular:      Rate and Rhythm: Normal rate.      Heart sounds: Normal heart sounds. No murmur. No friction rub. No gallop.    Pulmonary:      Effort: No respiratory distress.      Breath sounds: No wheezing or rales.      Comments: Dim, RA  Abdominal:      General: Bowel sounds are normal. There is no distension.      Tenderness: There is no abdominal tenderness. There is no guarding.      Comments: Has periodic constipation, prefers using pills   Genitourinary:     Comments: No limitations  Musculoskeletal:      Right lower leg: No edema.      Left lower leg: No edema.      Comments: L LE pain, WBAT   Neurological:      Mental Status: She is alert and oriented to person, place, and time.   Psychiatric:         Mood and Affect: Mood normal.      Comments: Has hx of TBI and depression         Medication List:  Current  Outpatient Medications   Medication Sig   ? hydrOXYzine pamoate (VISTARIL) 25 MG capsule Take 25 mg by mouth 4 (four) times a day. Give with tylenol   ? melatonin 3 mg Tab tablet Take 6 mg by mouth at bedtime.   ? acetaminophen (TYLENOL) 500 MG tablet Take 2 tablets (1,000 mg total) by mouth 3 (three) times a day. (Patient taking differently: Take 1,000 mg by mouth 4 (four) times a day.       )   ? asenapine (SAPHRIS) 5 mg Subl SL tablet Place 5 mg under the tongue as needed.   ? aspirin 81 mg chewable tablet Chew 1 tablet (81 mg total) 2 (two) times a day with meals.   ? cholecalciferol, vitamin D3, 2,000 unit Tab Take 2,000 Units by mouth daily.   ? clonazePAM (KLONOPIN) 0.5 MG tablet Take 0.5 mg by mouth 2 (two) times a day.   ? HYDROmorphone (DILAUDID) 2 MG tablet Take 1-2 tablets (2-4 mg total) by mouth every 4 (four) hours as needed for pain. Take 1 tab for pain 1-6/10, take 2 tabs for pain 7-10/10.   ? ipratropium (ATROVENT) 42 mcg (0.06 %) nasal spray 2 sprays into each nostril daily as needed.          ? lamoTRIgine (LAMICTAL) 100 MG tablet Take 1 tablet (100 mg total) by mouth daily.   ? ondansetron (ZOFRAN) 4 MG tablet Take 4 mg by mouth every 8 (eight) hours as needed.          ? polyvinyl alcohol (LIQUIFILM TEARS) 1.4 % ophthalmic solution Administer 1 drop to both eyes as needed for dry eyes.   ? senna-docusate (PERICOLACE) 8.6-50 mg tablet Take 1 tablet by mouth 2 (two) times a day.   ? tolterodine (DETROL LA) 4 MG ER capsule Take 4 mg by mouth daily with lunch.          ? traZODone (DESYREL) 100 MG tablet Take 200 mg by mouth at bedtime.   ? VIIBRYD 20 mg Tab tablet Take 20 mg by mouth daily.   ? ZOLMitriptan (ZOMIG) 5 mg nasal solution 1 spray into each nostril as needed.       Labs:  Results for orders placed or performed during the hospital encounter of 11/11/19   Basic metabolic panel   Result Value Ref Range    Sodium 139 136 - 145 mmol/L    Potassium 4.4 3.5 - 5.0 mmol/L    Chloride 102 98 -  107 mmol/L    CO2 31 22 - 31 mmol/L    Anion Gap, Calculation 6 5 - 18 mmol/L    Glucose 118 70 - 125 mg/dL    Calcium 9.3 8.5 - 10.5 mg/dL    BUN 10 8 - 22 mg/dL    Creatinine 0.71 0.60 - 1.10 mg/dL    GFR MDRD Af Amer >60 >60 mL/min/1.73m2    GFR MDRD Non Af Amer >60 >60 mL/min/1.73m2     Lab Results   Component Value Date    WBC 10.8 11/12/2019    HGB 8.0 (L) 11/14/2019    HCT 29.5 (L) 11/12/2019    MCV 86 11/12/2019     11/12/2019     Lab Results   Component Value Date    TSH 0.51 11/23/2015     Vitamin D, Total (25-Hydroxy)   Date Value Ref Range Status   07/26/2019 54.0 30.0 - 80.0 ng/mL Final     Lab Results   Component Value Date    UOEAPAFV94 822 (H) 06/12/2014       Assessment/Plan:    Intertrochanteric fracture of left proximal femur without definite mass lesion: Given weightbearing as tolerated, incisional cares as directed, follow-up with Ortho as ordered    History of left foot drop: Pending bracing with [facility]    Anticoagulation: continue aspirin 81 mg twice daily duration per Ortho    ABLA: last Hgb 8.0 on 11/14/19, recheck    Pain control: increase Tylenol to 1000 mg 4 times daily with Vistaril 25 mg 4 times daily.  Continue Dilaudid 1-2 tabs every 4 hours as needed, encourage aggressive icing    Constipation: Continue 07 S1 tab twice daily    Insomnia: Continue trazodone 200 mg at bedtime, start melatonin 6 mg    Overactive bladder: Continue Detrol 4 mg daily with lunch    Depression with TBI hx: continue Viibyd 20mg daily and lamotrigine 100mg daily    Anxiety: Continue clonazepam 0.5 mg twice daily.  Requesting psych evaluation    History of migraines: Continue zolmitriptan 5mg 1 spray each nostril PRN    Osteopenia: Continue Prolia as ordered    Vit D def: continue D3 2000U daily    Disposition: plans to return home.  [facility] 25/30    The care plan has been reviewed and all orders signed. Changes to care plan, if any, as noted. Otherwise, continue care plan of care.  The total time  spent with this patient was 35 minutes, with greater than 50% spent in counseling and coordination of care that included multiple issues per pain control, psych evaluation, follow-up with Ortho and therapy which lasted 20 minutes.      Post Discharge Medication Reconciliation Status: discharge medications reconciled and changed, per note/orders (see AVS)    Electronically signed by: Tree Thomas NP
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
## 2021-06-20 ENCOUNTER — HEALTH MAINTENANCE LETTER (OUTPATIENT)
Age: 66
End: 2021-06-20

## 2021-06-20 NOTE — PROGRESS NOTES
[facility]  ENDOCRINOLOGY    Osteoporosis Follow Up 10/7/2018    Conrad Zhu, 1955, [id number]          Reason for visit      1. Osteoporosis        History     Conrad Zhu is a very pleasant 63 y.o. old female who presents for follow up.   SUMMARY:  1. OSTEOPOROSIS. The diagnosis was made based on fragility fracture of her left 5th metatarsals, Right wrist-Colles type s/p ORIF in 2014. She also had a baseline DXA scan confirming osteoporosis.   The patient has the following risk factors for osteoporosis:   Age, gender, , BMI-she keeps her weight close to underweight range since adolescence but denied eating disorders. The patient is not on high risk medications such as glucocorticoids, anti-coagulants, chemotherapy, levothyroxine. BUT she is on gabapentin.   The following high- risk conditions have been ruled out: celiac disease, gastric bypass, hyperparathyroidism, inflammatory bowel disease, hyperthyroidism, rheumatoid arthritis, lupus, chronic kidney disease. I suspect an eating disorder but she vehemently denies.   Gyn History: Patient denied any prior hysterectomy, ovariectomy, breast cancer or family history of breast cancer Menopause was at age: 48 years. There has been a height loss of 0 inches.   Patient is currently on calcium supplements: 600 mg/day and vitamin D supplements 1000 IU/day. Dairy intake: She has lactose intolerance so no dairy for many years.   Investigations so far:   DXA scan dated 3/26/2014: (attached to chart):   Left Femoral Neck T-Score: -2.5   Right femoral Neck T-Score: -2.3   Total Left femoral T-Score: -2.8   Total Right femoral T-score: -2.4   A-P Spine T-Score: -1.9     TODAY:  Conrad returns today in f/u for Osteoporosis.  She reports that she is not interested in getting a Prolia injection today.  She had a tooth extraction done a couple of months ago, and it didn't heal as fast as the Dentist thought that it should have.  He felt that it was because of  "the Prolia. She reports that it has healed now. She ended up seeing an Oral Surgeon post extraction, who did a procedure.  She is not entirely sure what he did, only that it was expensive.  She has not yet started the Ergocalciferol to help treat her Vit D level, which is currently 19.4 and below normal range.  Her Calcium level is fine at 9.3. Her Dexa Scan from 2017 showed an increase with the Prolia (see accompanying report).             Past Medical History     Patient Active Problem List   Diagnosis     History of cocaine abuse     Anxiety     Nausea     OAB (overactive bladder)     Low back pain     Chronic pain     PN (peripheral neuropathy)     Insomnia     Migraine headache     Osteoporosis     Former smoker     Decubitus ulcer of left heel, stage 3 (H)       Family History       family history includes Cancer in her brother, father, mother, and sister.    Social History      reports that she has quit smoking. She has never used smokeless tobacco. She reports that she does not drink alcohol or use illicit drugs.      Review of Systems     Patient denies current pain, limited mobility, fractures.   Remainder per HPI.      Vital Signs     /68  Ht 4' 11\" (1.499 m)  Wt 113 lb (51.3 kg)  BMI 22.82 kg/m2    Physical Exam     GENERAL:  Normal, NIRD  EYES:  Pupils equal, round and reactive to light; no proptosis, lid lag or  periorbital edema.  THYROID:  Thyroid is normal.  No tenderness or bruit  NECK: No lymph nodes  MUSCULOSKELETAL: No joint abnormalities, FROM in all four extremities. No kyphosis. Muscle strength grossly normal without evidence of wasting.  HEART:  Regular rate and rhythm without murmur.  LUNGS:  Clear to auscultation.  ABDOMEN:Soft, non-tender, no masses or organomegaly  NEURO:  Patella Reflexes were normal.No tremors  SKIN:  No acanthosis nigricans or vitiligo        Assessment     1. Osteoporosis        Plan     We will try and wait another 6 months before her next Prolia injection. " Encouraged to be careful to avoid falls.  She will start the Ergocalciferol when it arrives.  We will recheck her level in 6 months.       Total visit minutes:25  Time spent counseling and coordination of care:23    Blanca L Gerald   Endocrinology  10/7/2018  12:37 PM      Current Medications     Outpatient Medications Prior to Visit   Medication Sig Dispense Refill     buPROPion (WELLBUTRIN XL) 150 MG 24 hr tablet Take 150 mg by mouth daily.  2     clonazePAM (KLONOPIN) 0.5 MG tablet Take 1 tablet (0.5 mg total) by mouth 3 (three) times a day as needed for anxiety. 60 tablet 3     DENOSUMAB (PROLIA SUBQ) Inject under the skin every 6 (six) months.       desvenlafaxine succinate (PRISTIQ) 100 MG 24 hr tablet Take 100 mg by mouth daily.       FLUZONE QUAD 2819-2519, PF, 60 mcg (15 mcg x 4)/0.5 mL Syrg injection TO BE ADMINISTERED BY PHARMACIST FOR IMMUNIZATION  0     ipratropium (ATROVENT) 42 mcg (0.06 %) nasal spray 2 sprays into each nostril.       lamoTRIgine (LAMICTAL) 100 MG tablet Take 1 tablet (100 mg total) by mouth daily. 90 tablet 3     ondansetron (ZOFRAN) 4 MG tablet Take 4 mg by mouth.       SHINGRIX, PF, 50 mcg/0.5 mL SusR TO BE ADMINISTERED BY PHARMACIST FOR IMMUNIZATION  0     traZODone (DESYREL) 100 MG tablet Take 1 tablet (100 mg total) by mouth bedtime. (Patient taking differently: Take 200 mg by mouth at bedtime. ) 90 tablet 1     calcium carbonate-vitamin D3 500 mg(1,250mg) -400 unit Tab Take 1 tablet by mouth.       chlorhexidine (PERIDEX) 0.12 % solution SWISH 15 ML AROUND MOUTH FOR 30 SECONDS THEN SPIT. USE TWICE A DAY FOR 10 DAYS  1     clindamycin (CLEOCIN) 150 MG capsule TAKE 1 CAPSULE BY MOUTH FOUR TIMES A DAY UNTIL GONE  0     ergocalciferol (ERGOCALCIFEROL) 50,000 unit capsule Take one cap on Sunday and Thursday 24 capsule 2     famciclovir (FAMVIR) 250 MG tablet TAKE 1 TABLET BY MOUTH 2 TIMES DAILY FOR 10 DAYS.       FOLIC ACID/MULTIVIT-MINERALS (WOMEN'S MULTIVITAMIN GUMMIES ORAL)  Take by mouth. Vitafusion gummies. 1 daily.       HYDROcodone-acetaminophen 5-325 mg per tablet TAKE 1 TABLET BY MOUTH EVERY 4 TO 6 HOURS AS NEEDED FOR PAIN  0     pregabalin (LYRICA) 75 MG capsule Take 75 mg by mouth.       Facility-Administered Medications Prior to Visit   Medication Dose Route Frequency Provider Last Rate Last Dose     lidocaine 2 % jelly (XYLOCAINE)   Topical PRN Mark Lechuga DPM   1 application at 03/29/18 1318         Lab Results     TSH   Date Value Ref Range Status   11/23/2015 0.51 0.30 - 5.00 uIU/mL Final     PTH   Date Value Ref Range Status   03/19/2015 38 10 - 86 pg/mL Final     Calcium   Date Value Ref Range Status   09/27/2018 9.3 8.5 - 10.5 mg/dL Final     Phosphorus   Date Value Ref Range Status   03/19/2015 4.1 2.5 - 4.5 mg/dL Final     Iron   Date Value Ref Range Status   06/12/2014 86 42 - 175 ug/dL Final           Imaging Results   Last DEXA scan:  Results for orders placed in visit on 09/18/17   DXA Bone Density Scan    Narrative 9/18/2017      RE: Conrad Marko  YOB: 1955        Dear Blanca Duarte,    Patient Profile:  62 y.o. female, postmenopausal, is here for the follow up bone density   test.   History of fractures - Yes;  Wrist and Foot. Family history of   osteoporosis - None.  Family history of hip fracture: None. Smoking   history - Past. Osteoporosis treatment past -  Yes;  HRT. Osteoporosis   treatment current - Yes;  Prolia.  Chronic medical problems - Liver   disease and Spine surgery. High risk medications -  None.      Assessment:    1. The spine bone density L1-L4 with T-score -0.9.  2. Femoral bone densities show left femoral neck T- score -1.7 and right   femoral neck T-score -1.8.  3. Trabecular bone score indicates moderate trabecular bone architecture.      62 y.o. female with LOW BONE DENSITY (OSTEOPENIA) currently receiving   Prolia.     Since the previous scan done on July 28, 2015, there has been a 3.8%   increase in the lumbar  spine.  Additionally, there has been a 5.5%   increase in the left total hip along with a 5.3% increase in the right   total hip.  This represents an excellent response to treatment.    Recommendations:  Ongoing evaluation of current treatment is recommended with follow up bone   density scan in 2 years.      Bone densitometry was performed on your patient using our [employer] iDXA   densitometer. The results are summarized and a copy of the actual scans   are included for your review. In conformity with the International Society   of Clinical Densitometry's most recent position statement for DXA   interpretation (2015), the diagnosis will be made on the lowest measured   T-score of the lumbar spine, femoral neck, total proximal femur or 33%   radius. Note the change in terminology for diagnostic classification from   OSTEOPENIA to LOW BONE MASS. All trending for sequential exams will be   done using multiple vertebrae or the total proximal femur. Fracture risk   is based on the WHO Fracture Risk Assessment Tool (FRAX). If additional   information is needed or if you would like to discuss the results, please   do not hesitate to call me.       Thank you for referring this patient to [facility].   We are happy to be of service in support of you and your practice. If you   have any questions or suggestions to improve our service, please call me   at [phone].     Sincerely,     Kamila Day M.D. C.C.REYNALDO.  Osteoporosis Services, [facility]

## 2021-06-21 NOTE — LETTER
Letter by Blanca Duarte NP at      Author: Blanca Duarte NP Service: -- Author Type: --    Filed:  Encounter Date: 1/13/2021 Status: (Other)         Conrad Zhu  [address] [facility] [address]             January 13, 2021         Dear Ms. Zhu,    Below are the results from your recent visit:    Resulted Orders   DXA Bone Density Scan    Narrative    1/12/2021      RE: Conrad Zhu  YOB: 1955        Dear Blanca Duarte,    Patient Profile:  65 y.o. female, postmenopausal, is here for the follow up bone density   test.   History of fractures - Yes;  Wrist, Hip and Femur. Family history of   osteoporosis - None.  Family history of hip fracture: None. Smoking   history - No. Osteoporosis treatment past -  Yes;  HRT and Prolia.   Osteoporosis treatment current - Yes, Evenity for 1 year.  Chronic medical   problems - Chronic low back problems and Liver disease. High risk   medications -  None.      Assessment:    1. The spine bone density L1-L4 with T-score 0.0.  2. Femoral bone density shows right femoral neck T- score -1.0.  3. Trabecular bone score indicates good trabecular bone architecture.      65 y.o. female with NORMAL BONE DENSITY and MODERATE fracture risk,   adjusted for the TBS, with major osteoporotic fracture risk 10.7% and hip   fracture risk 0.7%.     Since the previous bone density dated  September 23, 2019, there has been   a   +16.1% change in the bone density of the spine.  Additionally there   has been a +18.3% change in the right total hip.  This represents an   excellent response to treatment.    Recommendations:  Appropriate follow-up pharmacologic treatment, after Evenity, is   recommended with follow up bone density scan in 2 years.      Bone densitometry was performed on your patient using our [employer]   densitometer. The results are summarized and a copy of the actual scans   are included for your review. In conformity with the  International Society   of Clinical Densitometry's most recent position statement for DXA   interpretation (2015), the diagnosis will be made on the lowest measured   T-score of the lumbar spine, femoral neck, total proximal femur or 33%   radius. Note the change in terminology for diagnostic classification from   OSTEOPENIA to LOW BONE MASS. All trending for sequential exams will be   done using multiple vertebrae or the total proximal femur. Fracture risk   is based on the WHO Fracture Risk Assessment Tool (FRAX). If additional   information is needed or if you would like to discuss the results, please   do not hesitate to call me.       Thank you for referring this patient to [facility].   We are happy to be of service in support of you and your practice. If you   have any questions or suggestions to improve our service, please call me   at [phone].     Sincerely,     Kamila Day M.D. C.C.REYNALDO.  Osteoporosis Services, [facility]     The test results show that your current treatment worked wonderfully!!! We will plan on starting the Prolia up again next month and see how this goes.      Please call with questions or contact us using [employer].    Sincerely,        Electronically signed by Blanca Duarte NP

## 2021-06-21 NOTE — PROGRESS NOTES
FOOT AND ANKLE SURGERY/PODIATRY Progress Note        ASSESSMENT:   Tailor's bunion right   Foot Drop left        TREATMENT:  -The patient has an area of increased pressure along the plantar 5th MPJ right foot. Likely due to mild tailor's bunion and left foot drop.   -I removed the offending callus tissue and recommend she begin use of a pumice stone 1-2x per week.   -She was encouraged to return for debridement prn. The patient's questions were invited and answered. She will monitor for concerns and follow-up as symptoms dictate.     Mark Lechuga, [facility]       HPI: I was asked to see Conrad Marko today for a new concern of callus build-up along the plantar 5th MPJ right foot. She has been applying lotion to soften the skin.      Past Medical History:   Diagnosis Date     Anxiety      Chronic pain 8/14/2016     Depression      Former smoker      History of cocaine abuse      Insomnia 8/14/2016     Liver disease      Low back pain 8/14/2016     Migraine headache 8/14/2016     Osteoporosis 8/14/2016     PN (peripheral neuropathy) 8/14/2016       Allergies   Allergen Reactions     Compazine [Prochlorperazine]      Pt stated mouth freezes         Current Outpatient Medications:      asenapine (SAPHRIS) 5 mg Subl SL tablet, Place 5 mg under the tongue as needed., Disp: , Rfl:      buPROPion (WELLBUTRIN XL) 150 MG 24 hr tablet, Take 150 mg by mouth daily., Disp: , Rfl: 2     clonazePAM (KLONOPIN) 0.5 MG tablet, Take 1 tablet (0.5 mg total) by mouth 3 (three) times a day as needed for anxiety., Disp: 60 tablet, Rfl: 3     DENOSUMAB (PROLIA SUBQ), Inject under the skin every 6 (six) months., Disp: , Rfl:      desvenlafaxine succinate (PRISTIQ) 100 MG 24 hr tablet, Take 100 mg by mouth daily., Disp: , Rfl:      ergocalciferol (ERGOCALCIFEROL) 50,000 unit capsule, Take one cap on Sunday and Thursday, Disp: 24 capsule, Rfl: 2     famciclovir (FAMVIR) 250 MG tablet, TAKE 1 TABLET BY MOUTH 2 TIMES DAILY  FOR 10 DAYS., Disp: , Rfl:      FLUZONE QUAD 5133-0381, PF, 60 mcg (15 mcg x 4)/0.5 mL Syrg injection, TO BE ADMINISTERED BY PHARMACIST FOR IMMUNIZATION, Disp: , Rfl: 0     ipratropium (ATROVENT) 42 mcg (0.06 %) nasal spray, 2 sprays into each nostril., Disp: , Rfl:      lamoTRIgine (LAMICTAL) 100 MG tablet, Take 1 tablet (100 mg total) by mouth daily., Disp: 90 tablet, Rfl: 3     ondansetron (ZOFRAN) 4 MG tablet, Take 4 mg by mouth., Disp: , Rfl:      SHINGRIX, PF, 50 mcg/0.5 mL SusR, TO BE ADMINISTERED BY PHARMACIST FOR IMMUNIZATION, Disp: , Rfl: 0     traZODone (DESYREL) 100 MG tablet, Take 1 tablet (100 mg total) by mouth bedtime. (Patient taking differently: Take 200 mg by mouth at bedtime. ), Disp: 90 tablet, Rfl: 1    Current Facility-Administered Medications:      lidocaine 2 % jelly (XYLOCAINE), , Topical, PRN, Mark Lechuga DPM, 1 application at 11/16/18 1051    Family History   Problem Relation Age of Onset     Cancer Mother      Cancer Father      Cancer Sister      Cancer Brother        Review of Systems - per HPI, all others negative       OBJECTIVE:  Appearance: alert, well appearing, and in no distress.    There were no vitals filed for this visit.    Vascular: Dorsalis pedis and posterior tibial pulses are palpable right.  Dermatologic: Turgor and texture are within normal limits. No coloration or temperature changes. Minimal hyperkeratotic tissue plantar 5th MPJ right foot.   Neurologic: All epicritic and proprioceptive sensations are grossly intact right.  Musculoskeletal: Prominent 5th metatarsal head right foot.     Imaging: None

## 2021-06-24 NOTE — PROGRESS NOTES
FOOT AND ANKLE SURGERY/PODIATRY Progress Note        ASSESSMENT:   Dermatitis   Tailor's bunion right   Foot Drop left        TREATMENT:  -I removed the offending callus tissue and recommend she begin use of a pumice stone 1-2x per week.   -Dry, thickened skin plantar hallux bilateral. I will start her on LacHydrin 12% cream, apply two times a day.   -She will monitor for concerns and return as symptoms dictate.    Mark Lechuga, [facility]  [facility]       HPI: I was asked to see Conrad Zhu today for a new concern of callus build-up along the plantar 5th MPJ right foot. She also has concerns related to dry skin on her left foot.    Past Medical History:   Diagnosis Date     Anxiety      Chronic pain 8/14/2016     Depression      Former smoker      History of cocaine abuse      Insomnia 8/14/2016     Liver disease      Low back pain 8/14/2016     Migraine headache 8/14/2016     Osteoporosis 8/14/2016     PN (peripheral neuropathy) 8/14/2016       Allergies   Allergen Reactions     Compazine [Prochlorperazine]      Pt stated mouth freezes         Current Outpatient Medications:      asenapine (SAPHRIS) 5 mg Subl SL tablet, Place 5 mg under the tongue as needed., Disp: , Rfl:      buPROPion (WELLBUTRIN XL) 150 MG 24 hr tablet, Take 150 mg by mouth daily., Disp: , Rfl: 2     clonazePAM (KLONOPIN) 0.5 MG tablet, Take 1 tablet (0.5 mg total) by mouth 3 (three) times a day as needed for anxiety., Disp: 60 tablet, Rfl: 3     DENOSUMAB (PROLIA SUBQ), Inject under the skin every 6 (six) months., Disp: , Rfl:      desvenlafaxine succinate (PRISTIQ) 100 MG 24 hr tablet, Take 100 mg by mouth daily., Disp: , Rfl:      ergocalciferol (ERGOCALCIFEROL) 50,000 unit capsule, Take one cap on Sunday and Thursday, Disp: 24 capsule, Rfl: 2     famciclovir (FAMVIR) 250 MG tablet, TAKE 1 TABLET BY MOUTH 2 TIMES DAILY FOR 10 DAYS., Disp: , Rfl:      FLUZONE QUAD 6579-6207, PF, 60 mcg (15 mcg x 4)/0.5 mL Syrg injection, TO BE  ADMINISTERED BY PHARMACIST FOR IMMUNIZATION, Disp: , Rfl: 0     ipratropium (ATROVENT) 42 mcg (0.06 %) nasal spray, 2 sprays into each nostril., Disp: , Rfl:      lamoTRIgine (LAMICTAL) 100 MG tablet, Take 1 tablet (100 mg total) by mouth daily., Disp: 90 tablet, Rfl: 3     ondansetron (ZOFRAN) 4 MG tablet, Take 4 mg by mouth., Disp: , Rfl:      SHINGRIX, PF, 50 mcg/0.5 mL SusR, TO BE ADMINISTERED BY PHARMACIST FOR IMMUNIZATION, Disp: , Rfl: 0     traZODone (DESYREL) 100 MG tablet, Take 1 tablet (100 mg total) by mouth bedtime. (Patient taking differently: Take 200 mg by mouth at bedtime. ), Disp: 90 tablet, Rfl: 1     ammonium lactate (AMLACTIN) 12 % cream, Apply two times a day, Disp: 385 g, Rfl: 2    Current Facility-Administered Medications:      lidocaine 2 % jelly (XYLOCAINE), , Topical, PRN, Mark Lechuga, SHENG, 1 application at 11/16/18 1051    Family History   Problem Relation Age of Onset     Cancer Mother      Cancer Father      Cancer Sister      Cancer Brother        Review of Systems - per HPI, all others negative       OBJECTIVE:  Appearance: alert, well appearing, and in no distress.    Vitals:    03/12/19 1513   BP: 118/72   Temp: 98.5  F (36.9  C)       Vascular: Dorsalis pedis and posterior tibial pulses are palpable right.  Dermatologic: Dry, thickened flaky skin plantar hallux bilateral. No coloration or temperature changes. Minimal hyperkeratotic tissue plantar 5th MPJ right foot.   Neurologic: All epicritic and proprioceptive sensations are grossly intact right.  Musculoskeletal: Prominent 5th metatarsal head right foot.     Imaging: None
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
## 2021-06-26 NOTE — TELEPHONE ENCOUNTER
RN cannot approve Refill Request    RN can NOT refill this medication med is not covered by policy/route to provider. Last office visit: 3/18/2021 Sofie Huitron CNP Last Physical: Visit date not found Last MTM visit: Visit date not found Last visit same specialty: 3/18/2021 Sofie Huitron CNP.  Next visit within 3 mo: Visit date not found  Next physical within 3 mo: Visit date not found      Yamile Kraus, [facility] Triage/Med Refill 6/17/2021    Requested Prescriptions   Pending Prescriptions Disp Refills     ondansetron (ZOFRAN) 4 MG tablet [Pharmacy Med Name: ONDANSETRON HCL 4 MG TABLET] 30 tablet 0     Sig: TAKE 1 TABLET BY MOUTH EVERY 8 HOURS IF NEEDED FOR NAUSEA/VOMITING       There is no refill protocol information for this order

## 2021-06-26 NOTE — PROGRESS NOTES
Patient presents to the clinic today for a follow up with Sherron Ruiz PA-C.     Pain score: 5  What does your pain feel like: constant ache and numbness  Does the pain interfere with:  Work: no  Walking/distance: yes  Sleep: yes  Daily activities: yes  Relationships/social life: yes  Mood: yes  F= 8

## 2021-06-26 NOTE — PATIENT INSTRUCTIONS - HE
Continue Morphine ER take 15 mg in the am and pm   Continue T#4 2 tabs in the am and 2 tabs in afternoon and 2 tabs at bedtime.  #180 tabs to last at least 30 days.     As your opioid dose remains stable, I will send electronic refills to the pharmacy for 2 subsequent months until your next appointment so you do not have to call our refill line.  The fill dates for your medications are:  06/15/2021 & 07/15/2021   Check with your pharmacy that all prescriptions are on your profile. Call the pharmacy a week before you want to fill the prescription as stock may vary and the pharmacy may need to order the medication for you. I reserve the right to cancel the electronic prescription at the pharmacy in between appointments and would contact you with an explanation if this were to occur.  Discussed trileptal for pain - would start at 150 mg at bedtime x 7 days, then 150 mg two times a day and increase as tolerated.  Will wait on starting this as pain is stable.  Have discussed lumbar sympathetic block for left foot/ankle pain - patient not ready to pursue.   Continue with psychiatrist and psychologist for behavioral health plan.  Discuss a new therapist with your psychiatrist - if you would like to see here, we have ADELAIDA Luna and Chloe Cho [facility] and you would need to check with insurance network and complete an intake packet prior to scheduling.  Let me know if you would like a referral.  Follow-up with Sherron as scheduled in 8 weeks in August in office [facility].  Ok to cancel your July appointment.

## 2021-06-28 NOTE — PROGRESS NOTES
"Progress Notes by Tree Thomas NP at 11/27/2019 10:21 AM     Author: Tree Thomas NP Service: -- Author Type: Nurse Practitioner    Filed: 11/27/2019 12:18 PM Encounter Date: 11/27/2019 Status: Attested    : Tree Thomas NP (Nurse Practitioner)    Related Notes: Original Note by Tree Thomas NP (Nurse Practitioner) filed at 11/27/2019 10:39 AM    Cosigner: Margot Cooley MBBS at 12/2/2019  6:34 AM    Attestation signed by Margot Cooley MBBS at 12/2/2019  6:34 AM    Agree with discharge note                [facility]      Facility:    [facility] [[id number]] Code Status: FULL CODE      Chief Complaint/Reason for Visit:  133  Chief Complaint   Patient presents with   ? Discharge Summary       HPI:   Conrad is a 64 y.o. female who is recent transfer from [facility] with an admission on 11/11/2019 and discharged 11/14/2019.  She is past medical history of overactive bladder, peripheral neuropathy, migraine headaches, osteoporosis, TBI \"a left midshaft tibial fracture 4 weeks ago with a total fall, presents with left intertrochanteric femur fracture.  She apparently had a spontaneous fracture, and to maneuver herself down a flight of stairs to get help.  Her fracture was surgically fixed with us so failed medullary nailing and underwent left proximal femur bone biopsy which was negative for malignancy.  She was given weightbearing as tolerated, aspirin for DVT prophylaxis and had a ANUJA of hemoglobin of 8 at discharge.  She was discharged to the TCU for rehab.    She has concluded her TCU stay and will be discharged back to home with services on 11/29/2019.    Past Medical History:  Past Medical History:   Diagnosis Date   ? Anxiety    ? Chronic pain 8/14/2016   ? Depression    ? Former smoker    ? History of cocaine abuse (H)    ? Insomnia 8/14/2016   ? Liver disease    ? Low back pain 8/14/2016   ? Migraine headache 8/14/2016   ? " Osteoporosis 8/14/2016   ? PN (peripheral neuropathy) 8/14/2016   ? PONV (postoperative nausea and vomiting)            Surgical History:  Past Surgical History:   Procedure Laterality Date   ? EXPLORATORY LAPAROTOMY     ? TUBAL LIGATION         Family History:   Family History   Problem Relation Age of Onset   ? Cancer Mother    ? Cancer Father    ? Cancer Sister    ? Cancer Brother        Social History:    Social History     Socioeconomic History   ? Marital status: Single     Spouse name: Not on file   ? Number of children: Not on file   ? Years of education: Not on file   ? Highest education level: Not on file   Occupational History   ? Not on file   Social Needs   ? Financial resource strain: Not on file   ? Food insecurity:     Worry: Not on file     Inability: Not on file   ? Transportation needs:     Medical: Not on file     Non-medical: Not on file   Tobacco Use   ? Smoking status: Former Smoker     Packs/day: 0.00   ? Smokeless tobacco: Never Used   Substance and Sexual Activity   ? Alcohol use: No   ? Drug use: No   ? Sexual activity: Not on file   Lifestyle   ? Physical activity:     Days per week: Not on file     Minutes per session: Not on file   ? Stress: Not on file   Relationships   ? Social connections:     Talks on phone: Not on file     Gets together: Not on file     Attends Worship service: Not on file     Active member of club or organization: Not on file     Attends meetings of clubs or organizations: Not on file     Relationship status: Not on file   ? Intimate partner violence:     Fear of current or ex partner: Not on file     Emotionally abused: Not on file     Physically abused: Not on file     Forced sexual activity: Not on file   Other Topics Concern   ? Not on file   Social History Narrative   ? Not on file          Review of Systems   Constitutional: Positive for activity change. Negative for appetite change, chills, diaphoresis, fatigue and fever.        Pain control   HENT:  Negative for congestion and hearing loss.    Eyes: Negative.    Respiratory: Negative for shortness of breath and wheezing.    Cardiovascular: Negative.    Gastrointestinal: Positive for constipation. Negative for abdominal distention, abdominal pain, blood in stool, diarrhea and nausea.        Improved   Endocrine: Negative.    Genitourinary: Negative for difficulty urinating.   Musculoskeletal:        L LE pain   Skin: Positive for wound.        L hip, LE pain   Allergic/Immunologic: Negative.    Neurological: Negative for dizziness, seizures, speech difficulty and light-headedness.   Hematological: Negative.    Psychiatric/Behavioral: Negative for agitation, behavioral problems, decreased concentration, hallucinations and sleep disturbance. The patient is nervous/anxious.         Chronic       Vitals:    11/27/19 1022   BP: 131/81   Pulse: 79   Resp: 18   Temp: 98  F (36.7  C)   SpO2: 94%   Weight: 153 lb (69.4 kg)       Physical Exam  Constitutional:       Appearance: Normal appearance.      Comments: Pain control issues remain, recent xray negative, no limitations noted with therapy   HENT:      Head: Normocephalic and atraumatic.      Right Ear: External ear normal.      Left Ear: External ear normal.      Nose: No congestion or rhinorrhea.      Mouth/Throat:      Mouth: Mucous membranes are moist.      Pharynx: No oropharyngeal exudate or posterior oropharyngeal erythema.   Eyes:      General:         Right eye: No discharge.         Left eye: No discharge.   Neck:      Musculoskeletal: Normal range of motion and neck supple.   Cardiovascular:      Rate and Rhythm: Normal rate.      Heart sounds: Normal heart sounds. No murmur. No friction rub. No gallop.    Pulmonary:      Effort: No respiratory distress.      Breath sounds: No wheezing or rales.      Comments: Dim, RA  Abdominal:      General: Bowel sounds are normal. There is no distension.      Tenderness: There is no abdominal tenderness. There is no  guarding.      Comments: Constipation improved   Genitourinary:     Comments: No limitations  Musculoskeletal:      Right lower leg: No edema.      Left lower leg: No edema.      Comments: L LE pain has not improved though has no functional limitations, WBAT.    Skin:     General: Skin is warm and dry.      Findings: No erythema.      Comments: Staples intact in L TITA   Neurological:      Mental Status: She is alert and oriented to person, place, and time.   Psychiatric:         Mood and Affect: Mood normal.      Comments: Has hx of TBI and depression         Medication List:  Current Outpatient Medications   Medication Sig   ? acetaminophen (TYLENOL) 500 MG tablet Take 2 tablets (1,000 mg total) by mouth 3 (three) times a day. (Patient taking differently: Take 1,000 mg by mouth 4 (four) times a day.       )   ? ARTIFICIAL TEARS,PG-HYPM-GLYC, 1-0.2-0.2 % Drop INSTILL 1 DROP INTO BOTH EYES AS NEEDED FOR DRY EYES   ? asenapine (SAPHRIS) 5 mg Subl SL tablet Place 5 mg under the tongue as needed.   ? aspirin 81 mg chewable tablet Chew 1 tablet (81 mg total) 2 (two) times a day with meals.   ? cholecalciferol, vitamin D3, 2,000 unit Tab Take 2,000 Units by mouth daily.   ? clonazePAM (KLONOPIN) 0.5 MG tablet Take 0.5 mg by mouth 2 (two) times a day.   ? ferrous sulfate 325 (65 FE) MG tablet Take 1 tablet by mouth daily with breakfast.   ? hydrOXYzine pamoate (VISTARIL) 25 MG capsule Take 50 mg by mouth 4 (four) times a day. Give with tylenol    ? ipratropium (ATROVENT) 42 mcg (0.06 %) nasal spray 2 sprays into each nostril daily as needed.          ? lamoTRIgine (LAMICTAL) 100 MG tablet Take 1 tablet (100 mg total) by mouth daily.   ? melatonin 3 mg Tab tablet Take 6 mg by mouth at bedtime.   ? ondansetron (ZOFRAN) 4 MG tablet Take 4 mg by mouth every 8 (eight) hours as needed.          ? oxyCODONE (ROXICODONE) 5 MG immediate release tablet Take 5 mg by mouth every 4 (four) hours as needed for pain.   ? polyvinyl alcohol  (LIQUIFILM TEARS) 1.4 % ophthalmic solution Administer 1 drop to both eyes as needed for dry eyes.   ? senna-docusate (PERICOLACE) 8.6-50 mg tablet Take 1 tablet by mouth 2 (two) times a day. (Patient taking differently: Take 1 tablet by mouth daily as needed. )   ? tolterodine (DETROL LA) 4 MG ER capsule Take 4 mg by mouth daily with lunch.          ? traZODone (DESYREL) 100 MG tablet Take 200 mg by mouth at bedtime.   ? VIIBRYD 20 mg Tab tablet Take 20 mg by mouth daily.   ? ZOLMitriptan (ZOMIG) 5 mg nasal solution 1 spray into each nostril as needed.       Labs:  Results for orders placed or performed in visit on 11/18/19   Basic Metabolic Panel   Result Value Ref Range    Sodium 139 136 - 145 mmol/L    Potassium 4.4 3.5 - 5.0 mmol/L    Chloride 104 98 - 107 mmol/L    CO2 25 22 - 31 mmol/L    Anion Gap, Calculation 10 5 - 18 mmol/L    Glucose 68 (L) 70 - 125 mg/dL    Calcium 9.3 8.5 - 10.5 mg/dL    BUN 10 8 - 22 mg/dL    Creatinine 0.77 0.60 - 1.10 mg/dL    GFR MDRD Af Amer >60 >60 mL/min/1.73m2    GFR MDRD Non Af Amer >60 >60 mL/min/1.73m2     Lab Results   Component Value Date    WBC 9.5 11/18/2019    HGB 8.5 (L) 11/18/2019    HCT 27.9 (L) 11/18/2019    MCV 90 11/18/2019     11/18/2019     Lab Results   Component Value Date    TSH 2.56 11/25/2019     Vitamin D, Total (25-Hydroxy)   Date Value Ref Range Status   07/26/2019 54.0 30.0 - 80.0 ng/mL Final     Lab Results   Component Value Date    [id number] 368 11/18/2019       Assessment/Plan:    Intertrochanteric fracture of left proximal femur without definite mass lesion: Given weightbearing as tolerated, incisional cares as directed, follow-up with Ortho     History of left foot drop: Pending bracing with [facility]    Anticoagulation: continue aspirin 81 mg twice daily duration per Ortho    ABLA: last Hgb 8.5 on 11/18, continue FeSO4 325mg, recheck CBC with PCP    Pain control: continue tylenol 1000 mg 4 times daily with Vistaril 50 mg 4 times daily.  Was using dilaudid frequently (4-5x with 2 tabs daily), now switched her to oxycodone 5 mg every 4 hours as needed with frequent icing. Per therapy she has no limitations per pain.  Had mtg with nurse manager to address concerns.  Recent xray negative.  Scheduled to f/u with ortho soon    Constipation: Continue senna S tab daily PRN, denies issue    Insomnia: Continue trazodone 200 mg at bedtime and melatonin 6 mg, sleeping improved    Overactive bladder: Continue Detrol 4 mg daily with lunch    Depression with TBI hx: continue Viibyd 20mg daily and lamotrigine 100mg daily    Anxiety: Continue clonazepam 0.5 mg twice daily    History of migraines: Continue zolmitriptan 5mg 1 spray each nostril PRN    Osteopenia: Continue Prolia as ordered    Vit D def: continue D3 2000U daily    Disposition: plans to return home with services on 11/29/19 after f/u with surgeon.  [facility] 25/30    MEDICAL EQUIPMENT NEEDS:  na    DISCHARGE PLAN/FACE TO FACE:  I certify that services are/were furnished while this patient was under the care of a physician and that a physician or an allowed non-physician practitioner (NPP), had a face-to-face encounter that meets the physician face-to-face encounter requirements. The encounter was in whole, or in part, related to the primary reason for home health. The patient is confined to his/her home and needs intermittent skilled nursing, physical therapy, speech-language pathology, or the continued need for occupational therapy. A plan of care has been established by a physician and is periodically reviewed by a physician.  Date of Face-to-Face Encounter: 11/27/19    I certify that, based on my findings, the following services are medically necessary home health services: [facility] PT/OT to evaluate and treat.    My clinical findings support the need for the above skilled services because: patient will be discharging to home.  Patient needs assistance with performing IADLs and ADLs affecting safely at  home.    Patient to re-establish plan of care with their PCP within 7 days after leaving TCU.     The care plan has been reviewed and all orders signed. Changes to care plan, if any, as noted. Otherwise, continue care plan of care.  The total time spent with this patient was 31 minutes, with greater than 50% spent in counseling and coordination of care that included multiple issues per ongoing pain control, f/u with surgeon and discharge which lasted 16 minutes.       Electronically signed by: Tree Thomas NP

## 2021-06-29 NOTE — PROGRESS NOTES
"Progress Notes by Sherron Ruiz PA-C at 4/28/2020  1:20 PM     Author: Sherron Ruiz PA-C Service: -- Author Type: Physician Assistant    Filed: 5/5/2020  4:10 PM Date of Service: 4/28/2020  1:20 PM Status: Signed    : Sherron Ruiz PA-C (Physician Assistant)         Video Start Time: 1:30 PM    Additional provider notes:  [facility]  New Patient Consultation        HPI  Conrad Nuneztian 64 y.o. is here today, sent to me by  to discuss   Chief Complaint   Patient presents with   ? Consult   .   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  Her left foot and leg was most affected and right foot also numb into shin/calf.  She states she also has left leg pain, sometimes moves around but always there.  She states her neck pain is from \"arthritis\" and her lower back pain status post L4 hemilaminectomy.  Denies numbness tingling or weakness in bilateral arms.  She has a migraine very infrequently since menopause.  She used to get several times per month.     Reports multi-site pain with history of left hip fracture, states she went up to go to the bathroom and didn't fall but  needed surgical repair 11/2019.  She describes left sided pain from her butt cheek down to her foot.  She has some pain at the surgical site but incision is well healed. She didn't go to 3 month follow-up due to COVID19 but does have an appointment next week with Dr. Enrique Raya.    Following [facility] with most recent visit reviewed from 03/20/2020:      She reports pain is constant moderate to severe pain that interferes with daily " activities, sleep, recreational activities and concentration.  She describes pain as sharp, tingling and numb left leg to foot, throbbing, and swollen.      In regards to spine work up, most recent with Dr. England reviewed from 02/26/20:      She reports the nerve root injections did not help at all. She was advised no surgical treatment.      She has also seen Spine center in the past  for back pain 07/01/2015:  Assessment/Plan:       Diagnoses and associated orders for this visit:     Lumbar spine pain  -     MR Lumbar Spine Without Contrast; Future; Expected date: 7/1/15     Lumbar stenosis  -     MR Lumbar Spine Without Contrast; Future; Expected date: 7/1/15     Lumbar and sacral spondyloarthritis  -     MR Lumbar Spine Without Contrast; Future; Expected date: 7/1/15     Weakness  -     MR Lumbar Spine Without Contrast; Future; Expected date: 7/1/15  -     AFO, Ankle Guantlet, Prefab     Assessment:     1.  Low back pain with left lower extremity pain and paresthesias.  Most consistent with lumbar radiculopathy.  She has lumbar degenerative disc disease and spondylosis resulting in moderate central stenosis at L4-5 and it does appear that she has some neural foraminal narrowing on the left as well on recent CT scan.     2.  Chronic left lower extremity weakness.  Carries a diagnosis of mononeuritis multiplex following rhabdomyolysis injury.  Followed by neurology.        Discussion:     1.  I discussed the diagnosis and treatment options with the patient.  Overall her symptoms have improved.  We can continue to monitor symptoms however she still has significant weakness with footdrop in the left leg and recent EMG 2 months ago she has ongoing active denervation.  Evaluation of lumbar spine for a double crush type injury contributing to her denervation would be appropriate and she agrees.     2.  MRI of the lumbar spine to evaluate.     3.  Her left AFO currently is not fitting very well.  She would  "like a prescription for new AFO and I'll provide this for her.     4.  Continue to follow with neurology for mononeuritis multiplex.       5. Follow-up 3 weeks to discuss MRI and treatment options.\"     Has had PM&R treatment with Dr. Chino Kaufman at [facility] for many years.  He left their practice.    Alleviating factors: Medications  Aggravating factors: Standing, sitting, walking, laying down, reaching, twisting, lifting, squatting, bending, stretching, going up and down stairs, riding in the car, getting out of car or bed  Pain level today: On a scale of 1-10, the patient rates their pain at a 8-10.  Pain ratings vary between 6/10 and 10/10.  Function Ratin meaning pain affects the following:     3 - Enjoy     4 - Work, Enjoy     5 - Active, Mood, Work, Enjoy     6 - Sleep, Active, Mood, Work, Enjoy     7 - Walk, Sleep Active, Mood, Work, Enjoy     8 - Relate, Walk, Sleep, Active, Mood, Work, Enjoy  Associated Symptoms: Weight gain 30 pounds, swelling in left ankle/calf.  Denies weight loss, fever/chills, rash, bowel or bladder incontinence.  Previous Diagnostics & Treatments for Pain:  Surgery - Left hip internal fixation on 19 with Dr. Bolden.    Injections - LESI x 3, selective nerve root block denies any pain relief.    Imaging - MRI/CT, x-ray see below  Physical Therapy - Multiple through [place], [facility].  She states she still does HEP a few times a week.    Chiropractor/Acupuncture - weekly chiropractor, quit after surgery.     Massage - Quit massage therapy as she wants to get well.    TENS or bracing - Has AFO, uses sometimes. Doesn't feel right.  Has TENS unit, hasn't used lately. Denies inversion table, traction.    Pain Psychology or biofeedback - Denies  Other - heat/ice both 20 minutes on, 20 minutes off.    Social History: On disability.  Lives with boyfriend in house.  She reports she sometimes needs help with daily routine including lifting groceries.  She states she stays " home and she does cook at night and does laundry.  She is able to stand for 30 minutes.  She reports painful to walk and uses a cane, not currently using or walker.  Denies recent falls.    Patient set goals today in the office to achieve with the pain centers help. They are:    1. To get my pain to a tolerable level (5-6/10)  2. 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    Past Medical History:   Diagnosis Date   ? Anxiety    ? Chronic pain 8/14/2016   ? Depression    ? Former smoker    ? History of cocaine abuse (H)    ? Insomnia 8/14/2016   ? Liver disease    ? Low back pain 8/14/2016   ? Migraine headache 8/14/2016   ? Osteoporosis 8/14/2016   ? PN (peripheral neuropathy) 8/14/2016   ? PONV (postoperative nausea and vomiting)      Past Surgical History:   Procedure Laterality Date   ? EXPLORATORY LAPAROTOMY     ? TUBAL LIGATION         Social  Family History   Problem Relation Age of Onset   ? Cancer Mother    ? Cancer Father    ? Cancer Sister    ? Cancer Brother      Social History     Tobacco Use   Smoking Status Former Smoker   ? Packs/day: 0.00   Smokeless Tobacco Never Used     Social History     Substance and Sexual Activity   Alcohol Use No     Social History     Substance and Sexual Activity   Drug Use No     Social History     Substance and Sexual Activity   Sexual Activity Not Currently       Chemical Dependency History: Patient Denies tobacco use (smoked 2 years 20 years ago), alcohol use, illicit substance use including THC.  Denies any chemical dependency evaluation or treatment in the past. Per chart history of cocaine and alcohol dependence in remission. Denies any legal issues related to substance use.    Psychiatric History:  Patient reports current psychiatrist or psychologist Dr. Sean Johnson in [place] and Dr. Cooper in [facility].  She will see Dr. Johnson in a couple of weeks as her Viibryd was increased which she  "reports is not helping.  She states she hasn't been taking clonazepam but has it at home; she is aware of interaction with medication.  Reports her mood is depressed and stuck at home more with COVID19.  Denies an appetite.  Denies any suicidal ideation.  Denies any hospitalizations for mental illness.  Per PCP note on 02/10/20: \"She reports that her depression started back 22 years ago, her 19-year-old daughter who was 9 months pregnant at the time was killed in a car accident.  She reports that she has had mental health issues ever since this occurred. She reports that her anniversary of this is 1/29, she would have been 41 years old this year and this has hit her hard.  She thinks that her pain has really heightened this as well.\"    Pertinent Pain Medications:  She currently takes T#4 every 6 hours prn (MME 36), She takes 2 tabs per dose two times a day and repeats in middle of the night if she wakes because of \"tolerance\" to pain medication.   She states it works the best and no side effets.   lamictal 100 mg daily. Trazodone 300 mg at night, waking at least 2-3 times per night due to pain.      Previously tried medications:    NSAIDs: Toradol 15-30 mg IM didn't help, Ibuprofen or Aleve not helpful.    Acetaminophen: Denies    Antidepressants: Effexor 75 mg daily, unsure about Cymbalta.     Gabapentinoids: Gabapentin 300 mg three times a day didn't work, Lyrica 75 mg rash/itching, topamax 200 mg BID    Opioids: Vicodin 5/325 mg helpful, T#3, dilaudid 2 mg, oxycodone 5 mg postop and at TCU switched     Topicals: Voltaren Gel doesn't help, tried OTC lidocaine which was worked ok for pain.    Supplements: Denies    Muscle Relaxants: Soma, flexeril    Other: Medrol dose pack postop, hydroxyzine 50 mg, fioricet      Current Outpatient Medications:   ?  acetaminophen (TYLENOL) 500 MG tablet, Take 1 tablet (500 mg total) by mouth every 6 (six) hours as needed for pain., Disp: , Rfl: 0  ?  acetaminophen-codeine " (TYLENOL-CODEINE #4) 300-60 mg per tablet, Take 1 tablet by mouth every 6 (six) hours as needed for pain., Disp: 60 tablet, Rfl: 0  ?  ARTIFICIAL TEARS,PG-HYPM-GLYC, 1-0.2-0.2 % Drop, INSTILL 1 DROP INTO BOTH EYES AS NEEDED FOR DRY EYES, Disp: , Rfl: 99  ?  asenapine (SAPHRIS) 5 mg Subl SL tablet, Place 5 mg under the tongue as needed., Disp: , Rfl:   ?  cholecalciferol, vitamin D3, 2,000 unit Tab, Take 2,000 Units by mouth daily., Disp: , Rfl:   ?  clonazePAM (KLONOPIN) 0.5 MG tablet, Take 0.5 mg by mouth 2 (two) times a day as needed., Disp: , Rfl:   ?  famciclovir (FAMVIR) 250 MG tablet, Take 1 tablet (250 mg total) by mouth 2 (two) times a day for 7 days., Disp: 14 tablet, Rfl: 3  ?  ipratropium (ATROVENT) 42 mcg (0.06 %) nasal spray, 2 sprays into each nostril daily as needed.    , Disp: , Rfl:   ?  lamoTRIgine (LAMICTAL) 100 MG tablet, Take 1 tablet (100 mg total) by mouth daily., Disp: 90 tablet, Rfl: 3  ?  methylPREDNISolone (MEDROL DOSEPACK) 4 mg tablet, Take by mouth as instructed per packaging., Disp: , Rfl:   ?  ondansetron (ZOFRAN) 4 MG tablet, Take 4 mg by mouth every 8 (eight) hours as needed.    , Disp: , Rfl:   ?  polyvinyl alcohol (LIQUIFILM TEARS) 1.4 % ophthalmic solution, Administer 1 drop to both eyes as needed for dry eyes., Disp: , Rfl:   ?  tolterodine (DETROL LA) 4 MG ER capsule, Take 4 mg by mouth daily with lunch.    , Disp: , Rfl:   ?  traZODone (DESYREL) 100 MG tablet, Take 200 mg by mouth at bedtime., Disp: , Rfl:   ?  VIIBRYD 20 mg Tab tablet, Take 20 mg by mouth daily., Disp: , Rfl: 2    Current Facility-Administered Medications:   ?  romosozumab-aqqg injection 210 mg (EVENITY), 210 mg, Subcutaneous, Q30 Days, Blanca Duarte NP, 210 mg at 03/20/20 1005  ?  romosozumab-aqqg injection 210 mg (EVENITY), 210 mg, Subcutaneous, Q30 Days, Blanca Duarte NP, 210 mg at 04/23/20 1312  ?  romosozumab-aqqg Syrg 210 mg, 210 mg, Subcutaneous, Once, Blanca Duarte NP      Review of  Systems:  12 point systems were reviewed with pt as documented on pt health form of 4/28/2020. ROS was positive for weight gain, difficulty sleeping, neck pain, glasses, dry eyes, cataracts, nose stuffiness, dry mouth, snoring, urinary frequency/urgency, bladder incontinence, muscle/joint pain, stiffness, low back pain, numbness, tingling, memory difficulty, easy bruising, anxiety, stress, depressio  the rest of the systems were pertinent negative.  (General, Cardiac, Pulmonary, Integumentary, Musculoskeletal, Neurological,GI, , GYN, Endocrine, Hematological and Psychological)    Exam  There were no vitals filed for this visit.    Constitutional-General:  Well nourished, well developed, well groomed, healthy appearing individual.  Weight is obese, appears stated age and presents alone.  Psych-Mental Status: A & O in no acute distress. Speech is fluent. Thought process normal. Recent and remote memory are intact.  Attention span and concentration are normal. Displays appropriate mood and affect.   H,E,N,T- Symmetrical, Eyes- Perrla, Nares- patents bilaterally, throat- trachea is midline, airway is patent, unlabored respiratory effort.  Pulmonary- No cough, wheezing, rales, or shortness of breath observed.  Musckuloskeletal  Gait:  Gait is abnormal and difficult getting from sitting to standing position.  Ambulates independently with antalgia.  Gross Motor: Toe walking unable, heel walking difficult.  Spine ROM:  Cervical ROM flexion WNL, extension WNL, rotation right WNL, rotation left WNL. Lumbar ROM flexion reduced due to pain, extension WNL, lateral bending right and left reduced due to pain.  Integumentary: no rashes or breaks in the skin, no open wounds. Exposed skin is clean, dry, intact to inspection and palpation.    Lab:   Last UDS through PCP 11/11/2019 as expected    Imaging:  CT Hip Without Contrast Left 01/11/2020  IMPRESSION:   1.  Postoperative changes of short IM dima and dynamic screw fixation of an  intertrochanteric fracture of the left hip. The fracture is in good anatomic alignment. There is evidence of some new bone formation along the superolateral margin of the   fracture, but it is still visualized along the majority of its course.  2.  Ununited and unfixed facture fragment involving the lesser trochanter. This was seen on the original film 11/11/2019 and has not significantly changed.  3.  Superimposed degenerative change left hip joint.  4.  No evidence for fracture or migration of the fixation appliances.  5.  Exam otherwise negative and unchanged.     MRI Ankle right without contrast 07/06/2019    MRI Cervical, thoracic, and lumbar spine without contrast 10/13/17:  IMPRESSION:   CONCLUSION:  MRI CERVICAL SPINE:  1.  Mild to moderate degenerative changes most pronounced C5-C6 and C6-C7 where there is mild spinal canal and mild to moderate foraminal narrowing.  2.  No high-grade spinal canal narrowing, spinal cord compression, or spinal cord signal abnormality.     MRI THORACIC SPINE:  1.  Minor degenerative changes. No stenosis.  2.  Normal spinal cord.     MRI LUMBAR SPINE:  1.  Normal spinal cord and conus. No high-grade central spinal canal stenosis in the lumbar spine.  2.  Interval changes of laminectomy on the left at L4-L5. Adequate central canal at this level. Moderate left and mild right foraminal narrowing unchanged.  3.  At L3-L4 a left-sided herniation narrows the lateral recess, unchanged from prior. The disc bulge also contacts and slightly displaces the exiting L3 nerve root on the left, not changed.  4.  At L2-L3 a left-sided herniation contacts and may slightly displace the traversing nerve root, unchanged.    :  Dated 04/28/20 was reviewed with the patient    ORT: 5 (moderate)     Assessment :  After reviewing the patients chart and physical findings I agree that the patient would benefit from our pain center multidisciplinary treatment approach.   I have discussed with the  patient today the diagnosis of multi-site pain secondary to peripheral neuropathy, left leg pain history of rhabdomyolysis and possible CRPS, also lumbar post laminectomy syndrome. We reviewed the natural progression of this diagnosis.  We discussed possible benefits and detriments of physical therapy, integrative care such as acupuncture/chiropractor, medication management, behavorial therapy, and other alternative treatments including supplements and diet.  We also discussed future possible treatment options in a stepwise fashion, including interventional pain procedures and injections and possible surgical referral if needed.      Interventions: Patient has failed multiple LESI and nerve root blocks for low back and leg pain.  We review option of SCS implant trial for pain in these areas and she is not interested.  Could consider sympathetic block for nerve pain in LE, has diagnosis of CRPS in chart, difficult to assess on video visit today.    Physical Medicine and rehabilitations: I recommend a warm pool physical therapy evaluation and treatment for pain control, improvement of function, and assisting the patient to develop a daily routine to support achievement and, where necessary, readjustment of habits and roles according to the patient capacity and life situation.    Medication Management: We discussed medication options to manage the patient. After discussion of potential adverse effects versus benefits, the patient agreed to proceed with a trial of Butrans patch for better around the clock management of chronic pain - patient informed this is a schedule 3 opioid with possible risks and side effects reviewed.  There is distant history of CD concerns; she does not report or present with these concerns currently. Will need to repeat UDT prior to starting and also review opioid agreement and consent with patient, can mail this to her house and review by phone if necessary during COVID19.    Behavioral  Health: We discussed the body mind process of pain. We discussed the importance of cognitive behavioral therapy to help patient to control pain, address any psychiatric condition that may contribute to patient's experience of pain. The patient has current providers that she will continue with and agrees to review trial of duloxetine with psychiatrist. May ask for MARC to have provider communication as necessary.    The patient understands that opiate/controlled pain medication is not prescribed during the initial patient consultation at the pain center. If the patient is on pain medications for chronic pain, the PCP or prescribing provider may choose  to prescribe until the patient presents for follow up at the pain center. Medication prescriptions provided by the pain center will depend on the UA results, safe prescribing practices established by the CDC guidelines and patient response to their current treatment regimen at the follow up visit.      Plan recommended today:    Follow up in 4 weeks with Sherron STONE    Continue your current regimen of alleviating factors as reviewed today    Please see your current provider for any refill of an opioid prescription; they may choose to provide a refill until we have your urine screen back. They will continue to manage your general health and have requested you see the pain center for pain management. Please discuss any health concerns with your primary care physician.    [place] Precautions are taken with every patient which includes a  report and drug screen. A urine drug screen will be taken for baseline screening.  You must go to [facility] to get this completed, they are open 7-5 M-F or Saturday 9-1.     Behavioral Therapy- Continue with current psychiatrist and psychologist as scheduled.  Ask psychiatrist if you have been on duloxetine/cymbalta in the past as this treats both mood and pain.    Physical and Occupational Therapy- continue home exercises learned.   "Discussed referral to [facility] warm pool therapy when able    Injections- failed through spine center and recently at [facility].  Discussed spinal cord stimulator option, patient is not interested at this time.    Discussed trial of Butrans patch starting at 5 mcg/hr placing 1 patch on skin for 7 days at a time to see if this helps baseline pain control.  Likely will need to increase patch strength as tolerated, goal to have up to 50% pain reduction.  Please review information in AVS and also website [email or web address].com, pick \"I am a patient\" and you may review product information there as well. You may need to take T#4 with activity, but goal would be to decrease this medication over time.  Discussed avoiding benzodiazepines with opioids due to risk of respiratory depression, so you will need to discontinue refills of clonazepam as we discussed today.      Discussed idea of medical cannabis for pain - if above medication changes are not helping, will review this more in follow-up.     Diagnosis  1. Long term (current) use of opiate analgesic    2. Chronic left-sided low back pain with sciatica, sciatica laterality unspecified    3. Peripheral polyneuropathy    4. Left hip pain        Patient reminders:     SAFETY REMINDERS  No alcohol while taking controlled substances. Alcohol is not an illegal substance, it is unsafe to use in combination. It is a build up of substances in the body that can be extremely hazardous and may cause respirations to slow to a dangerous rate resulting in hospitalization, brain damage, or death.    Opioid medications have been associated with sharp rise in unintentional overdose and death.  Overdose is a condition characterized by the consumption in excess of a particular drug causing adverse effects. This can happen b/c you are sick, accidentally or intentionally took an extra dose, are on multiple medication that can interact. Someone took your medication and they are not use " to the medication.  Symptoms of overdose include:   !breathing slow and shallow, erratic or not at all  !pinpoint pupils, hallucinations  !confusion  !muscle jerks, slack muscles   !extreme sleepiness or loss of alertness   !awake but not able to talk   !face pale or clammy, vomiting, for lighter skinned people, the skin tone turns bluish purple, for darker skinned people, it turns grayish or ashen   If in a situation where overdose is a concern engage the emergency response system (dial 911).    In one study it was noted that 80% of unintentional overdoses occurred in people who were taking a combination of opioids and benzodiazepines.    Do not sell, loan, borrow or share your opioid medication with anyone. Deaths have occurred as a result of this practice. It is illegal and patients are being prosecuted.     Prevent unexpected access/loss of medication: Keep medication locked. Only carry what you need with you.    The patient agrees to the plan and has no further questions, if questions arise the patient knows to call [phone].     Thank you for this consult and opportunity to assist with this patients care.    Sherron Ruiz PA-C  [facility]   [address]  [place] [address]  Ph: [phone]  Fax: [phone]    Video-Visit Details    Type of service:  Video Visit    Video End Time (time video stopped): 2:24 PM  Originating Location (pt. Location): Home    Distant Location (provider location):  [facility]     Mode of Communication:  Video Conference via [employer]      Sherron Ruiz PA-C

## 2021-06-29 NOTE — PROGRESS NOTES
"Progress Notes by Ania Reynolds CMA at 4/28/2020  1:20 PM     Author: Ania Reynolds CMA Service: -- Author Type: Certified Medical Assistant    Filed: 5/5/2020  4:10 PM Date of Service: 4/28/2020  1:20 PM Status: Signed    : Ania Reynolds CMA (Certified Medical Assistant)       Conrad Zhu is a 64 y.o. female who is being evaluated via a billable video visit.      The patient has been notified of following:     \"This video visit will be conducted via a call between you and your physician/provider. We have found that certain health care needs can be provided without the need for an in-person physical exam.  This service lets us provide the care you need with a video conversation.  If a prescription is necessary we can send it directly to your pharmacy.  If lab work is needed we can place an order for that and you can then stop by our lab to have the test done at a later time.    Video visits are billed at different rates depending on your insurance coverage. Please reach out to your insurance provider with any questions.    If during the course of the call the physician/provider feels a video visit is not appropriate, you will not be charged for this service.\"    Patient has given verbal consent to a Video visit? Yes    Patient would like to receive their AVS by AVS Preference: Christy.    Patient would like the video invitation sent by: Text to cell phone: [phone]    Will anyone else be joining your video visit? No          Pain score---8-10  Constant or intermittent---constant  What does your pain feel like during a flare (description)---left leg numb, right foot numb and hurts to walk,   Does the pain interfere with:  Work ----- disability  Walking ------ yes  Sleep ------- yes  Daily activities ------yes  Relationships -------yes  F= 8    UDT---N/A  CSA---N/A             "

## 2021-06-29 NOTE — PROGRESS NOTES
"Progress Notes by Ida Quinn CMA at 6/3/2020 12:40 PM     Author: Ida Quinn CMA Service: -- Author Type: Certified Medical Assistant    Filed: 6/4/2020  2:24 PM Date of Service: 6/3/2020 12:40 PM Status: Signed    : Ida Quinn CMA (Certified Medical Assistant)       Conrad Zhu is a 64 y.o. female who is being evaluated via a billable video visit regarding hip, back and Neuropathy pain. Patient describes her pain as radiating. Patient's everyday functions effected by pain include: walking, sleep, activities of daily living and mood.     The patient has been notified of following:     \"This video visit will be conducted via a call between you and your physician/provider. We have found that certain health care needs can be provided without the need for an in-person physical exam.  This service lets us provide the care you need with a video conversation.  If a prescription is necessary we can send it directly to your pharmacy.  If lab work is needed we can place an order for that and you can then stop by our lab to have the test done at a later time. Video visits are billed at different rates depending on your insurance coverage. Please reach out to your insurance provider with any questions.\"    Patient has given verbal consent to a Video visit? Yes    Patient would like to receive their AVS by AVS Preference: Christy.    Patient would like the video invitation sent by: text to mobile    Will anyone else be joining your video visit? No     Ida Quinn CMA         "

## 2021-06-29 NOTE — PROGRESS NOTES
Progress Notes by Josafat Chung PharmD at 11/12/2020 10:00 AM     Author: Josafat Chung PharmD Service: -- Author Type: Pharmacist    Filed: 11/12/2020  2:00 PM Date of Service: 11/12/2020 10:00 AM Status: Signed    : Josafat Chung PharmD (Pharmacist)       MT Initial Encounter  Assessment & Plan                                                     Medication Adherence/Access: Medications reconciled.     Chronic Pain Syndrome/CRPS of left lower limb: Patient reports pain not well controlled, and not significantly changed with Fentanyl use. Given pain seems mostly neuropathic in nature, fentanyl not likely to be the most appropriate option. Pt okay with discontinuing today. Will plan to continue Tylenol #4 right now until we find more effective options. Reviewed UDT results and also reviewed with Sherron Ruiz PA-C, results as expected based on Codeine use. But just in abundance of caution, suggested pt avoid using medications from other people.     Limited details on the history of trials of neuropathic agents, but from what is available in the chart, does not appear that pt was titrated on doses to target adequate pain control. Discussed that we may need to reconsider some of these options. Given comorbid depression/anxiety, may benefit from options like Duloxetine and adjusting dose of Lamotrigine (see below).   -Queued refill for Tylenol #4   -Stop Fentanyl (per discussion with Sherron Ruiz PA-C)   -Recommend Duloxetine, as below   -Recommend increasing Lamotrigine, as below          Anxiety/depression: Patient notes significant depression and anxiety concerns. Also concerned about pt's thoughts about stopping her medication treatment, but she has appropriately expressed she will review with psychiatry before making such a decision. Given pt's concerns of weight gain, feeling that current meds aren't working for depression, and chronic neuropathic pain, may benefit from transition from Viibryd  to alternative such as Duloxetine which would be beneficial for depression/anxiety/pain and less likely to cause weight gain. May also benefit from increased dose of Lamotrigine for increased mood and pain support.   -Recommend switch from Viibryd to Duloxetine 60 mg daily (PharmD to send recommendation to Psychiatry. Pt to discuss at next visit)   -Consider increasing Lamotrigine to 100 mg two times a day (PharmD to send recommendation to Psychiatry. Pt to discuss at next visit)       Osteoporosis: Calcium and Phosphorus WNL. Continue with plan to transition to Prolia as determined by endocrinology clinic.       Low Vitamin D: Appropriately started on high dose supplement for low levels.   -Continue current therapy       OAB: Pt notes Detrol has bene hepful, but urinary symptoms continue to be bothersome, especially with sleep. Given dry mouth, hesitant to recommend dose increase at this time. May benefit from non-pharmacological options such as timed voiding and kegel exercises. Both of these were discussed today.   -Continue current therapy   -Pt to increase Kegels to 15 minutes three times a day   -Pt to implement timed voiding - start with hourly voiding     Allergies: Continues to have congestion, runny nose, and dry eyes despite ipratropium use. May benefit from trial of Flonase. Discussed that pt could use both inhalers, but pt preferred to trial Flonase alone.   -Trial Flonase 50 mcg 2 sprays each nostril once daily       GERD:   Nausea: Infrequent symptoms well managed with PRN therapies. Will plan to discontinue omeprazole, as pt is not using. Will continue to monitor symptoms, given nausea with spaghetti, question if this may be uncontrolled GERD.   -Discontinue Omeprazole            Follow Up  No follow-ups on file.      Subjective & Objective                                                     Conrad Zhu is a 65 y.o. female called for an initial visit for Medication Therapy Management. She was  "referred to me from   Sherron Ruiz PA-C.       Patient consented to a telehealth visit: Yes    Chief Complaint: \"just trying to find something that's going to work\"     Medication Adherence/Access: Medications reconciled.     Chronic Pain Syndrome/CRPS of left lower limb: Prescribed acetaminophen 500 mg every 6 hours as needed, acetaminophen-codeine 300-60 mg 1-2 tabs every 8 hours as needed, fentanyl 12 mcg/h patch every 72 hours, lamotrigine 100 mg daily.     Prescribed Narcan 4 mg nasal spray as needed.    Used the last patch, and hasn't had anything since Monday (there is a refill encounter but this was not queued up). Didn't seem to work. Felt the same without as she did with it.     Patient saw a copy of her recent UDT and was concerned about all of the things she saw in the results. Reports, she doesn't have anything other than prescribed meds. Was at the  before her last appointment with Sherron. She was having a lot of pain, but didn't have any Tylenol ES with her so accepted 4 tabs from somebody that was there. Pt thought they were ES Tylenol. Otherwise hasn't taken anything from anybody else.     Said she went to the dentist and didn't accept anything for pain.       Having pain all the way down the left leg. From time to time moves up above the knee. Always having pins and needles in the left foot. Lots of numbness. Can't feel the bottom of my left foot. In the right lower back/buttocks to the side, feels like there's a knife stuck in there. Has been using her TENS unit and that hasn't been particularly helpful. The knife pain in the lower back is new.      Has trouble walking. Notes that she's had EMGs in the past and doesn't have nerves in the left foot.     \"I'm tired of being in pain to the point of it's not good.\" Denies thoughts of hurting herself. Has thoughts about stopping all of her meds. Doesn't think it helps. Notes she will talk to her psychiatrist first \"I don't do that " "kind of stuff. I know better than\"     \"I've been a lot of stuff. Some strong stuff. My resistance to pain medicine is really high\"        Gabapentin: Useless - it never worked. Doesn't recall how much she was using. Did use after surgery - it did take the burn away \"but I don't have burning\" Later reports having a reaction that she doesn't recall. Notes that she gained weight.    Lyrica: Doesn't work. Been on this 2-3 times.    Venlafaxine: It stopped working - but was using for mood.   Cymbalta: Unsure if she has used this.    Amitriptyline: Thinks that she was using it from old PCP or psychiatry. It didn't work. \"I gave it a shot for however long I was supposed\"          Anxiety/depression: Prescribed asenapine 5 mg sublingual as needed, lamotrigine 100 mg daily, trazodone 100 mg 3 tablets at bedtime, Viibryd 20 mg daily.     Has only used Asenapine once. \"it knocked me out from morning to night.\"      Mood and depression \"really really bad\" had a video visit with therapist yesterday. He's really worried. Has a follow-up scheduled Monday.     Has an appointment with psychiatry in the next week two. Off of her anxiety medicine.     Stays in the house, by herself 90% of the time. Not going out every day.     Waking up three times per night, due to urination, but often not able to fall asleep again. Last night went to bed early and didn't have to get up until 5:30 to urinate - \"that's a fluke for me\"     Want to get off my meds for depression because I don't think they're working.     Struggling with both anxiety and depression. This time of year is difficult due to her daughters passing and the upcoming anniversary of that.     Have a little bit of anxiety every day. Doesn't leave the house. Just laying on the couch.     With COVID, being along, and the \"handicap thing\" that's gotten worse.     Wt Readings from Last 3 Encounters:   10/07/20 145 lb 6.4 oz (66 kg)   09/25/20 139 lb 9.6 oz (63.3 kg)   07/01/20 139 lb " "(63 kg)          Osteoporosis: Prescribed Evenity 210 mg injection every 30 days. Plan is to transition her back to Prolia.     Lab Results   Component Value Date    CALCIUM 9.1 10/09/2020    PHOS 4.1 03/19/2015 9/23/2019 Dxa:         Low Vitamin D: Prescribed Vitamin D2 50,000 units twice  weekly - started on 10/15.     Vitamin D, Total (25-Hydroxy)   Date Value Ref Range Status   10/09/2020 29.6 (L) 30.0 - 80.0 ng/mL Final       OAB: Prescribed tolterodine 4 mg ER daily. It's a lot better - still waking up three times per night to urinate. Does have a little bit of dry mouth.       Doing kegels maybe once a day at least. Maybe makes it an hour, sometimes two, without needing to urinate. Not aware of timed voiding.     Has cut back on fluids before bedtime, but does struggle with dry mouth. Doesn't take fluid for 30-60 minutes before bedtime. Every so often will have a half-cup of coffee.       Allergies: Prescribed ipratropium 42 mcg nasal spray 2 sprays each nostril 3 times daily. Doesn't feel like her symptoms are well managed. Has had 2 surgeries. 80% better since then.     Still having congestion, breathes through her mouth. Can't take deep breaths. \"stay kind of miserable a lot\" Still having dripping/runny nose. Denies post-nasal drip. Also has dry eyes.     Used Flonase a long time ago - doesn't remember if it was helpful.       GERD: Prescribed omeprazole 20 mg daily. Was having a lot of indigestion. Got over the counter chewable Calcium Carbonate 1177 mg. Using 1 tab as needed - finds this more effective than. Has needed maybe twice in the last month.       Nausea: Prescribed ondansetron 4 mg every 8 hours as needed. Keeps on hand. Made spaghetti a little over a week ago - took the Zofran but other than it has been a few months.         PMH: reviewed in EPIC   Allergies/ADRs: reviewed in EPIC   Alcohol:   Social History     Substance and Sexual Activity   Alcohol Use No      Tobacco:   Social History "     Tobacco Use   Smoking Status Former Smoker   ? Packs/day: 0.00   Smokeless Tobacco Never Used     ----------------    The patient was given a summary of these recommendations via mTraks    I spent 67 minutes with this patient today.   All changes were made via collaborative practice agreement with   Sherron Ruiz PA-C. A copy of the visit note was provided to the patient's provider.     Geovani AraujoD  Medication Therapy Management (MTM) Pharmacist  [facility]      Telemedicine Visit Details    Type of service:  Telephone     Start Time: 10:07 AM  End Time: 11:14 AM    Originating Location (pt. Location): Home    Distant Location (provider location):  [facility]    Mode of Communication: Telephone     Current Outpatient Medications   Medication Sig Dispense Refill   ? acetaminophen (TYLENOL) 500 MG tablet Take 1 tablet (500 mg total) by mouth every 6 (six) hours as needed for pain.  0   ? acetaminophen-codeine (TYLENOL-CODEINE #4) 300-60 mg per tablet Take 1-2 tablets by mouth every 8 (eight) hours as needed for pain. 75 tablet 0   ? ARTIFICIAL TEARS,PG-HYPM-GLYC, 1-0.2-0.2 % Drop INSTILL 1 DROP INTO BOTH EYES AS NEEDED FOR DRY EYES  99   ? asenapine (SAPHRIS) 5 mg Subl SL tablet Place 5 mg under the tongue as needed.     ? cholecalciferol, vitamin D3, 50 mcg (2,000 unit) Tab Take one tablet twice a week 24 tablet 1   ? ergocalciferol (ERGOCALCIFEROL) 1,250 mcg (50,000 unit) capsule Take 1 capsule (50,000 Units total) by mouth 2 (two) times a week. 24 capsule 1   ? fentaNYL (DURAGESIC) 12 mcg/hr Place 1 patch on the skin every third day. 10 patch 0   ? ipratropium (ATROVENT) 42 mcg (0.06 %) nasal spray INHALE 2 SPRAYS IN THE NOSTRIL(S) 3 TIMES DAILY. (Patient taking differently: as needed. ) 15 mL 2   ? lamoTRIgine (LAMICTAL) 100 MG tablet Take 1 tablet (100 mg total) by mouth daily. 90 tablet 3   ? naloxone (NARCAN) 4 mg/actuation nasal  spray 1 spray (4 mg dose) into one nostril for opioid reversal. Call 911. May repeat if no response in 3 minutes. 1 Box 0   ? omeprazole (PRILOSEC) 20 MG capsule TAKE 1 CAPSULE (20 MG TOTAL) BY MOUTH DAILY BEFORE BREAKFAST. 90 capsule 3   ? ondansetron (ZOFRAN) 4 MG tablet TAKE 1 TABLET BY MOUTH EVERY 8 HOURS IF NEEDED FOR NAUSEA/VOMITING 30 tablet 0   ? polyvinyl alcohol (LIQUIFILM TEARS) 1.4 % ophthalmic solution Administer 1 drop to both eyes as needed for dry eyes.     ? tolterodine (DETROL LA) 4 MG ER capsule Take 1 capsule (4 mg total) by mouth daily with lunch. 90 capsule 1   ? traZODone (DESYREL) 100 MG tablet Take 300 mg by mouth at bedtime.      ? VIIBRYD 20 mg Tab tablet Take 20 mg by mouth daily.   2     Current Facility-Administered Medications   Medication Dose Route Frequency Provider Last Rate Last Dose   ? romosozumab-aqqg injection 210 mg (EVENITY)  210 mg Subcutaneous Q30 Days Blanca Duarte NP   210 mg at 03/20/20 1005   ? romosozumab-aqqg injection 210 mg (EVENITY)  210 mg Subcutaneous Q30 Days Blanca Duarte NP   210 mg at 08/31/20 1407   ? romosozumab-aqqg injection 210 mg (EVENITY)  210 mg Subcutaneous Q30 Days Blanca Duarte NP   210 mg at 11/05/20 1029   ? romosozumab-aqqg Syrg 210 mg  210 mg Subcutaneous Once Blanca Duarte NP

## 2021-06-29 NOTE — PROGRESS NOTES
Progress Notes by Sofie Huitron CNP at 9/25/2020  2:10 PM     Author: Sofie Huitron CNP Service: -- Author Type: Nurse Practitioner    Filed: 9/25/2020  3:10 PM Encounter Date: 9/25/2020 Status: Signed    : Sofie Huitron CNP (Nurse Practitioner)       Clinic Note    Assessment:     Assessment and Plan:  1. Abdominal pain, epigastric/Nausea/Gastroesophageal reflux disease without esophagitis: Chronic nausea. Epigastric pain to exam with increased burping. Acidic like feeling in stomach. Will trial treatment with Omeprazole. Will check CBC, CMP, Lipase, CRP. Will ultrasound right upper quadrant area to rule out gallbladder issues. Discussed trying Gas X over the counter, gel capsules for gas relief, she is not interested in Maalox.  - Comprehensive Metabolic Panel  - HM1(CBC and Differential)  - C-Reactive Protein(CRP)  - Lipase  - US Abdomen Limited; Future  -Omeprazole (PRILOSEC) 20 MG capsule; Take 1 capsule (20 mg total) by mouth daily before breakfast.  Dispense: 30 capsule  -Rest, push fluids.  -BRAT diet over the weekend as discussed.  -Gas X over the counter as needed for burping/belching.  -Follow up if symptoms worsen over the weekend for further evaluation (ER).         Patient Instructions   Try over-the-counter Gas-X (simethicone) for your burping.    Start the omeprazole, half hour prior to breakfast daily.    Your labs are processing, I will release them via my chart once they are available.    Scheduling will call you to set up your abdominal ultrasound.    Follow-up over the weekend if symptoms worsen as discussed today.  Patient Education     Epigastric Pain (Uncertain Cause)     Epigastric pain can be a sign of disease in the upper abdomen. Common causes include:    Acid reflux (stomach acid flowing up into the esophagus)    Gastritis (irritation of the stomach lining)    Peptic Ulcer Disease    Inflammation of the pancreas    Gallstone    Infection in the  gallbladder  Pain may be dull or burning. It may spread upward to the chest or to the back. There may be other symptoms such as belching, bloating, cramps or hunger pains. There may be weight loss or poor appetite, nausea or vomiting.  Since the diagnosis of your pain is not certain yet, further tests may sometimes be needed. Sometimes the doctor will treat you for the most likely condition to see if there is improvement before doing further tests.  Home care  Medicines    Antacids help neutralize the normal acids in your stomach. Examples are Maalox, Mylanta, Rolaids, and Tums. If you dont like the liquid, you can also try a chewable one. You may find one works better than another for you. Overuse can cause diarrhea or constipation.    Acid blockers (H2 blockers) decrease acid production. Examples are cimetidine (Tagamet), famotidine (Pepcid) and ranitidine (Zantac).    Acid inhibitors (PPIs) decrease acid production in a different way than the blockers. You may find they work better, but can take a little longer to take effect.  Examples are omeprazole (Prilosec), lansoprazole (Prevacid), pantoprazole (Protonix), rabeprazole (Aciphex), and esomeprazole (Nexium).    Take an antacid 30-60 minutes after eating and at bedtime, but not at the same time as an acid blocker.    Try not to take NSAIDs. Aspirin may also cause problems, but if taking it for your heart or other medical reasons, talk to your doctor before stopping it; you do not want to cause a worse problem, like a heart attack or stroke.  Diet    If certain foods seem to cause your spasm, try to avoid them.     Eat slowly and chew food well before swallowing. Symptoms of gastritis can be worsened by certain foods. Limit or avoid fatty, fried, and spicy foods, as well as coffee, chocolate, mint, and foods with high acid content such as tomatoes and citrus fruit and juices (orange, grapefruit, lemon).    Avoid alcohol, caffeine, and tobacco, which can delay  healing and worsen your problem.    Try eating smaller meals with snacks in between  Follow-up care  Follow up with your healthcare provider or as advised.  When to seek medical advice  Call your healthcare provider right away if any of the following occur:    Stomach pain worsens or moves to the right lower part of the abdomen    Chest pain appears, or if it worsens or spreads to the chest, back, neck, shoulder, or arm    Frequent vomiting (cant keep down liquids)    Blood in the stool or vomit (red or black color)    Feeling weak or dizzy, fainting, or having trouble breathing    Fever of 100.4 F (38 C) or higher, or as directed by your healthcare provider    Abdominal swelling  Date Last Reviewed: 9/25/2015 2000-2017 [employer]. [address] [place] [address], [place] [address]. All rights reserved. This information is not intended as a substitute for professional medical care. Always follow your healthcare professional's instructions.             Return if symptoms worsen or fail to improve.         Subjective:      Conrad Zhu is a 65 y.o. female presents today with concerns of stomach issues.    She reports her symptoms started about 1 week ago.      She reports some nausea, vomiting, and a migraine headache with on Saturday and Sunday.      She reports then starting to have some pain in her stomach and increased burping that started on Monday. She reports a slight acidic feeling in her stomach as well.     She reports she is not really hungry, but is forcing herself to eat blander foods, smaller portions through out the day.    She reports no fever but reports having some hot and cold sweats.     She reports having one episode of diarrhea today, she took an Imodium which stopped this.  She denies any lower abdominal pain.    She reports no vomiting since Sunday.  No headache since Sunday.  She denies a sore throat, shortness of breath, or cough.    She denies any recent travel or new foods. She  continues on zofran for chronic nausea.     She denies any COVID-19 exposure.    The following portions of the patient's history were reviewed and updated as appropriate.    Review of Systems:    Review is otherwise negative except for what is mentioned above.     Social Hx:    Social History     Tobacco Use   Smoking Status Former Smoker   ? Packs/day: 0.00   Smokeless Tobacco Never Used         Objective:     Vitals:    09/25/20 1414   BP: 127/79   Pulse: 83   Temp: 98.8  F (37.1  C)   SpO2: 98%   Weight: 139 lb 9.6 oz (63.3 kg)     Physical Exam   Constitutional: She is oriented to person, place, and time and well-developed, well-nourished, and in no distress. No distress.   HENT:   Head: Normocephalic and atraumatic.   Eyes: Pupils are equal, round, and reactive to light.   Neck: Normal range of motion. Neck supple.   Cardiovascular: Normal rate, regular rhythm and normal heart sounds. Exam reveals no gallop and no friction rub.   No murmur heard.  Pulmonary/Chest: Effort normal and breath sounds normal.   Abdominal: Soft. Normal appearance and bowel sounds are normal. There is no hepatosplenomegaly. There is abdominal tenderness in the epigastric area. There is positive Diggs's sign. There is no rigidity, no rebound, no guarding, no CVA tenderness and no tenderness at McBurney's point.       Musculoskeletal:         General: No edema.   Lymphadenopathy:     She has no cervical adenopathy.   Neurological: She is alert and oriented to person, place, and time. Gait normal.   Skin: Skin is warm and dry. She is not diaphoretic.   Psychiatric: Mood, memory, affect and judgment normal.   Nursing note and vitals reviewed.      Patient Active Problem List   Diagnosis   ? Anxiety   ? Nausea   ? OAB (overactive bladder)   ? Low back pain   ? Chronic pain   ? PN (peripheral neuropathy)   ? Insomnia   ? Migraine headache   ? Osteoporosis   ? Former smoker   ? Decubitus ulcer of left heel, stage 3 (H)   ? Achilles  tendinitis of right lower extremity   ? Sprain of right ankle, unspecified ligament, initial encounter   ? Sprain of right foot, initial encounter   ? Closed nondisplaced fracture of body of right calcaneus, initial encounter   ? Corneal scarring   ? Left foot drop   ? Left leg weakness   ? Leukocytosis   ? TBI (traumatic brain injury) (H)   ? Tear film insufficiency   ? Closed intertrochanteric fracture of hip, left, initial encounter (H)   ? Left hip pain   ? Pre-operative general physical examination   ? Anemia due to blood loss, acute   ? Acute post-operative pain   ? Complex regional pain syndrome i of left lower limb   ? Depression   ? Long term (current) use of opiate analgesic   ? Anxiety disorder, unspecified   ? Neck pain     Current Outpatient Medications   Medication Sig Dispense Refill   ? acetaminophen (TYLENOL) 500 MG tablet Take 1 tablet (500 mg total) by mouth every 6 (six) hours as needed for pain.  0   ? acetaminophen-codeine (TYLENOL-CODEINE #4) 300-60 mg per tablet Take 1-2 tablets by mouth every 8 (eight) hours as needed for pain. 75 tablet 0   ? ARTIFICIAL TEARS,PG-HYPM-GLYC, 1-0.2-0.2 % Drop INSTILL 1 DROP INTO BOTH EYES AS NEEDED FOR DRY EYES  99   ? cholecalciferol, vitamin D3, 2,000 unit Tab Take 2,000 Units by mouth daily.     ? ipratropium (ATROVENT) 42 mcg (0.06 %) nasal spray INHALE 2 SPRAYS IN THE NOSTRIL(S) 3 TIMES DAILY. (Patient taking differently: as needed. ) 15 mL 2   ? lamoTRIgine (LAMICTAL) 100 MG tablet Take 1 tablet (100 mg total) by mouth daily. 90 tablet 3   ? ondansetron (ZOFRAN) 4 MG tablet TAKE 1 TABLET BY MOUTH EVERY 8 HOURS IF NEEDED FOR NAUSEA/VOMITING 30 tablet 0   ? tolterodine (DETROL LA) 4 MG ER capsule Take 1 capsule (4 mg total) by mouth daily with lunch. 90 capsule 1   ? traZODone (DESYREL) 100 MG tablet Take 300 mg by mouth at bedtime.      ? VIIBRYD 20 mg Tab tablet Take 20 mg by mouth daily.   2   ? asenapine (SAPHRIS) 5 mg Subl SL tablet Place 5 mg under  the tongue as needed.     ? omeprazole (PRILOSEC) 20 MG capsule Take 1 capsule (20 mg total) by mouth daily before breakfast. 30 capsule 1   ? polyvinyl alcohol (LIQUIFILM TEARS) 1.4 % ophthalmic solution Administer 1 drop to both eyes as needed for dry eyes.       Current Facility-Administered Medications   Medication Dose Route Frequency Provider Last Rate Last Dose   ? romosozumab-aqqg injection 210 mg (EVENITY)  210 mg Subcutaneous Q30 Days Blanca Duarte NP   210 mg at 03/20/20 1005   ? romosozumab-aqqg injection 210 mg (EVENITY)  210 mg Subcutaneous Q30 Days Blanca Duarte NP   210 mg at 08/31/20 1407   ? romosozumab-aqqg injection 210 mg (EVENITY)  210 mg Subcutaneous Q30 Days Blanca Duarte NP       ? romosozumab-aqqg Syrg 210 mg  210 mg Subcutaneous Once Blanca Duarte NP Breeanna Waletzko, Adult-Geriatric Nurse Practitioner  [facility] - Internal Medicine Team     9/25/2020

## 2021-06-29 NOTE — PROGRESS NOTES
Progress Notes by Karrie Woods LPN at 7/1/2020  1:20 PM     Author: Karrie Woods LPN Service: -- Author Type: Licensed Nurse    Filed: 7/2/2020 12:24 PM Date of Service: 7/1/2020  1:20 PM Status: Signed    : Karrie Woods LPN (Licensed Nurse)

## 2021-06-30 NOTE — PROGRESS NOTES
Progress Notes by Sofie Huitron CNP at 3/18/2021  3:00 PM     Author: Sofie Huitron CNP Service: -- Author Type: Nurse Practitioner    Filed: 3/18/2021  3:19 PM Encounter Date: 3/18/2021 Status: Signed    : Sofie Huitron CNP (Nurse Practitioner)       Clinic Note    Assessment:     Assessment and Plan:  1. Chronic bilateral low back pain without sciatica/Chronic pain syndrome: Two week course. She has tried chiropractic care, ice, heat, without relief. Worse when bending over. No recent falls. She continues to work with the pain clinic for her chronic pain. She currently is on Tylenol #4 with Morphine, will not adjust this. Previous lumbar surgery, injections. Will obtain an updated MRI of her lumbar spine.   - MR Lumbar Spine With Without Contrast; Future  - Ice or heat, whichever feels better 15 minutes four times per day.  - Icy hot or biofreeze topically as needed for pain control.  - Continue Chiropractic care.  - Consider a massage.  - Continue pain medications as prescribed.  - Follow up if symptoms persist or worsen.      Patient Instructions   Continue alternating ice and heat.    Continue Icy hot or biofreeze topically as needed.    Continue working with your chiropractor.    Continue your pain medications.    I have ordered your low back MRI, call [phone] to get this set up.    I will call you with results once back.       Patient Education     Relieving Back Pain  Back pain is a common problem. You can strain back muscles by lifting too much weight or just by moving the wrong way. Back strain can be uncomfortable, even painful. And it can take weeks or months to improve. To help yourself feel better and prevent future back strains, try these tips.  Important: Don't give aspirin to children or teens without first discussing it with your child's healthcare provider.  Ice    Ice reduces muscle pain and swelling. It helps most during the first 24 to 48 hours  after an injury.    Wrap an ice pack or a bag of frozen peas in a thin towel. Never put ice directly on your skin.    Place the ice where your back hurts the most.    Dont ice for more than 20 minutes at a time.    You can use ice several times a day.  Medicines  Over-the-counter pain relievers include acetaminophen and anti-inflammatory medicines, which includes aspirin, naproxen, or ibuprofen. They can help ease discomfort. Some also reduce swelling.    Tell your healthcare provider about any medicines you are already taking.    Take medicines only as directed.  Manipulation and massage  Having manipulation by an osteopathic doctor or chiropractor may be helpful. Getting a massage also may help.   Heat  After the first 48 hours, heat can relax sore muscles and improve blood flow.    Try a warm bath or shower. Or use a heating pad set on low. To prevent a burn, keep a cloth between you and the heating pad.    Dont use a heating pad for more than 15 minutes at a time. Never sleep on a heating pad.  Date Last Reviewed: 6/1/2018 2000-2019 [employer]. [address] [place] [address]. All rights reserved. This information is not intended as a substitute for professional medical care. Always follow your healthcare professional's instructions.             Return in about 6 months (around 9/18/2021) for Follow up.         Subjective:      Conrad Zhu is a 65 y.o. female presents today with concerns of low back pain.    She reports that this first started 2 weeks ago, out of the blue. She denies any mechanism of injury. She reports that when she bends over to do laundry, putting wash in the washer, or taking clothing out of the dryer her back is really painful. She reports that it is a constant nagging type pain. She was seen for follow up at the pain clinic and her pain medications were switched around yesterday as well.    She has had previous surgery, and epidural steroid injections which  have not helped in the past. Her last MRI was in 2017.     She denies any further loss of strength in her legs.    She reports she also needs to have her handicap permit renewed.     The following portions of the patient's history were reviewed and updated as appropriate.    Review of Systems:    Review is otherwise negative except for what is mentioned above.     Social Hx:    Social History     Tobacco Use   Smoking Status Former Smoker   ? Packs/day: 0.00   Smokeless Tobacco Never Used         Objective:     Vitals:    03/18/21 1457   BP: 140/82   Pulse: 81   SpO2: 95%     Physical Exam   Constitutional: She is oriented to person, place, and time and well-developed, well-nourished, and in no distress. No distress.   HENT:   Head: Normocephalic and atraumatic.   Musculoskeletal: Normal range of motion.         General: Tenderness present. No deformity or edema.      Lumbar back: She exhibits tenderness, bony tenderness and pain. She exhibits normal range of motion, no swelling, no edema, no deformity, no laceration, no spasm and normal pulse.        Back:    Neurological: She is alert and oriented to person, place, and time. Gait normal. Coordination normal.   Skin: Skin is warm and dry. She is not diaphoretic.   Psychiatric: Mood, memory, affect and judgment normal.   Nursing note and vitals reviewed.        Patient Active Problem List   Diagnosis   ? Anxiety   ? Nausea   ? OAB (overactive bladder)   ? Low back pain   ? Chronic pain   ? PN (peripheral neuropathy)   ? Insomnia   ? Migraine headache   ? Osteoporosis   ? Former smoker   ? Achilles tendinitis of right lower extremity   ? Sprain of right ankle, unspecified ligament, initial encounter   ? Sprain of right foot, initial encounter   ? Closed nondisplaced fracture of body of right calcaneus, initial encounter   ? Corneal scarring   ? Left foot drop   ? Left leg weakness   ? Leukocytosis   ? Tear film insufficiency   ? Closed intertrochanteric fracture of  hip, left, initial encounter (H)   ? Left hip pain   ? Pre-operative general physical examination   ? Anemia due to blood loss, acute   ? Acute post-operative pain   ? Complex regional pain syndrome i of left lower limb   ? Depression   ? Long term (current) use of opiate analgesic   ? Anxiety disorder, unspecified   ? Neck pain   ? Vitamin deficiency     Current Outpatient Medications   Medication Sig Dispense Refill   ? acetaminophen-codeine (TYLENOL-CODEINE #4) 300-60 mg per tablet Take 1-2 tablets by mouth every 8 (eight) hours as needed for pain. 150 tablet 0   ? fluticasone propionate (FLONASE) 50 mcg/actuation nasal spray Apply 2 sprays into each nostril daily.     ? ipratropium (ATROVENT) 42 mcg (0.06 %) nasal spray INHALE 2 SPRAYS IN THE NOSTRIL(S) 3 TIMES DAILY. (Patient taking differently: as needed. ) 15 mL 2   ? lamoTRIgine (LAMICTAL) 200 MG tablet Take 200 mg by mouth daily.     ? morphine 15 mg TR12 Take 15 mg by mouth every 12 (twelve) hours. 60 each 0   ? tolterodine (DETROL LA) 4 MG ER capsule Take 1 capsule (4 mg total) by mouth daily with lunch. 90 capsule 1   ? traZODone (DESYREL) 100 MG tablet Take 300 mg by mouth at bedtime.      ? VIIBRYD 20 mg Tab tablet Take 30 mg by mouth daily.   2   ? naloxone (NARCAN) 4 mg/actuation nasal spray 1 spray (4 mg dose) into one nostril for opioid reversal. Call 911. May repeat if no response in 3 minutes. 1 Box 0   ? ondansetron (ZOFRAN) 4 MG tablet TAKE 1 TABLET BY MOUTH EVERY 8 HOURS IF NEEDED FOR NAUSEA/VOMITING 30 tablet 0     Current Facility-Administered Medications   Medication Dose Route Frequency Provider Last Rate Last Admin   ? denosumab 60 mg (PROLIA 60 mg/ml)  60 mg Subcutaneous Q6 Months Blanca Duarte, NP   60 mg at 01/26/21 1042   ? romosozumab-aqqg injection 210 mg (EVENITY)  210 mg Subcutaneous Q30 Days Blanca Duarte, NP   210 mg at 03/20/20 1005   ? romosozumab-aqqg injection 210 mg (EVENITY)  210 mg Subcutaneous Q30 Days Gerald  Blanca CHIU NP   210 mg at 12/10/20 1158   ? romosozumab-aqqg Syrg 210 mg  210 mg Subcutaneous Once Blanca Duarte NP Breeanna Waletzko, Adult-Geriatric Nurse Practitioner  [facility] - Internal Medicine Team     3/18/2021

## 2021-07-03 NOTE — ADDENDUM NOTE
Addendum Note by Fay Brady PA-C at 5/5/2020 10:54 AM     Author: Fay Brady PA-C Service: -- Author Type: Physician Assistant    Filed: 5/5/2020 10:54 AM Encounter Date: 5/4/2020 Status: Signed    : Fay Brady PA-C (Physician Assistant)    Addended by: FAY BRADY on: 5/5/2020 10:54 AM        Modules accepted: Orders

## 2021-07-03 NOTE — ADDENDUM NOTE
Addendum Note by Trudy Winters CNP at 9/25/2020  2:10 PM     Author: Trudy Winters CNP Service: -- Author Type: Nurse Practitioner    Filed: 9/28/2020 11:13 AM Encounter Date: 9/25/2020 Status: Signed    : Trudy Winters CNP (Nurse Practitioner)    Addended by: TRUDY WINTERS on: 9/28/2020 11:13 AM        Modules accepted: Orders

## 2021-07-03 NOTE — ADDENDUM NOTE
Addendum Note by Ida Rodriguez CMA at 5/18/2020 10:00 AM     Author: Ida Rodriguez CMA Service: -- Author Type: Certified Medical Assistant    Filed: 5/19/2020 11:52 AM Encounter Date: 5/18/2020 Status: Signed    : Ida Rodriguez CMA (Certified Medical Assistant)    Addended by: IDA RODRIGUEZ on: 5/19/2020 11:52 AM        Modules accepted: Orders

## 2021-07-03 NOTE — ADDENDUM NOTE
Addendum Note by Elyse Melvin RN at 10/17/2019  2:20 PM     Author: Elyse Melvin RN Service: -- Author Type: Registered Nurse    Filed: 11/7/2019  9:03 AM Encounter Date: 10/17/2019 Status: Signed    : Elyse Melvin RN (Registered Nurse)    Addended by: ELYSE MELVIN on: 11/7/2019 09:03 AM        Modules accepted: Orders

## 2021-07-03 NOTE — ADDENDUM NOTE
Addendum Note by Vidhya Duncan at 8/13/2020  2:20 PM     Author: Vidhya Duncan Service: -- Author Type: --    Filed: 8/18/2020  7:26 AM Date of Service: 8/13/2020  2:20 PM Status: Signed    : Vidhya Duncan    Encounter addended by: Vidhya Duncan on: 8/18/2020  7:26 AM      Actions taken: Charge Capture section accepted

## 2021-07-03 NOTE — ADDENDUM NOTE
Addendum Note by Ida Rodriguez CMA at 5/5/2020  9:27 AM     Author: Ida Rodriguez CMA Service: -- Author Type: Certified Medical Assistant    Filed: 5/5/2020  9:27 AM Encounter Date: 5/4/2020 Status: Signed    : Ida Rodriguez CMA (Certified Medical Assistant)    Addended by: IDA RODRIGUEZ on: 5/5/2020 09:27 AM        Modules accepted: Orders

## 2021-07-03 NOTE — ADDENDUM NOTE
Addendum Note by Fay Brady PA-C at 11/5/2020 12:14 PM     Author: Fay Brady PA-C Service: -- Author Type: Physician Assistant    Filed: 11/5/2020 12:14 PM Encounter Date: 10/28/2020 Status: Signed    : Fay Brady PA-C (Physician Assistant)    Addended by: FAY BRADY on: 11/5/2020 12:14 PM        Modules accepted: Orders

## 2021-07-03 NOTE — ADDENDUM NOTE
Addendum Note by Mitra Camarena RN at 9/17/2020  1:47 PM     Author: Mitra Camarena RN Service: -- Author Type: Registered Nurse    Filed: 9/17/2020  1:47 PM Encounter Date: 8/27/2020 Status: Signed    : Mitra Camarena RN (Registered Nurse)    Addended by: MITRA CAMARENA on: 9/17/2020 01:47 PM        Modules accepted: Orders

## 2021-07-03 NOTE — ADDENDUM NOTE
Addendum Note by Fay Brady PA-C at 9/17/2020  3:29 PM     Author: Fay Brady PA-C Service: -- Author Type: Physician Assistant    Filed: 9/17/2020  3:29 PM Encounter Date: 8/27/2020 Status: Signed    : Fay Brady PA-C (Physician Assistant)    Addended by: FAY BRADY on: 9/17/2020 03:29 PM        Modules accepted: Orders

## 2021-07-03 NOTE — ADDENDUM NOTE
Addendum Note by Vidhya Duncan at 7/1/2020  1:20 PM     Author: Vidhya Duncan Service: -- Author Type: --    Filed: 7/16/2020 12:27 PM Date of Service: 7/1/2020  1:20 PM Status: Signed    : Vidhya Duncan    Encounter addended by: Vidhya Duncan on: 7/16/2020 12:27 PM      Actions taken: Charge Capture section accepted

## 2021-07-03 NOTE — ADDENDUM NOTE
Addendum Note by Cintia Day RN at 10/16/2020  1:53 PM     Author: Cintia Day RN Service: -- Author Type: Registered Nurse    Filed: 10/16/2020  1:53 PM Encounter Date: 10/15/2020 Status: Signed    : Cintia Day RN (Registered Nurse)    Addended by: CINTIA DAY on: 10/16/2020 01:53 PM        Modules accepted: Orders

## 2021-07-03 NOTE — ADDENDUM NOTE
Addendum Note by Vidhya Duncan at 6/3/2020 12:40 PM     Author: Vidhya Duncan Service: -- Author Type: --    Filed: 6/10/2020 10:16 AM Date of Service: 6/3/2020 12:40 PM Status: Signed    : Vidhya Duncan    Encounter addended by: Vidhya Duncan on: 6/10/2020 10:16 AM      Actions taken: Charge Capture section accepted

## 2021-07-03 NOTE — ADDENDUM NOTE
Addendum Note by Vidhya Duncan at 4/28/2020  1:20 PM     Author: Vidhya Duncan Service: -- Author Type: --    Filed: 5/20/2020  7:23 AM Date of Service: 4/28/2020  1:20 PM Status: Signed    : Vidhya Duncan    Encounter addended by: Vdihya Duncan on: 5/20/2020  7:23 AM      Actions taken: Charge Capture section accepted

## 2021-07-06 VITALS — WEIGHT: 145 LBS | BODY MASS INDEX: 28.47 KG/M2 | HEIGHT: 60 IN

## 2021-07-08 ENCOUNTER — TRANSCRIBE ORDERS (OUTPATIENT)
Dept: ENDOCRINOLOGY | Facility: CLINIC | Age: 66
End: 2021-07-08

## 2021-07-08 DIAGNOSIS — M81.0 AGE RELATED OSTEOPOROSIS, UNSPECIFIED PATHOLOGICAL FRACTURE PRESENCE: Primary | ICD-10-CM

## 2021-07-08 DIAGNOSIS — Z92.29 PERSONAL HISTORY OF OTHER DRUG THERAPY: ICD-10-CM

## 2021-07-08 NOTE — PROGRESS NOTES
As part of the required manual data conversion process for integration, this encounter was created to document a CAM (Clinic Administered Medication) order. This information was copied from the [facility] patient's chart to the [facility] patient chart.     Vinicio Brink PharmD  July 8, 2021

## 2021-07-17 ASSESSMENT — ACTIVITIES OF DAILY LIVING (ADL): CURRENT_FUNCTION: HOUSEWORK REQUIRES ASSISTANCE

## 2021-07-23 ENCOUNTER — OFFICE VISIT (OUTPATIENT)
Dept: INTERNAL MEDICINE | Facility: CLINIC | Age: 66
End: 2021-07-23
Payer: COMMERCIAL

## 2021-07-23 ENCOUNTER — TELEPHONE (OUTPATIENT)
Dept: PALLIATIVE MEDICINE | Facility: OTHER | Age: 66
End: 2021-07-23

## 2021-07-23 VITALS
OXYGEN SATURATION: 94 % | DIASTOLIC BLOOD PRESSURE: 70 MMHG | SYSTOLIC BLOOD PRESSURE: 140 MMHG | BODY MASS INDEX: 30.61 KG/M2 | WEIGHT: 155.9 LBS | HEART RATE: 76 BPM | HEIGHT: 60 IN

## 2021-07-23 DIAGNOSIS — Z13.29 SCREENING FOR ENDOCRINE, NUTRITIONAL, METABOLIC AND IMMUNITY DISORDER: ICD-10-CM

## 2021-07-23 DIAGNOSIS — K21.9 GASTROESOPHAGEAL REFLUX DISEASE WITHOUT ESOPHAGITIS: ICD-10-CM

## 2021-07-23 DIAGNOSIS — R63.5 WEIGHT GAIN: ICD-10-CM

## 2021-07-23 DIAGNOSIS — M81.0 AGE RELATED OSTEOPOROSIS, UNSPECIFIED PATHOLOGICAL FRACTURE PRESENCE: ICD-10-CM

## 2021-07-23 DIAGNOSIS — F41.9 ANXIETY: Primary | ICD-10-CM

## 2021-07-23 DIAGNOSIS — Z11.59 NEED FOR HEPATITIS C SCREENING TEST: ICD-10-CM

## 2021-07-23 DIAGNOSIS — Z13.21 SCREENING FOR ENDOCRINE, NUTRITIONAL, METABOLIC AND IMMUNITY DISORDER: ICD-10-CM

## 2021-07-23 DIAGNOSIS — F32.A DEPRESSION, UNSPECIFIED DEPRESSION TYPE: ICD-10-CM

## 2021-07-23 DIAGNOSIS — F51.01 PRIMARY INSOMNIA: ICD-10-CM

## 2021-07-23 DIAGNOSIS — G89.4 CHRONIC PAIN SYNDROME: ICD-10-CM

## 2021-07-23 DIAGNOSIS — Z11.4 SCREENING FOR HIV (HUMAN IMMUNODEFICIENCY VIRUS): ICD-10-CM

## 2021-07-23 DIAGNOSIS — Z13.0 SCREENING FOR ENDOCRINE, NUTRITIONAL, METABOLIC AND IMMUNITY DISORDER: ICD-10-CM

## 2021-07-23 DIAGNOSIS — Z13.228 SCREENING FOR ENDOCRINE, NUTRITIONAL, METABOLIC AND IMMUNITY DISORDER: ICD-10-CM

## 2021-07-23 DIAGNOSIS — Z13.220 SCREENING FOR HYPERLIPIDEMIA: ICD-10-CM

## 2021-07-23 DIAGNOSIS — Z12.11 SCREENING FOR COLON CANCER: ICD-10-CM

## 2021-07-23 DIAGNOSIS — Z00.00 ANNUAL PHYSICAL EXAM: ICD-10-CM

## 2021-07-23 DIAGNOSIS — N32.81 OVERACTIVE BLADDER: ICD-10-CM

## 2021-07-23 DIAGNOSIS — Z12.31 VISIT FOR SCREENING MAMMOGRAM: ICD-10-CM

## 2021-07-23 PROCEDURE — 99397 PER PM REEVAL EST PAT 65+ YR: CPT | Performed by: NURSE PRACTITIONER

## 2021-07-23 PROCEDURE — 99214 OFFICE O/P EST MOD 30 MIN: CPT | Mod: 25 | Performed by: NURSE PRACTITIONER

## 2021-07-23 RX ORDER — METHADONE HYDROCHLORIDE 5 MG/1
TABLET ORAL
COMMUNITY
Start: 2021-02-12 | End: 2021-08-10 | Stop reason: ALTCHOICE

## 2021-07-23 ASSESSMENT — ACTIVITIES OF DAILY LIVING (ADL): CURRENT_FUNCTION: HOUSEWORK REQUIRES ASSISTANCE

## 2021-07-23 ASSESSMENT — MIFFLIN-ST. JEOR: SCORE: 1165.72

## 2021-07-23 NOTE — TELEPHONE ENCOUNTER
Call back to Pt.    Writer lVM for Pt explaining that the provider doing her oral surgery procedure is responsible managing Pt's pain after her procedure.  Writer explained that pain can be an indication for her surgeon of infection or complication that would need to be addressed by them  The reason the pain clinic wouldn't prescribe is that the pain clinic has not assessed pt's condition and wouldn't want to get in the way of Pt healing.    Pt instructed to call her oral surgeon and discuss her pain with them, if given an Rx for pain medications then pt should call and inform pain clinic that she was prescribed a pain medication.    Efra Jones RN  Patient Care Supervisor   [facility] [place]

## 2021-07-23 NOTE — PROGRESS NOTES
"SUBJECTIVE:   Conrad Zhu is a 65 year old female who presents for Preventive Visit.    Patient has been advised of split billing requirements and indicates understanding: Yes   Are you in the first 12 months of your Medicare coverage?  No    The patient presents for her annual physical.    She reports overall feeling okay. She will be having labs done prior to her second prolia injection, and would like to have her annual labs done at the same time.    She is due for a follow up colonoscopy. She reports that her last one did not go well, she was awake the entire time. She would prefer to have a cologuard done. She has no family history of Colon Cancer.    She also reports putting off her mammogram as the last time she had one done, the imaging was painful. She has bilateral breast implants, and they can be sore at times. Encouraged her to get her mammogram done.    She reports abnormal weight gain. She is not eating much due to her appetite being poor. She reports going in cycles with this. Her weight today was 155 pounds, last year she was 139 pounds. Will check a TSH today.     She reports that she other wise is doing well. She had dental work done yesterday, so her mouth is a little sore.      Healthy Habits:     In general, how would you rate your overall health?  Good    Frequency of exercise:  None    Do you usually eat at least 4 servings of fruit and vegetables a day, include whole grains    & fiber and avoid regularly eating high fat or \"junk\" foods?  No    Taking medications regularly:  Yes    Medication side effects:  Other    Ability to successfully perform activities of daily living:  Housework requires assistance    Home Safety:  No safety concerns identified    Hearing Impairment:  No hearing concerns    In the past 6 months, have you been bothered by leaking of urine?  No    In general, how would you rate your overall mental or emotional health?  Fair      PHQ-2 Total Score: 0    Additional " concerns today:  No    Do you feel safe in your environment? Yes    Have you ever done Advance Care Planning? (For example, a Health Directive, POLST, or a discussion with a medical provider or your loved ones about your wishes): No, advance care planning information given to patient to review.  Patient plans to discuss their wishes with loved ones or provider.      Fall risk  Fallen 2 or more times in the past year?: No  Any fall with injury in the past year?: No  click delete button to remove this line now  Cognitive Screening   1) Repeat 3 items (Leader, Season, Table)    2) Clock draw: NORMAL  3) 3 item recall: Recalls 2 objects   Results: NORMAL clock, 1-2 items recalled: COGNITIVE IMPAIRMENT LESS LIKELY    Mini-CogTM Copyright VINH Joy. Licensed by the author for use in [facility]; reprinted with permission (nacho@[facility]). All rights reserved.      Do you have sleep apnea, excessive snoring or daytime drowsiness?: yes    Reviewed and updated as needed this visit by clinical staff  Tobacco  Allergies  Meds              Reviewed and updated as needed this visit by Provider                Social History     Tobacco Use     Smoking status: Former Smoker     Packs/day: 0.00     Smokeless tobacco: Never Used   Substance Use Topics     Alcohol use: No     If you drink alcohol do you typically have >3 drinks per day or >7 drinks per week? No    Alcohol Use 7/17/2021   Prescreen: >3 drinks/day or >7 drinks/week? Not Applicable     Current providers sharing in care for this patient include:   Patient Care Team:  Sofie Huitron CNP as PCP - Josafat Villela, PharmD as Pharmacist (Pharmacist)  Sofie Huitron CNP as Assigned PCP  Blanca Duarte NP as Nurse Practitioner    The following health maintenance items are reviewed in Epic and correct as of today:  Health Maintenance Due   Topic Date Due     ADVANCE CARE PLANNING  Never done     HIV SCREENING  Never done     HEPATITIS C  "SCREENING  Never done     MAMMO SCREENING  03/27/2017     LIPID  12/10/2017     PHQ-9  08/10/2020     Lab work is in process  Mammogram Screening: Mammogram Screening: Recommended mammography every 1-2 years with patient discussion and risk factor consideration  Any new diagnosis of family breast, ovarian, or bowel cancer? No  She prefers annual screening  Pertinent mammograms are reviewed under the imaging tab.    Review of Systems  Constitutional, HEENT, cardiovascular, pulmonary, GI, , musculoskeletal, neuro, skin, endocrine and psych systems are negative, except as otherwise noted.    OBJECTIVE:   BP (!) 140/70 (BP Location: Right arm, Patient Position: Sitting, Cuff Size: Adult Regular)   Pulse 76   Ht 1.511 m (4' 11.5\")   Wt 70.7 kg (155 lb 14.4 oz)   SpO2 94%   BMI 30.96 kg/m   Estimated body mass index is 30.96 kg/m  as calculated from the following:    Height as of this encounter: 1.511 m (4' 11.5\").    Weight as of this encounter: 70.7 kg (155 lb 14.4 oz).  Physical Exam  GENERAL: healthy, alert and no distress  EYES: Eyes grossly normal to inspection, PERRL and conjunctivae and sclerae normal  HENT: ear canals and TM's normal, nose and mouth without ulcers or lesions  NECK: no adenopathy, no asymmetry, masses, or scars and thyroid normal to palpation  RESP: lungs clear to auscultation - no rales, rhonchi or wheezes  BREAST: normal without masses, tenderness or nipple discharge and no palpable axillary masses or adenopathy  CV: regular rate and rhythm, normal S1 S2, no S3 or S4, no murmur, click or rub, no peripheral edema and peripheral pulses strong  ABDOMEN: soft, nontender, no hepatosplenomegaly, no masses and bowel sounds normal  MS: no gross musculoskeletal defects noted, no edema  SKIN: no suspicious lesions or rashes  NEURO: Normal strength and tone, mentation intact and speech normal  PSYCH: mentation appears normal, affect normal/bright    Labs reviewed in Epic    ASSESSMENT / PLAN: "   Conrad was seen today for annual visit and referral.    Diagnoses and all orders for this visit:  Annual physical exam: Mammogram and Cologuard ordered today. No further paps needed. Future fasted labs ordered.    Anxiety: Managed per psychiatry. She continues on Viibryd.     Depression, unspecified depression type: Lamictal and Viibryd. Stable.     Primary insomnia: She continues on Trazodone. Stable. Managed per psychiatry.     Chronic pain syndrome: She continues on Tylenol #3 and Methadone. Managed per the pain clinic. Stable.     Age related osteoporosis, unspecified pathological fracture presence: Managed per Endocrinology. She continues on Prolia, Calcium, and Vitamin D.     Overactive bladder: She continues on detrol. Stable.     Weight gain: She has gained 16 pounds in a year. Discussed diet and exercise with her today. Will check a TSH.   -     TSH with free T4 reflex; Future    Screening for endocrine, nutritional, metabolic and immunity disorder  -     Lipid panel reflex to direct LDL Fasting; Future  -     Comprehensive metabolic panel (BMP + Alb, Alk Phos, ALT, AST, Total. Bili, TP); Future  -     CBC with platelets and differential; Future    Screening for colon cancer  -     COLOGUARD(EXACT SCIENCES)    Screening for HIV (human immunodeficiency virus)  -     HIV Antigen Antibody Combo; Future    Need for hepatitis C screening test  -     Hepatitis C Screen Reflex to HCV RNA Quant and Genotype; Future    Visit for screening mammogram  -     MA SCREENING DIGITAL BILAT - Future  (s+30); Future    Screening for hyperlipidemia  -     Lipid panel reflex to direct LDL Fasting; Future    Other orders  -     REVIEW OF HEALTH MAINTENANCE PROTOCOL ORDERS    Patient has been advised of split billing requirements and indicates understanding: Yes  COUNSELING:  Reviewed preventive health counseling, as reflected in patient instructions       Regular exercise       Healthy diet/nutrition       Osteoporosis  "prevention/bone health    Estimated body mass index is 30.96 kg/m  as calculated from the following:    Height as of this encounter: 1.511 m (4' 11.5\").    Weight as of this encounter: 70.7 kg (155 lb 14.4 oz).    Weight management plan: Discussed healthy diet and exercise guidelines    She reports that she has quit smoking. She smoked 0.00 packs per day. She has never used smokeless tobacco.      Appropriate preventive services were discussed with this patient, including applicable screening as appropriate for cardiovascular disease, diabetes, osteopenia/osteoporosis, and glaucoma.  As appropriate for age/gender, discussed screening for colorectal cancer, prostate cancer, breast cancer, and cervical cancer. Checklist reviewing preventive services available has been given to the patient.    Reviewed patients plan of care and provided an AVS. The Intermediate Care Plan ( asthma action plan, low back pain action plan, and migraine action plan) for Conrad meets the Care Plan requirement. This Care Plan has been established and reviewed with the Patient.    Counseling Resources:  ATP IV Guidelines  Pooled Cohorts Equation Calculator  Breast Cancer Risk Calculator  Breast Cancer: Medication to Reduce Risk  FRAX Risk Assessment  ICSI Preventive Guidelines  Dietary Guidelines for Americans, 2010  USDA's MyPlate  ASA Prophylaxis  Lung CA Screening    Sofie Huitron CNP  [facility]    Identified Health Risks:  "

## 2021-07-23 NOTE — TELEPHONE ENCOUNTER
Reason for call:  Medication   If this is a refill request, has the caller requested the refill from the pharmacy already? Unknown  Will the patient be using a [facility]? N/A      Phone number to reach patient:  Other phone number:  [phone]    Best Time:  Anytime    Can we leave a detailed message on this number?  YES    Travel screening: Not Applicable       Patient had called in stating that she had oral surgery and wants to talk to Dr. Ruiz pain team about her need for medication. Did not state what medication is needed nor what her question was. Vidhya Johnson, ATC

## 2021-07-27 ENCOUNTER — ALLIED HEALTH/NURSE VISIT (OUTPATIENT)
Dept: ENDOCRINOLOGY | Facility: CLINIC | Age: 66
End: 2021-07-27
Payer: COMMERCIAL

## 2021-07-27 DIAGNOSIS — M81.0 OSTEOPOROSIS: Primary | ICD-10-CM

## 2021-07-27 PROCEDURE — 96372 THER/PROPH/DIAG INJ SC/IM: CPT | Performed by: PHARMACIST

## 2021-07-27 PROCEDURE — 99207 PR NO CHARGE NURSE ONLY: CPT

## 2021-07-27 NOTE — PROGRESS NOTES
Started Prolia 7/2014.Beulah Huston RN      Indication: Prolia  (denosumab) is a prescription medicine used to treat osteoporosis in patients who:     Are at high risk for fracture, meaning patients who have had a fracture related to osteoporosis, or who have multiple risk factors for fracture     Cannot use another osteoporosis medicine or other osteoporosis medicines did not work well   The timeline for early/late injections would be 4 weeks early and any time after the 6 month lucie. If a patient receives their injection late, then the subsequent injection would be 6 months from the date that they actually received the injection    1.  When was the last injection?  1/26/21  2.  Did they check with their insurance for this calendar year?  yes  3.  Is there an order in the chart?  yes  4.  Has the patient had dental work involving the bone in the past month or will have work in the next 6 months?  no  5.  Did you have the patient wait 15 minutes after the injection?  na  6.  Remember to use .injection under the medication notes    The following steps were completed to comply with the REMS program for Prolia:    Reviewed information in the Medication Guide and Patient Counseling Chart, including the serious risks of Prolia  and the symptoms of each risk.    Advised patient to seek prompt medical attention if they have signs or symptoms of any of the serious risks.  Provided each patient a copy of the Medication Guide and Patient Brochure.  The following medication was given:     MEDICATION: Denosumab (Prolia) 60 mg/ml SOLN  ROUTE: SQ  SITE: Arm - Left  DOSE: 60 mg   LOT #: [id number]  :  [employer]  EXPIRATION DATE:  10/2023  NDC#: see JOSE Huston RN

## 2021-08-03 PROBLEM — L89.623: Status: RESOLVED | Noted: 2018-03-29 | Resolved: 2021-03-18

## 2021-08-04 NOTE — PROGRESS NOTES
"PAIN CENTER PROGRESS NOTE    Subjective:   Conrad Zhu is a 65 y.o. 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  Consult was 04/28/20.    Major issues:  1. Chronic, continuous use of opioids    2. Chronic pain syndrome    3. Complex regional pain syndrome i of left lower limb    4. Migraine without aura and without status migrainosus, not intractable      Pain location and description: See CMA note   Radiation of pain: lower back to bilateral hips  Gait disturbance: Denies recent falls, antalgic. Uses a cane prn.  Exacerbating factors: Standing, sitting, walking, laying down, reaching, twisting, lifting, squatting, bending, stretching, going up and down stairs, riding in the car, getting out of car or bed  Alleviating factors: Medications, ice, rest.  Associated symptoms: Chronic LLE weakness, numbness in legs, swelling in left ankle/calf.  Denies fever/chills, rash, bowel or bladder incontinence.  Functional pain symptoms: See CMA note.  Adverse effects of medications: Xerostomia using biotene and regular dental appointments. Denies drowsiness, itching, nausea, constipation.    Current treatment efficacy: Fair.  See medication list.  Current treatment compliance: Taking medication as prescribed, keeping appointments as scheduled.    Since the last Pain Center visit, she had urgent care visit on 07/10/21 due to right foot injury reviewed today:  \"ASSESSMENT:  1. Right foot pain     PLAN:  Reviewed x-rays. Recommeded conservative cares including rest, ice application, elevation and OTC analgesics. She was given a postop shoe. Advised patient to be cautious while wearing this since she does have chronic left lower leg pain, weakness, decreased range of motion. Should be seen if no improvement in the next " "couple days or sooner if worse. Consider repeat x-ray given history of osteoporosis. She was agreeable with the plan and expressed understanding.    Sean Haskins PA-C\"    She is done using the soft shoe after a few days, right foot is recovered fully.      She had physical with Dr. Huitron on 07/23/2021 reviewed:  \"  ASSESSMENT / PLAN:   Conrad was seen today for annual visit and referral.     Diagnoses and all orders for this visit:  Annual physical exam: Mammogram and Cologuard ordered today. No further paps needed. Future fasted labs ordered.     Anxiety: Managed per psychiatry. She continues on Viibryd.      Depression, unspecified depression type: Lamictal and Viibryd. Stable.      Primary insomnia: She continues on Trazodone. Stable. Managed per psychiatry.      Chronic pain syndrome: She continues on Tylenol #3 and Methadone. Managed per the pain clinic. Stable.      Age related osteoporosis, unspecified pathological fracture presence: Managed per Endocrinology. She continues on Prolia, Calcium, and Vitamin D.      Overactive bladder: She continues on detrol. Stable.      Weight gain: She has gained 16 pounds in a year. Discussed diet and exercise with her today. Will check a TSH.   -     TSH with free T4 reflex; Future     Screening for endocrine, nutritional, metabolic and immunity disorder  -     Lipid panel reflex to direct LDL Fasting; Future  -     Comprehensive metabolic panel (BMP + Alb, Alk Phos, ALT, AST, Total. Bili, TP); Future  -     CBC with platelets and differential; Future     Screening for colon cancer  -     COLOGUARD(EXACT SCIENCES)     Screening for HIV (human immunodeficiency virus)  -     HIV Antigen Antibody Combo; Future     Need for hepatitis C screening test  -     Hepatitis C Screen Reflex to HCV RNA Quant and Genotype; Future     Visit for screening mammogram  -     MA SCREENING DIGITAL BILAT - Future  (s+30); Future     Screening for hyperlipidemia  -     Lipid panel reflex " "to direct LDL Fasting; Future     Other orders  -     REVIEW OF HEALTH MAINTENANCE PROTOCOL ORDERS     Patient has been advised of split billing requirements and indicates understanding: Yes  COUNSELING:  Reviewed preventive health counseling, as reflected in patient instructions       Regular exercise       Healthy diet/nutrition       Osteoporosis prevention/bone health     Estimated body mass index is 30.96 kg/m  as calculated from the following:    Height as of this encounter: 1.511 m (4' 11.5\").    Weight as of this encounter: 70.7 kg (155 lb 14.4 oz).     Weight management plan: Discussed healthy diet and exercise guidelines\"    She hasn't completed labs yet, reminder today.  She is scheduled for mammogram.  She also saw oral surgeon and she states she had to have a filling and has to use a new gel every night for dry mouth along with biotene products.  She was prescribed T#4 every 4-6 hours for a few days after and refilled #15 tabs from [facility] D[facility] and reduced her morphine to 1 dose during that time.      States her left foot pain feels like a blood pressure cuff wrapped around the leg tight.  Denies burning, no new numbness, no new weakness. We review option for trileptal; she has concerns this will cause weight gain and leg edema.  She has failed multiple neuropathics.  She states she feels good control of her pain symptoms.      States pain relief is good with current medications MSER 15 mg every 12 hours and T#4  6 tabs a day.  Denies side effects.  Today pain rated 5/10 feels good control.  States other days when pain is highest it is 8/10 felt tight.  Has more good days than bad days with current plan.  Pain usually depends how much she is walking on it and trying to do something every day.  She states she knows her limits doesn't go to mall.  She states she gets tired within 45 min -1 hour of walking.  She is not using cane or walker.  Trying to be mindful of her posture and her gait.  She " "asks where she would go to PT.  She had PT for 1.5 years after her initial hospitalization and interested in returning.      She continues psychotherapy monthly. She is seeing psychiatrist every 3 months.   States her [place] to [place] next week and asks if she can refill a few days early in order to have medication while away.      Review of Systems  Constitutional: Sleep interruption due to pain.  Denies fever, chills, night sweats, lethargy, weight gain  Musculoskeletal: Positive for low back pain, left hip and foot joint pain.  Denies joint swelling, recent falls.  Gastrointestional: Denies difficulty swallowing, change in appetite, abdominal pain, constipation, nausea, vomiting,  GERD, fecal incontinence.  Genitourinary: Denies urinary incontinence, dysuria, hematuria, UTI, frequency, hesitancy, change in libido  Neurologic: Headaches, migraines.  Denies confusion, seizure, weakness, changes in balance, changes in speech.  Psychiatric: Depression, anxiety.  Denies memory loss, psychoses, suicidal ideation, substance use/abuse.     Objective:   /74 (BP Location: Left arm)   Pulse 86   Resp 16   Ht 1.511 m (4' 11.5\")   Wt 65.8 kg (145 lb)   BMI 28.80 kg/m      Constitutional-General:  Well nourished, well developed, well groomed, healthy appearing individual.  Weight is overweight, appears stated age and presents alone.  Psych-Mental Status: A & O in no acute distress. Speech is fluent. Thought process normal. Recent and remote memory are intact.  Attention span and concentration are normal. Displays appropriate mood and affect.   H,E,N,T- Airway is patent, unlabored respiratory effort.  Derm - Exposed skin CDI.  Musckuloskeletal -  Gait:  Gait is abnormal.  Ambulates independently with antalgia and everted left foot.    *Opioid [place] Precautions:    UDT -  10/07/2020  Opioid Agreement - 06/2021  Pharmacy- as documented    [place]  Reviewed - " 08/05/2021 as expected   Pill Count - n/a  Psychological evaluation - outside provider  MME - 84  Pharmacogenetic testing - N/A    Labs:    Imaging:  CT Hip Without Contrast Left 01/11/2020  IMPRESSION:   1.  Postoperative changes of short IM dima and dynamic screw fixation of an intertrochanteric fracture of the left hip. The fracture is in good anatomic alignment. There is evidence of some new bone formation along the superolateral margin of the   fracture, but it is still visualized along the majority of its course.  2.  Ununited and unfixed facture fragment involving the lesser trochanter. This was seen on the original film 11/11/2019 and has not significantly changed.  3.  Superimposed degenerative change left hip joint.  4.  No evidence for fracture or migration of the fixation appliances.  5.  Exam otherwise negative and unchanged.     MRI Ankle right without contrast 07/06/2019     MRI Cervical, thoracic, and lumbar spine without contrast 10/13/17:  IMPRESSION:   CONCLUSION:  MRI CERVICAL SPINE:  1.  Mild to moderate degenerative changes most pronounced C5-C6 and C6-C7 where there is mild spinal canal and mild to moderate foraminal narrowing.  2.  No high-grade spinal canal narrowing, spinal cord compression, or spinal cord signal abnormality.     MRI THORACIC SPINE:  1.  Minor degenerative changes. No stenosis.  2.  Normal spinal cord.     MRI LUMBAR SPINE:  1.  Normal spinal cord and conus. No high-grade central spinal canal stenosis in the lumbar spine.  2.  Interval changes of laminectomy on the left at L4-L5. Adequate central canal at this level. Moderate left and mild right foraminal narrowing unchanged.  3.  At L3-L4 a left-sided herniation narrows the lateral recess, unchanged from prior. The disc bulge also contacts and slightly displaces the exiting L3 nerve root on the left, not changed.  4.  At L2-L3 a left-sided herniation contacts and may slightly displace the traversing nerve root,  unchanged.     Assessment:   Conrad Zhu is a 65 y.o. female with visit today for multi-site pain in left leg/foot secondary to mononeuritis multiplex from rhabdo in 2013, lumbar stenosis and history of L4 hemilaminectomy, and left hip pain secondary to fracture and surgery 11/2019 following [facility].  She failed a trial of Butrans patch/Belbuca films and has had significant nausea and abdominal symptoms as side effect.   Trial of fentanyl 12 mcg/hr did not help pain, review that an increase to 25 mcg/hr was not recommended as it would exceed CDC guideline MME with her T#4.  She recently failed methadone as 5 mg every 12 hour caused headaches.  Discuss continuing MSER 15 mg every 12 hours and T#4 up to 6 tabs/day, finds this works best for her pain control without side effects.  May consider pill count to ensure compliance in the near future, has requested early fill for travel.  Have discussed trileptal trial for nerve pain control, she will consider. Have discussed option for lumbar sympathetic block and she is hesitant on procedures and idea of SCS trial. She is referred to [facility] for evaluation and treatment of left lower extremity pain, weakness, balance, and gait to assist with walking and activity tolerance.     Plan:   Continue Morphine ER take 15 mg in the am and pm   Continue T#4 2 tabs in the am and 2 tabs in afternoon and 2 tabs at bedtime.  #180 tabs to last at least 30 days.     As your opioid dose remains stable, I will send electronic refills to the pharmacy for 2 subsequent months until your next appointment so you do not have to call our refill line.  The fill dates for your medications are:  Refill 08/11/21 due to travel to start on 08/14/21 & 09/13/2021  Check with your pharmacy that all prescriptions are on your profile. Call the pharmacy a week before you want to fill the prescription as stock may vary and the pharmacy may need to order the medication for you. I reserve the  right to cancel the electronic prescription at the pharmacy in between appointments and would contact you with an explanation if this were to occur.  Continue with psychiatrist and psychologist for behavioral health plan.    Referral to [facility] in [place] to evaluate posture, gait, strength, balance, etc.  Call to make appointment.    Follow-up with Sherron in 8 weeks in office     Sherron Ruiz PA-C  [facility]   [address]  [place] [address]  Ph: [phone]  Fax: [phone]    36 minutes spent on the date of the encounter doing chart review, history and exam, documentation, and further activities.

## 2021-08-05 ENCOUNTER — OFFICE VISIT (OUTPATIENT)
Dept: PALLIATIVE MEDICINE | Facility: OTHER | Age: 66
End: 2021-08-05
Payer: COMMERCIAL

## 2021-08-05 VITALS
RESPIRATION RATE: 16 BRPM | BODY MASS INDEX: 28.47 KG/M2 | HEART RATE: 86 BPM | DIASTOLIC BLOOD PRESSURE: 74 MMHG | HEIGHT: 60 IN | WEIGHT: 145 LBS | SYSTOLIC BLOOD PRESSURE: 130 MMHG

## 2021-08-05 DIAGNOSIS — F11.90 CHRONIC, CONTINUOUS USE OF OPIOIDS: ICD-10-CM

## 2021-08-05 DIAGNOSIS — G89.4 CHRONIC PAIN SYNDROME: ICD-10-CM

## 2021-08-05 DIAGNOSIS — G90.522 COMPLEX REGIONAL PAIN SYNDROME I OF LEFT LOWER LIMB: Primary | ICD-10-CM

## 2021-08-05 PROCEDURE — G0463 HOSPITAL OUTPT CLINIC VISIT: HCPCS

## 2021-08-05 PROCEDURE — 99214 OFFICE O/P EST MOD 30 MIN: CPT | Performed by: PHYSICIAN ASSISTANT

## 2021-08-05 RX ORDER — SODIUM FLUORIDE 1.1 G/100G
GEL ORAL
COMMUNITY
Start: 2021-07-31 | End: 2023-06-12

## 2021-08-05 RX ORDER — ACETAMINOPHEN AND CODEINE PHOSPHATE 300; 60 MG/1; MG/1
1-2 TABLET ORAL EVERY 8 HOURS PRN
Qty: 180 TABLET | Refills: 1 | Status: SHIPPED | OUTPATIENT
Start: 2021-08-14 | End: 2021-10-05

## 2021-08-05 ASSESSMENT — MIFFLIN-ST. JEOR: SCORE: 1116.28

## 2021-08-05 ASSESSMENT — PAIN SCALES - GENERAL: PAINLEVEL: MODERATE PAIN (5)

## 2021-08-05 NOTE — PATIENT INSTRUCTIONS
Continue Morphine ER take 15 mg in the am and pm   Continue T#4 2 tabs in the am and 2 tabs in afternoon and 2 tabs at bedtime.  #180 tabs to last at least 30 days.     As your opioid dose remains stable, I will send electronic refills to the pharmacy for 2 subsequent months until your next appointment so you do not have to call our refill line.  The fill dates for your medications are:  Refill 08/11/21 due to travel to start on 08/14/21 & 09/13/2021  Check with your pharmacy that all prescriptions are on your profile. Call the pharmacy a week before you want to fill the prescription as stock may vary and the pharmacy may need to order the medication for you. I reserve the right to cancel the electronic prescription at the pharmacy in between appointments and would contact you with an explanation if this were to occur.  Continue with psychiatrist and psychologist for behavioral health plan.    Referral to [facility] in [place] to evaluate posture, gait, strength, balance, etc.  Call to make appointment.    Follow-up with Sherron in 8 weeks in office [facility].

## 2021-08-05 NOTE — LETTER
"    8/5/2021         [place]: [place]  [address] [address] [address]        Dear Colleague,    Thank you for referring your patient, Conrad Zhu, to the [facility]. Please see a copy of my visit note below.    PAIN CENTER PROGRESS NOTE    Subjective:   Conrad Zhu is a 65 y.o. 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  Consult was 04/28/20.    Major issues:  1. Chronic, continuous use of opioids    2. Chronic pain syndrome    3. Complex regional pain syndrome i of left lower limb    4. Migraine without aura and without status migrainosus, not intractable      Pain location and description: See CMA note   Radiation of pain: lower back to bilateral hips  Gait disturbance: Denies recent falls, antalgic. Uses a cane prn.  Exacerbating factors: Standing, sitting, walking, laying down, reaching, twisting, lifting, squatting, bending, stretching, going up and down stairs, riding in the car, getting out of car or bed  Alleviating factors: Medications, ice, rest.  Associated symptoms: Chronic LLE weakness, numbness in legs, swelling in left ankle/calf.  Denies fever/chills, rash, bowel or bladder incontinence.  Functional pain symptoms: See CMA note.  Adverse effects of medications: Xerostomia using biotene and regular dental appointments. Denies drowsiness, itching, nausea, constipation.    Current treatment efficacy: Fair.  See medication list.  Current treatment compliance: Taking medication as prescribed, keeping appointments as scheduled.    Since the last Pain Center visit, she had urgent care visit on 07/10/21 due to right foot injury reviewed today:  \"ASSESSMENT:  1. Right foot pain     PLAN:  Reviewed x-rays. Recommeded conservative cares including rest, ice " "application, elevation and OTC analgesics. She was given a postop shoe. Advised patient to be cautious while wearing this since she does have chronic left lower leg pain, weakness, decreased range of motion. Should be seen if no improvement in the next couple days or sooner if worse. Consider repeat x-ray given history of osteoporosis. She was agreeable with the plan and expressed understanding.    Sean Haskins PA-C\"    She is done using the soft shoe after a few days, right foot is recovered fully.      She had physical with Dr. Huitron on 07/23/2021 reviewed:  \"  ASSESSMENT / PLAN:   Conrad was seen today for annual visit and referral.     Diagnoses and all orders for this visit:  Annual physical exam: Mammogram and Cologuard ordered today. No further paps needed. Future fasted labs ordered.     Anxiety: Managed per psychiatry. She continues on Viibryd.      Depression, unspecified depression type: Lamictal and Viibryd. Stable.      Primary insomnia: She continues on Trazodone. Stable. Managed per psychiatry.      Chronic pain syndrome: She continues on Tylenol #3 and Methadone. Managed per the pain clinic. Stable.      Age related osteoporosis, unspecified pathological fracture presence: Managed per Endocrinology. She continues on Prolia, Calcium, and Vitamin D.      Overactive bladder: She continues on detrol. Stable.      Weight gain: She has gained 16 pounds in a year. Discussed diet and exercise with her today. Will check a TSH.   -     TSH with free T4 reflex; Future     Screening for endocrine, nutritional, metabolic and immunity disorder  -     Lipid panel reflex to direct LDL Fasting; Future  -     Comprehensive metabolic panel (BMP + Alb, Alk Phos, ALT, AST, Total. Bili, TP); Future  -     CBC with platelets and differential; Future     Screening for colon cancer  -     COLOGUARD(EXACT SCIENCES)     Screening for HIV (human immunodeficiency virus)  -     HIV Antigen Antibody Combo; Future     Need " "for hepatitis C screening test  -     Hepatitis C Screen Reflex to HCV RNA Quant and Genotype; Future     Visit for screening mammogram  -     MA SCREENING DIGITAL BILAT - Future  (s+30); Future     Screening for hyperlipidemia  -     Lipid panel reflex to direct LDL Fasting; Future     Other orders  -     REVIEW OF HEALTH MAINTENANCE PROTOCOL ORDERS     Patient has been advised of split billing requirements and indicates understanding: Yes  COUNSELING:  Reviewed preventive health counseling, as reflected in patient instructions       Regular exercise       Healthy diet/nutrition       Osteoporosis prevention/bone health     Estimated body mass index is 30.96 kg/m  as calculated from the following:    Height as of this encounter: 1.511 m (4' 11.5\").    Weight as of this encounter: 70.7 kg (155 lb 14.4 oz).     Weight management plan: Discussed healthy diet and exercise guidelines\"    She hasn't completed labs yet, reminder today.  She is scheduled for mammogram.  She also saw oral surgeon and she states she had to have a filling and has to use a new gel every night for dry mouth along with biotene products.  She was prescribed T#4 every 4-6 hours for a few days after and refilled #15 tabs from [facility] D[facility] and reduced her morphine to 1 dose during that time.      States her left foot pain feels like a blood pressure cuff wrapped around the leg tight.  Denies burning, no new numbness, no new weakness. We review option for trileptal; she has concerns this will cause weight gain and leg edema.  She has failed multiple neuropathics.  She states she feels good control of her pain symptoms.      States pain relief is good with current medications MSER 15 mg every 12 hours and T#4  6 tabs a day.  Denies side effects.  Today pain rated 5/10 feels good control.  States other days when pain is highest it is 8/10 felt tight.  Has more good days than bad days with current plan.  Pain usually depends how much she is " "walking on it and trying to do something every day.  She states she knows her limits doesn't go to mall.  She states she gets tired within 45 min -1 hour of walking.  She is not using cane or walker.  Trying to be mindful of her posture and her gait.  She asks where she would go to PT.  She had PT for 1.5 years after her initial hospitalization and interested in returning.      She continues psychotherapy monthly. She is seeing psychiatrist every 3 months.   States her [place] to [place] next week and asks if she can refill a few days early in order to have medication while away.      Review of Systems  Constitutional: Sleep interruption due to pain.  Denies fever, chills, night sweats, lethargy, weight gain  Musculoskeletal: Positive for low back pain, left hip and foot joint pain.  Denies joint swelling, recent falls.  Gastrointestional: Denies difficulty swallowing, change in appetite, abdominal pain, constipation, nausea, vomiting,  GERD, fecal incontinence.  Genitourinary: Denies urinary incontinence, dysuria, hematuria, UTI, frequency, hesitancy, change in libido  Neurologic: Headaches, migraines.  Denies confusion, seizure, weakness, changes in balance, changes in speech.  Psychiatric: Depression, anxiety.  Denies memory loss, psychoses, suicidal ideation, substance use/abuse.     Objective:   /74 (BP Location: Left arm)   Pulse 86   Resp 16   Ht 1.511 m (4' 11.5\")   Wt 65.8 kg (145 lb)   BMI 28.80 kg/m      Constitutional-General:  Well nourished, well developed, well groomed, healthy appearing individual.  Weight is overweight, appears stated age and presents alone.  Psych-Mental Status: A & O in no acute distress. Speech is fluent. Thought process normal. Recent and remote memory are intact.  Attention span and concentration are normal. Displays appropriate mood and affect.   H,E,N,T- Airway is patent, unlabored respiratory effort.  Derm " - Exposed skin CDI.  Musckuloskeletal -  Gait:  Gait is abnormal.  Ambulates independently with antalgia and everted left foot.    *Opioid [place] Precautions:    UDT -  10/07/2020  Opioid Agreement - 06/2021  Pharmacy- as documented    [place]  Reviewed - 08/05/2021 as expected   Pill Count - n/a  Psychological evaluation - outside provider  MME - 84  Pharmacogenetic testing - N/A    Labs:    Imaging:  CT Hip Without Contrast Left 01/11/2020  IMPRESSION:   1.  Postoperative changes of short IM dima and dynamic screw fixation of an intertrochanteric fracture of the left hip. The fracture is in good anatomic alignment. There is evidence of some new bone formation along the superolateral margin of the   fracture, but it is still visualized along the majority of its course.  2.  Ununited and unfixed facture fragment involving the lesser trochanter. This was seen on the original film 11/11/2019 and has not significantly changed.  3.  Superimposed degenerative change left hip joint.  4.  No evidence for fracture or migration of the fixation appliances.  5.  Exam otherwise negative and unchanged.     MRI Ankle right without contrast 07/06/2019     MRI Cervical, thoracic, and lumbar spine without contrast 10/13/17:  IMPRESSION:   CONCLUSION:  MRI CERVICAL SPINE:  1.  Mild to moderate degenerative changes most pronounced C5-C6 and C6-C7 where there is mild spinal canal and mild to moderate foraminal narrowing.  2.  No high-grade spinal canal narrowing, spinal cord compression, or spinal cord signal abnormality.     MRI THORACIC SPINE:  1.  Minor degenerative changes. No stenosis.  2.  Normal spinal cord.     MRI LUMBAR SPINE:  1.  Normal spinal cord and conus. No high-grade central spinal canal stenosis in the lumbar spine.  2.  Interval changes of laminectomy on the left at L4-L5. Adequate central canal at this level. Moderate left and mild right foraminal narrowing unchanged.  3.  At L3-L4 a left-sided herniation narrows  the lateral recess, unchanged from prior. The disc bulge also contacts and slightly displaces the exiting L3 nerve root on the left, not changed.  4.  At L2-L3 a left-sided herniation contacts and may slightly displace the traversing nerve root, unchanged.     Assessment:   Conrad Zhu is a 65 y.o. female with visit today for multi-site pain in left leg/foot secondary to mononeuritis multiplex from rhabdo in 2013, lumbar stenosis and history of L4 hemilaminectomy, and left hip pain secondary to fracture and surgery 11/2019 following [facility].  She failed a trial of Butrans patch/Belbuca films and has had significant nausea and abdominal symptoms as side effect.   Trial of fentanyl 12 mcg/hr did not help pain, review that an increase to 25 mcg/hr was not recommended as it would exceed CDC guideline MME with her T#4.  She recently failed methadone as 5 mg every 12 hour caused headaches.  Discuss continuing MSER 15 mg every 12 hours and T#4 up to 6 tabs/day, finds this works best for her pain control without side effects.  May consider pill count to ensure compliance in the near future, has requested early fill for travel.  Have discussed trileptal trial for nerve pain control, she will consider. Have discussed option for lumbar sympathetic block and she is hesitant on procedures and idea of SCS trial. She is referred to [facility] for evaluation and treatment of left lower extremity pain, weakness, balance, and gait to assist with walking and activity tolerance.     Plan:   Continue Morphine ER take 15 mg in the am and pm   Continue T#4 2 tabs in the am and 2 tabs in afternoon and 2 tabs at bedtime.  #180 tabs to last at least 30 days.     As your opioid dose remains stable, I will send electronic refills to the pharmacy for 2 subsequent months until your next appointment so you do not have to call our refill line.  The fill dates for your medications are:  Refill 08/11/21 due to travel to start on  08/14/21 & 09/13/2021  Check with your pharmacy that all prescriptions are on your profile. Call the pharmacy a week before you want to fill the prescription as stock may vary and the pharmacy may need to order the medication for you. I reserve the right to cancel the electronic prescription at the pharmacy in between appointments and would contact you with an explanation if this were to occur.  Continue with psychiatrist and psychologist for behavioral health plan.    Referral to [facility] in [place] to evaluate posture, gait, strength, balance, etc.  Call to make appointment.    Follow-up with Sherron in 8 weeks in office     Sherron Ruiz PA-C  [facility]   [address]  [place] [address]  Ph: [phone]  Fax: [phone]    36 minutes spent on the date of the encounter doing chart review, history and exam, documentation, and further activities.          Patient presents to the clinic today for a follow up with Sherron Ruiz PA-C.     Pain score: 5  What does your pain feel like: constant ache and numbness  Does the pain interfere with:  Work: no  Walking/distance: yes  Sleep: yes  Daily activities: yes  Relationships/social life: yes  Mood: yes  F= 8      Again, thank you for allowing me to participate in the care of your patient.        Sincerely,        Sherron Ruiz PA-C

## 2021-08-09 ENCOUNTER — TELEPHONE (OUTPATIENT)
Dept: PALLIATIVE MEDICINE | Facility: OTHER | Age: 66
End: 2021-08-09

## 2021-08-09 DIAGNOSIS — G90.522 COMPLEX REGIONAL PAIN SYNDROME I OF LEFT LOWER LIMB: Primary | ICD-10-CM

## 2021-08-09 DIAGNOSIS — G89.4 CHRONIC PAIN SYNDROME: ICD-10-CM

## 2021-08-09 RX ORDER — MORPHINE SULFATE 15 MG/1
15 TABLET, FILM COATED, EXTENDED RELEASE ORAL EVERY 12 HOURS
Qty: 60 TABLET | Refills: 0 | Status: CANCELLED | OUTPATIENT
Start: 2021-08-14 | End: 2021-09-13

## 2021-08-09 NOTE — TELEPHONE ENCOUNTER
Patient leaves a message on refill line stating that there is something wrong with her Morphine Rx at the pharmacy, they states its different than before so do not want to dispense it.  RN spoke to pharmacy and pharmacist states that this Morphine is morphabond not typical ER Morphine.  Will need to re-queue and re-send per pharmacist because insurance will not pay for this medication.    Patient is not due to  until 8/11/21 for start on 8/14/21 so will send to provider to review correct medication was queued.    Pending Prescriptions:                       Disp   Refills    morphine (MS CONTIN) 15 MG CR tablet      60 tab*0            Sig: Take 1 tablet (15 mg) by mouth every 12 hours    morphine (MS CONTIN) 15 MG CR tablet      60 tab*0            Sig: Take 1 tablet (15 mg) by mouth every 12 hours

## 2021-08-10 RX ORDER — MORPHINE SULFATE 15 MG/1
15 TABLET, FILM COATED, EXTENDED RELEASE ORAL EVERY 12 HOURS
Qty: 60 TABLET | Refills: 0 | Status: SHIPPED | OUTPATIENT
Start: 2021-09-13 | End: 2021-10-05

## 2021-08-10 RX ORDER — MORPHINE SULFATE 15 MG/1
15 TABLET, FILM COATED, EXTENDED RELEASE ORAL EVERY 12 HOURS
Qty: 60 TABLET | Refills: 0 | Status: SHIPPED | OUTPATIENT
Start: 2021-08-14 | End: 2021-09-13

## 2021-10-05 DIAGNOSIS — G89.4 CHRONIC PAIN SYNDROME: ICD-10-CM

## 2021-10-05 DIAGNOSIS — G90.522 COMPLEX REGIONAL PAIN SYNDROME I OF LEFT LOWER LIMB: ICD-10-CM

## 2021-10-05 RX ORDER — MORPHINE SULFATE 15 MG/1
15 TABLET, FILM COATED, EXTENDED RELEASE ORAL EVERY 12 HOURS
Qty: 60 TABLET | Refills: 0 | Status: SHIPPED | OUTPATIENT
Start: 2021-10-13 | End: 2021-10-28

## 2021-10-05 RX ORDER — ACETAMINOPHEN AND CODEINE PHOSPHATE 300; 60 MG/1; MG/1
1-2 TABLET ORAL EVERY 8 HOURS PRN
Qty: 180 TABLET | Refills: 0 | Status: SHIPPED | OUTPATIENT
Start: 2021-10-12 | End: 2021-10-28

## 2021-10-05 NOTE — TELEPHONE ENCOUNTER
Received call from patient requesting refill tylenol 300/60 mg and morphine 15 mg CR    Last dispensed from pharmacy on   Tylenol: 9/13/21 for 30 days  Morphine: 9/13/21 for 30 days    Patient's last office/virtual visit by prescribing provider on 8/5/21  Next office/virtual appointment scheduled for 10/28/21-ensure this is an in clinic visit as patient is due for a UDT    CSA signed 06/2021   UDT done 10/07/2020.    Pending Prescriptions:                       Disp   Refills    morphine (MS CONTIN) 15 MG CR tablet      60 tab*0            Sig: Take 1 tablet (15 mg) by mouth every 12 hours    acetaminophen-codeine 300-60 MG TABS      180 ta*1            Sig: Take 1-2 tablets by mouth every 8 hours as needed           (max of 6/day)    [facility]

## 2021-10-11 ENCOUNTER — HEALTH MAINTENANCE LETTER (OUTPATIENT)
Age: 66
End: 2021-10-11

## 2021-10-12 ENCOUNTER — MYC MEDICAL ADVICE (OUTPATIENT)
Dept: INTERNAL MEDICINE | Facility: CLINIC | Age: 66
End: 2021-10-12

## 2021-10-19 ENCOUNTER — HOSPITAL ENCOUNTER (OUTPATIENT)
Dept: MAMMOGRAPHY | Facility: CLINIC | Age: 66
Discharge: HOME | End: 2021-10-19
Attending: NURSE PRACTITIONER | Admitting: NURSE PRACTITIONER
Payer: COMMERCIAL

## 2021-10-19 DIAGNOSIS — Z12.31 VISIT FOR SCREENING MAMMOGRAM: ICD-10-CM

## 2021-10-19 PROCEDURE — 77063 BREAST TOMOSYNTHESIS BI: CPT

## 2021-10-28 ENCOUNTER — OFFICE VISIT (OUTPATIENT)
Dept: PALLIATIVE MEDICINE | Facility: OTHER | Age: 66
End: 2021-10-28
Payer: COMMERCIAL

## 2021-10-28 VITALS — SYSTOLIC BLOOD PRESSURE: 141 MMHG | HEART RATE: 73 BPM | DIASTOLIC BLOOD PRESSURE: 67 MMHG

## 2021-10-28 DIAGNOSIS — G90.522 COMPLEX REGIONAL PAIN SYNDROME I OF LEFT LOWER LIMB: ICD-10-CM

## 2021-10-28 DIAGNOSIS — F11.90 CHRONIC, CONTINUOUS USE OF OPIOIDS: ICD-10-CM

## 2021-10-28 DIAGNOSIS — G89.4 CHRONIC PAIN SYNDROME: Primary | ICD-10-CM

## 2021-10-28 LAB
CANNABINOIDS UR QL SCN: NORMAL
CREAT UR-MCNC: 33 MG/DL
CREAT UR-MCNC: 33 MG/DL

## 2021-10-28 PROCEDURE — 80307 DRUG TEST PRSMV CHEM ANLYZR: CPT | Performed by: PHYSICIAN ASSISTANT

## 2021-10-28 PROCEDURE — 80320 DRUG SCREEN QUANTALCOHOLS: CPT | Performed by: PHYSICIAN ASSISTANT

## 2021-10-28 PROCEDURE — 99213 OFFICE O/P EST LOW 20 MIN: CPT | Performed by: PHYSICIAN ASSISTANT

## 2021-10-28 PROCEDURE — G0463 HOSPITAL OUTPT CLINIC VISIT: HCPCS

## 2021-10-28 PROCEDURE — 82570 ASSAY OF URINE CREATININE: CPT | Mod: XU | Performed by: PHYSICIAN ASSISTANT

## 2021-10-28 RX ORDER — MORPHINE SULFATE 15 MG/1
15 TABLET, FILM COATED, EXTENDED RELEASE ORAL EVERY 12 HOURS
Qty: 60 TABLET | Refills: 0 | Status: SHIPPED | OUTPATIENT
Start: 2021-11-12 | End: 2021-12-12

## 2021-10-28 RX ORDER — ACETAMINOPHEN AND CODEINE PHOSPHATE 300; 60 MG/1; MG/1
1-2 TABLET ORAL EVERY 8 HOURS PRN
Qty: 180 TABLET | Refills: 1 | Status: SHIPPED | OUTPATIENT
Start: 2021-11-09 | End: 2021-12-22

## 2021-10-28 RX ORDER — MORPHINE SULFATE 15 MG/1
15 TABLET, FILM COATED, EXTENDED RELEASE ORAL EVERY 12 HOURS
Qty: 60 TABLET | Refills: 0 | Status: SHIPPED | OUTPATIENT
Start: 2021-12-12 | End: 2022-01-06

## 2021-10-28 RX ORDER — LAMOTRIGINE 100 MG/1
100 TABLET ORAL DAILY
COMMUNITY
Start: 2021-09-13 | End: 2022-05-09

## 2021-10-28 ASSESSMENT — PAIN SCALES - GENERAL: PAINLEVEL: SEVERE PAIN (6)

## 2021-10-28 NOTE — PROGRESS NOTES
Pt is being seen today by VITO Bethea, for refills, UDT and f/u of lt leg and foot pain.    Pain score 6  Constant   What does your pain like feel during a flare? tight  Does the pain interfere with:  Work ----- NA  Walking ------ yes  Sleep ------- yes  Daily activities ------yes  Relationships -------yes  F=7    CSA signed 06/2021   UDT done 10/07/2020    tyl #3 at midnight    MSER at 0630 this am

## 2021-10-28 NOTE — LETTER
10/28/2021         RE: Conrad Zhu  [id number] [facility] [id number]        Dear Colleague,    Thank you for referring your patient, Conrad Zhu, to the [facility]. Please see a copy of my visit note below.    PAIN CENTER PROGRESS NOTE    Subjective:   Conrad Zhu is a 66 y.o. 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  Consult was 04/28/20.    Major issues:  1. Chronic, continuous use of opioids    2. Chronic pain syndrome    3. Complex regional pain syndrome i of left lower limb    4. Migraine without aura and without status migrainosus, not intractable      Pain location and description: See CMA note. Severe Pain (6)  Radiation of pain: lower back to bilateral hips  Gait disturbance: Denies recent falls, antalgic.  Exacerbating factors: Standing, sitting, walking, laying down, reaching, twisting, lifting, squatting, bending, stretching, going up and down stairs, riding in the car, getting out of car or bed  Alleviating factors: Medications, ice, rest.  Associated symptoms: Chronic LLE weakness, numbness in legs, swelling in left ankle/calf.  Denies fever/chills, rash, bowel or bladder incontinence.  Functional pain symptoms: See CMA note.  Adverse effects of medications: Xerostomia using biotene and regular dental appointments. Denies drowsiness, itching, nausea, constipation.    Current treatment efficacy: Fair.  See medication list.  Current treatment compliance: Taking medication as prescribed, keeping appointments as scheduled.    Since the last Pain Center visit, states pain is unchanged.  Denies recent falls or injuries.  Denies changes in health or medications.  Denies ED visits or hospitalizations.    She lost 13 pounds and taking 1-2 long walks with  her new puppy, Merary.  She is crate training her and sleeping good at night.     [facility] referral she hasn't started yet due to above life changes.      States her left foot pain feels like a blood pressure cuff wrapped around the leg tight.  Denies burning, no new numbness, no new weakness. We review option for trileptal; she has concerns this will cause weight gain and leg edema.  She has failed multiple neuropathics.  She states she feels good control of her pain symptoms.      States pain relief is good with current medications MSER 15 mg every 12 hours 6-7 am and 6 pm and T#4  6 tabs a day 2 tabs TID.  Denies side effects.  Today pain rated 6/10 feels good control.  States other days when pain is highest it is 8/10 felt tight.  Has more good days than bad days with current plan.  Pain usually depends how much she is walking on it and trying to do something every day.  She states she gets tired within 45 min -1 hour of walking.  She is not using cane or walker.      She continues psychotherapy monthly. She is seeing psychiatrist every 3 months.     Current Outpatient Medications   Medication Sig Dispense Refill     acetaminophen-codeine 300-60 MG TABS Take 1-2 tablets by mouth every 8 hours as needed (max of 6/day) 180 tablet 0     cholecalciferol 50 MCG (2000 UT) tablet Take 4,000 Units by mouth        DENTAGEL 1.1 % GEL topical gel        diazePAM (VALIUM) 5 MG tablet [DIAZEPAM (VALIUM) 5 MG TABLET] Take 1 tablet po 1 hour prior to procedure and 1 tablet po after consent form signed 2 tablet 0     fluticasone propionate (FLONASE) 50 mcg/actuation nasal spray [FLUTICASONE PROPIONATE (FLONASE) 50 MCG/ACTUATION NASAL SPRAY] Apply 2 sprays into each nostril daily.       glycerin-hypromellose- (VISINE) 0.2-0.2-1 % SOLN ophthalmic solution INSTILL 1 DROP INTO BOTH EYES AS NEEDED FOR DRY EYES       ipratropium (ATROVENT) 42 mcg (0.06 %) nasal spray [IPRATROPIUM (ATROVENT) 42 MCG (0.06 %) NASAL SPRAY] INHALE  2 SPRAYS IN THE NOSTRIL(S) 3 TIMES DAILY. 15 mL 2     lamoTRIgine (LAMICTAL) 200 MG tablet [LAMOTRIGINE (LAMICTAL) 200 MG TABLET] Take 200 mg by mouth daily.       morphine (MS CONTIN) 15 MG CR tablet Take 1 tablet (15 mg) by mouth every 12 hours 60 tablet 0     naloxone (NARCAN) 4 mg/actuation nasal spray [NALOXONE (NARCAN) 4 MG/ACTUATION NASAL SPRAY] 1 spray (4 mg dose) into one nostril for opioid reversal. Call 911. May repeat if no response in 3 minutes. 1 Box 0     ondansetron (ZOFRAN) 4 MG tablet [ONDANSETRON (ZOFRAN) 4 MG TABLET] TAKE 1 TABLET BY MOUTH EVERY 8 HOURS IF NEEDED FOR NAUSEA/VOMITING 30 tablet 0     tolterodine (DETROL LA) 4 MG ER capsule [TOLTERODINE (DETROL LA) 4 MG ER CAPSULE] TAKE 1 CAPSULE (4 MG TOTAL) BY MOUTH DAILY WITH LUNCH. 90 capsule 1     traZODone (DESYREL) 100 MG tablet [TRAZODONE (DESYREL) 100 MG TABLET] Take 300 mg by mouth at bedtime.        VIIBRYD 20 mg Tab tablet [VIIBRYD 20 MG TAB TABLET] Take 30 mg by mouth daily.   2       Review of Systems  Constitutional: Sleep interruption due to pain and new puppy.  Denies fever, chills, night sweats, lethargy, weight gain  Musculoskeletal: Positive for left LE/foot pain.  Denies joint swelling, recent falls.  Gastrointestional: Denies difficulty swallowing, change in appetite, abdominal pain, constipation, nausea, vomiting,  GERD, fecal incontinence.  Genitourinary: Denies urinary incontinence, dysuria, hematuria, UTI, frequency, hesitancy, change in libido  Neurologic: Headaches, migraines.  Denies confusion, seizure, weakness, changes in balance, changes in speech.  Psychiatric: Depression, anxiety.  Denies memory loss, psychoses, suicidal ideation, substance use/abuse.     Objective:   BP (!) 141/67 (BP Location: Left arm, Patient Position: Sitting, Cuff Size: Adult Regular)   Pulse 73     Constitutional-General:  Well nourished, well developed, well groomed, healthy appearing individual.  Weight is overweight, appears stated age  and presents alone.  Psych-Mental Status: A & O in no acute distress. Speech is fluent. Thought process normal. Recent and remote memory are intact.  Attention span and concentration are normal. Displays appropriate mood and affect.   H,E,N,T- Airway is patent, unlabored respiratory effort.  Derm - Exposed skin CDI.  Musckuloskeletal -  Gait:  Gait is abnormal.  Ambulates independently with antalgia and everted left foot.    *Opioid [place] Precautions:    UDT - Collected 10/28/2021, results pending  Opioid Agreement - 06/2021  Pharmacy- as documented    [place]  Reviewed - 10/28/2021 as expected   Pill Count - n/a  Psychological evaluation - outside provider  MME - 84  Pharmacogenetic testing - N/A    Labs:    Imaging:  CT Hip Without Contrast Left 01/11/2020  IMPRESSION:   1.  Postoperative changes of short IM dima and dynamic screw fixation of an intertrochanteric fracture of the left hip. The fracture is in good anatomic alignment. There is evidence of some new bone formation along the superolateral margin of the   fracture, but it is still visualized along the majority of its course.  2.  Ununited and unfixed facture fragment involving the lesser trochanter. This was seen on the original film 11/11/2019 and has not significantly changed.  3.  Superimposed degenerative change left hip joint.  4.  No evidence for fracture or migration of the fixation appliances.  5.  Exam otherwise negative and unchanged.     MRI Ankle right without contrast 07/06/2019     MRI Cervical, thoracic, and lumbar spine without contrast 10/13/17:  IMPRESSION:   CONCLUSION:  MRI CERVICAL SPINE:  1.  Mild to moderate degenerative changes most pronounced C5-C6 and C6-C7 where there is mild spinal canal and mild to moderate foraminal narrowing.  2.  No high-grade spinal canal narrowing, spinal cord compression, or spinal cord signal abnormality.     MRI THORACIC SPINE:  1.  Minor degenerative changes. No stenosis.  2.  Normal spinal  cord.     MRI LUMBAR SPINE:  1.  Normal spinal cord and conus. No high-grade central spinal canal stenosis in the lumbar spine.  2.  Interval changes of laminectomy on the left at L4-L5. Adequate central canal at this level. Moderate left and mild right foraminal narrowing unchanged.  3.  At L3-L4 a left-sided herniation narrows the lateral recess, unchanged from prior. The disc bulge also contacts and slightly displaces the exiting L3 nerve root on the left, not changed.  4.  At L2-L3 a left-sided herniation contacts and may slightly displace the traversing nerve root, unchanged.    Had left LE EMGs every 6 months - was told permanent nerve damage.     Assessment:   Conrad Zhu is a 66 y.o. female with visit today for multi-site pain in left leg/foot secondary to mononeuritis multiplex from rhabdo in 2013, lumbar stenosis and history of L4 hemilaminectomy, and left hip pain secondary to fracture and surgery 11/2019 following [facility].  She failed a trial of Butrans patch/Belbuca films and has had significant nausea and abdominal symptoms as side effect.   Trial of fentanyl 12 mcg/hr did not help pain, review that an increase to 25 mcg/hr was not recommended as it would exceed CDC guideline MME with her T#4.  She recently failed methadone as 5 mg every 12 hour caused headaches.  Discuss continuing MSER 15 mg every 12 hours and T#4 up to 6 tabs/day, finds this works best for her pain control without side effects.  Have discussed trileptal trial for nerve pain control, she will consider. Have discussed option for lumbar sympathetic block and she is hesitant on procedures and idea of SCS trial. She is referred to [facility] for evaluation and treatment of left lower extremity pain, weakness, balance, and gait to assist with walking and activity tolerance.     Plan:   Continue Morphine ER take 15 mg in the am and pm.  Refills sent for 11/12/21 and 12/12/21  Continue T#4 2 tabs in the am and 2 tabs in  afternoon and 2 tabs at bedtime.  #180 tabs to last at least 30 days.   Refills sent for 11/09/21 with 1 additional refill.  As your opioid dose remains stable, I will send electronic refills to the pharmacy for 2 subsequent months until your next appointment so you do not have to call our refill line.    Check with your pharmacy that all prescriptions are on your profile. Call the pharmacy a week before you want to fill the prescription as stock may vary and the pharmacy may need to order the medication for you. I reserve the right to cancel the electronic prescription at the pharmacy in between appointments and would contact you with an explanation if this were to occur.  Continue with psychiatrist and psychologist for behavioral health plan.    Continue walking with dog for exercise/activity, good job with weight loss!    Waiting to schedule with [facility] in [place] to evaluate posture, gait, strength, balance, etc.  Call to make appointment when able, let me know if referral expires and you need another one faxed.    Diagnostics: UDT/Blood collected today results are pending.  Patient required a random Drug Test due to the need to comply with [place] Model Policy Guidelines and CDC Guideline for the use of any controlled substances. Reasons for definitive testing include:  -Identify specific medication(s) or drug(s) in a class (e.g. Benzodiazepines, barbiturates, opioids, antidepressants)  -Definitive concentration of medication(s), drug(s), and/or metabolites needed to guide management  -Identify non-prescribed medication or illicit drug use for ongoing safe prescribing of controlled substances  -Identify substances that may present high risk for additive or synergistic interaction with prescription medication (e.g. Alcohol).  Patient is either being considered for or taking a controlled substance. Unexpected findings will be discussed and treatment decision may be adjusted. Testing is being implemented  w/o bias related to age, race, gender, socioeconomic status or [employer] affiliation.   Test results will be available through [facility] approximately 1 week from collection date.    Follow-up with Sherron in 8 weeks in office     Sherron Ruiz PA-C  [facility]   [address]  [place] [address]  Ph: [phone]  Fax: [phone]    25 minutes spent on the date of the encounter doing chart review, history and exam, documentation, and further activities.          Pt is being seen today by VITO Bethea, for refills, UDT and f/u of lt leg and foot pain.    Pain score 6  Constant   What does your pain like feel during a flare? tight  Does the pain interfere with:  Work ----- NA  Walking ------ yes  Sleep ------- yes  Daily activities ------yes  Relationships -------yes  F=7    CSA signed 06/2021   UDT done 10/07/2020    tyl #3 at midnight    MSER at 0630 this am        Again, thank you for allowing me to participate in the care of your patient.        Sincerely,        Sherron Ruiz PA-C

## 2021-10-28 NOTE — PATIENT INSTRUCTIONS
Continue Morphine ER take 15 mg in the am and pm.  Refills sent for 11/12/21 and 12/12/21  Continue T#4 2 tabs in the am and 2 tabs in afternoon and 2 tabs at bedtime.  #180 tabs to last at least 30 days.   Refills sent for 11/09/21 with 1 additional refill.  As your opioid dose remains stable, I will send electronic refills to the pharmacy for 2 subsequent months until your next appointment so you do not have to call our refill line.    Check with your pharmacy that all prescriptions are on your profile. Call the pharmacy a week before you want to fill the prescription as stock may vary and the pharmacy may need to order the medication for you. I reserve the right to cancel the electronic prescription at the pharmacy in between appointments and would contact you with an explanation if this were to occur.  Continue with psychiatrist and psychologist for behavioral health plan.    Continue walking with dog for exercise/activity, good job with weight loss!    Waiting to schedule with [facility] in [place] to evaluate posture, gait, strength, balance, etc.  Call to make appointment when able, let me know if referral expires and you need another one faxed.    Diagnostics: UDT/Blood collected today results are pending.  Patient required a random Drug Test due to the need to comply with [employer] Model Policy Guidelines and CDC Guideline for the use of any controlled substances. Reasons for definitive testing include:  -Identify specific medication(s) or drug(s) in a class (e.g. Benzodiazepines, barbiturates, opioids, antidepressants)  -Definitive concentration of medication(s), drug(s), and/or metabolites needed to guide management  -Identify non-prescribed medication or illicit drug use for ongoing safe prescribing of controlled substances  -Identify substances that may present high risk for additive or synergistic interaction with prescription medication (e.g. Alcohol).  Patient is either being considered for or  taking a controlled substance. Unexpected findings will be discussed and treatment decision may be adjusted. Testing is being implemented w/o bias related to age, race, gender, socioeconomic status or [employer] affiliation.   Test results will be available through [facility] approximately 1 week from collection date.    Follow-up with Sherron in 8 weeks in office

## 2021-10-28 NOTE — PROGRESS NOTES
PAIN CENTER PROGRESS NOTE    Subjective:   Conrad Zhu is a 66 y.o. 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  Consult was 04/28/20.    Major issues:  1. Chronic, continuous use of opioids    2. Chronic pain syndrome    3. Complex regional pain syndrome i of left lower limb    4. Migraine without aura and without status migrainosus, not intractable      Pain location and description: See CMA note. Severe Pain (6)  Radiation of pain: lower back to bilateral hips  Gait disturbance: Denies recent falls, antalgic.  Exacerbating factors: Standing, sitting, walking, laying down, reaching, twisting, lifting, squatting, bending, stretching, going up and down stairs, riding in the car, getting out of car or bed  Alleviating factors: Medications, ice, rest.  Associated symptoms: Chronic LLE weakness, numbness in legs, swelling in left ankle/calf.  Denies fever/chills, rash, bowel or bladder incontinence.  Functional pain symptoms: See CMA note.  Adverse effects of medications: Xerostomia using biotene and regular dental appointments. Denies drowsiness, itching, nausea, constipation.    Current treatment efficacy: Fair.  See medication list.  Current treatment compliance: Taking medication as prescribed, keeping appointments as scheduled.    Since the last Pain Center visit, states pain is unchanged.  Denies recent falls or injuries.  Denies changes in health or medications.  Denies ED visits or hospitalizations.    She lost 13 pounds and taking 1-2 long walks with her new puppy, Merary.  She is crate training her and sleeping good at night.     [facility] referral she hasn't started yet due to above life changes.      States her left foot pain feels like a blood pressure cuff wrapped around the leg tight.  Denies  burning, no new numbness, no new weakness. We review option for trileptal; she has concerns this will cause weight gain and leg edema.  She has failed multiple neuropathics.  She states she feels good control of her pain symptoms.      States pain relief is good with current medications MSER 15 mg every 12 hours 6-7 am and 6 pm and T#4  6 tabs a day 2 tabs TID.  Denies side effects.  Today pain rated 6/10 feels good control.  States other days when pain is highest it is 8/10 felt tight.  Has more good days than bad days with current plan.  Pain usually depends how much she is walking on it and trying to do something every day.  She states she gets tired within 45 min -1 hour of walking.  She is not using cane or walker.      She continues psychotherapy monthly. She is seeing psychiatrist every 3 months.     Current Outpatient Medications   Medication Sig Dispense Refill     acetaminophen-codeine 300-60 MG TABS Take 1-2 tablets by mouth every 8 hours as needed (max of 6/day) 180 tablet 0     cholecalciferol 50 MCG (2000 UT) tablet Take 4,000 Units by mouth        DENTAGEL 1.1 % GEL topical gel        diazePAM (VALIUM) 5 MG tablet [DIAZEPAM (VALIUM) 5 MG TABLET] Take 1 tablet po 1 hour prior to procedure and 1 tablet po after consent form signed 2 tablet 0     fluticasone propionate (FLONASE) 50 mcg/actuation nasal spray [FLUTICASONE PROPIONATE (FLONASE) 50 MCG/ACTUATION NASAL SPRAY] Apply 2 sprays into each nostril daily.       glycerin-hypromellose- (VISINE) 0.2-0.2-1 % SOLN ophthalmic solution INSTILL 1 DROP INTO BOTH EYES AS NEEDED FOR DRY EYES       ipratropium (ATROVENT) 42 mcg (0.06 %) nasal spray [IPRATROPIUM (ATROVENT) 42 MCG (0.06 %) NASAL SPRAY] INHALE 2 SPRAYS IN THE NOSTRIL(S) 3 TIMES DAILY. 15 mL 2     lamoTRIgine (LAMICTAL) 200 MG tablet [LAMOTRIGINE (LAMICTAL) 200 MG TABLET] Take 200 mg by mouth daily.       morphine (MS CONTIN) 15 MG CR tablet Take 1 tablet (15 mg) by mouth every 12 hours 60  tablet 0     naloxone (NARCAN) 4 mg/actuation nasal spray [NALOXONE (NARCAN) 4 MG/ACTUATION NASAL SPRAY] 1 spray (4 mg dose) into one nostril for opioid reversal. Call 911. May repeat if no response in 3 minutes. 1 Box 0     ondansetron (ZOFRAN) 4 MG tablet [ONDANSETRON (ZOFRAN) 4 MG TABLET] TAKE 1 TABLET BY MOUTH EVERY 8 HOURS IF NEEDED FOR NAUSEA/VOMITING 30 tablet 0     tolterodine (DETROL LA) 4 MG ER capsule [TOLTERODINE (DETROL LA) 4 MG ER CAPSULE] TAKE 1 CAPSULE (4 MG TOTAL) BY MOUTH DAILY WITH LUNCH. 90 capsule 1     traZODone (DESYREL) 100 MG tablet [TRAZODONE (DESYREL) 100 MG TABLET] Take 300 mg by mouth at bedtime.        VIIBRYD 20 mg Tab tablet [VIIBRYD 20 MG TAB TABLET] Take 30 mg by mouth daily.   2       Review of Systems  Constitutional: Sleep interruption due to pain and new puppy.  Denies fever, chills, night sweats, lethargy, weight gain  Musculoskeletal: Positive for left LE/foot pain.  Denies joint swelling, recent falls.  Gastrointestional: Denies difficulty swallowing, change in appetite, abdominal pain, constipation, nausea, vomiting,  GERD, fecal incontinence.  Genitourinary: Denies urinary incontinence, dysuria, hematuria, UTI, frequency, hesitancy, change in libido  Neurologic: Headaches, migraines.  Denies confusion, seizure, weakness, changes in balance, changes in speech.  Psychiatric: Depression, anxiety.  Denies memory loss, psychoses, suicidal ideation, substance use/abuse.     Objective:   BP (!) 141/67 (BP Location: Left arm, Patient Position: Sitting, Cuff Size: Adult Regular)   Pulse 73     Constitutional-General:  Well nourished, well developed, well groomed, healthy appearing individual.  Weight is overweight, appears stated age and presents alone.  Psych-Mental Status: A & O in no acute distress. Speech is fluent. Thought process normal. Recent and remote memory are intact.  Attention span and concentration are normal. Displays appropriate mood and affect.   H,E,N,T- Airway  is patent, unlabored respiratory effort.  Derm - Exposed skin CDI.  Musckuloskeletal -  Gait:  Gait is abnormal.  Ambulates independently with antalgia and everted left foot.    *Opioid [place] Precautions:    UDT - Collected 10/28/2021, results pending  Opioid Agreement - 06/2021  Pharmacy- as documented    [place]  Reviewed - 10/28/2021 as expected   Pill Count - n/a  Psychological evaluation - outside provider  MME - 84  Pharmacogenetic testing - N/A    Labs:    Imaging:  CT Hip Without Contrast Left 01/11/2020  IMPRESSION:   1.  Postoperative changes of short IM dima and dynamic screw fixation of an intertrochanteric fracture of the left hip. The fracture is in good anatomic alignment. There is evidence of some new bone formation along the superolateral margin of the   fracture, but it is still visualized along the majority of its course.  2.  Ununited and unfixed facture fragment involving the lesser trochanter. This was seen on the original film 11/11/2019 and has not significantly changed.  3.  Superimposed degenerative change left hip joint.  4.  No evidence for fracture or migration of the fixation appliances.  5.  Exam otherwise negative and unchanged.     MRI Ankle right without contrast 07/06/2019     MRI Cervical, thoracic, and lumbar spine without contrast 10/13/17:  IMPRESSION:   CONCLUSION:  MRI CERVICAL SPINE:  1.  Mild to moderate degenerative changes most pronounced C5-C6 and C6-C7 where there is mild spinal canal and mild to moderate foraminal narrowing.  2.  No high-grade spinal canal narrowing, spinal cord compression, or spinal cord signal abnormality.     MRI THORACIC SPINE:  1.  Minor degenerative changes. No stenosis.  2.  Normal spinal cord.     MRI LUMBAR SPINE:  1.  Normal spinal cord and conus. No high-grade central spinal canal stenosis in the lumbar spine.  2.  Interval changes of laminectomy on the left at L4-L5. Adequate central canal at this level. Moderate left and mild right  foraminal narrowing unchanged.  3.  At L3-L4 a left-sided herniation narrows the lateral recess, unchanged from prior. The disc bulge also contacts and slightly displaces the exiting L3 nerve root on the left, not changed.  4.  At L2-L3 a left-sided herniation contacts and may slightly displace the traversing nerve root, unchanged.    Had left LE EMGs every 6 months - was told permanent nerve damage.     Assessment:   Conrad Zhu is a 66 y.o. female with visit today for multi-site pain in left leg/foot secondary to mononeuritis multiplex from rhabdo in 2013, lumbar stenosis and history of L4 hemilaminectomy, and left hip pain secondary to fracture and surgery 11/2019 following [facility].  She failed a trial of Butrans patch/Belbuca films and has had significant nausea and abdominal symptoms as side effect.   Trial of fentanyl 12 mcg/hr did not help pain, review that an increase to 25 mcg/hr was not recommended as it would exceed CDC guideline MME with her T#4.  She recently failed methadone as 5 mg every 12 hour caused headaches.  Discuss continuing MSER 15 mg every 12 hours and T#4 up to 6 tabs/day, finds this works best for her pain control without side effects.  Have discussed trileptal trial for nerve pain control, she will consider. Have discussed option for lumbar sympathetic block and she is hesitant on procedures and idea of SCS trial. She is referred to [facility] for evaluation and treatment of left lower extremity pain, weakness, balance, and gait to assist with walking and activity tolerance.     Plan:   Continue Morphine ER take 15 mg in the am and pm.  Refills sent for 11/12/21 and 12/12/21  Continue T#4 2 tabs in the am and 2 tabs in afternoon and 2 tabs at bedtime.  #180 tabs to last at least 30 days.   Refills sent for 11/09/21 with 1 additional refill.  As your opioid dose remains stable, I will send electronic refills to the pharmacy for 2 subsequent months until your next  appointment so you do not have to call our refill line.    Check with your pharmacy that all prescriptions are on your profile. Call the pharmacy a week before you want to fill the prescription as stock may vary and the pharmacy may need to order the medication for you. I reserve the right to cancel the electronic prescription at the pharmacy in between appointments and would contact you with an explanation if this were to occur.  Continue with psychiatrist and psychologist for behavioral health plan.    Continue walking with dog for exercise/activity, good job with weight loss!    Waiting to schedule with [facility] in [place] to evaluate posture, gait, strength, balance, etc.  Call to make appointment when able, let me know if referral expires and you need another one faxed.    Diagnostics: UDT/Blood collected today results are pending.  Patient required a random Drug Test due to the need to comply with [place] [employer] Policy Guidelines and CDC Guideline for the use of any controlled substances. Reasons for definitive testing include:  -Identify specific medication(s) or drug(s) in a class (e.g. Benzodiazepines, barbiturates, opioids, antidepressants)  -Definitive concentration of medication(s), drug(s), and/or metabolites needed to guide management  -Identify non-prescribed medication or illicit drug use for ongoing safe prescribing of controlled substances  -Identify substances that may present high risk for additive or synergistic interaction with prescription medication (e.g. Alcohol).  Patient is either being considered for or taking a controlled substance. Unexpected findings will be discussed and treatment decision may be adjusted. Testing is being implemented w/o bias related to age, race, gender, socioeconomic status or Yarsanism affiliation.   Test results will be available through [facility] approximately 1 week from collection date.    Follow-up with Sherron in 8 weeks in office     Sherron Ruiz,  LASHAWN TREJO [facility]   [address]  [place] [address]  Ph: [phone]  Fax: [phone]    25 minutes spent on the date of the encounter doing chart review, history and exam, documentation, and further activities.

## 2021-10-29 LAB
BARBITURATES UR QL: NORMAL
ETHANOL UR QL SCN: NORMAL

## 2021-10-30 DIAGNOSIS — N32.81 OVERACTIVE BLADDER: ICD-10-CM

## 2021-10-31 RX ORDER — TOLTERODINE 4 MG/1
CAPSULE, EXTENDED RELEASE ORAL
Qty: 90 CAPSULE | Refills: 2 | Status: SHIPPED | OUTPATIENT
Start: 2021-10-31 | End: 2022-06-15

## 2021-10-31 NOTE — TELEPHONE ENCOUNTER
"Last Written Prescription Date:  5/5/2021  Last Fill Quantity: 90,  # refills: 1   Last office visit provider:  7/23/2021 Chioma Huitron CNP     tolterodine (DETROL LA) 4 MG ER capsule 90 capsule 1 5/5/2021  No   Sig - Route: TAKE 1 CAPSULE (4 MG TOTAL) BY MOUTH DAILY WITH LUNCH. - Oral   Sent to pharmacy as: tolterodine ER 4 mg capsule,extended release 24 hr (DETROL LA)   E-Prescribing Status: Receipt confirmed by pharmacy (5/5/2021  7:30 AM CDT         Requested Prescriptions   Pending Prescriptions Disp Refills     tolterodine ER (DETROL LA) 4 MG 24 hr capsule [Pharmacy Med Name: TOLTERODINE TART ER 4 MG CAP] 90 capsule 1     Sig: TAKE 1 CAPSULE (4 MG TOTAL) BY MOUTH DAILY WITH LUNCH.       Muscarinic Antagonists (Urinary Incontinence Agents) Passed - 10/30/2021  7:09 AM        Passed - Recent (12 mo) or future (30 days) visit within the authorizing provider's specialty     Patient has had an office visit with the authorizing provider or a provider within the authorizing providers department within the previous 12 mos or has a future within next 30 days. See \"Patient Info\" tab in inbasket, or \"Choose Columns\" in Meds & Orders section of the refill encounter.              Passed - Patient does not have a diagnosis of glaucoma on the problem list     If glaucoma diagnosis is new, refer refill to physician.          Passed - Medication is active on med list        Passed - Patient is 18 years of age or older             Patricia Vidal RN 10/31/21 4:55 PM  "

## 2021-11-02 LAB
CODEINE UR CFM-MCNC: >9000 NG/ML
CODEINE/CREAT UR: ABNORMAL
DHC UR CFM-MCNC: 88 NG/ML
DHC/CREAT UR: 267 NG/MG {CREAT}
HYDROCODONE UR CFM-MCNC: 138 NG/ML
HYDROCODONE/CREAT UR: 418 NG/MG {CREAT}
HYDROMORPHONE UR CFM-MCNC: 302 NG/ML
HYDROMORPHONE/CREAT UR: 915 NG/MG {CREAT}
MORPHINE UR CFM-MCNC: >7000 NG/ML
MORPHINE/CREAT UR: ABNORMAL
NORCODEINE UR-MCNC: 2300 NG/ML
NORCODEINE/CREAT UR CFM: 6970 NG/MG {CREAT}

## 2021-11-03 LAB
ETHANOL UR QL CFM: NEGATIVE
ETHYL GLUCURONIDE UR QL SCN: NOT DETECTED NG/MG CREAT
ETHYL SULFATE/CREAT UR: NOT DETECTED NG/MG CREAT
LEVEL OF DETECTION (ETHANOL): NORMAL

## 2021-11-11 ENCOUNTER — OFFICE VISIT (OUTPATIENT)
Dept: ENDOCRINOLOGY | Facility: CLINIC | Age: 66
End: 2021-11-11
Payer: COMMERCIAL

## 2021-11-11 VITALS — HEART RATE: 76 BPM | DIASTOLIC BLOOD PRESSURE: 76 MMHG | SYSTOLIC BLOOD PRESSURE: 136 MMHG

## 2021-11-11 DIAGNOSIS — R11.0 NAUSEA: ICD-10-CM

## 2021-11-11 DIAGNOSIS — M81.0 OSTEOPOROSIS WITHOUT CURRENT PATHOLOGICAL FRACTURE, UNSPECIFIED OSTEOPOROSIS TYPE: Primary | ICD-10-CM

## 2021-11-11 PROBLEM — S72.142D DISPLACED INTERTROCHANTERIC FRACTURE OF LEFT FEMUR, SUBSEQUENT ENCOUNTER FOR CLOSED FRACTURE WITH ROUTINE HEALING: Status: ACTIVE | Noted: 2019-11-14

## 2021-11-11 PROBLEM — K59.00 CONSTIPATION, UNSPECIFIED: Status: ACTIVE | Noted: 2019-11-14

## 2021-11-11 PROBLEM — S82.402D: Status: ACTIVE | Noted: 2019-11-14

## 2021-11-11 PROBLEM — F14.11 COCAINE ABUSE IN REMISSION (H): Status: ACTIVE | Noted: 2019-11-14

## 2021-11-11 PROBLEM — Z91.81 HISTORY OF FALLING: Status: ACTIVE | Noted: 2019-11-14

## 2021-11-11 PROCEDURE — 99214 OFFICE O/P EST MOD 30 MIN: CPT | Performed by: NURSE PRACTITIONER

## 2021-11-11 NOTE — LETTER
11/11/2021         RE: Conrad Zhu  [address] [address] [facility] [address]        Dear Colleague,    Thank you for referring your patient, Conrad Zhu, to the [facility]. Please see a copy of my visit note below.    [facility]  ENDOCRINOLOGY    Osteoporosis Follow Up 11/11/2021    Conrad Zhu, 1955, [id number]          Reason for visit      1. Osteoporosis without current pathological fracture, unspecified osteoporosis type        History     Conrad Zhu is a very pleasant 66 year old old female who presents for follow up.   SUMMARY:  1. OSTEOPOROSIS. The diagnosis was made based on fragility fracture of her left 5th metatarsals, Right wrist-Colles type s/p ORIF in 2014. She also had a baseline DXA scan confirming osteoporosis.   The patient has the following risk factors for osteoporosis:   Age, gender, , BMI-she keeps her weight close to underweight range since adolescence but denied eating disorders. The patient is not on high risk medications such as glucocorticoids, anti-coagulants, chemotherapy, levothyroxine. BUT she is on gabapentin.   The following high- risk conditions have been ruled out: celiac disease, gastric bypass, hyperparathyroidism, inflammatory bowel disease, hyperthyroidism, rheumatoid arthritis, lupus, chronic kidney disease. I suspect an eating disorder but she vehemently denies.   Gyn History: Patient denied any prior hysterectomy, ovariectomy, breast cancer or family history of breast cancer Menopause was at age: 48 years. There has been a height loss of 0 inches.   Patient is currently on calcium supplements: 600 mg/day and vitamin D supplements 1000 IU/day. Dairy intake: She has lactose intolerance so no dairy for many years.   Investigations so far:   DXA scan dated 3/26/2014: (attached to chart):   Left Femoral Neck T-Score: -2.5   Right femoral Neck T-Score: -2.3   Total Left femoral T-Score: -2.8   Total  Right femoral T-score: -2.4   A-P Spine T-Score: -1.9     TODAY:  Conrad is here today in f/u for Osteoporosis. She has completed a year of Evenity, and now has had one year on Prolia. She did not have any problems with it.  She did have a fall recently, with her new dog, but reports only a bruise from the experience. She is walking more regularly now because of the dog.  Last Vit D level was 56 and Calcium level was 9.1.  She reports that she has lost 13 lbs and is quite pleased about it. She is due for another Dexa Scan after starting the Prolia, but will get this done in January.        Risk Factors     The following high- risk conditions have been ruled out: celiac disease, eating disorders, gastric bypass, hyperparathyroidism, inflammatory bowel disease, hyperthyroidism, rheumatoid arthritis, lupus, chronic kidney disease.     Conrad Zhu has the following risk factors: Age, Female gender and      She is not on high risk medications such as glucocorticoids, anti-coagulants, anti-convulsants, chemotherapy or levothyroxine.    Patient deniesHysterectomy, Oophrectomy, Breast cancer and Family history of breast cancer.      Past Medical History     Patient Active Problem List   Diagnosis     Anxiety     Nausea     OAB (overactive bladder)     Low back pain     Chronic pain     PN (peripheral neuropathy)     Insomnia     Migraine headache     Osteoporosis     Former smoker     Achilles tendinitis of right lower extremity     Sprain of right ankle, unspecified ligament, initial encounter     Sprain of right foot, initial encounter     Closed nondisplaced fracture of body of right calcaneus, initial encounter     Corneal scarring     Left foot drop     Left leg weakness     Leukocytosis     Tear film insufficiency     Closed intertrochanteric fracture of hip, left, initial encounter (H)     Left hip pain     Pre-operative general physical examination     Anemia due to blood loss, acute     Acute  "post-operative pain     Complex regional pain syndrome i of left lower limb     Depression     Long term (current) use of opiate analgesic     Anxiety disorder, unspecified     Neck pain     Vitamin deficiency       Family History       family history includes Breast Cancer in her sister; Cancer in her brother, father, mother, and sister.    Social History      reports that she has quit smoking. She smoked 0.00 packs per day. She has never used smokeless tobacco. She reports that she does not drink alcohol and does not use drugs.      Review of Systems     Patient denies current pain, limited mobility, fractures.   Remainder per HPI.      Vital Signs     /76 (BP Location: Right arm, Patient Position: Sitting, Cuff Size: Adult Regular)   Pulse 76     Physical Exam     GENERAL:  Normal, NIRD  EYES:  Pupils equal, round and reactive to light; no proptosis, lid lag or  periorbital edema.  THYROID:  Thyroid is normal.  No tenderness or bruit  NECK: No lymph nodes  MUSCULOSKELETAL: No joint abnormalities, FROM in all four extremities. No kyphosis. Muscle strength grossly normal without evidence of wasting.  HEART:  Regular rate and rhythm without murmur.  LUNGS:  Clear to auscultation.  ABDOMEN:Soft, non-tender, no masses or organomegaly  NEURO:  No tremors  SKIN:  No acanthosis nigricans or vitiligo        Assessment     1. Osteoporosis without current pathological fracture, unspecified osteoporosis type        Plan       Marka will remain on the Prolia injections. We will get another \"change of therapy\" Dexa Scan done and will f/u together in 1 year, sooner with problems or concerns.         Blanca Duarte NP   Endocrinology  11/11/2021  7:45 AM        Current Medications     Outpatient Medications Prior to Visit   Medication Sig Dispense Refill     acetaminophen-codeine 300-60 MG TABS Take 1-2 tablets by mouth every 8 hours as needed (max of 6/day) 180 tablet 1     cholecalciferol 50 MCG (2000 UT) tablet Take " 4,000 Units by mouth        DENTAGEL 1.1 % GEL topical gel        fluticasone propionate (FLONASE) 50 mcg/actuation nasal spray [FLUTICASONE PROPIONATE (FLONASE) 50 MCG/ACTUATION NASAL SPRAY] Apply 2 sprays into each nostril daily.       glycerin-hypromellose- (VISINE) 0.2-0.2-1 % SOLN ophthalmic solution INSTILL 1 DROP INTO BOTH EYES AS NEEDED FOR DRY EYES       ipratropium (ATROVENT) 42 mcg (0.06 %) nasal spray [IPRATROPIUM (ATROVENT) 42 MCG (0.06 %) NASAL SPRAY] INHALE 2 SPRAYS IN THE NOSTRIL(S) 3 TIMES DAILY. 15 mL 2     lamoTRIgine (LAMICTAL) 100 MG tablet Take 100 mg by mouth daily       [START ON 11/12/2021] morphine (MS CONTIN) 15 MG CR tablet Take 1 tablet (15 mg) by mouth every 12 hours 60 tablet 0     [START ON 12/12/2021] morphine (MS CONTIN) 15 MG CR tablet Take 1 tablet (15 mg) by mouth every 12 hours 60 tablet 0     naloxone (NARCAN) 4 mg/actuation nasal spray [NALOXONE (NARCAN) 4 MG/ACTUATION NASAL SPRAY] 1 spray (4 mg dose) into one nostril for opioid reversal. Call 911. May repeat if no response in 3 minutes. 1 Box 0     ondansetron (ZOFRAN) 4 MG tablet [ONDANSETRON (ZOFRAN) 4 MG TABLET] TAKE 1 TABLET BY MOUTH EVERY 8 HOURS IF NEEDED FOR NAUSEA/VOMITING 30 tablet 0     tolterodine ER (DETROL LA) 4 MG 24 hr capsule TAKE 1 CAPSULE (4 MG TOTAL) BY MOUTH DAILY WITH LUNCH. 90 capsule 2     traZODone (DESYREL) 100 MG tablet [TRAZODONE (DESYREL) 100 MG TABLET] Take 300 mg by mouth at bedtime.        VIIBRYD 20 mg Tab tablet Take 20 mg by mouth daily   2     Facility-Administered Medications Prior to Visit   Medication Dose Route Frequency Provider Last Rate Last Admin     denosumab (PROLIA) injection 60 mg  60 mg Subcutaneous Q6 Months Vinicio Brink, PharmD   60 mg at 07/27/21 1101         Lab Results     TSH   Date Value Ref Range Status   11/25/2019 2.56 0.30 - 5.00 uIU/mL Final           Imaging Results   Last DEXA scan:  No valid procedures specified.    Answers for HPI/ROS submitted by the  patient on 11/8/2021  General Symptoms: No  Skin Symptoms: No  HENT Symptoms: No  EYE SYMPTOMS: No  HEART SYMPTOMS: No  LUNG SYMPTOMS: No  INTESTINAL SYMPTOMS: No  URINARY SYMPTOMS: No  GYNECOLOGIC SYMPTOMS: No  BREAST SYMPTOMS: No  SKELETAL SYMPTOMS: No  BLOOD SYMPTOMS: No  NERVOUS SYSTEM SYMPTOMS: No  MENTAL HEALTH SYMPTOMS: No          Again, thank you for allowing me to participate in the care of your patient.        Sincerely,        Blanca Duarte NP

## 2021-11-11 NOTE — PROGRESS NOTES
[facility]  ENDOCRINOLOGY    Osteoporosis Follow Up 11/11/2021    Conrad Zhu, 1955, [id number]          Reason for visit      1. Osteoporosis without current pathological fracture, unspecified osteoporosis type        History     Conrad Zhu is a very pleasant 66 year old old female who presents for follow up.   SUMMARY:  1. OSTEOPOROSIS. The diagnosis was made based on fragility fracture of her left 5th metatarsals, Right wrist-Colles type s/p ORIF in 2014. She also had a baseline DXA scan confirming osteoporosis.   The patient has the following risk factors for osteoporosis:   Age, gender, , BMI-she keeps her weight close to underweight range since adolescence but denied eating disorders. The patient is not on high risk medications such as glucocorticoids, anti-coagulants, chemotherapy, levothyroxine. BUT she is on gabapentin.   The following high- risk conditions have been ruled out: celiac disease, gastric bypass, hyperparathyroidism, inflammatory bowel disease, hyperthyroidism, rheumatoid arthritis, lupus, chronic kidney disease. I suspect an eating disorder but she vehemently denies.   Gyn History: Patient denied any prior hysterectomy, ovariectomy, breast cancer or family history of breast cancer Menopause was at age: 48 years. There has been a height loss of 0 inches.   Patient is currently on calcium supplements: 600 mg/day and vitamin D supplements 1000 IU/day. Dairy intake: She has lactose intolerance so no dairy for many years.   Investigations so far:   DXA scan dated 3/26/2014: (attached to chart):   Left Femoral Neck T-Score: -2.5   Right femoral Neck T-Score: -2.3   Total Left femoral T-Score: -2.8   Total Right femoral T-score: -2.4   A-P Spine T-Score: -1.9     TODAY:  Conrad is here today in f/u for Osteoporosis. She has completed a year of Evenity, and now has had one year on Prolia. She did not have any problems with it.  She did have a fall recently, with her new  dog, but reports only a bruise from the experience. She is walking more regularly now because of the dog.  Last Vit D level was 56 and Calcium level was 9.1.  She reports that she has lost 13 lbs and is quite pleased about it. She is due for another Dexa Scan after starting the Prolia, but will get this done in January.        Risk Factors     The following high- risk conditions have been ruled out: celiac disease, eating disorders, gastric bypass, hyperparathyroidism, inflammatory bowel disease, hyperthyroidism, rheumatoid arthritis, lupus, chronic kidney disease.     Conrad Zhu has the following risk factors: Age, Female gender and      She is not on high risk medications such as glucocorticoids, anti-coagulants, anti-convulsants, chemotherapy or levothyroxine.    Patient deniesHysterectomy, Oophrectomy, Breast cancer and Family history of breast cancer.      Past Medical History     Patient Active Problem List   Diagnosis     Anxiety     Nausea     OAB (overactive bladder)     Low back pain     Chronic pain     PN (peripheral neuropathy)     Insomnia     Migraine headache     Osteoporosis     Former smoker     Achilles tendinitis of right lower extremity     Sprain of right ankle, unspecified ligament, initial encounter     Sprain of right foot, initial encounter     Closed nondisplaced fracture of body of right calcaneus, initial encounter     Corneal scarring     Left foot drop     Left leg weakness     Leukocytosis     Tear film insufficiency     Closed intertrochanteric fracture of hip, left, initial encounter (H)     Left hip pain     Pre-operative general physical examination     Anemia due to blood loss, acute     Acute post-operative pain     Complex regional pain syndrome i of left lower limb     Depression     Long term (current) use of opiate analgesic     Anxiety disorder, unspecified     Neck pain     Vitamin deficiency       Family History       family history includes Breast Cancer  "in her sister; Cancer in her brother, father, mother, and sister.    Social History      reports that she has quit smoking. She smoked 0.00 packs per day. She has never used smokeless tobacco. She reports that she does not drink alcohol and does not use drugs.      Review of Systems     Patient denies current pain, limited mobility, fractures.   Remainder per HPI.      Vital Signs     /76 (BP Location: Right arm, Patient Position: Sitting, Cuff Size: Adult Regular)   Pulse 76     Physical Exam     GENERAL:  Normal, NIRD  EYES:  Pupils equal, round and reactive to light; no proptosis, lid lag or  periorbital edema.  THYROID:  Thyroid is normal.  No tenderness or bruit  NECK: No lymph nodes  MUSCULOSKELETAL: No joint abnormalities, FROM in all four extremities. No kyphosis. Muscle strength grossly normal without evidence of wasting.  HEART:  Regular rate and rhythm without murmur.  LUNGS:  Clear to auscultation.  ABDOMEN:Soft, non-tender, no masses or organomegaly  NEURO:  No tremors  SKIN:  No acanthosis nigricans or vitiligo        Assessment     1. Osteoporosis without current pathological fracture, unspecified osteoporosis type        Plan       Marka will remain on the Prolia injections. We will get another \"change of therapy\" Dexa Scan done and will f/u together in 1 year, sooner with problems or concerns.         Blanca Duarte NP  [facility]  11/11/2021  7:45 AM        Current Medications     Outpatient Medications Prior to Visit   Medication Sig Dispense Refill     acetaminophen-codeine 300-60 MG TABS Take 1-2 tablets by mouth every 8 hours as needed (max of 6/day) 180 tablet 1     cholecalciferol 50 MCG (2000 UT) tablet Take 4,000 Units by mouth        DENTAGEL 1.1 % GEL topical gel        fluticasone propionate (FLONASE) 50 mcg/actuation nasal spray [FLUTICASONE PROPIONATE (FLONASE) 50 MCG/ACTUATION NASAL SPRAY] Apply 2 sprays into each nostril daily.       glycerin-hypromellose- " (VISINE) 0.2-0.2-1 % SOLN ophthalmic solution INSTILL 1 DROP INTO BOTH EYES AS NEEDED FOR DRY EYES       ipratropium (ATROVENT) 42 mcg (0.06 %) nasal spray [IPRATROPIUM (ATROVENT) 42 MCG (0.06 %) NASAL SPRAY] INHALE 2 SPRAYS IN THE NOSTRIL(S) 3 TIMES DAILY. 15 mL 2     lamoTRIgine (LAMICTAL) 100 MG tablet Take 100 mg by mouth daily       [START ON 11/12/2021] morphine (MS CONTIN) 15 MG CR tablet Take 1 tablet (15 mg) by mouth every 12 hours 60 tablet 0     [START ON 12/12/2021] morphine (MS CONTIN) 15 MG CR tablet Take 1 tablet (15 mg) by mouth every 12 hours 60 tablet 0     naloxone (NARCAN) 4 mg/actuation nasal spray [NALOXONE (NARCAN) 4 MG/ACTUATION NASAL SPRAY] 1 spray (4 mg dose) into one nostril for opioid reversal. Call 911. May repeat if no response in 3 minutes. 1 Box 0     ondansetron (ZOFRAN) 4 MG tablet [ONDANSETRON (ZOFRAN) 4 MG TABLET] TAKE 1 TABLET BY MOUTH EVERY 8 HOURS IF NEEDED FOR NAUSEA/VOMITING 30 tablet 0     tolterodine ER (DETROL LA) 4 MG 24 hr capsule TAKE 1 CAPSULE (4 MG TOTAL) BY MOUTH DAILY WITH LUNCH. 90 capsule 2     traZODone (DESYREL) 100 MG tablet [TRAZODONE (DESYREL) 100 MG TABLET] Take 300 mg by mouth at bedtime.        VIIBRYD 20 mg Tab tablet Take 20 mg by mouth daily   2     Facility-Administered Medications Prior to Visit   Medication Dose Route Frequency Provider Last Rate Last Admin     denosumab (PROLIA) injection 60 mg  60 mg Subcutaneous Q6 Months Vinicio Brink, PharmD   60 mg at 07/27/21 1101         Lab Results     TSH   Date Value Ref Range Status   11/25/2019 2.56 0.30 - 5.00 uIU/mL Final           Imaging Results   Last DEXA scan:  No valid procedures specified.    Answers for HPI/ROS submitted by the patient on 11/8/2021  General Symptoms: No  Skin Symptoms: No  HENT Symptoms: No  EYE SYMPTOMS: No  HEART SYMPTOMS: No  LUNG SYMPTOMS: No  INTESTINAL SYMPTOMS: No  URINARY SYMPTOMS: No  GYNECOLOGIC SYMPTOMS: No  BREAST SYMPTOMS: No  SKELETAL SYMPTOMS: No  BLOOD  SYMPTOMS: No  NERVOUS SYSTEM SYMPTOMS: No  MENTAL HEALTH SYMPTOMS: No

## 2021-11-12 RX ORDER — ONDANSETRON 4 MG/1
TABLET, FILM COATED ORAL
Qty: 30 TABLET | Refills: 0 | Status: SHIPPED | OUTPATIENT
Start: 2021-11-12 | End: 2022-05-25

## 2021-11-12 NOTE — TELEPHONE ENCOUNTER
"Routing refill request to provider for review/approval because:  Provider fill for acute dosing.    Last Written Prescription Date:  6/18/21  Last Fill Quantity: 30,  # refills: 0   Last office visit provider:  7/23/21     Requested Prescriptions   Pending Prescriptions Disp Refills     ondansetron (ZOFRAN) 4 MG tablet [Pharmacy Med Name: ONDANSETRON HCL 4 MG TABLET] 30 tablet 0     Sig: TAKE 1 TABLET BY MOUTH EVERY 8 HOURS IF NEEDED FOR NAUSEA/VOMITING        Antivertigo/Antiemetic Agents Passed - 11/11/2021 10:11 AM        Passed - Recent (12 mo) or future (30 days) visit within the authorizing provider's specialty     Patient has had an office visit with the authorizing provider or a provider within the authorizing providers department within the previous 12 mos or has a future within next 30 days. See \"Patient Info\" tab in inbasket, or \"Choose Columns\" in Meds & Orders section of the refill encounter.              Passed - Medication is active on med list        Passed - Patient is 18 years of age or older             Luis A Moreno RN 11/12/21 8:07 AM  "

## 2021-12-15 ENCOUNTER — TELEPHONE (OUTPATIENT)
Dept: PALLIATIVE MEDICINE | Facility: OTHER | Age: 66
End: 2021-12-15

## 2021-12-15 ENCOUNTER — OFFICE VISIT (OUTPATIENT)
Dept: INTERNAL MEDICINE | Facility: CLINIC | Age: 66
End: 2021-12-15
Payer: COMMERCIAL

## 2021-12-15 VITALS
WEIGHT: 136 LBS | OXYGEN SATURATION: 97 % | SYSTOLIC BLOOD PRESSURE: 160 MMHG | DIASTOLIC BLOOD PRESSURE: 90 MMHG | BODY MASS INDEX: 26.7 KG/M2 | HEIGHT: 60 IN | HEART RATE: 88 BPM

## 2021-12-15 DIAGNOSIS — G89.4 CHRONIC PAIN SYNDROME: ICD-10-CM

## 2021-12-15 PROCEDURE — 99207 PR NO CHARGE LOS: CPT | Performed by: NURSE PRACTITIONER

## 2021-12-15 RX ORDER — ACETAMINOPHEN AND CODEINE PHOSPHATE 300; 60 MG/1; MG/1
1-2 TABLET ORAL EVERY 8 HOURS PRN
Qty: 180 TABLET | Refills: 1 | Status: CANCELLED | OUTPATIENT
Start: 2021-12-15

## 2021-12-15 RX ORDER — CETIRIZINE HYDROCHLORIDE 10 MG/1
10 TABLET ORAL PRN
COMMUNITY
End: 2024-04-02

## 2021-12-15 ASSESSMENT — MIFFLIN-ST. JEOR: SCORE: 1070.45

## 2021-12-15 ASSESSMENT — PATIENT HEALTH QUESTIONNAIRE - PHQ9: SUM OF ALL RESPONSES TO PHQ QUESTIONS 1-9: 7

## 2021-12-15 NOTE — PROGRESS NOTES
Discussed with the patient that the pain center are the people who monitor her urine drug screenings, follow up with her pain. She should establish care with a new provider there. She was in agreement with this plan. She is not out of pain medicine. No charge visit today.   Answers for HPI/ROS submitted by the patient on 12/14/2021  How many servings of fruits and vegetables do you eat daily?: 2-3  On average, how many sweetened beverages do you drink each day (Examples: soda, juice, sweet tea, etc.  Do NOT count diet or artificially sweetened beverages)?: 0  How many minutes a day do you exercise enough to make your heart beat faster?: 30 to 60  How many days a week do you exercise enough to make your heart beat faster?: 7  How many days per week do you miss taking your medication?: 0

## 2021-12-15 NOTE — TELEPHONE ENCOUNTER
Patient calls, reports having gone back to or talked to primary care and they are not comfortable with taking over the pain management portion of care.  Please review and advise.    Last apt with RW: 1028/21

## 2021-12-16 ENCOUNTER — TELEPHONE (OUTPATIENT)
Dept: INTERNAL MEDICINE | Facility: CLINIC | Age: 66
End: 2021-12-16
Payer: COMMERCIAL

## 2021-12-16 NOTE — TELEPHONE ENCOUNTER
Please call the patient and let her know that we will be taking over her pain medications as requested by the pain clinic. We are trying to figure out the drug screening testing portion. She just had a drug test done 10/28; so she should be good for 6 months. Have her request her refills from us. Thank you!

## 2021-12-16 NOTE — TELEPHONE ENCOUNTER
I have connected with Sofie - she had a few questions about the drug screening process and our directors are going to educate the PCP office on how to do this and she will be able to take over.  Please reassure Conrad that this is happening as I spoke with Raven Velazquez this morning and she has the patient/provider information.   Thanks.

## 2021-12-16 NOTE — TELEPHONE ENCOUNTER
I already sent a phone encounter to update the patient about her pain medications. She will not need a drug screen for 6 months, so we should be good to go.

## 2021-12-20 ENCOUNTER — MYC MEDICAL ADVICE (OUTPATIENT)
Dept: INTERNAL MEDICINE | Facility: CLINIC | Age: 66
End: 2021-12-20
Payer: COMMERCIAL

## 2021-12-20 DIAGNOSIS — G89.4 CHRONIC PAIN SYNDROME: ICD-10-CM

## 2021-12-22 RX ORDER — ACETAMINOPHEN AND CODEINE PHOSPHATE 300; 60 MG/1; MG/1
1-2 TABLET ORAL EVERY 8 HOURS PRN
Qty: 180 TABLET | Refills: 1 | Status: SHIPPED | OUTPATIENT
Start: 2021-12-22 | End: 2022-02-23

## 2022-01-05 ENCOUNTER — MYC MEDICAL ADVICE (OUTPATIENT)
Dept: INTERNAL MEDICINE | Facility: CLINIC | Age: 67
End: 2022-01-05
Payer: COMMERCIAL

## 2022-01-05 DIAGNOSIS — G89.4 CHRONIC PAIN SYNDROME: ICD-10-CM

## 2022-01-05 DIAGNOSIS — G90.522 COMPLEX REGIONAL PAIN SYNDROME I OF LEFT LOWER LIMB: ICD-10-CM

## 2022-01-07 RX ORDER — MORPHINE SULFATE 15 MG/1
15 TABLET, FILM COATED, EXTENDED RELEASE ORAL EVERY 12 HOURS
Qty: 60 TABLET | Refills: 0 | Status: SHIPPED | OUTPATIENT
Start: 2022-01-13 | End: 2022-02-04

## 2022-01-24 DIAGNOSIS — Z92.29 PERSONAL HISTORY OF OTHER DRUG THERAPY: ICD-10-CM

## 2022-01-24 DIAGNOSIS — M81.0 OSTEOPOROSIS WITHOUT CURRENT PATHOLOGICAL FRACTURE, UNSPECIFIED OSTEOPOROSIS TYPE: Primary | ICD-10-CM

## 2022-02-01 ENCOUNTER — ALLIED HEALTH/NURSE VISIT (OUTPATIENT)
Dept: ENDOCRINOLOGY | Facility: CLINIC | Age: 67
End: 2022-02-01
Payer: COMMERCIAL

## 2022-02-01 DIAGNOSIS — M81.0 OSTEOPOROSIS WITHOUT CURRENT PATHOLOGICAL FRACTURE, UNSPECIFIED OSTEOPOROSIS TYPE: Primary | ICD-10-CM

## 2022-02-01 DIAGNOSIS — Z92.29 PERSONAL HISTORY OF OTHER DRUG THERAPY: ICD-10-CM

## 2022-02-01 PROCEDURE — 99207 PR NO CHARGE NURSE ONLY: CPT

## 2022-02-01 PROCEDURE — 96372 THER/PROPH/DIAG INJ SC/IM: CPT | Performed by: PHARMACIST

## 2022-02-01 NOTE — PROGRESS NOTES
"Prolia Injection Phone Screen      Screening questions have been asked 2-3 days prior to administration visit for Prolia. If any questions are answered with \"Yes,\" this phone encounter were will routed to ordering provider for further evaluation.     1.  When was the last injection?  07/27/21    2.  Has insurance for this injection been verified?  Yes    3.  Did you experience any new onset achiness or rashes that lasted for over a month with your previous Prolia injection?   No    4.  Do you have a fever over 101?F or a new deep cough that is unusual for you today? No    5.  Have you started any new medications in the last 6 months that you were told could affect your immune system? These may have been prescribed by oncologist, transplant, rheumatology, or dermatology.   No    6.  In the last 6 months have you have gastric bypass or parathyroid surgery?   No    7.  Do you plan dental work requiring drilling into the bone such as implants/extractions or oral surgery in the next 2-3 months?   No    8. Do you have new insurance since the last injection?    Patient informed if symptoms discussed above present prior to their administration appointment, they are to notify clinic immediately.     Keira BRAVO RN          The following steps were completed to comply with the REMS program for Prolia:  1. Ordering provider has previously reviewed information in the Medication Guide and Patient Counseling Chart, including the serious risks of Prolia  and the symptoms of each risk and have been advised to seek prompt medical attention if they have signs or symptoms of any of the serious risks.  2. Provided each patient a copy of the Medication Guide and Patient Brochure.  See MAR for administration details.   Indication: Prolia  (denosumab) is a prescription medicine used to treat osteoporosis in patients who:   Are at high risk for fracture, meaning patients who have had a fracture related to osteoporosis, or who have " multiple risk factors for fracture; Cannot use another osteoporosis medicine or other osteoporosis medicines did not work well.   The timeline for early/late injections would be 4 weeks early and any time after the 6 month lucie. If a patient receives their injection late, then the subsequent injection would be 6 months from the date that they actually received the injection    Have the screening questions been asked prior to this administration? Yes    The following medication was given:     MEDICATION: Denosumab (Prolia) 60 mg/ml SOLN  ROUTE: SQ  SITE: Arm - Left  DOSE: 60mg  LOT #: [id number]  :  [employer]  EXPIRATION DATE:  04/24

## 2022-02-04 ENCOUNTER — MYC REFILL (OUTPATIENT)
Dept: INTERNAL MEDICINE | Facility: CLINIC | Age: 67
End: 2022-02-04
Payer: COMMERCIAL

## 2022-02-04 DIAGNOSIS — G89.4 CHRONIC PAIN SYNDROME: ICD-10-CM

## 2022-02-04 DIAGNOSIS — G90.522 COMPLEX REGIONAL PAIN SYNDROME I OF LEFT LOWER LIMB: ICD-10-CM

## 2022-02-06 NOTE — TELEPHONE ENCOUNTER
Routing refill request to provider for review/approval because:  Controlled substance request    Last Written Prescription Date:  1/7/22  Last Fill Quantity: 60,  # refills: 0   Last office visit provider:  12/15/21     Requested Prescriptions   Pending Prescriptions Disp Refills     morphine (MS CONTIN) 15 MG CR tablet 60 tablet 0     Sig: Take 1 tablet (15 mg) by mouth every 12 hours       There is no refill protocol information for this order          Luis A Moreno RN 02/06/22 3:19 PM

## 2022-02-09 RX ORDER — MORPHINE SULFATE 15 MG/1
15 TABLET, FILM COATED, EXTENDED RELEASE ORAL EVERY 12 HOURS
Qty: 60 TABLET | Refills: 0 | Status: SHIPPED | OUTPATIENT
Start: 2022-02-09 | End: 2022-03-07

## 2022-02-16 ENCOUNTER — TELEPHONE (OUTPATIENT)
Dept: INTERNAL MEDICINE | Facility: CLINIC | Age: 67
End: 2022-02-16
Payer: COMMERCIAL

## 2022-02-16 NOTE — TELEPHONE ENCOUNTER
Please initiate PA for tolterodine ER (DETROL LA) 4 MG 24 hr capsule    Case # [id number]  Phone # [phone]

## 2022-02-17 NOTE — TELEPHONE ENCOUNTER
Central Prior Authorization Team   Phone: [phone]      PA NOT NEEDED    Medication: tolterodine ER (DETROL LA) 4 MG 24 hr capsule  Insurance Company: [employer] - Phone [phone] Fax [phone]  Pharmacy Filling the Rx: [id number] - [place], [place] - [address] AT [facility]  Filling Pharmacy Phone: [phone]  Filling Pharmacy Fax:    Start Date: 2/17/2022    Started PA on CMM and a response of Drug is covered by current benefit plan. No further PA activity needed.  Called pharmacy, medication did process through but had a high copay, probably going towards a deductible.

## 2022-02-20 DIAGNOSIS — G89.4 CHRONIC PAIN SYNDROME: ICD-10-CM

## 2022-02-21 NOTE — TELEPHONE ENCOUNTER
Routing refill request to provider for review/approval because:  Drug not on the [facility] refill protocol - controlled substance request.     Last Written Prescription Date:  12/22/2021  Last Fill Quantity: 180,  # refills: 1   Last office visit provider:  12/15/2021     Requested Prescriptions   Pending Prescriptions Disp Refills     acetaminophen-codeine 300-60 MG TABS [Pharmacy Med Name: ACETAMINOPHEN-COD #4 TABLET] 180 tablet 1     Sig: TAKE 1-2 TABLETS BY MOUTH EVERY 8 HOURS AS NEEDED (MAX OF 6/DAY)       There is no refill protocol information for this order          Ruth Albrecht, HAO 02/21/22 4:13 PM

## 2022-02-23 ENCOUNTER — MEDICAL CORRESPONDENCE (OUTPATIENT)
Dept: HEALTH INFORMATION MANAGEMENT | Facility: CLINIC | Age: 67
End: 2022-02-23
Payer: COMMERCIAL

## 2022-02-23 ENCOUNTER — TELEPHONE (OUTPATIENT)
Dept: INTERNAL MEDICINE | Facility: CLINIC | Age: 67
End: 2022-02-23
Payer: COMMERCIAL

## 2022-02-23 RX ORDER — ACETAMINOPHEN AND CODEINE PHOSPHATE 300; 60 MG/1; MG/1
1-2 TABLET ORAL EVERY 8 HOURS PRN
Qty: 180 TABLET | Refills: 1 | Status: SHIPPED | OUTPATIENT
Start: 2022-02-23 | End: 2022-04-22

## 2022-02-23 NOTE — TELEPHONE ENCOUNTER
I called and spoke with [employer] and they said that they don't need anything from us anymore.

## 2022-02-23 NOTE — TELEPHONE ENCOUNTER
"Reason for Call:  Other call back    Detailed comments: Tanisha from PA [employer] regarding PA Request needing help to complete some clinical questions. Call back as soon as they can, its going to time out today as its going to be their \"final\" attempt.     Phone number: [phone]  Case ID#: [id number]      Phone Number Patient can be reached at: Home number on file [phone] (home)    Best Time: ANY    Can we leave a detailed message on this number? YES    Call taken on 2/23/2022 at 9:40 AM by Lele Cruz      "

## 2022-03-07 ENCOUNTER — MYC REFILL (OUTPATIENT)
Dept: INTERNAL MEDICINE | Facility: CLINIC | Age: 67
End: 2022-03-07
Payer: COMMERCIAL

## 2022-03-07 DIAGNOSIS — G90.522 COMPLEX REGIONAL PAIN SYNDROME I OF LEFT LOWER LIMB: ICD-10-CM

## 2022-03-07 DIAGNOSIS — G89.4 CHRONIC PAIN SYNDROME: ICD-10-CM

## 2022-03-08 ENCOUNTER — TRANSFERRED RECORDS (OUTPATIENT)
Dept: HEALTH INFORMATION MANAGEMENT | Facility: CLINIC | Age: 67
End: 2022-03-08
Payer: COMMERCIAL

## 2022-03-08 RX ORDER — MORPHINE SULFATE 15 MG/1
15 TABLET, FILM COATED, EXTENDED RELEASE ORAL EVERY 12 HOURS
Qty: 60 TABLET | Refills: 0 | Status: SHIPPED | OUTPATIENT
Start: 2022-03-08 | End: 2022-03-31

## 2022-03-08 NOTE — TELEPHONE ENCOUNTER
Routing refill request to provider for review/approval because:  Drug not on the [facility] refill protocol - controlled substance    Last Written Prescription Date:  2/9/2022  Last Fill Quantity: 60,  # refills: 0   Last office visit provider:  12/15/2021 Sofie Huitron CNP     Requested Prescriptions   Pending Prescriptions Disp Refills     morphine (MS CONTIN) 15 MG CR tablet 60 tablet 0     Sig: Take 1 tablet (15 mg) by mouth every 12 hours       There is no refill protocol information for this order          Patricia Vidal RN 03/08/22 1:58 PM

## 2022-03-21 ENCOUNTER — TRANSFERRED RECORDS (OUTPATIENT)
Dept: HEALTH INFORMATION MANAGEMENT | Facility: CLINIC | Age: 67
End: 2022-03-21
Payer: COMMERCIAL

## 2022-03-31 ENCOUNTER — MYC REFILL (OUTPATIENT)
Dept: INTERNAL MEDICINE | Facility: CLINIC | Age: 67
End: 2022-03-31
Payer: COMMERCIAL

## 2022-03-31 DIAGNOSIS — G89.4 CHRONIC PAIN SYNDROME: ICD-10-CM

## 2022-03-31 DIAGNOSIS — G90.522 COMPLEX REGIONAL PAIN SYNDROME I OF LEFT LOWER LIMB: ICD-10-CM

## 2022-04-01 NOTE — TELEPHONE ENCOUNTER
Routing refill request to provider for review/approval because:  Drug not on the [facility] refill protocol - controlled substance    Last Written Prescription Date:  3/8/2022  Last Fill Quantity: 60,  # refills: 0   Last office visit provider:  12/15/2021 Sofie Huitron CNP     Requested Prescriptions   Pending Prescriptions Disp Refills     morphine (MS CONTIN) 15 MG CR tablet 60 tablet 0     Sig: Take 1 tablet (15 mg) by mouth every 12 hours       There is no refill protocol information for this order          Patricia Vidal RN 04/01/22 4:14 PM

## 2022-04-04 RX ORDER — MORPHINE SULFATE 15 MG/1
15 TABLET, FILM COATED, EXTENDED RELEASE ORAL EVERY 12 HOURS
Qty: 60 TABLET | Refills: 0 | Status: SHIPPED | OUTPATIENT
Start: 2022-04-04 | End: 2022-04-20

## 2022-04-20 DIAGNOSIS — G89.4 CHRONIC PAIN SYNDROME: ICD-10-CM

## 2022-04-20 DIAGNOSIS — G90.522 COMPLEX REGIONAL PAIN SYNDROME I OF LEFT LOWER LIMB: ICD-10-CM

## 2022-04-20 RX ORDER — MORPHINE SULFATE 15 MG/1
15 TABLET, FILM COATED, EXTENDED RELEASE ORAL EVERY 12 HOURS
Qty: 60 TABLET | Refills: 0 | Status: SHIPPED | OUTPATIENT
Start: 2022-04-20 | End: 2022-05-22

## 2022-04-20 NOTE — TELEPHONE ENCOUNTER
Reason for Call:  Medication or medication refill:    Do you use a [facility]?  Name of the pharmacy and phone number for the current request:  [id number] - [place], [place] - [address] AT [facility] [facility] & [address]  [phone]  Name of the medication requested: acetaminophen-codeine 300-60 MG TABS    morphine (MS CONTIN) 15 MG CR tablet    Other request: Patient states she dose not need the morphine (MS CONTIN) 15 MG CR tablet medication just yet. Patient is just calling this in now because she is having a lot of issues with pharmacy not sending her refills in. Please advise     Can we leave a detailed message on this number? YES    Phone number patient can be reached at: Home number on file [phone] (home)    Best Time: any    Call taken on 4/20/2022 at 1:49 PM by Amy Soto

## 2022-04-22 RX ORDER — ACETAMINOPHEN AND CODEINE PHOSPHATE 300; 60 MG/1; MG/1
1-2 TABLET ORAL EVERY 8 HOURS PRN
Qty: 180 TABLET | Refills: 1 | Status: SHIPPED | OUTPATIENT
Start: 2022-04-22 | End: 2022-06-15

## 2022-05-09 ENCOUNTER — OFFICE VISIT (OUTPATIENT)
Dept: INTERNAL MEDICINE | Facility: CLINIC | Age: 67
End: 2022-05-09
Payer: COMMERCIAL

## 2022-05-09 VITALS
WEIGHT: 123.3 LBS | HEART RATE: 78 BPM | BODY MASS INDEX: 24.49 KG/M2 | OXYGEN SATURATION: 97 % | SYSTOLIC BLOOD PRESSURE: 120 MMHG | DIASTOLIC BLOOD PRESSURE: 74 MMHG

## 2022-05-09 DIAGNOSIS — F41.9 ANXIETY: ICD-10-CM

## 2022-05-09 DIAGNOSIS — R63.5 WEIGHT GAIN: ICD-10-CM

## 2022-05-09 DIAGNOSIS — Z11.59 NEED FOR HEPATITIS C SCREENING TEST: ICD-10-CM

## 2022-05-09 DIAGNOSIS — Z13.0 SCREENING FOR ENDOCRINE, NUTRITIONAL, METABOLIC AND IMMUNITY DISORDER: ICD-10-CM

## 2022-05-09 DIAGNOSIS — Z13.21 SCREENING FOR ENDOCRINE, NUTRITIONAL, METABOLIC AND IMMUNITY DISORDER: ICD-10-CM

## 2022-05-09 DIAGNOSIS — Z13.220 SCREENING FOR HYPERLIPIDEMIA: ICD-10-CM

## 2022-05-09 DIAGNOSIS — G89.4 CHRONIC PAIN SYNDROME: ICD-10-CM

## 2022-05-09 DIAGNOSIS — Z13.228 SCREENING FOR ENDOCRINE, NUTRITIONAL, METABOLIC AND IMMUNITY DISORDER: ICD-10-CM

## 2022-05-09 DIAGNOSIS — F33.42 RECURRENT MAJOR DEPRESSIVE DISORDER, IN FULL REMISSION (H): ICD-10-CM

## 2022-05-09 DIAGNOSIS — F11.90 CHRONIC, CONTINUOUS USE OF OPIOIDS: ICD-10-CM

## 2022-05-09 DIAGNOSIS — Z13.29 SCREENING FOR ENDOCRINE, NUTRITIONAL, METABOLIC AND IMMUNITY DISORDER: ICD-10-CM

## 2022-05-09 DIAGNOSIS — Z11.4 SCREENING FOR HIV (HUMAN IMMUNODEFICIENCY VIRUS): ICD-10-CM

## 2022-05-09 LAB
ALBUMIN SERPL-MCNC: 4 G/DL (ref 3.5–5)
ALP SERPL-CCNC: 52 U/L (ref 45–120)
ALT SERPL W P-5'-P-CCNC: 56 U/L (ref 0–45)
AMPHETAMINES UR QL SCN: ABNORMAL
ANION GAP SERPL CALCULATED.3IONS-SCNC: 13 MMOL/L (ref 5–18)
AST SERPL W P-5'-P-CCNC: 42 U/L (ref 0–40)
BARBITURATES UR QL: ABNORMAL
BASOPHILS # BLD AUTO: 0 10E3/UL (ref 0–0.2)
BASOPHILS NFR BLD AUTO: 1 %
BENZODIAZ UR QL: ABNORMAL
BILIRUB SERPL-MCNC: 0.4 MG/DL (ref 0–1)
BUN SERPL-MCNC: 24 MG/DL (ref 8–22)
CALCIUM SERPL-MCNC: 9.9 MG/DL (ref 8.5–10.5)
CANNABINOIDS UR QL SCN: ABNORMAL
CHLORIDE BLD-SCNC: 102 MMOL/L (ref 98–107)
CHOLEST SERPL-MCNC: 176 MG/DL
CO2 SERPL-SCNC: 26 MMOL/L (ref 22–31)
COCAINE UR QL: ABNORMAL
CREAT SERPL-MCNC: 0.76 MG/DL (ref 0.6–1.1)
CREAT UR-MCNC: 69 MG/DL
EOSINOPHIL # BLD AUTO: 0.1 10E3/UL (ref 0–0.7)
EOSINOPHIL NFR BLD AUTO: 1 %
ERYTHROCYTE [DISTWIDTH] IN BLOOD BY AUTOMATED COUNT: 13.6 % (ref 10–15)
FASTING STATUS PATIENT QL REPORTED: YES
GFR SERPL CREATININE-BSD FRML MDRD: 86 ML/MIN/1.73M2
GLUCOSE BLD-MCNC: 99 MG/DL (ref 70–125)
HCT VFR BLD AUTO: 37.8 % (ref 35–47)
HCV AB SERPL QL IA: NONREACTIVE
HDLC SERPL-MCNC: 56 MG/DL
HGB BLD-MCNC: 12.2 G/DL (ref 11.7–15.7)
HIV 1+2 AB+HIV1 P24 AG SERPL QL IA: NEGATIVE
IMM GRANULOCYTES # BLD: 0 10E3/UL
IMM GRANULOCYTES NFR BLD: 0 %
LDLC SERPL CALC-MCNC: 107 MG/DL
LYMPHOCYTES # BLD AUTO: 1.6 10E3/UL (ref 0.8–5.3)
LYMPHOCYTES NFR BLD AUTO: 23 %
MCH RBC QN AUTO: 30.4 PG (ref 26.5–33)
MCHC RBC AUTO-ENTMCNC: 32.3 G/DL (ref 31.5–36.5)
MCV RBC AUTO: 94 FL (ref 78–100)
MONOCYTES # BLD AUTO: 0.4 10E3/UL (ref 0–1.3)
MONOCYTES NFR BLD AUTO: 6 %
NEUTROPHILS # BLD AUTO: 4.7 10E3/UL (ref 1.6–8.3)
NEUTROPHILS NFR BLD AUTO: 68 %
OPIATES UR QL SCN: ABNORMAL
OXYCODONE UR QL: ABNORMAL
PCP UR QL SCN: ABNORMAL
PLATELET # BLD AUTO: 255 10E3/UL (ref 150–450)
POTASSIUM BLD-SCNC: 5.1 MMOL/L (ref 3.5–5)
PROT SERPL-MCNC: 6.8 G/DL (ref 6–8)
RBC # BLD AUTO: 4.01 10E6/UL (ref 3.8–5.2)
SODIUM SERPL-SCNC: 141 MMOL/L (ref 136–145)
TRIGL SERPL-MCNC: 65 MG/DL
TSH SERPL DL<=0.005 MIU/L-ACNC: 0.61 UIU/ML (ref 0.3–5)
WBC # BLD AUTO: 6.9 10E3/UL (ref 4–11)

## 2022-05-09 PROCEDURE — 99214 OFFICE O/P EST MOD 30 MIN: CPT | Performed by: NURSE PRACTITIONER

## 2022-05-09 PROCEDURE — 80307 DRUG TEST PRSMV CHEM ANLYZR: CPT | Performed by: NURSE PRACTITIONER

## 2022-05-09 PROCEDURE — 86803 HEPATITIS C AB TEST: CPT | Performed by: NURSE PRACTITIONER

## 2022-05-09 PROCEDURE — 96127 BRIEF EMOTIONAL/BEHAV ASSMT: CPT | Performed by: NURSE PRACTITIONER

## 2022-05-09 PROCEDURE — 80050 GENERAL HEALTH PANEL: CPT | Performed by: NURSE PRACTITIONER

## 2022-05-09 PROCEDURE — 80061 LIPID PANEL: CPT | Performed by: NURSE PRACTITIONER

## 2022-05-09 PROCEDURE — 87389 HIV-1 AG W/HIV-1&-2 AB AG IA: CPT | Performed by: NURSE PRACTITIONER

## 2022-05-09 PROCEDURE — 36415 COLL VENOUS BLD VENIPUNCTURE: CPT | Performed by: NURSE PRACTITIONER

## 2022-05-09 RX ORDER — TRAZODONE HYDROCHLORIDE 100 MG/1
300 TABLET ORAL AT BEDTIME
Qty: 270 TABLET | Refills: 3
Start: 2022-05-09 | End: 2023-02-01

## 2022-05-09 RX ORDER — LAMOTRIGINE 100 MG/1
100 TABLET ORAL DAILY
Qty: 90 TABLET | Refills: 3 | Status: SHIPPED | OUTPATIENT
Start: 2022-05-09 | End: 2023-07-21

## 2022-05-09 RX ORDER — VILAZODONE HYDROCHLORIDE 20 MG/1
20 TABLET ORAL DAILY
Qty: 30 TABLET | Refills: 3
Start: 2022-05-09 | End: 2022-06-15

## 2022-05-09 ASSESSMENT — ANXIETY QUESTIONNAIRES
IF YOU CHECKED OFF ANY PROBLEMS ON THIS QUESTIONNAIRE, HOW DIFFICULT HAVE THESE PROBLEMS MADE IT FOR YOU TO DO YOUR WORK, TAKE CARE OF THINGS AT HOME, OR GET ALONG WITH OTHER PEOPLE: SOMEWHAT DIFFICULT
7. FEELING AFRAID AS IF SOMETHING AWFUL MIGHT HAPPEN: NOT AT ALL
GAD7 TOTAL SCORE: 6
1. FEELING NERVOUS, ANXIOUS, OR ON EDGE: SEVERAL DAYS
6. BECOMING EASILY ANNOYED OR IRRITABLE: SEVERAL DAYS
5. BEING SO RESTLESS THAT IT IS HARD TO SIT STILL: SEVERAL DAYS
2. NOT BEING ABLE TO STOP OR CONTROL WORRYING: SEVERAL DAYS
3. WORRYING TOO MUCH ABOUT DIFFERENT THINGS: SEVERAL DAYS

## 2022-05-09 ASSESSMENT — PATIENT HEALTH QUESTIONNAIRE - PHQ9
5. POOR APPETITE OR OVEREATING: SEVERAL DAYS
SUM OF ALL RESPONSES TO PHQ QUESTIONS 1-9: 2

## 2022-05-09 NOTE — PATIENT INSTRUCTIONS
Let us know when you need your mental health medications refilled.    Urine drug screen in is processing.    Great job on the weight loss.    Let's see you back in 6 months for your physical with fasted labs, before then if anything comes up.    Send in your cologuard.

## 2022-05-09 NOTE — PROGRESS NOTES
"    Assessment & Plan     Anxiety: ISIDORO-7 score today was a 6. She continues on Viibryd and Lamictal. Stable.     Recurrent major depressive disorder, in full remission (H): PHQ-9 score today was a 2. Will take over her mental health medications, as they have all been stable in dosing.   - traZODone (DESYREL) 100 MG tablet  Dispense: 270 tablet; Refill: 3  - VIIBRYD 20 MG TABS tablet  Dispense: 30 tablet; Refill: 3  - lamoTRIgine (LAMICTAL) 100 MG tablet  Dispense: 90 tablet; Refill: 3    Chronic, continuous use of opioids/Chronic pain syndrome : Checked a urine drug screening today, was positive as expected for opioids. Will do this on a yearly basis from now on. Stable.   - Urine Drugs of Abuse Screen Panel 1 - Drug Screen (Full)    Screening for HIV (human immunodeficiency virus): Labs ordered today.  - HIV Antigen Antibody Combo    Need for hepatitis C screening test: Ordered today.   - Hepatitis C Screen Reflex to HCV RNA Quant and Genotype    Screening for hyperlipidemia: Will recheck labs today.   - Lipid panel reflex to direct LDL Fasting    Screening for endocrine, nutritional, metabolic and immunity disorder  - Lipid panel reflex to direct LDL Fasting  - Comprehensive metabolic panel (BMP + Alb, Alk Phos, ALT, AST, Total. Bili, TP)  - CBC with platelets and differential    Weight gain: Old orders, weight loss, related to exercise. Stable.   - TSH with free T4 reflex    BMI:   Estimated body mass index is 24.49 kg/m  as calculated from the following:    Height as of 12/15/21: 1.511 m (4' 11.5\").    Weight as of this encounter: 55.9 kg (123 lb 4.8 oz).       Return in about 6 months (around 11/9/2022) for Follow up, Routine preventive.    Sofie Pang CNP  [facility]    Ozzy Martines is a 66 year old who presents for the following health issues    HPI     The patient presents today for follow up.    She has a long standing history of anxiety and depression. She " has been seeing a psychiatrist, but her medications have been stable, and she would kamilah to transfer them over to her PCP.    She reports that she got a new puppy, and has been very active walking, and playing with it. She has lost 20 pounds. Congratulated her on this.    She continues on chronic opiods for her chronic pain. Previously managed by the pain clinic, provider recently took these over about 6 months ago. Will have her do a urine drug screening today.    She reports having a cologuard test at home, she will get this submitted.     Review of Systems   Constitutional, HEENT, cardiovascular, pulmonary, GI, , musculoskeletal, neuro, skin, endocrine and psych systems are negative, except as otherwise noted.      Objective    /74 (BP Location: Right arm, Patient Position: Sitting, Cuff Size: Adult Regular)   Pulse 78   Wt 55.9 kg (123 lb 4.8 oz)   SpO2 97%   BMI 24.49 kg/m    Body mass index is 24.49 kg/m .  Physical Exam   GENERAL: healthy, alert and no distress  BREAST: normal without masses, tenderness or nipple discharge and no palpable axillary masses or adenopathy  CV: regular rate and rhythm, normal S1 S2, no S3 or S4, no murmur, click or rub, no peripheral edema and peripheral pulses strong  MS: no gross musculoskeletal defects noted, no edema  SKIN: no suspicious lesions or rashes  NEURO: Normal strength and tone, mentation intact and speech normal  PSYCH: mentation appears normal, affect normal/bright

## 2022-05-10 PROBLEM — F14.11 COCAINE ABUSE IN REMISSION (H): Status: RESOLVED | Noted: 2019-11-14 | Resolved: 2022-05-10

## 2022-05-10 ASSESSMENT — ANXIETY QUESTIONNAIRES: GAD7 TOTAL SCORE: 6

## 2022-05-22 ENCOUNTER — MYC REFILL (OUTPATIENT)
Dept: INTERNAL MEDICINE | Facility: CLINIC | Age: 67
End: 2022-05-22
Payer: COMMERCIAL

## 2022-05-22 DIAGNOSIS — G90.522 COMPLEX REGIONAL PAIN SYNDROME I OF LEFT LOWER LIMB: ICD-10-CM

## 2022-05-22 DIAGNOSIS — G89.4 CHRONIC PAIN SYNDROME: ICD-10-CM

## 2022-05-23 NOTE — TELEPHONE ENCOUNTER
Routing refill request to provider for review/approval because:  Drug not on the [facility] refill protocol     Last Written Prescription Date:  4/20/22  Last Fill Quantity: 60,  # refills: 0   Last office visit provider:  5/9/22     Requested Prescriptions   Pending Prescriptions Disp Refills     morphine (MS CONTIN) 15 MG CR tablet 60 tablet 0     Sig: Take 1 tablet (15 mg) by mouth every 12 hours       There is no refill protocol information for this order          Karrie Pelayo, RN 05/23/22 3:49 PM

## 2022-05-24 DIAGNOSIS — R11.0 NAUSEA: ICD-10-CM

## 2022-05-25 RX ORDER — ONDANSETRON 4 MG/1
TABLET, FILM COATED ORAL
Qty: 30 TABLET | Refills: 1 | Status: SHIPPED | OUTPATIENT
Start: 2022-05-25 | End: 2023-03-06

## 2022-05-25 NOTE — TELEPHONE ENCOUNTER
"Last Written Prescription Date:  11/12/21  Last Fill Quantity: 30,  # refills: 0   Last office visit provider:  5/9/22     Requested Prescriptions   Pending Prescriptions Disp Refills     ondansetron (ZOFRAN) 4 MG tablet [Pharmacy Med Name: ONDANSETRON HCL 4 MG TABLET] 30 tablet 0     Sig: TAKE 1 TABLET BY MOUTH EVERY 8 HOURS IF NEEDED FOR NAUSEA/VOMITING.        Antivertigo/Antiemetic Agents Passed - 5/24/2022  9:53 AM        Passed - Recent (12 mo) or future (30 days) visit within the authorizing provider's specialty     Patient has had an office visit with the authorizing provider or a provider within the authorizing providers department within the previous 12 mos or has a future within next 30 days. See \"Patient Info\" tab in inbasket, or \"Choose Columns\" in Meds & Orders section of the refill encounter.              Passed - Medication is active on med list        Passed - Patient is 18 years of age or older             Karrie Pelayo RN 05/25/22 6:17 PM  "

## 2022-05-31 NOTE — TELEPHONE ENCOUNTER
Pt called and stated she submitted a refill request on 05/24/2022 and today is 3 business days. Pt is requesting the refill be expedited and sent to the pharmacy today if possible.  Please call PT with any questions/concerns.    Radha Rodas

## 2022-06-01 RX ORDER — MORPHINE SULFATE 15 MG/1
15 TABLET, FILM COATED, EXTENDED RELEASE ORAL EVERY 12 HOURS
Qty: 60 TABLET | Refills: 0 | Status: SHIPPED | OUTPATIENT
Start: 2022-06-01 | End: 2022-06-30

## 2022-06-14 DIAGNOSIS — F33.42 RECURRENT MAJOR DEPRESSIVE DISORDER, IN FULL REMISSION (H): ICD-10-CM

## 2022-06-14 DIAGNOSIS — G89.4 CHRONIC PAIN SYNDROME: ICD-10-CM

## 2022-06-15 DIAGNOSIS — N32.81 OVERACTIVE BLADDER: ICD-10-CM

## 2022-06-15 RX ORDER — TOLTERODINE 4 MG/1
CAPSULE, EXTENDED RELEASE ORAL
Qty: 90 CAPSULE | Refills: 2 | Status: SHIPPED | OUTPATIENT
Start: 2022-06-15 | End: 2023-04-09

## 2022-06-15 RX ORDER — VILAZODONE HYDROCHLORIDE 20 MG/1
TABLET ORAL
Qty: 30 TABLET | Refills: 2 | Status: SHIPPED | OUTPATIENT
Start: 2022-06-15 | End: 2022-09-26

## 2022-06-15 NOTE — TELEPHONE ENCOUNTER
Routing refill request to provider for review/approval because:  Drug not on the [facility] refill protocol     Last Written Prescription Date:  4/22/22  Last Fill Quantity: 180,  # refills: 1   Last office visit provider:  5/9/22     Requested Prescriptions   Pending Prescriptions Disp Refills     acetaminophen-codeine (TYLENOL #4) 300-60 MG per tablet [Pharmacy Med Name: ACETAMINOPHEN-COD #4 TABLET] 180 tablet 1     Sig: TAKE 1-2 TABLETS BY MOUTH EVERY 8 HOURS AS NEEDED (MAX OF 6/DAY)       There is no refill protocol information for this order          Karrie Pelayo, RN 06/14/22 9:24 PM

## 2022-06-15 NOTE — TELEPHONE ENCOUNTER
Routing refill request to provider for review/approval because:  Drug not on the [facility] refill protocol     Last Written Prescription Date:  5/9/22  Last Fill Quantity: 30,  # refills: 3   Last office visit provider:  5/9/22     Requested Prescriptions   Pending Prescriptions Disp Refills     VIIBRYD 20 MG TABS tablet [Pharmacy Med Name: VIIBRYD 20 MG TABLET] 30 tablet 2     Sig: TAKE ONE TABLET BY MOUTH DAILY.       There is no refill protocol information for this order          Karrie Pelayo RN 06/14/22 9:26 PM

## 2022-06-16 DIAGNOSIS — G89.4 CHRONIC PAIN SYNDROME: ICD-10-CM

## 2022-06-16 NOTE — TELEPHONE ENCOUNTER
"Last Written Prescription Date:  10/31/2021  Last Fill Quantity: 90,  # refills: 2   Last office visit provider:  5/9/2022     Requested Prescriptions   Pending Prescriptions Disp Refills     tolterodine ER (DETROL LA) 4 MG 24 hr capsule [Pharmacy Med Name: TOLTERODINE TART ER 4 MG CAP] 90 capsule 2     Sig: TAKE 1 CAPSULE (4 MG TOTAL) BY MOUTH DAILY WITH LUNCH.       Muscarinic Antagonists (Urinary Incontinence Agents) Passed - 6/15/2022 12:22 AM        Passed - Recent (12 mo) or future (30 days) visit within the authorizing provider's specialty     Patient has had an office visit with the authorizing provider or a provider within the authorizing providers department within the previous 12 mos or has a future within next 30 days. See \"Patient Info\" tab in inbasket, or \"Choose Columns\" in Meds & Orders section of the refill encounter.              Passed - Patient does not have a diagnosis of glaucoma on the problem list     If glaucoma diagnosis is new, refer refill to physician.          Passed - Medication is active on med list        Passed - Patient is 18 years of age or older             Lillie Duran RN 06/15/22 8:35 PM  "

## 2022-06-17 NOTE — TELEPHONE ENCOUNTER
Routing refill request to provider for review/approval because:  Controlled substance request    Last Written Prescription Date:  6/15/22  Last Fill Quantity: 180,  # refills: 1   Last office visit provider:  5/9/22     Requested Prescriptions   Pending Prescriptions Disp Refills     acetaminophen-codeine (TYLENOL #4) 300-60 MG per tablet [Pharmacy Med Name: ACETAMINOPHEN-COD #4 TABLET] 180 tablet 1     Sig: TAKE 1-2 TABLETS BY MOUTH EVERY 8 HOURS AS NEEDED (MAX OF 6/DAY)       There is no refill protocol information for this order          Luis A Moreno RN 06/17/22 11:15 AM

## 2022-06-17 NOTE — TELEPHONE ENCOUNTER
Duplicate. Please refuse and close encounter.  Lilly Hussein CMA ............... 5:27 PM, 06/17/22

## 2022-06-20 NOTE — TELEPHONE ENCOUNTER
Failed and pharmacy did not received rx.  Please resend.    Butch Barron, EDINN, RN  [facility]

## 2022-06-20 NOTE — TELEPHONE ENCOUNTER
6-20-22  Pt called stated [facility] doesn't this have the refill request for:   acetaminophen-codeine (TYLENOL #4) 300-60 MG per tablet  Per Epic its saying this error msg:  An error was encountered while attempting to calculate the morphine milligram equivalents per day for at least one order.  Pt is now out of  meds   [facility]

## 2022-06-21 NOTE — TELEPHONE ENCOUNTER
Left message that rx sent to pharm and for patient to follow up with pharm if she have not done so.  EDIN VargasN, RN  [facility]

## 2022-06-24 NOTE — PATIENT INSTRUCTIONS
Come back and have your labs drawn when fasted.    Call [phone] to set up your mammogram.    Your cologuard has been ordered. You will receive this in the mail.      Patient Education     Prevention Guidelines, Women Ages 65 and Older  Screening tests and vaccines are an important part of managing your health. A screening test is done to find possible disorders or diseases in people who don't have any symptoms. The goal is to find a disease early so lifestyle changes can be made and you can be watched more closely to reduce the risk of disease, or to detect it early enough to treat it most effectively. Screening tests are not considered diagnostic, but are used to determine if more testing is needed. Health counseling is essential, too. Below are guidelines for these, for women ages 65 and older. Talk with your healthcare provider to make sure you re up to date on what you need.  Screening Who needs it How often   Type 2 diabetes or prediabetes All women beginning at age 45 and women without symptoms at any age who are overweight or obese and have 1 or more additional risk factors for diabetes At least every 3 years   Type 2 diabetes All women with prediabetes Every year   Unhealthy alcohol use All women in this age group At routine exams   Blood pressure All women in this age group Yearly checkup if your blood pressure is normal  Normal blood pressure is less than 120/80 mm Hg  If your blood pressure reading is higher than normal, follow the advice of your healthcare provider   Breast cancer All women of average risk Mammograms should be done every 1 or 2 years. Talk with your healthcare provider about your risk factors and how often you need the test and for how long.   Cervical cancer Only women who had abnormal screening results before age 65 Talk with your healthcare provider   Chlamydia Women at increased risk for infection At routine exams   Colorectal cancer All women at average risk in this age group  through age 75 who are in good health. For women ages 76 to 85, talk with your healthcare provider about whether to continue screening. For women 85 and older, screening is not needed. Multiple tests are available and are used at different times. Possible tests include:    Flexible sigmoidoscopy every 5 years, or    Colonoscopy every 10 years, or    CT colonography (virtual colonoscopy) every 5 years, or    Yearly fecal occult blood test, or    Yearly fecal immunochemical test every year, or    Stool DNA test, every 3 years  If you choose a test other than a colonoscopy and have an abnormal test result, you will need to follow-up with a colonoscopy. Talk with your healthcare provider which test is best for you.  Some people should be screened using a different schedule because of their personal or family health history. Talk with your healthcare provider about your health history.   Depression All women in this age group At routine exams   Gonorrhea Sexually active women at increased risk for infection At yearly routine exams   Hepatitis C Anyone at increased risk; 1 time for those born between 1945 and 1965 At routine exams   High cholesterol or triglycerides All women in this age group who are at risk for coronary artery disease At least every 5 years; talk with your healthcare provider about your risk   HIV Women at increased risk for infection At routine exams; talk with your healthcare provider about your risk   Lung cancer Adults ages 55 to 74 who are in fairly good health and are at higher risk for lung cancer    Currently smoke or have quit within the past 15 years    30-pack year smoking history  Eligibility criteria and age limit (possibly up to age 80) may vary across major organizations Yearly lung cancer screening with a low dose CT scan (LDCT); talk with your healthcare provider   Obesity All women in this age group At yearly routine exams   Osteoporosis All women in this age group Routinely done every  2 years, but repeat as advised by your healthcare provider   Syphilis Women at increased risk for infection At routine exams; talk with your healthcare provider   Thyroid-stimulating hormone (TSH) Only women in this age group with symptoms of thyroid dysfunction Talk with your healthcare provider   Tuberculosis Women at increased risk for infection Talk with your healthcare provider   Vision All women in this age group Every 1 to 2 years; if you have a chronic health condition, ask your healthcare provider if you need exams more often   Vaccine Who needs it How often   Chickenpox (varicella) All women in this age group who have no record of this infection or vaccine 2 doses; second dose should be given at least 4 weeks after the first dose   Hepatitis A Women at increased risk for infection 2 or 3 doses (depending on the vaccine) given at least 6 months apart; talk with your healthcare provider   Hepatitis B Women at increased risk for infection 2 or 3 doses (depending on the vaccine); second dose should be given 1 month after the first dose; if a the third dose, it should be given at least 2 months after the second dose and at least 4 months after the first dose   Haemophilus influenza type B (HIB) Women at increased risk for infection 1 to 3 doses; talk with your healthcare provider   Influenza (flu) All women in this age group Once a year   Pneumococcal conjugate vaccine (PCV13) and pneumococcal polysaccharide vaccine (PPSV23) All women in this age group  PPSV 23: women who have not been vaccinated or have not had infection  PCV 13: women at increased risk for infection PPSV: 1 dose at age 65 or older  PCV 13: 1 dose at age 65 or older; talk with your healthcare provider   Tetanus/diphtheria/pertussis (Td/Tdap) booster All women in this age group Td every 10 years, or a 1-time dose of Tdap instead of a Td booster after age 18, then Td every 10 years   Zoster (shingles) All women in this age group 2 vaccines are  available:    Recombinant zoster vaccine (RZV; Shigrix) is recommended as the preferred shingles vaccine. It's given in a series of 2 doses. The 2nd dose is given 2 to 6 months after the first. This is given even if you've had shingles before or had a previous zoster live vaccine.    Zoster live vaccine live (ZVL; Zostavax) may be given to healthy adults over age 60. It's given in one dose.   Counseling Who needs it How often   Diet and exercise Women who are overweight or obese When diagnosed, and then at routine exams   Fall prevention (exercise and vitamin D supplements) All women in this age group At yearly routine exams   Sexually transmitted infection prevention Women at increased risk for infection-talk with your healthcare provider At routine exams   Use of daily aspirin Women up to age 70 who are at high risk for cardiovascular problems and not at increased risk for bleeding as identified by your healthcare provider When your risk is known   Use of tobacco and the health effects it can cause All women in this age group Every exam   Clarence last reviewed this educational content on 7/1/2020 2000-2021 [employer]. All rights reserved. This information is not intended as a substitute for professional medical care. Always follow your healthcare professional's instructions.            Patient unable to complete

## 2022-06-30 ENCOUNTER — MYC REFILL (OUTPATIENT)
Dept: INTERNAL MEDICINE | Facility: CLINIC | Age: 67
End: 2022-06-30

## 2022-06-30 DIAGNOSIS — G90.522 COMPLEX REGIONAL PAIN SYNDROME I OF LEFT LOWER LIMB: ICD-10-CM

## 2022-06-30 DIAGNOSIS — G89.4 CHRONIC PAIN SYNDROME: ICD-10-CM

## 2022-07-01 RX ORDER — MORPHINE SULFATE 15 MG/1
15 TABLET, FILM COATED, EXTENDED RELEASE ORAL EVERY 12 HOURS
Qty: 60 TABLET | Refills: 0 | Status: SHIPPED | OUTPATIENT
Start: 2022-07-01 | End: 2022-07-22

## 2022-07-22 DIAGNOSIS — G89.4 CHRONIC PAIN SYNDROME: ICD-10-CM

## 2022-07-22 DIAGNOSIS — G90.522 COMPLEX REGIONAL PAIN SYNDROME I OF LEFT LOWER LIMB: ICD-10-CM

## 2022-07-22 RX ORDER — MORPHINE SULFATE 15 MG/1
15 TABLET, FILM COATED, EXTENDED RELEASE ORAL EVERY 12 HOURS
Qty: 60 TABLET | Refills: 0 | Status: SHIPPED | OUTPATIENT
Start: 2022-07-22 | End: 2022-08-29

## 2022-07-22 NOTE — TELEPHONE ENCOUNTER
Spoke with patient. She stated that she will has some of the MS Contin left from when she picked it up on the 6th. She was just trying to request it so it will be refilled on time. She also wanted to use [facility] pharmacy as well.    Rx and pharm pended.     Last Written Prescription Date:  7/1/2022  Last Fill Quantity: 60,   # refills: 0  Last Office Visit: 05/09/2022  Future Office visit:      Nov 14, 2022  9:30 AM  (Arrive by 9:10 AM)  Annual Wellness Visit with Sofie Pang CNP  [facility] ([facility] ) [phone]          EDIN VargasN, RN  [facility]

## 2022-07-22 NOTE — TELEPHONE ENCOUNTER
Reason for Call:  Medication or medication refill: morphine (MS CONTIN) 15 MG CR tablet    Do you use a [facility]? NO  Name of the pharmacy and phone number for the current request:  [facility] pharmacy [address] [place] [address] in [place], [phone]    Name of the medication requested:     morphine (MS CONTIN) 15 MG CR tablet 60 tablet 0 7/1/2022  No   Sig - Route: Take 1 tablet (15 mg) by mouth every 12 hours - Oral   Sent to pharmacy as: Morphine Sulfate ER 15 MG Oral Tablet Extended Release (MS CONTIN)     Other Details: Pt states she wants all of her future prescriptions to be sent to [facility] pharmacy in [place]. Please update her chart.     Can we leave a detailed message on this number? YES    Phone number patient can be reached at: Cell number on file:    Telephone Information:   Mobile [phone]       Best Time: ANY    Call taken on 7/22/2022 at 9:33 AM by Radha Rodas

## 2022-08-02 ENCOUNTER — ALLIED HEALTH/NURSE VISIT (OUTPATIENT)
Dept: ENDOCRINOLOGY | Facility: CLINIC | Age: 67
End: 2022-08-02
Payer: COMMERCIAL

## 2022-08-02 DIAGNOSIS — M81.0 OSTEOPOROSIS WITHOUT CURRENT PATHOLOGICAL FRACTURE, UNSPECIFIED OSTEOPOROSIS TYPE: Primary | ICD-10-CM

## 2022-08-02 DIAGNOSIS — Z92.29 PERSONAL HISTORY OF OTHER DRUG THERAPY: ICD-10-CM

## 2022-08-02 PROCEDURE — 99207 PR NO CHARGE NURSE ONLY: CPT

## 2022-08-02 PROCEDURE — 96372 THER/PROPH/DIAG INJ SC/IM: CPT | Performed by: NURSE PRACTITIONER

## 2022-08-02 NOTE — PROGRESS NOTES
"Prolia Injection Phone Screen      Screening questions have been asked 2-3 days prior to administration visit for Prolia. If any questions are answered with \"Yes,\" this phone encounter were will routed to ordering provider for further evaluation.     1.  When was the last injection?  02/01/22    2.  Has insurance for this injection been verified?  Yes    3.  Did you experience any new onset achiness or rashes that lasted for over a month with your previous Prolia injection?   No    4.  Do you have a fever over 101?F or a new deep cough that is unusual for you today? No    5.  Have you started any new medications in the last 6 months that you were told could affect your immune system? These may have been prescribed by oncologist, transplant, rheumatology, or dermatology.   No    6.  In the last 6 months have you have gastric bypass or parathyroid surgery?   No    7.  Do you plan dental work requiring drilling into the bone such as implants/extractions or oral surgery in the next 2-3 months?   No    8. Do you have new insurance since the last injection?    Patient informed if symptoms discussed above present prior to their administration appointment, they are to notify clinic immediately.     Keira BRAVO RN          The following steps were completed to comply with the REMS program for Prolia:  1. Ordering provider has previously reviewed information in the Medication Guide and Patient Counseling Chart, including the serious risks of Prolia  and the symptoms of each risk and have been advised to seek prompt medical attention if they have signs or symptoms of any of the serious risks.  2. Provided each patient a copy of the Medication Guide and Patient Brochure.  See MAR for administration details.   Indication: Prolia  (denosumab) is a prescription medicine used to treat osteoporosis in patients who:   Are at high risk for fracture, meaning patients who have had a fracture related to osteoporosis, or who have " multiple risk factors for fracture; Cannot use another osteoporosis medicine or other osteoporosis medicines did not work well.   The timeline for early/late injections would be 4 weeks early and any time after the 6 month lucie. If a patient receives their injection late, then the subsequent injection would be 6 months from the date that they actually received the injection    Have the screening questions been asked prior to this administration? Yes    The following medication was given:     MEDICATION: Denosumab (Prolia) 60 mg/ml SOLN  ROUTE: SQ  SITE: Arm - Left  DOSE: 60mg  LOT #: [id number]  :  Parature  EXPIRATION DATE:  10/24

## 2022-08-18 ENCOUNTER — TELEPHONE (OUTPATIENT)
Dept: INTERNAL MEDICINE | Facility: CLINIC | Age: 67
End: 2022-08-18

## 2022-08-18 NOTE — TELEPHONE ENCOUNTER
"Pt reporting prescribed tylenol 4   Dentist prescribed and Chioma Daniels prescribed.  Dentist instructions states take every 4 hours and 6 hours   Chioma's directions state take 1-2 tablets by mouth every 8 hours as needed for severe pain.    Pt stating \"It's fine I don't want to bother Chioma about this I will take it every 4 hours, and I wont get the prescription, I don't want to get in trouble.\"    Pt had no further questions or concerns.    Velia LOVE RN  [facility]    "

## 2022-08-29 ENCOUNTER — MYC REFILL (OUTPATIENT)
Dept: INTERNAL MEDICINE | Facility: CLINIC | Age: 67
End: 2022-08-29

## 2022-08-29 DIAGNOSIS — G90.522 COMPLEX REGIONAL PAIN SYNDROME I OF LEFT LOWER LIMB: ICD-10-CM

## 2022-08-29 DIAGNOSIS — G89.4 CHRONIC PAIN SYNDROME: ICD-10-CM

## 2022-08-31 RX ORDER — MORPHINE SULFATE 15 MG/1
15 TABLET, FILM COATED, EXTENDED RELEASE ORAL EVERY 12 HOURS
Qty: 60 TABLET | Refills: 0 | Status: SHIPPED | OUTPATIENT
Start: 2022-08-31 | End: 2022-09-22

## 2022-09-22 ENCOUNTER — MYC REFILL (OUTPATIENT)
Dept: INTERNAL MEDICINE | Facility: CLINIC | Age: 67
End: 2022-09-22

## 2022-09-22 DIAGNOSIS — G90.522 COMPLEX REGIONAL PAIN SYNDROME I OF LEFT LOWER LIMB: ICD-10-CM

## 2022-09-22 DIAGNOSIS — G89.4 CHRONIC PAIN SYNDROME: ICD-10-CM

## 2022-09-23 RX ORDER — MORPHINE SULFATE 15 MG/1
15 TABLET, FILM COATED, EXTENDED RELEASE ORAL EVERY 12 HOURS
Qty: 60 TABLET | Refills: 0 | Status: SHIPPED | OUTPATIENT
Start: 2022-09-23 | End: 2022-10-20

## 2022-09-24 ENCOUNTER — HEALTH MAINTENANCE LETTER (OUTPATIENT)
Age: 67
End: 2022-09-24

## 2022-09-24 DIAGNOSIS — F33.42 RECURRENT MAJOR DEPRESSIVE DISORDER, IN FULL REMISSION (H): ICD-10-CM

## 2022-09-26 RX ORDER — VILAZODONE HYDROCHLORIDE 20 MG/1
TABLET ORAL
Qty: 30 TABLET | Refills: 2 | Status: SHIPPED | OUTPATIENT
Start: 2022-09-26 | End: 2022-12-28

## 2022-10-03 DIAGNOSIS — G89.4 CHRONIC PAIN SYNDROME: ICD-10-CM

## 2022-10-20 ENCOUNTER — MYC REFILL (OUTPATIENT)
Dept: INTERNAL MEDICINE | Facility: CLINIC | Age: 67
End: 2022-10-20

## 2022-10-20 DIAGNOSIS — G90.522 COMPLEX REGIONAL PAIN SYNDROME I OF LEFT LOWER LIMB: ICD-10-CM

## 2022-10-20 DIAGNOSIS — G89.4 CHRONIC PAIN SYNDROME: ICD-10-CM

## 2022-10-24 DIAGNOSIS — G90.522 COMPLEX REGIONAL PAIN SYNDROME I OF LEFT LOWER LIMB: ICD-10-CM

## 2022-10-24 DIAGNOSIS — G89.4 CHRONIC PAIN SYNDROME: ICD-10-CM

## 2022-10-25 ENCOUNTER — MYC REFILL (OUTPATIENT)
Dept: INTERNAL MEDICINE | Facility: CLINIC | Age: 67
End: 2022-10-25

## 2022-10-25 DIAGNOSIS — G89.4 CHRONIC PAIN SYNDROME: ICD-10-CM

## 2022-10-25 DIAGNOSIS — G90.522 COMPLEX REGIONAL PAIN SYNDROME I OF LEFT LOWER LIMB: ICD-10-CM

## 2022-10-25 RX ORDER — MORPHINE SULFATE 15 MG/1
15 TABLET, FILM COATED, EXTENDED RELEASE ORAL EVERY 12 HOURS
Qty: 60 TABLET | Refills: 0 | Status: SHIPPED | OUTPATIENT
Start: 2022-10-25 | End: 2022-11-15

## 2022-10-25 RX ORDER — MORPHINE SULFATE 15 MG/1
15 TABLET, FILM COATED, EXTENDED RELEASE ORAL EVERY 12 HOURS
Qty: 60 TABLET | Refills: 0 | OUTPATIENT
Start: 2022-10-25

## 2022-10-26 RX ORDER — MORPHINE SULFATE 15 MG/1
15 TABLET, FILM COATED, EXTENDED RELEASE ORAL EVERY 12 HOURS
Qty: 60 TABLET | Refills: 0 | OUTPATIENT
Start: 2022-10-26

## 2022-11-01 ENCOUNTER — LAB (OUTPATIENT)
Dept: LAB | Facility: CLINIC | Age: 67
End: 2022-11-01
Payer: COMMERCIAL

## 2022-11-01 DIAGNOSIS — M81.0 OSTEOPOROSIS WITHOUT CURRENT PATHOLOGICAL FRACTURE, UNSPECIFIED OSTEOPOROSIS TYPE: ICD-10-CM

## 2022-11-01 LAB
CALCIUM SERPL-MCNC: 10.1 MG/DL (ref 8.8–10.2)
DEPRECATED CALCIDIOL+CALCIFEROL SERPL-MC: 65 UG/L (ref 20–75)

## 2022-11-01 PROCEDURE — 82310 ASSAY OF CALCIUM: CPT

## 2022-11-01 PROCEDURE — 36415 COLL VENOUS BLD VENIPUNCTURE: CPT

## 2022-11-01 PROCEDURE — 82306 VITAMIN D 25 HYDROXY: CPT

## 2022-11-15 ENCOUNTER — MYC REFILL (OUTPATIENT)
Dept: INTERNAL MEDICINE | Facility: CLINIC | Age: 67
End: 2022-11-15

## 2022-11-15 DIAGNOSIS — G90.522 COMPLEX REGIONAL PAIN SYNDROME I OF LEFT LOWER LIMB: ICD-10-CM

## 2022-11-15 DIAGNOSIS — G89.4 CHRONIC PAIN SYNDROME: ICD-10-CM

## 2022-11-16 RX ORDER — MORPHINE SULFATE 15 MG/1
15 TABLET, FILM COATED, EXTENDED RELEASE ORAL EVERY 12 HOURS
Qty: 60 TABLET | Refills: 0 | Status: SHIPPED | OUTPATIENT
Start: 2022-11-16 | End: 2022-12-11

## 2022-11-29 ENCOUNTER — VIRTUAL VISIT (OUTPATIENT)
Dept: ENDOCRINOLOGY | Facility: CLINIC | Age: 67
End: 2022-11-29
Payer: COMMERCIAL

## 2022-11-29 DIAGNOSIS — M81.8 OTHER OSTEOPOROSIS WITHOUT CURRENT PATHOLOGICAL FRACTURE: Primary | ICD-10-CM

## 2022-11-29 PROCEDURE — 99213 OFFICE O/P EST LOW 20 MIN: CPT | Mod: 95 | Performed by: NURSE PRACTITIONER

## 2022-11-29 NOTE — PROGRESS NOTES
Conrad is a 67 year old who is being evaluated via a billable video visit.      How would you like to obtain your AVS? MyChart  If the video visit is dropped, the invitation should be resent by: Text to cell phone: [phone]  Will anyone else be joining your video visit? [facility]  ENDOCRINOLOGY    Osteoporosis Follow Up 11/29/2022    Conrad Zhu, 1955, [id number]          Reason for visit      1. Other osteoporosis without current pathological fracture        History     Conrad Zhu is a very pleasant 67 year old old female who presents for follow up.   SUMMARY:  1. OSTEOPOROSIS. The diagnosis was made based on fragility fracture of her left 5th metatarsals, Right wrist-Colles type s/p ORIF in 2014. She also had a baseline DXA scan confirming osteoporosis.   The patient has the following risk factors for osteoporosis:   Age, gender, , BMI-she keeps her weight close to underweight range since adolescence but denied eating disorders. The patient is not on high risk medications such as glucocorticoids, anti-coagulants, chemotherapy, levothyroxine. BUT she is on gabapentin.   The following high- risk conditions have been ruled out: celiac disease, gastric bypass, hyperparathyroidism, inflammatory bowel disease, hyperthyroidism, rheumatoid arthritis, lupus, chronic kidney disease. I suspect an eating disorder but she vehemently denies.   Gyn History: Patient denied any prior hysterectomy, ovariectomy, breast cancer or family history of breast cancer Menopause was at age: 48 years. There has been a height loss of 0 inches.   Patient is currently on calcium supplements: 600 mg/day and vitamin D supplements 1000 IU/day. Dairy intake: She has lactose intolerance so no dairy for many years.   Investigations so far:   DXA scan dated 3/26/2014: (attached to chart):   Left Femoral Neck T-Score: -2.5   Right femoral Neck T-Score: -2.3   Total Left femoral T-Score: -2.8   Total Right  femoral T-score: -2.4   A-P Spine T-Score: -1.9     TODAY:    Conrad is seen today via Video Visit for Osteoporosis.  She completed a year on Evenity, and has had two years on Prolia. She had a great response to the Evenity, as evidenced in her last Dexa Scan. She is now due for another.  She has had no falls in the last year. She walks as she can do so. She is taking Vit D, 4000 international unit(s) daily and her current level is 65. Calcium level is 10.1.    Risk Factors     The following high- risk conditions have been ruled out: celiac disease, eating disorders, gastric bypass, hyperparathyroidism, inflammatory bowel disease, hyperthyroidism, rheumatoid arthritis, lupus, chronic kidney disease.     Conrad Zhu has the following risk factors: Age, Female gender and      She is not on high risk medications such as glucocorticoids, anti-coagulants, anti-convulsants, chemotherapy or levothyroxine.    Patient deniesHysterectomy, Oophrectomy, Breast cancer and Family history of breast cancer.        Past Medical History     Patient Active Problem List   Diagnosis     Anxiety     Nausea     OAB (overactive bladder)     Low back pain     Chronic pain     PN (peripheral neuropathy)     Insomnia     Osteoporosis     Former smoker     Achilles tendinitis of right lower extremity     Sprain of right ankle, unspecified ligament, initial encounter     Sprain of right foot, initial encounter     Closed nondisplaced fracture of body of right calcaneus, initial encounter     Corneal scarring     Left foot drop     Left leg weakness     Leukocytosis     Tear film insufficiency     Closed intertrochanteric fracture of hip, left, initial encounter (H)     Left hip pain     Pre-operative general physical examination     Anemia due to blood loss, acute     Acute post-operative pain     Complex regional pain syndrome i of left lower limb     Depression     Long term (current) use of opiate analgesic     Anxiety disorder,  unspecified     Neck pain     Vitamin deficiency     Age-related osteoporosis without current pathological fracture     Anemia     Closed fracture of metatarsal bone     Complex regional pain syndrome type 2 of upper extremity     Constipation, unspecified     Degeneration of intervertebral disc of cervical region     Edema     History of falling     Menopause present     Muscle weakness     Neoplasm of uncertain behavior of skin     Unspecified fracture of shaft of left fibula, subsequent encounter for closed fracture with routine healing     Displaced intertrochanteric fracture of left femur, subsequent encounter for closed fracture with routine healing       Family History       family history includes Breast Cancer in her sister; Cancer in her brother, father, mother, and sister.    Social History      reports that she has quit smoking. She has never used smokeless tobacco. She reports that she does not drink alcohol and does not use drugs.      Review of Systems     Patient denies current pain, limited mobility, fractures.   Remainder per HPI.      Vital Signs     There were no vitals taken for this visit.    Physical Exam     Constitutional:  Well developed, Well nourished  HENT:  Normocephalic,   Neck: normal in appearance  Eyes:  PERRL, Conjunctiva pink  Respiratory:  No respiratory distress  Skin: No acanthosis nigricans, lipoatrophy or lipodystrophy  Neurologic:  Alert & oriented x 3, nonfocal  Psychiatric:  Affect, Mood, Insight appropriate        Assessment     1. Other osteoporosis without current pathological fracture        Plan     Will get her next Dexa Scan scheduled. She will continue on the Prolia injections and will f/u with me in 1 year.         Blanca Duarte NP   Endocrinology  11/29/2022  12:38 PM        Current Medications     Outpatient Medications Prior to Visit   Medication Sig Dispense Refill     acetaminophen-codeine (TYLENOL #4) 300-60 MG per tablet Take 1 to 2 tablets by mouth  every 8 hours as needed for severe pain 180 tablet 3     cetirizine (ZYRTEC) 10 MG tablet Take 10 mg by mouth as needed       cholecalciferol 50 MCG (2000 UT) tablet Take 4,000 Units by mouth        DENTAGEL 1.1 % GEL topical gel        fluticasone propionate (FLONASE) 50 mcg/actuation nasal spray Spray 2 sprays into both nostrils as needed       glycerin-hypromellose- (VISINE) 0.2-0.2-1 % SOLN ophthalmic solution INSTILL 1 DROP INTO BOTH EYES AS NEEDED FOR DRY EYES       ipratropium (ATROVENT) 42 mcg (0.06 %) nasal spray [IPRATROPIUM (ATROVENT) 42 MCG (0.06 %) NASAL SPRAY] INHALE 2 SPRAYS IN THE NOSTRIL(S) 3 TIMES DAILY. 15 mL 2     lamoTRIgine (LAMICTAL) 100 MG tablet Take 1 tablet (100 mg) by mouth daily 90 tablet 3     morphine (MS CONTIN) 15 MG CR tablet Take 1 tablet (15 mg) by mouth every 12 hours 60 tablet 0     naloxone (NARCAN) 4 mg/actuation nasal spray [NALOXONE (NARCAN) 4 MG/ACTUATION NASAL SPRAY] 1 spray (4 mg dose) into one nostril for opioid reversal. Call 911. May repeat if no response in 3 minutes. (Patient taking differently: once as needed) 1 Box 0     ondansetron (ZOFRAN) 4 MG tablet TAKE 1 TABLET BY MOUTH EVERY 8 HOURS IF NEEDED FOR NAUSEA/VOMITING. 30 tablet 1     tolterodine ER (DETROL LA) 4 MG 24 hr capsule TAKE 1 CAPSULE (4 MG TOTAL) BY MOUTH DAILY WITH LUNCH. 90 capsule 2     traZODone (DESYREL) 100 MG tablet Take 3 tablets (300 mg) by mouth At Bedtime 270 tablet 3     VIIBRYD 20 MG TABS tablet Take 1 tablet by mouth daily 30 tablet 2     Facility-Administered Medications Prior to Visit   Medication Dose Route Frequency Provider Last Rate Last Admin     denosumab (PROLIA) injection 60 mg  60 mg Subcutaneous Q6 Months Blanca Duarte, NP   60 mg at 08/02/22 0944     denosumab (PROLIA) injection 60 mg  60 mg Subcutaneous Q6 Months Vinicio Brink PharmD   60 mg at 02/01/22 1024         Lab Results     TSH   Date Value Ref Range Status   05/09/2022 0.61 0.30 - 5.00 uIU/mL Final            Imaging Results   Last DEXA scan:  No valid procedures specified.    Study Result    Narrative & Impression   1/12/2021        RE: Conrad Zhu  YOB: 1955           Dear Blanca Duarte,     Patient Profile:  65 y.o. female, postmenopausal, is here for the follow up bone density test.   History of fractures - Yes;  Wrist, Hip and Femur. Family history of osteoporosis - None.  Family history of hip fracture: None. Smoking history - No. Osteoporosis treatment past -  Yes;  HRT and Prolia. Osteoporosis treatment current - Yes, Evenity for   1 year.  Chronic medical problems - Chronic low back problems and Liver disease. High risk medications -  None.        Assessment:     1. The spine bone density L1-L4 with T-score 0.0.  2. Femoral bone density shows right femoral neck T- score -1.0.  3. Trabecular bone score indicates good trabecular bone architecture.        65 y.o. female with NORMAL BONE DENSITY and MODERATE fracture risk, adjusted for the TBS, with major osteoporotic fracture risk 10.7% and hip fracture risk 0.7%.      Since the previous bone density dated  September 23, 2019, there has been a   +16.1% change in the bone density of the spine.  Additionally there has been a +18.3% change in the right total hip.  This represents an excellent response to treatment.     Recommendations:  Appropriate follow-up pharmacologic treatment, after Evenity, is recommended with follow up bone density scan in 2 years.        Bone densitometry was performed on your patient using our Cop[employer] How To densitometer. The results are summarized and a copy of the actual scans are included for your review. In conformity with the International Society of Clinical Densitometry's most   recent position statement for DXA interpretation (2015), the diagnosis will be made on the lowest measured T-score of the lumbar spine, femoral neck, total proximal femur or 33% radius. Note the change in terminology for  diagnostic classification from   OSTEOPENIA to LOW BONE MASS. All trending for sequential exams will be done using multiple vertebrae or the total proximal femur. Fracture risk is based on the WHO Fracture Risk Assessment Tool (FRAX). If additional information is needed or if you would   like to discuss the results, please do not hesitate to call me.         Thank you for referring this patient to [facility]. We are happy to be of service in support of you and your practice. If you have any questions or suggestions to improve our service, please call me at [phone].      Sincerely,      IZABELLA UsCSACHIN.  Osteoporosis Services, [facility]         Video-Visit Details    Video Start Time: 1130    Type of service:  Video Visit    Video End Time:1150    Originating Location (pt. Location): Home        Distant Location (provider location):  On-site    Platform used for Video Visit: [employer]    Date of last OV: 11/11/21  Reason for Visit: Osteoporosis    Answers for HPI/ROS submitted by the patient on 11/29/2022  General Symptoms: No  Skin Symptoms: No  HENT Symptoms: No  EYE SYMPTOMS: No  HEART SYMPTOMS: No  LUNG SYMPTOMS: No  INTESTINAL SYMPTOMS: No  URINARY SYMPTOMS: No  GYNECOLOGIC SYMPTOMS: No  BREAST SYMPTOMS: No  SKELETAL SYMPTOMS: No  BLOOD SYMPTOMS: No  NERVOUS SYSTEM SYMPTOMS: No  MENTAL HEALTH SYMPTOMS: No

## 2022-12-07 ASSESSMENT — ENCOUNTER SYMPTOMS
CONSTIPATION: 0
EYE PAIN: 0
HEADACHES: 0
FEVER: 0
JOINT SWELLING: 0
ARTHRALGIAS: 0
DIARRHEA: 0
DYSURIA: 0
HEMATURIA: 0
PARESTHESIAS: 0
HEMATOCHEZIA: 0
MYALGIAS: 0
NAUSEA: 0
SORE THROAT: 0
ABDOMINAL PAIN: 0
PALPITATIONS: 0
CHILLS: 0
HEARTBURN: 0
DIZZINESS: 0
WEAKNESS: 0
FREQUENCY: 1
SHORTNESS OF BREATH: 0
COUGH: 0
NERVOUS/ANXIOUS: 0
BREAST MASS: 0

## 2022-12-07 ASSESSMENT — ACTIVITIES OF DAILY LIVING (ADL): CURRENT_FUNCTION: HOUSEWORK REQUIRES ASSISTANCE

## 2022-12-09 ENCOUNTER — OFFICE VISIT (OUTPATIENT)
Dept: INTERNAL MEDICINE | Facility: CLINIC | Age: 67
End: 2022-12-09
Payer: COMMERCIAL

## 2022-12-09 VITALS
HEIGHT: 59 IN | BODY MASS INDEX: 26.21 KG/M2 | HEART RATE: 78 BPM | OXYGEN SATURATION: 98 % | DIASTOLIC BLOOD PRESSURE: 82 MMHG | SYSTOLIC BLOOD PRESSURE: 124 MMHG | TEMPERATURE: 98 F | WEIGHT: 130 LBS

## 2022-12-09 DIAGNOSIS — Z12.11 SCREENING FOR COLON CANCER: ICD-10-CM

## 2022-12-09 DIAGNOSIS — F51.01 PRIMARY INSOMNIA: ICD-10-CM

## 2022-12-09 DIAGNOSIS — F33.42 RECURRENT MAJOR DEPRESSIVE DISORDER, IN FULL REMISSION (H): ICD-10-CM

## 2022-12-09 DIAGNOSIS — G89.4 CHRONIC PAIN SYNDROME: ICD-10-CM

## 2022-12-09 DIAGNOSIS — Z13.21 SCREENING FOR ENDOCRINE, NUTRITIONAL, METABOLIC AND IMMUNITY DISORDER: ICD-10-CM

## 2022-12-09 DIAGNOSIS — Z13.0 SCREENING FOR ENDOCRINE, NUTRITIONAL, METABOLIC AND IMMUNITY DISORDER: ICD-10-CM

## 2022-12-09 DIAGNOSIS — Z13.228 SCREENING FOR ENDOCRINE, NUTRITIONAL, METABOLIC AND IMMUNITY DISORDER: ICD-10-CM

## 2022-12-09 DIAGNOSIS — N32.81 OVERACTIVE BLADDER: ICD-10-CM

## 2022-12-09 DIAGNOSIS — Z00.00 MEDICARE ANNUAL WELLNESS VISIT, SUBSEQUENT: ICD-10-CM

## 2022-12-09 DIAGNOSIS — Z00.00 ENCOUNTER FOR MEDICARE ANNUAL WELLNESS EXAM: Primary | ICD-10-CM

## 2022-12-09 DIAGNOSIS — F11.90 CHRONIC, CONTINUOUS USE OF OPIOIDS: ICD-10-CM

## 2022-12-09 DIAGNOSIS — Z13.220 SCREENING FOR HYPERLIPIDEMIA: ICD-10-CM

## 2022-12-09 DIAGNOSIS — Z13.29 SCREENING FOR ENDOCRINE, NUTRITIONAL, METABOLIC AND IMMUNITY DISORDER: ICD-10-CM

## 2022-12-09 LAB
ALBUMIN SERPL BCG-MCNC: 4.2 G/DL (ref 3.5–5.2)
ALP SERPL-CCNC: 56 U/L (ref 35–104)
ALT SERPL W P-5'-P-CCNC: 20 U/L (ref 10–35)
AMPHETAMINES UR QL SCN: ABNORMAL
ANION GAP SERPL CALCULATED.3IONS-SCNC: 8 MMOL/L (ref 7–15)
AST SERPL W P-5'-P-CCNC: 28 U/L (ref 10–35)
BARBITURATES UR QL SCN: ABNORMAL
BASOPHILS # BLD AUTO: 0 10E3/UL (ref 0–0.2)
BASOPHILS NFR BLD AUTO: 1 %
BENZODIAZ UR QL SCN: ABNORMAL
BILIRUB SERPL-MCNC: 0.3 MG/DL
BUN SERPL-MCNC: 12.3 MG/DL (ref 8–23)
BZE UR QL SCN: ABNORMAL
CALCIUM SERPL-MCNC: 9.3 MG/DL (ref 8.8–10.2)
CANNABINOIDS UR QL SCN: ABNORMAL
CHLORIDE SERPL-SCNC: 102 MMOL/L (ref 98–107)
CHOLEST SERPL-MCNC: 208 MG/DL
CREAT SERPL-MCNC: 0.94 MG/DL (ref 0.51–0.95)
CREAT UR-MCNC: 103 MG/DL
DEPRECATED HCO3 PLAS-SCNC: 29 MMOL/L (ref 22–29)
EOSINOPHIL # BLD AUTO: 0.2 10E3/UL (ref 0–0.7)
EOSINOPHIL NFR BLD AUTO: 3 %
ERYTHROCYTE [DISTWIDTH] IN BLOOD BY AUTOMATED COUNT: 13 % (ref 10–15)
GFR SERPL CREATININE-BSD FRML MDRD: 66 ML/MIN/1.73M2
GLUCOSE SERPL-MCNC: 97 MG/DL (ref 70–99)
HCT VFR BLD AUTO: 38 % (ref 35–47)
HDLC SERPL-MCNC: 71 MG/DL
HGB BLD-MCNC: 12.4 G/DL (ref 11.7–15.7)
IMM GRANULOCYTES # BLD: 0 10E3/UL
IMM GRANULOCYTES NFR BLD: 1 %
LDLC SERPL CALC-MCNC: 122 MG/DL
LYMPHOCYTES # BLD AUTO: 1.6 10E3/UL (ref 0.8–5.3)
LYMPHOCYTES NFR BLD AUTO: 29 %
MCH RBC QN AUTO: 29.7 PG (ref 26.5–33)
MCHC RBC AUTO-ENTMCNC: 32.6 G/DL (ref 31.5–36.5)
MCV RBC AUTO: 91 FL (ref 78–100)
MONOCYTES # BLD AUTO: 0.5 10E3/UL (ref 0–1.3)
MONOCYTES NFR BLD AUTO: 8 %
NEUTROPHILS # BLD AUTO: 3.2 10E3/UL (ref 1.6–8.3)
NEUTROPHILS NFR BLD AUTO: 59 %
NONHDLC SERPL-MCNC: 137 MG/DL
OPIATES UR QL SCN: ABNORMAL
OXYCODONE UR QL: ABNORMAL
PCP QUAL URINE (ROCHE): ABNORMAL
PLATELET # BLD AUTO: 327 10E3/UL (ref 150–450)
POTASSIUM SERPL-SCNC: 4.6 MMOL/L (ref 3.4–5.3)
PROT SERPL-MCNC: 7 G/DL (ref 6.4–8.3)
RBC # BLD AUTO: 4.17 10E6/UL (ref 3.8–5.2)
SODIUM SERPL-SCNC: 139 MMOL/L (ref 136–145)
TRIGL SERPL-MCNC: 74 MG/DL
WBC # BLD AUTO: 5.5 10E3/UL (ref 4–11)

## 2022-12-09 PROCEDURE — G0438 PPPS, INITIAL VISIT: HCPCS | Performed by: NURSE PRACTITIONER

## 2022-12-09 PROCEDURE — G0009 ADMIN PNEUMOCOCCAL VACCINE: HCPCS | Performed by: NURSE PRACTITIONER

## 2022-12-09 PROCEDURE — 85025 COMPLETE CBC W/AUTO DIFF WBC: CPT | Performed by: NURSE PRACTITIONER

## 2022-12-09 PROCEDURE — 99213 OFFICE O/P EST LOW 20 MIN: CPT | Mod: 25 | Performed by: NURSE PRACTITIONER

## 2022-12-09 PROCEDURE — 80061 LIPID PANEL: CPT | Performed by: NURSE PRACTITIONER

## 2022-12-09 PROCEDURE — 80053 COMPREHEN METABOLIC PANEL: CPT | Performed by: NURSE PRACTITIONER

## 2022-12-09 PROCEDURE — 80307 DRUG TEST PRSMV CHEM ANLYZR: CPT | Performed by: NURSE PRACTITIONER

## 2022-12-09 PROCEDURE — 36415 COLL VENOUS BLD VENIPUNCTURE: CPT | Performed by: NURSE PRACTITIONER

## 2022-12-09 PROCEDURE — 90732 PPSV23 VACC 2 YRS+ SUBQ/IM: CPT | Performed by: NURSE PRACTITIONER

## 2022-12-09 RX ORDER — CHLORHEXIDINE GLUCONATE ORAL RINSE 1.2 MG/ML
SOLUTION DENTAL
COMMUNITY
Start: 2022-12-06 | End: 2023-01-31

## 2022-12-09 ASSESSMENT — ENCOUNTER SYMPTOMS
JOINT SWELLING: 0
FREQUENCY: 1
ARTHRALGIAS: 0
HEMATURIA: 0
HEADACHES: 0
MYALGIAS: 0
PARESTHESIAS: 0
DYSURIA: 0
CONSTIPATION: 0
DIARRHEA: 0
SORE THROAT: 0
WEAKNESS: 0
SHORTNESS OF BREATH: 0
HEMATOCHEZIA: 0
BREAST MASS: 0
COUGH: 0
DIZZINESS: 0
EYE PAIN: 0
HEARTBURN: 0
ABDOMINAL PAIN: 0
PALPITATIONS: 0
NERVOUS/ANXIOUS: 0
NAUSEA: 0
FEVER: 0
CHILLS: 0

## 2022-12-09 ASSESSMENT — PATIENT HEALTH QUESTIONNAIRE - PHQ9
SUM OF ALL RESPONSES TO PHQ QUESTIONS 1-9: 8
SUM OF ALL RESPONSES TO PHQ QUESTIONS 1-9: 8
10. IF YOU CHECKED OFF ANY PROBLEMS, HOW DIFFICULT HAVE THESE PROBLEMS MADE IT FOR YOU TO DO YOUR WORK, TAKE CARE OF THINGS AT HOME, OR GET ALONG WITH OTHER PEOPLE: SOMEWHAT DIFFICULT

## 2022-12-09 ASSESSMENT — ACTIVITIES OF DAILY LIVING (ADL): CURRENT_FUNCTION: HOUSEWORK REQUIRES ASSISTANCE

## 2022-12-09 NOTE — PATIENT INSTRUCTIONS
Your labs are processing at this time, I will release results on [employer] network once they are back.    For the heel sore try Bandaid brand blister blocker dressing, this will help heal the scabbed over area, provide a cushion, and keep the area moist.     You received your PPSV23 shot today. This is the second pneumonia shot, you should be good to go after this.    I have ordered a Cologuard for you, they will send this to your address. This is your colon cancer screening. If this is negative, you are good for 3 years.    No worries about the controlled substances.    I will see you back in 6 months for follow up, before then if anything comes up.   Patient Education   Personalized Prevention Plan  You are due for the preventive services outlined below.  Your care team is available to assist you in scheduling these services.  If you have already completed any of these items, please share that information with your care team to update in your medical record.  Health Maintenance Due   Topic Date Due     Colorectal Cancer Screening  09/02/2021     Activities of Daily Living    Your Health Risk Assessment indicates you have difficulties with activities of daily living such as housework, bathing, preparing meals, taking medication, etc. Please make a follow up appointment for us to address this issue in more detail.    Urinary Incontinence, Female (Adult)   Urinary incontinence means loss of bladder control. This problem affects many women, especially as they get older. If you have incontinence, you may be embarrassed to ask for help. But know that this problem can be treated.   Types of Incontinence  There are different types of incontinence. Two of the main types are described here. You can have more than one type.     Stress incontinence. With this type, urine leaks when pressure (stress) is put on the bladder. This may happen when you cough, sneeze, or laugh. Stress incontinence most often occurs because the pelvic floor  muscles that support the bladder and urethra are weak. This can happen after pregnancy and vaginal childbirth or a hysterectomy. It can also be due to excess body weight or hormone changes.    Urge incontinence (also called overactive bladder). With this type, a sudden urge to urinate is felt often. This may happen even though there may not be much urine in the bladder. The need to urinate often during the night is common. Urge incontinence most often occurs because of bladder spasms. This may be due to bladder irritation or infection. Damage to bladder nerves or pelvic muscles, constipation, and certain medicines can also lead to urge incontinence.  Treatment depends on the cause. Further evaluation is needed to find the type you have. This will likely include an exam and certain tests. Based on the results, you and your healthcare provider can then plan treatment. Until a diagnosis is made, the home care tips below can help ease symptoms.   Home care    Do pelvic floor muscle exercises, if they are prescribed. The pelvic floor muscles help support the bladder and urethra. Many women find that their symptoms improve when doing special exercises that strengthen these muscles. To do the exercises, contract the muscles you would use to stop your stream of urine. But do this when you re not urinating. Hold for 10 seconds, then relax. Repeat 10 to 20 times in a row, at least 3 times a day. Your healthcare provider may give you other instructions for how to do the exercises and how often.    Keep a bladder diary. This helps track how often and how much you urinate over a set period of time. Bring this diary with you to your next visit with the provider. The information can help your provider learn more about your bladder problem.    Lose weight, if advised to by your provider. Extra weight puts pressure on the bladder. Your provider can help you create a weight-loss plan that s right for you. This may include exercising  more and making certain diet changes.    Don't have foods and drinks that may irritate the bladder. These can include alcohol and caffeinated drinks.    Quit smoking. Smoking and other tobacco use can lead to a long-term (chronic) cough that strains the pelvic floor muscles. Smoking may also damage the bladder and urethra. Talk with your provider about treatments or methods you can use to quit smoking.    If drinking large amounts of fluid makes you have symptoms, you may be advised to limit your fluid intake. You may also be advised to drink most of your fluids during the day and to limit fluids at night.    If you re worried about urine leakage or accidents, you may wear absorbent pads to catch urine. Change the pads often. This helps reduce discomfort. It may also reduce the risk of skin or bladder infections.    Follow-up care  Follow up with your healthcare provider, or as directed. It may take some to find the right treatment for your problem. But healthy lifestyle changes can be made right away. These include such things as exercising on a regular basis, eating a healthy diet, losing weight (if needed), and quitting smoking. Your treatment plan may include special therapies or medicines. Certain procedures or surgery may also be options. Talk about any questions you have with your provider.   When to seek medical advice  Call the healthcare provider right away if any of these occur:    Fever of 100.4 F (38 C) or higher, or as directed by your provider    Bladder pain or fullness    Belly swelling    Nausea or vomiting    Back pain    Weakness, dizziness, or fainting  Clarence last reviewed this educational content on 1/1/2020 2000-2021 [employer]. All rights reserved. This information is not intended as a substitute for professional medical care. Always follow your healthcare professional's instructions.          Depression and Suicide in Older Adults    Nearly 2 million older Americans have  "some type of depression. Some of them even take their own lives. Yet depression among older adults is often ignored. Learn the warning signs. You may help spare a loved one needless pain. You may also save a life.   What is depression?  Depression is a common and serious illness that affects the way you think and feel. It is not a normal part of aging, nor is it a sign of weakness, a character flaw, or something you can snap out of. Most people with depression need treatment to get better. The most common symptom is a feeling of deep sadness. People who are depressed also may seem tired and listless. And nothing seems to give them pleasure. It s normal to grieve or be sad sometimes. But sadness lessens or passes with time. Depression rarely goes away or improves on its own. A person with clinical depression can't \"snap out of it.\" Other symptoms of depression are:     Sleeping more or less than normal    Eating more or less than normal    Having headaches, stomachaches, or other pains that don t go away    Feeling nervous,  empty,  or worthless    Crying a great deal    Thinking or talking about suicide or death    Loss of interest in activities previously enjoyed    Social isolation    Feeling confused or forgetful  What causes it?  The causes of depression aren t fully known. But it is thought to result from a complex blend of these factors:     Biochemistry. Certain chemicals in the brain play a role.    Genes. Depression does run in families.    Life stress. Life stresses can also trigger depression in some people. Older adults often face many stressors, such as death of friends or a spouse, health problems, and financial concerns.    Chronic conditions. This includes conditions such as diabetes, heart disease, or cancer. These can cause symptoms of depression. Medicine side effects can cause changes in thoughts and behaviors.  How you can help  Often, depressed people may not want to ask for help. When they do, " they may be ignored. Or, they may receive the wrong treatment. You can help by showing parents and older friends love and support. If they seem depressed, don t lecture the person, ignore the symptoms, or discount the symptoms as a  normal  part of aging -which they are not. Get involved, listen, and show interest and support.   Help them understand that depression is a treatable illness. Tell them you can help them find the right treatment. Offer to go to their healthcare provider's appointment with them for support when the symptoms are discussed. With their approval, contact a local mental health center, social service agency, or hospital about services.   You can be an advocate for him or her at healthcare appointments. Many older adults have chronic illnesses that can cause symptoms of depression. Medicine side effects can change thoughts and behaviors. You can help make sure that the healthcare provider looks at all of these factors. He or she should refer your family member or friend to a mental healthcare provider when needed. in some cases, untreated depression can lead to a misdiagnosis. A person may be diagnosed with a brain disorder such as dementia. If the healthcare provider does not take the issue of depression seriously, help your family member or friend to find another provider.   Don't be afraid to ask  If you think an older person you care about could be suicidal, ask,  Have you thought about suicide?  Most people will tell you the truth. If they say  yes,  they may already have a plan for how and when they will attempt it. Find out as much as you can. The more detailed the plan, and the easier it is to carry out, the more danger the person is in right now. Tell the person you are there for them and do not want them to harm him or herself. Don't wait to get help for the person. Call the person's healthcare provider, local hospital, or emergency services.   To learn more    National Suicide  Prevention Lifeline (crisis hotline) 022-634-XSPO (479-312-7474)    National [place] of Mental Yiqvrk798-036-7124kkk.[facility].nih.gov    National [place] on Mental Xqhyrsm014-451-1641stg.graeme.org    Mental Health Diuvtox681-395-8866fvr.[facility].org    National Suicide Vzdquug057-OSVMOPL (629-939-1853)    Call 911  Never leave the person alone. A person who is actively suicidal needs psychiatric care right away. They will need constant supervision. Never leave the person out of sight. Call 911 or the national 24-hour suicide crisis hotline at 345-505-FACG (027-254-2582). You can also take the person to the closest emergency room.   Clarence last reviewed this educational content on 5/1/2020 2000-2021 [employer]. All rights reserved. This information is not intended as a substitute for professional medical care. Always follow your healthcare professional's instructions.

## 2022-12-09 NOTE — PROGRESS NOTES
"SUBJECTIVE:   Conrad is a 67 year old who presents for Preventive Visit.  Patient has been advised of split billing requirements and indicates understanding: Yes  Are you in the first 12 months of your Medicare coverage?  No    The patient presents today for her annual exam. She is fasted today.    She reports that she recently had dental work done, and her dentist prescribed her some Tramadol which did not help her tooth pain. She had some old Vicodin from a year prior, and she took four of these. She has a hx of chronic pain, is on chronic opioids. We will complete her annual drug testing today.     She would like her second pneumonia shot today.    She reports that she hit the left back of her heel on her front door when holding it open, and she has a scab there that will not heal. The area is not draining.    She last had a colonoscopy on 09/02/2011; was normal, 10 year follow up. Cologuard ordered today.     Her last Mammogram was 10/19/2021; was normal.    Her last bone density test was done 01/13/23021; she continues on prolia injections every 6 months through Endocrinology.    She reports that her [employer] will need a prior authorization next year, in 2023.     Healthy Habits:     In general, how would you rate your overall health?  Good    Duration of exercise:  Greater than 60 minutes    Do you usually eat at least 4 servings of fruit and vegetables a day, include whole grains    & fiber and avoid regularly eating high fat or \"junk\" foods?  Yes    Taking medications regularly:  Yes    Medication side effects:  None    Ability to successfully perform activities of daily living:  Housework requires assistance    Home Safety:  Lack of grab bars in the bathroom    Hearing Impairment:  No hearing concerns    In the past 6 months, have you been bothered by leaking of urine? Yes    In general, how would you rate your overall mental or emotional health?  Good      PHQ-2 Total Score: 1    Additional concerns today:  " Yes      Have you ever done Advance Care Planning? (For example, a Health Directive, POLST, or a discussion with a medical provider or your loved ones about your wishes): Yes, patient states has an Advance Care Planning document and will bring a copy to the clinic.    Fall risk  Fallen 2 or more times in the past year?: No  Any fall with injury in the past year?: No  click delete button to remove this line now  Cognitive Screening   1) Repeat 3 items (Leader, Season, Table)    2) Clock draw: NORMAL  3) 3 item recall: Recalls 2 objects   Results: NORMAL clock, 1-2 items recalled: COGNITIVE IMPAIRMENT LESS LIKELY    Mini-CogTM Copyright S Rufino. Licensed by the author for use in [facility]; reprinted with permission (soob@[facility]). All rights reserved.      Do you have sleep apnea, excessive snoring or daytime drowsiness?: no    Reviewed and updated as needed this visit by clinical staff   Tobacco  Allergies  Meds              Reviewed and updated as needed this visit by Provider                 Social History     Tobacco Use     Smoking status: Former     Packs/day: 0.00     Types: Cigarettes     Smokeless tobacco: Never   Substance Use Topics     Alcohol use: No     If you drink alcohol do you typically have >3 drinks per day or >7 drinks per week? No    Alcohol Use 12/9/2022   Prescreen: >3 drinks/day or >7 drinks/week? -   Prescreen: >3 drinks/day or >7 drinks/week? No     Current providers sharing in care for this patient include:   Patient Care Team:  Sofie Pang CNP as PCP - Josafat Villela, PharmD as Pharmacist (Pharmacist)  Sofie Pang CNP as Assigned PCP  Blanca Duarte NP as Nurse Practitioner  Sherron Ruiz PA-C as Assigned Surgical Provider    The following health maintenance items are reviewed in Epic and correct as of today:  Health Maintenance   Topic Date Due     LUNG CANCER SCREENING  02/25/2021     COLORECTAL CANCER SCREENING  09/02/2021     URINE  DRUG SCREEN  05/09/2023     PHQ-9  06/09/2023     MAMMO SCREENING  10/19/2023     MEDICARE ANNUAL WELLNESS VISIT  12/09/2023     ANNUAL REVIEW OF HM ORDERS  12/09/2023     FALL RISK ASSESSMENT  12/09/2023     LIPID  05/09/2027     ADVANCE CARE PLANNING  12/09/2027     DTAP/TDAP/TD IMMUNIZATION (3 - Td or Tdap) 05/19/2028     DEXA  01/12/2036     HEPATITIS C SCREENING  Completed     DEPRESSION ACTION PLAN  Completed     INFLUENZA VACCINE  Completed     Pneumococcal Vaccine: 65+ Years  Completed     ZOSTER IMMUNIZATION  Completed     COVID-19 Vaccine  Completed     IPV IMMUNIZATION  Aged Out     MENINGITIS IMMUNIZATION  Aged Out     Lab work is in process  Mammogram Screening: Mammogram Screening: Recommended mammography every 1-2 years with patient discussion and risk factor consideration    FHS-7:   Breast CA Risk Assessment (FHS-7) 10/19/2021 12/7/2022   Did any of your first-degree relatives have breast or ovarian cancer? Yes Yes   Did any of your relatives have bilateral breast cancer? No Unknown   Did any man in your family have breast cancer? No No   Did any woman in your family have breast and ovarian cancer? No Yes   Did any woman in your family have breast cancer before age 50 y? No Yes   Do you have 2 or more relatives with breast and/or ovarian cancer? No No   Do you have 2 or more relatives with breast and/or bowel cancer? No No     click delete button to remove this line now  Mammogram Screening: Recommended mammography every 1-2 years with patient discussion and risk factor consideration  Pertinent mammograms are reviewed under the imaging tab.    Review of Systems   Constitutional: Negative for chills and fever.   HENT: Negative for congestion, ear pain, hearing loss and sore throat.    Eyes: Negative for pain and visual disturbance.   Respiratory: Negative for cough and shortness of breath.    Cardiovascular: Negative for chest pain, palpitations and peripheral edema.   Gastrointestinal: Negative for  "abdominal pain, constipation, diarrhea, heartburn, hematochezia and nausea.   Breasts:  Negative for tenderness, breast mass and discharge.   Genitourinary: Positive for frequency and urgency. Negative for dysuria, genital sores, hematuria, pelvic pain, vaginal bleeding and vaginal discharge.   Musculoskeletal: Negative for arthralgias, joint swelling and myalgias.   Skin: Negative for rash.   Neurological: Negative for dizziness, weakness, headaches and paresthesias.   Psychiatric/Behavioral: Negative for mood changes. The patient is not nervous/anxious.      OBJECTIVE:   /82 (BP Location: Right arm, Patient Position: Sitting)   Pulse 78   Temp 98  F (36.7  C)   Ht 1.511 m (4' 11.49\")   Wt 59 kg (130 lb)   SpO2 98%   BMI 25.83 kg/m   Estimated body mass index is 25.83 kg/m  as calculated from the following:    Height as of this encounter: 1.511 m (4' 11.49\").    Weight as of this encounter: 59 kg (130 lb).  Physical Exam  GENERAL: healthy, alert and no distress  EYES: Eyes grossly normal to inspection, PERRL and conjunctivae and sclerae normal  HENT: ear canals and TM's normal, nose and mouth without ulcers or lesions  NECK: no adenopathy, no asymmetry, masses, or scars and thyroid normal to palpation  RESP: lungs clear to auscultation - no rales, rhonchi or wheezes  BREAST: normal without masses, tenderness or nipple discharge and no palpable axillary masses or adenopathy  CV: regular rate and rhythm, normal S1 S2, no S3 or S4, no murmur, click or rub, no peripheral edema and peripheral pulses strong  ABDOMEN: soft, nontender, no hepatosplenomegaly, no masses and bowel sounds normal  MS: no gross musculoskeletal defects noted, no edema  SKIN: no suspicious lesions or rashes  NEURO: Normal strength and tone, mentation intact and speech normal  PSYCH: mentation appears normal, affect normal/bright    Diagnostic Test Results: Mammogram, Colonoscopy, Dexa scan.  Labs reviewed in Epic    ASSESSMENT / PLAN: " "  Conrad was seen today for wellness visit.    Diagnoses and all orders for this visit:    Medicare annual wellness visit, subsequent: Completed today. Pneumonia 23 given today. Cologuard ordered, labs drawn.     Recurrent major depressive disorder, in full remission (H): She continues on Viibryd and Lamictal. Stable.     Chronic, continuous use of opioids/Chronic pain syndrome: She continues on chronic MS Contin and Tylenol #3 for pain control. Has a pain contract in place. Will perform her annual drug screen.   -     Urine Drugs of Abuse Screen Panel 1 - Drug Screen (Full); Future    Overactive bladder: She continues on Detrol LA. Stable.     Primary insomnia: She continues on Trazodone. Stable.     Screening for colon cancer  -     COLOGUARD(EXACT SCIENCES); Future    Screening for endocrine, nutritional, metabolic and immunity disorder  -     CBC with platelets and differential; Future  -     Comprehensive metabolic panel (BMP + Alb, Alk Phos, ALT, AST, Total. Bili, TP); Future    Screening for hyperlipidemia  -     Lipid panel reflex to direct LDL Fasting    Other orders  -     REVIEW OF HEALTH MAINTENANCE PROTOCOL ORDERS  -     PPSV23, IM/SUBQ (2+ YRS) - [id number]    Patient has been advised of split billing requirements and indicates understanding: Yes      COUNSELING:  Reviewed preventive health counseling, as reflected in patient instructions  Special attention given to:       Regular exercise       Healthy diet/nutrition       Vision screening      BMI:   Estimated body mass index is 25.83 kg/m  as calculated from the following:    Height as of this encounter: 1.511 m (4' 11.49\").    Weight as of this encounter: 59 kg (130 lb).   Weight management plan: Discussed healthy diet and exercise guidelines      She reports that she has quit smoking. Her smoking use included cigarettes. She has never used smokeless tobacco.      Appropriate preventive services were discussed with this patient, including " applicable screening as appropriate for cardiovascular disease, diabetes, osteopenia/osteoporosis, and glaucoma.  As appropriate for age/gender, discussed screening for colorectal cancer, prostate cancer, breast cancer, and cervical cancer. Checklist reviewing preventive services available has been given to the patient.    Reviewed patients plan of care and provided an AVS. The Intermediate Care Plan ( asthma action plan, low back pain action plan, and migraine action plan) for Conrad meets the Care Plan requirement. This Care Plan has been established and reviewed with the Patient.          Sofie Pang [facility]    Identified Health Risks:  Answers for HPI/ROS submitted by the patient on 12/9/2022  If you checked off any problems, how difficult have these problems made it for you to do your work, take care of things at home, or get along with other people?: Somewhat difficult  PHQ9 TOTAL SCORE: 8

## 2022-12-11 ENCOUNTER — MYC REFILL (OUTPATIENT)
Dept: INTERNAL MEDICINE | Facility: CLINIC | Age: 67
End: 2022-12-11

## 2022-12-11 DIAGNOSIS — G90.522 COMPLEX REGIONAL PAIN SYNDROME I OF LEFT LOWER LIMB: ICD-10-CM

## 2022-12-11 DIAGNOSIS — G89.4 CHRONIC PAIN SYNDROME: ICD-10-CM

## 2022-12-12 RX ORDER — MORPHINE SULFATE 15 MG/1
15 TABLET, FILM COATED, EXTENDED RELEASE ORAL EVERY 12 HOURS
Qty: 60 TABLET | Refills: 0 | Status: SHIPPED | OUTPATIENT
Start: 2022-12-12 | End: 2023-01-12

## 2022-12-26 DIAGNOSIS — F33.42 RECURRENT MAJOR DEPRESSIVE DISORDER, IN FULL REMISSION (H): ICD-10-CM

## 2022-12-28 RX ORDER — VILAZODONE HYDROCHLORIDE 20 MG/1
TABLET ORAL
Qty: 90 TABLET | Refills: 3 | Status: SHIPPED | OUTPATIENT
Start: 2022-12-28 | End: 2023-12-22

## 2022-12-29 ENCOUNTER — TELEPHONE (OUTPATIENT)
Dept: ENDOCRINOLOGY | Facility: CLINIC | Age: 67
End: 2022-12-29

## 2022-12-29 DIAGNOSIS — M81.0 OSTEOPOROSIS WITHOUT CURRENT PATHOLOGICAL FRACTURE, UNSPECIFIED OSTEOPOROSIS TYPE: Primary | ICD-10-CM

## 2022-12-29 NOTE — TELEPHONE ENCOUNTER
[facility] Call Center    Phone Message    May a detailed message be left on voicemail: yes     Reason for Call: Order(s): Other:   Reason for requested: Needs orders for Dexa Scan placed in chart for scheduling asap. Thank you           Action Taken: Other: ENDO    Travel Screening: Not Applicable

## 2023-01-05 ENCOUNTER — HOSPITAL ENCOUNTER (OUTPATIENT)
Dept: MAMMOGRAPHY | Facility: CLINIC | Age: 68
Discharge: HOME | End: 2023-01-05
Attending: NURSE PRACTITIONER | Admitting: NURSE PRACTITIONER
Payer: COMMERCIAL

## 2023-01-05 DIAGNOSIS — Z12.31 VISIT FOR SCREENING MAMMOGRAM: ICD-10-CM

## 2023-01-05 PROCEDURE — 77063 BREAST TOMOSYNTHESIS BI: CPT

## 2023-01-06 LAB — NONINV COLON CA DNA+OCC BLD SCRN STL QL: NEGATIVE

## 2023-01-12 ENCOUNTER — MYC REFILL (OUTPATIENT)
Dept: INTERNAL MEDICINE | Facility: CLINIC | Age: 68
End: 2023-01-12
Payer: COMMERCIAL

## 2023-01-12 DIAGNOSIS — G89.4 CHRONIC PAIN SYNDROME: ICD-10-CM

## 2023-01-12 DIAGNOSIS — G90.522 COMPLEX REGIONAL PAIN SYNDROME I OF LEFT LOWER LIMB: ICD-10-CM

## 2023-01-18 RX ORDER — MORPHINE SULFATE 15 MG/1
15 TABLET, FILM COATED, EXTENDED RELEASE ORAL EVERY 12 HOURS
Qty: 60 TABLET | Refills: 0 | Status: SHIPPED | OUTPATIENT
Start: 2023-01-18 | End: 2023-02-13

## 2023-01-24 ENCOUNTER — HOSPITAL ENCOUNTER (OUTPATIENT)
Dept: BONE DENSITY | Facility: HOSPITAL | Age: 68
Discharge: HOME | End: 2023-01-24
Attending: NURSE PRACTITIONER | Admitting: NURSE PRACTITIONER
Payer: COMMERCIAL

## 2023-01-24 DIAGNOSIS — M81.0 OSTEOPOROSIS WITHOUT CURRENT PATHOLOGICAL FRACTURE, UNSPECIFIED OSTEOPOROSIS TYPE: ICD-10-CM

## 2023-01-24 PROCEDURE — 77080 DXA BONE DENSITY AXIAL: CPT

## 2023-01-25 DIAGNOSIS — G89.4 CHRONIC PAIN SYNDROME: ICD-10-CM

## 2023-01-30 ENCOUNTER — TELEPHONE (OUTPATIENT)
Dept: ENDOCRINOLOGY | Facility: CLINIC | Age: 68
End: 2023-01-30
Payer: COMMERCIAL

## 2023-01-30 DIAGNOSIS — Z92.29 PERSONAL HISTORY OF OTHER DRUG THERAPY: ICD-10-CM

## 2023-01-30 DIAGNOSIS — M81.8 OTHER OSTEOPOROSIS WITHOUT CURRENT PATHOLOGICAL FRACTURE: Primary | ICD-10-CM

## 2023-01-30 NOTE — TELEPHONE ENCOUNTER
----- Message from Pb Cruz sent at 2023  9:08 AM CST -----  Regarding: RE: Khadijah Crockett,    The order for this patient has .

## 2023-01-31 ENCOUNTER — OFFICE VISIT (OUTPATIENT)
Dept: INTERNAL MEDICINE | Facility: CLINIC | Age: 68
End: 2023-01-31
Payer: COMMERCIAL

## 2023-01-31 ENCOUNTER — ANCILLARY PROCEDURE (OUTPATIENT)
Dept: GENERAL RADIOLOGY | Facility: CLINIC | Age: 68
End: 2023-01-31
Attending: NURSE PRACTITIONER
Payer: COMMERCIAL

## 2023-01-31 VITALS
WEIGHT: 129 LBS | TEMPERATURE: 98.6 F | OXYGEN SATURATION: 98 % | RESPIRATION RATE: 16 BRPM | HEIGHT: 60 IN | SYSTOLIC BLOOD PRESSURE: 126 MMHG | BODY MASS INDEX: 25.32 KG/M2 | DIASTOLIC BLOOD PRESSURE: 70 MMHG | HEART RATE: 92 BPM

## 2023-01-31 DIAGNOSIS — M25.572 PAIN IN JOINT INVOLVING ANKLE AND FOOT, LEFT: Primary | ICD-10-CM

## 2023-01-31 DIAGNOSIS — M25.572 PAIN IN JOINT INVOLVING ANKLE AND FOOT, LEFT: ICD-10-CM

## 2023-01-31 DIAGNOSIS — G47.00 INSOMNIA, UNSPECIFIED TYPE: ICD-10-CM

## 2023-01-31 DIAGNOSIS — F33.42 RECURRENT MAJOR DEPRESSIVE DISORDER, IN FULL REMISSION (H): ICD-10-CM

## 2023-01-31 DIAGNOSIS — G89.29 OTHER CHRONIC PAIN: ICD-10-CM

## 2023-01-31 PROCEDURE — 73610 X-RAY EXAM OF ANKLE: CPT | Mod: TC | Performed by: RADIOLOGY

## 2023-01-31 PROCEDURE — 99214 OFFICE O/P EST MOD 30 MIN: CPT | Performed by: NURSE PRACTITIONER

## 2023-01-31 ASSESSMENT — ENCOUNTER SYMPTOMS
MYALGIAS: 0
STIFFNESS: 1
NECK PAIN: 0
BACK PAIN: 0
MUSCLE CRAMPS: 0
ARTHRALGIAS: 1
MUSCLE WEAKNESS: 0
JOINT SWELLING: 1

## 2023-01-31 ASSESSMENT — PATIENT HEALTH QUESTIONNAIRE - PHQ9
10. IF YOU CHECKED OFF ANY PROBLEMS, HOW DIFFICULT HAVE THESE PROBLEMS MADE IT FOR YOU TO DO YOUR WORK, TAKE CARE OF THINGS AT HOME, OR GET ALONG WITH OTHER PEOPLE: NOT DIFFICULT AT ALL
SUM OF ALL RESPONSES TO PHQ QUESTIONS 1-9: 3
SUM OF ALL RESPONSES TO PHQ QUESTIONS 1-9: 3

## 2023-01-31 NOTE — PATIENT INSTRUCTIONS
No evidence of fracture on my interpretation of your x-rays.  If radiology sees something different, we will call and let you know.    Wear your orthopedic boot for the next couple of weeks.  After that, you can take it off and try walking around.  You can use your boot as needed for pain/stiffness or longer walks.    Ice aggressively for the next few days.  15 minutes 2-3 times daily.    Keep leg elevated when sitting.    If after a couple of weeks, you are still dealing with significant pain, I would recommend that you schedule an appointment with [facility].

## 2023-01-31 NOTE — PROGRESS NOTES
Assessment & Plan     Pain in joint involving ankle and foot, left  X-ray personally interpreted by me as negative for fracture.  Suspect sprain.  She was placed in a cam boot before leaving today.  See patient instructions below for plan of care  - XR Ankle Left G/E 3 Views; Future    Insomnia, unspecified type  Controlled on trazodone    Other chronic pain  Continues on Tylenol 3.  We talked about adding ice and elevation to her treatment plan    Patient Instructions   No evidence of fracture on my interpretation of your x-rays.  If radiology sees something different, we will call and let you know.    Wear your orthopedic boot for the next couple of weeks.  After that, you can take it off and try walking around.  You can use your boot as needed for pain/stiffness or longer walks.    Ice aggressively for the next few days.  15 minutes 2-3 times daily.    Keep leg elevated when sitting.    If after a couple of weeks, you are still dealing with significant pain, I would recommend that you schedule an appointment with [facility].      Larry Gooden, [facility]   Conrad is a 67 year old, presenting for the following health issues:      Ankle Pain (Left ankle pain, tender to touch, swelling, fell yesterday walking her dog)    Slipped and fell yesterday while bringing her dog in from outside after walking.  Suspects sprain although is here to rule out fracture.    Ankle Pain    History of Present Illness       Reason for visit:  I fell yesterday and injured my left foot and ankle  Symptom onset:  1-3 days ago    She eats 2-3 servings of fruits and vegetables daily.She consumes 1 sweetened beverage(s) daily.She exercises with enough effort to increase her heart rate 9 or less minutes per day.  She exercises with enough effort to increase her heart rate 3 or less days per week.   She is taking medications regularly.    Today's PHQ-9         PHQ-9 Total Score:  "3    PHQ-9 Q9 Thoughts of better off dead/self-harm past 2 weeks :   Not at all    How difficult have these problems made it for you to do your work, take care of things at home, or get along with other people: Not difficult at all       Review of Systems   Constitutional, HEENT, cardiovascular, pulmonary, gi and gu systems are negative, except as otherwise noted.      Objective    /70 (BP Location: Right arm, Patient Position: Sitting, Cuff Size: Adult Regular)   Pulse 92   Temp 98.6  F (37  C)   Resp 16   Ht 1.511 m (4' 11.5\")   Wt 58.5 kg (129 lb)   SpO2 98%   BMI 25.62 kg/m    Body mass index is 25.62 kg/m .  Physical Exam     Soft tissue swelling along the left malleolus laterally and medially.  She is able to point her toe without much difficulty.      "

## 2023-02-01 RX ORDER — TRAZODONE HYDROCHLORIDE 100 MG/1
TABLET ORAL
Qty: 270 TABLET | Refills: 1 | Status: SHIPPED | OUTPATIENT
Start: 2023-02-01 | End: 2023-08-04

## 2023-02-01 NOTE — TELEPHONE ENCOUNTER
"Outpatient Medication Detail     Disp Refills Start End LAQUITA   traZODone (DESYREL) 100 MG tablet 270 tablet 3 5/9/2022  No   Sig - Route: Take 3 tablets (300 mg) by mouth At Bedtime - Oral   Class: No Print Out   Order: [id number]     CLASS:  No print.  Routing to provider per protocol.    Outpatient Medication Detail     Disp Refills Start End LAQUITA   traZODone (DESYREL) 100 MG tablet (Discontinued)   11/11/2019 5/9/2022 No   Sig - Route: [TRAZODONE (DESYREL) 100 MG TABLET] Take 300 mg by mouth at bedtime.  - Oral   Class: Historical   Reason for Discontinue: Reorder (No AVS / No eCancel)   Order: [id number]     Routing refill request to provider for review/approval because:  Medication is reported/historical - pharmacy change.    Last office visit provider:  1/31/23     Requested Prescriptions   Pending Prescriptions Disp Refills     traZODone (DESYREL) 100 MG tablet [Pharmacy Med Name: traZODone 100 mg tablet (DESYREL)] 270 tablet 0     Sig: Take 1 to 3 tablets by mouth at bedtime as needed       Serotonin Modulators Passed - 2/1/2023  8:22 AM        Passed - Recent (12 mo) or future (30 days) visit within the authorizing provider's specialty     Patient has had an office visit with the authorizing provider or a provider within the authorizing providers department within the previous 12 mos or has a future within next 30 days. See \"Patient Info\" tab in inbasket, or \"Choose Columns\" in Meds & Orders section of the refill encounter.              Passed - Medication is active on med list        Passed - Patient is age 18 or older        Passed - No active pregnancy on record        Passed - No positive pregnancy test in past 12 months             Luis A Moreno RN 02/01/23 8:24 AM  "

## 2023-02-07 ENCOUNTER — ALLIED HEALTH/NURSE VISIT (OUTPATIENT)
Dept: ENDOCRINOLOGY | Facility: CLINIC | Age: 68
End: 2023-02-07
Payer: COMMERCIAL

## 2023-02-07 DIAGNOSIS — Z92.29 PERSONAL HISTORY OF OTHER DRUG THERAPY: ICD-10-CM

## 2023-02-07 DIAGNOSIS — M81.8 OTHER OSTEOPOROSIS WITHOUT CURRENT PATHOLOGICAL FRACTURE: Primary | ICD-10-CM

## 2023-02-07 PROCEDURE — 96372 THER/PROPH/DIAG INJ SC/IM: CPT | Performed by: NURSE PRACTITIONER

## 2023-02-07 PROCEDURE — 99207 PR NO CHARGE NURSE ONLY: CPT

## 2023-02-07 NOTE — PROGRESS NOTES
"Prolia Injection Phone Screen      Screening questions have been asked 2-3 days prior to administration visit for Prolia. If any questions are answered with \"Yes,\" this phone encounter were will routed to ordering provider for further evaluation.     1.  When was the last injection?  08/02/22    2.  Has insurance for this injection been verified?  Yes    3.  Did you experience any new onset achiness or rashes that lasted for over a month with your previous Prolia injection?   No    4.  Do you have a fever over 101?F or a new deep cough that is unusual for you today? No    5.  Have you started any new medications in the last 6 months that you were told could affect your immune system? These may have been prescribed by oncologist, transplant, rheumatology, or dermatology.   No    6.  In the last 6 months have you have gastric bypass or parathyroid surgery?   No    7.  Do you plan dental work requiring drilling into the bone such as implants/extractions or oral surgery in the next 2-3 months?   No    8. Do you have new insurance since the last injection?    Patient informed if symptoms discussed above present prior to their administration appointment, they are to notify clinic immediately.     Keira BRAVO RN          The following steps were completed to comply with the REMS program for Prolia:  1. Ordering provider has previously reviewed information in the Medication Guide and Patient Counseling Chart, including the serious risks of Prolia  and the symptoms of each risk and have been advised to seek prompt medical attention if they have signs or symptoms of any of the serious risks.  2. Provided each patient a copy of the Medication Guide and Patient Brochure.  See MAR for administration details.   Indication: Prolia  (denosumab) is a prescription medicine used to treat osteoporosis in patients who:   Are at high risk for fracture, meaning patients who have had a fracture related to osteoporosis, or who have " multiple risk factors for fracture; Cannot use another osteoporosis medicine or other osteoporosis medicines did not work well.   The timeline for early/late injections would be 4 weeks early and any time after the 6 month lucie. If a patient receives their injection late, then the subsequent injection would be 6 months from the date that they actually received the injection    Have the screening questions been asked prior to this administration? Yes    The following medication was given:     MEDICATION: Denosumab (Prolia) 60 mg/ml SOLN  ROUTE: SQ  SITE: Arm - Left  DOSE: 60mg  LOT #: [id number]  :  Funding Options  EXPIRATION DATE:  03/31/2025

## 2023-02-08 ENCOUNTER — TELEPHONE (OUTPATIENT)
Dept: INTERNAL MEDICINE | Facility: CLINIC | Age: 68
End: 2023-02-08
Payer: COMMERCIAL

## 2023-02-08 NOTE — TELEPHONE ENCOUNTER
Reason for Call:  Appointment Request    Patient requesting this type of appt:  FOLLOW UP ON FOOT SPRAIN      Requested provider: Sofie Pang    Reason patient unable to be scheduled: Not within requested timeframe    When does patient want to be seen/preferred time: 1-2 days    Comments: would like to see Sofie    Could we send this information to you [place] or would you prefer to receive a phone call?:   Patient would prefer a phone call   Okay to leave a detailed message?: Yes at Cell number on file:    Telephone Information:   Mobile [phone]       Call taken on 2/8/2023 at 7:27 AM by June Galindo

## 2023-02-13 ENCOUNTER — MYC REFILL (OUTPATIENT)
Dept: INTERNAL MEDICINE | Facility: CLINIC | Age: 68
End: 2023-02-13
Payer: COMMERCIAL

## 2023-02-13 DIAGNOSIS — G89.4 CHRONIC PAIN SYNDROME: ICD-10-CM

## 2023-02-13 DIAGNOSIS — G90.522 COMPLEX REGIONAL PAIN SYNDROME I OF LEFT LOWER LIMB: ICD-10-CM

## 2023-02-13 DIAGNOSIS — S93.402A LEFT ANKLE SPRAIN: Primary | ICD-10-CM

## 2023-02-14 RX ORDER — MORPHINE SULFATE 15 MG/1
15 TABLET, FILM COATED, EXTENDED RELEASE ORAL EVERY 12 HOURS
Qty: 60 TABLET | Refills: 0 | Status: SHIPPED | OUTPATIENT
Start: 2023-02-14 | End: 2023-03-16

## 2023-02-20 ENCOUNTER — TELEPHONE (OUTPATIENT)
Dept: VASCULAR SURGERY | Facility: CLINIC | Age: 68
End: 2023-02-20
Payer: COMMERCIAL

## 2023-02-20 NOTE — TELEPHONE ENCOUNTER
Patient called and is upset. [facility] had an appt with Dr Lechuga on 2/14/23, and states she called on 2/10/23 and spoke with a  to cancel appt. Appt was never cancelled, and appt was listed as a no show. She would like this fixed, and is hoping someone from the clinic can call her at [phone].    Thank you!

## 2023-03-06 ENCOUNTER — TELEPHONE (OUTPATIENT)
Dept: INTERNAL MEDICINE | Facility: CLINIC | Age: 68
End: 2023-03-06
Payer: COMMERCIAL

## 2023-03-06 DIAGNOSIS — R11.0 NAUSEA: ICD-10-CM

## 2023-03-07 RX ORDER — ONDANSETRON 4 MG/1
TABLET, FILM COATED ORAL
Qty: 30 TABLET | Refills: 1 | Status: SHIPPED | OUTPATIENT
Start: 2023-03-07 | End: 2023-11-20

## 2023-03-07 NOTE — TELEPHONE ENCOUNTER
"Last Written Prescription Date:  5/25/22  Last Fill Quantity: 30,  # refills: 1   Last office visit provider: 1/31/23     Prescription approved per [facility] Refill Protocol.        Requested Prescriptions   Pending Prescriptions Disp Refills     ondansetron (ZOFRAN) 4 MG tablet 30 tablet 1     Sig: TAKE 1 TABLET BY MOUTH EVERY 8 HOURS IF NEEDED FOR NAUSEA/VOMITING.        Antivertigo/Antiemetic Agents Passed - 3/7/2023  4:53 PM        Passed - Recent (12 mo) or future (30 days) visit within the authorizing provider's specialty     Patient has had an office visit with the authorizing provider or a provider within the authorizing providers department within the previous 12 mos or has a future within next 30 days. See \"Patient Info\" tab in inbasket, or \"Choose Columns\" in Meds & Orders section of the refill encounter.              Passed - Medication is active on med list        Passed - Patient is 18 years of age or older             Sharita Silva RN 03/07/23 4:53 PM  "

## 2023-03-09 NOTE — TELEPHONE ENCOUNTER
Central Prior Authorization Team   Phone: [phone]      Prior Authorization Not Needed per Insurance    Medication: ondansetron (ZOFRAN) 4 MG tablet - PA NOT NEEDED  Insurance Company: [employer] - Phone [phone] Fax [phone]  Expected CoPay:      Pharmacy Filling the Rx: [facility] - [place], [place] - [address] [place] [address]  Pharmacy Notified: Yes - verified pharmacy received paid claim  Patient Notified: Yes (pharmacy will notify patient when ready)

## 2023-03-16 ENCOUNTER — MYC REFILL (OUTPATIENT)
Dept: INTERNAL MEDICINE | Facility: CLINIC | Age: 68
End: 2023-03-16
Payer: COMMERCIAL

## 2023-03-16 DIAGNOSIS — G89.4 CHRONIC PAIN SYNDROME: ICD-10-CM

## 2023-03-16 DIAGNOSIS — G90.522 COMPLEX REGIONAL PAIN SYNDROME I OF LEFT LOWER LIMB: ICD-10-CM

## 2023-03-17 RX ORDER — MORPHINE SULFATE 15 MG/1
15 TABLET, FILM COATED, EXTENDED RELEASE ORAL EVERY 12 HOURS
Qty: 60 TABLET | Refills: 0 | Status: SHIPPED | OUTPATIENT
Start: 2023-03-17 | End: 2023-04-09

## 2023-03-17 NOTE — TELEPHONE ENCOUNTER
Routing refill request to provider for review/approval because:  Drug not on the G refill protocol     Last Written Prescription Date:  2/14/23  Last Fill Quantity: 60,  # refills: 0   Last office visit provider:   1/31/23    Requested Prescriptions   Pending Prescriptions Disp Refills     morphine (MS CONTIN) 15 MG CR tablet 60 tablet 0     Sig: Take 1 tablet (15 mg) by mouth every 12 hours       There is no refill protocol information for this order          Bekah Crews RN 03/17/23 2:19 AM

## 2023-04-08 DIAGNOSIS — N32.81 OVERACTIVE BLADDER: ICD-10-CM

## 2023-04-09 ENCOUNTER — MYC REFILL (OUTPATIENT)
Dept: INTERNAL MEDICINE | Facility: CLINIC | Age: 68
End: 2023-04-09
Payer: COMMERCIAL

## 2023-04-09 DIAGNOSIS — G89.4 CHRONIC PAIN SYNDROME: ICD-10-CM

## 2023-04-09 DIAGNOSIS — G90.522 COMPLEX REGIONAL PAIN SYNDROME I OF LEFT LOWER LIMB: ICD-10-CM

## 2023-04-09 RX ORDER — TOLTERODINE 4 MG/1
CAPSULE, EXTENDED RELEASE ORAL
Qty: 90 CAPSULE | Refills: 2 | Status: SHIPPED | OUTPATIENT
Start: 2023-04-09 | End: 2023-06-12

## 2023-04-09 NOTE — TELEPHONE ENCOUNTER
"Last Written Prescription Date:  6/15/2022  Last Fill Quantity: 90,  # refills: 2   Last office visit provider:  1/31/2023 with [facility]     Requested Prescriptions   Pending Prescriptions Disp Refills     tolterodine ER (DETROL LA) 4 MG 24 hr capsule [Pharmacy Med Name: tolterodine ER 4 mg capsule,extended release 24 hr (DETROL LA)] 90 capsule 2     Sig: Take 1 capsule by mouth daily with lunch       Muscarinic Antagonists (Urinary Incontinence Agents) Passed - 4/9/2023  1:12 PM        Passed - Recent (12 mo) or future (30 days) visit within the authorizing provider's specialty     Patient has had an office visit with the authorizing provider or a provider within the authorizing providers department within the previous 12 mos or has a future within next 30 days. See \"Patient Info\" tab in inbasket, or \"Choose Columns\" in Meds & Orders section of the refill encounter.              Passed - Patient does not have a diagnosis of glaucoma on the problem list     If glaucoma diagnosis is new, refer refill to physician.          Passed - Medication is active on med list        Passed - Patient is 18 years of age or older             Janessa Camacho RN 04/09/23 1:12 PM  "

## 2023-04-12 RX ORDER — MORPHINE SULFATE 15 MG/1
15 TABLET, FILM COATED, EXTENDED RELEASE ORAL EVERY 12 HOURS
Qty: 60 TABLET | Refills: 0 | Status: SHIPPED | OUTPATIENT
Start: 2023-04-12 | End: 2023-05-08

## 2023-05-08 DIAGNOSIS — G89.4 CHRONIC PAIN SYNDROME: ICD-10-CM

## 2023-05-08 DIAGNOSIS — G90.522 COMPLEX REGIONAL PAIN SYNDROME I OF LEFT LOWER LIMB: ICD-10-CM

## 2023-05-08 RX ORDER — MORPHINE SULFATE 15 MG/1
15 TABLET, FILM COATED, EXTENDED RELEASE ORAL EVERY 12 HOURS
Qty: 60 TABLET | Refills: 0 | Status: SHIPPED | OUTPATIENT
Start: 2023-05-08 | End: 2023-06-06

## 2023-05-26 DIAGNOSIS — G89.4 CHRONIC PAIN SYNDROME: ICD-10-CM

## 2023-05-26 RX ORDER — ACETAMINOPHEN AND CODEINE PHOSPHATE 300; 60 MG/1; MG/1
TABLET ORAL
Qty: 180 TABLET | Refills: 3 | Status: SHIPPED | OUTPATIENT
Start: 2023-05-26 | End: 2023-09-18

## 2023-06-06 ENCOUNTER — OFFICE VISIT (OUTPATIENT)
Dept: PODIATRY | Facility: CLINIC | Age: 68
End: 2023-06-06
Payer: COMMERCIAL

## 2023-06-06 ENCOUNTER — DOCUMENTATION ONLY (OUTPATIENT)
Dept: VASCULAR SURGERY | Facility: CLINIC | Age: 68
End: 2023-06-06

## 2023-06-06 ENCOUNTER — PREP FOR PROCEDURE (OUTPATIENT)
Dept: VASCULAR SURGERY | Facility: CLINIC | Age: 68
End: 2023-06-06

## 2023-06-06 ENCOUNTER — MYC REFILL (OUTPATIENT)
Dept: INTERNAL MEDICINE | Facility: CLINIC | Age: 68
End: 2023-06-06

## 2023-06-06 VITALS — HEART RATE: 75 BPM | OXYGEN SATURATION: 97 %

## 2023-06-06 DIAGNOSIS — L60.0 INGROWING NAIL: Primary | ICD-10-CM

## 2023-06-06 DIAGNOSIS — G89.4 CHRONIC PAIN SYNDROME: ICD-10-CM

## 2023-06-06 DIAGNOSIS — G90.522 COMPLEX REGIONAL PAIN SYNDROME I OF LEFT LOWER LIMB: ICD-10-CM

## 2023-06-06 DIAGNOSIS — L03.032 PARONYCHIA, TOE, LEFT: ICD-10-CM

## 2023-06-06 PROCEDURE — 99203 OFFICE O/P NEW LOW 30 MIN: CPT | Performed by: PODIATRIST

## 2023-06-06 RX ORDER — CEPHALEXIN 500 MG/1
500 CAPSULE ORAL 3 TIMES DAILY
Qty: 30 CAPSULE | Refills: 0 | Status: SHIPPED | OUTPATIENT
Start: 2023-06-06 | End: 2023-09-13

## 2023-06-06 ASSESSMENT — PAIN SCALES - GENERAL: PAINLEVEL: NO PAIN (0)

## 2023-06-06 NOTE — PROGRESS NOTES
FOOT AND ANKLE SURGERY/PODIATRY CONSULT NOTE         ASSESSMENT: Ingrown nail/paronychia left hallux      TREATMENT:  -I discussed with the patient that she does have an infected ingrown nail on the medial border of the left hallux.    -We reviewed both temporary and permanent nail avulsion procedures today including postoperative course and chance of recurrence of 2% with permanent nail avulsion procedure. After discussion of these procedures she would like to proceed with permanent nail avulsion at this time.    -The patient states that she is unable to tolerate local anesthesia injection in the office setting and request sedation.    -I will asked my office to coordinate permanent nail avulsion of the medial border left hallux at [facility].    -I will start her on Keflex at this time.    -Patient's questions invited and answered.  She was encouraged to call my office with any further questions or concerns.     Mark Lechuga DPM  [facility]/Foot & Ankle Surgery      HPI: Patient returns to see me today for painful ingrown nail on her left great toe.  She reports has been dealing with this issue for several months and denies previous treatment.  She has noticed drainage from the medial border on the left hallux.    Past Medical History:   Diagnosis Date     Anxiety      Chronic pain 8/14/2016     Depression      Former smoker      History of cocaine abuse (H)      Insomnia 8/14/2016     Liver disease      Low back pain 8/14/2016     Migraine headache 8/14/2016     Osteoporosis 8/14/2016     PN (peripheral neuropathy) 8/14/2016     PONV (postoperative nausea and vomiting)          Social History     Socioeconomic History     Marital status: Single     Spouse name: Not on file     Number of children: Not on file     Years of education: Not on file     Highest education level: Not on file   Occupational History     Not on file   Tobacco Use     Smoking status: Former      Packs/day: 0.00     Types: Cigarettes     Smokeless tobacco: Never   Vaping Use     Vaping status: Never Used   Substance and Sexual Activity     Alcohol use: No     Drug use: No     Sexual activity: Not Currently   Other Topics Concern     Not on file   Social History Narrative     Not on file     Social Determinants of Health     Financial Resource Strain: Not on file   Food Insecurity: Not on file   Transportation Needs: Not on file   Physical Activity: Not on file   Stress: Not on file   Social Connections: Not on file   Intimate Partner Violence: Not on file   Housing Stability: Not on file            Allergies   Allergen Reactions     Compazine [Prochlorperazine] Unknown     Pt stated mouth freezes         MEDICATIONS:   Current Outpatient Medications   Medication     acetaminophen-codeine (TYLENOL #4) 300-60 MG per tablet     cephALEXin (KEFLEX) 500 MG capsule     cetirizine (ZYRTEC) 10 MG tablet     cholecalciferol 50 MCG (2000 UT) tablet     fluticasone propionate (FLONASE) 50 mcg/actuation nasal spray     glycerin-hypromellose- (VISINE) 0.2-0.2-1 % SOLN ophthalmic solution     ipratropium (ATROVENT) 42 mcg (0.06 %) nasal spray     lamoTRIgine (LAMICTAL) 100 MG tablet     morphine (MS CONTIN) 15 MG CR tablet     naloxone (NARCAN) 4 mg/actuation nasal spray     ondansetron (ZOFRAN) 4 MG tablet     tolterodine ER (DETROL LA) 4 MG 24 hr capsule     traZODone (DESYREL) 100 MG tablet     VIIBRYD 20 MG TABS tablet     DENTAGEL 1.1 % GEL topical gel     Current Facility-Administered Medications   Medication     denosumab (PROLIA) injection 60 mg     denosumab (PROLIA) injection 60 mg        Family History   Problem Relation Age of Onset     Cancer Mother      Cancer Father      Cancer Sister      Breast Cancer Sister         lumptecomy 40s     Cancer Brother           Review of Systems - 10 point Review of Systems is negative except for ingrown nail which is noted in HPI.    OBJECTIVE:  Appearance: alert,  well appearing, and in no distress.    VITAL SIGNS: Pulse 75   SpO2 97%       General appearance: Patient is alert and fully cooperative with history & exam.  No sign of distress is noted during the visit.     Psychiatric: Affect is pleasant & appropriate.  Patient appears motivated to improve health.     Respiratory: Breathing is regular & unlabored while sitting.     HEENT: Hearing is intact to spoken word.  Speech is clear.  No gross evidence of visual impairment that would impact ambulation.      Vascular: Dorsalis pedis and posterior tibial pulses are palpable. There is pedal hair growth bilateral.  CFT < 3 sec from anterior tibial surface to distal digits bilateral.  Localized edema medial border left hallux.  Dermatologic: Medial border left hallux is incurvated with localized erythema.  Neurologic: All epicritic and proprioceptive sensations are grossly intact bilateral.  Musculoskeletal: Pain to palpation medial border left hallux.

## 2023-06-06 NOTE — LETTER
6/6/2023         RE: Conrad Zhu  [address] [facility] [id number]        Dear Colleague,    Thank you for referring your patient, Conrad Zhu, to the [facility]. Please see a copy of my visit note below.          FOOT AND ANKLE SURGERY/PODIATRY CONSULT NOTE         ASSESSMENT: Ingrown nail/paronychia left hallux      TREATMENT:  -I discussed with the patient that she does have an infected ingrown nail on the medial border of the left hallux.    -We reviewed both temporary and permanent nail avulsion procedures today including postoperative course and chance of recurrence of 2% with permanent nail avulsion procedure. After discussion of these procedures she would like to proceed with permanent nail avulsion at this time.    -The patient states that she is unable to tolerate local anesthesia injection in the office setting and request sedation.    -I will asked my office to coordinate permanent nail avulsion of the medial border left hallux at [facility].    -I will start her on Keflex at this time.    -Patient's questions invited and answered.  She was encouraged to call my office with any further questions or concerns.     Mark Lechuga DPM  [facility] Podiatry/Foot & Ankle Surgery      HPI: Patient returns to see me today for painful ingrown nail on her left great toe.  She reports has been dealing with this issue for several months and denies previous treatment.  She has noticed drainage from the medial border on the left hallux.    Past Medical History:   Diagnosis Date     Anxiety      Chronic pain 8/14/2016     Depression      Former smoker      History of cocaine abuse (H)      Insomnia 8/14/2016     Liver disease      Low back pain 8/14/2016     Migraine headache 8/14/2016     Osteoporosis 8/14/2016     PN (peripheral neuropathy) 8/14/2016     PONV (postoperative nausea and vomiting)          Social History     Socioeconomic  History     Marital status: Single     Spouse name: Not on file     Number of children: Not on file     Years of education: Not on file     Highest education level: Not on file   Occupational History     Not on file   Tobacco Use     Smoking status: Former     Packs/day: 0.00     Types: Cigarettes     Smokeless tobacco: Never   Vaping Use     Vaping status: Never Used   Substance and Sexual Activity     Alcohol use: No     Drug use: No     Sexual activity: Not Currently   Other Topics Concern     Not on file   Social History Narrative     Not on file     Social Determinants of Health     Financial Resource Strain: Not on file   Food Insecurity: Not on file   Transportation Needs: Not on file   Physical Activity: Not on file   Stress: Not on file   Social Connections: Not on file   Intimate Partner Violence: Not on file   Housing Stability: Not on file            Allergies   Allergen Reactions     Compazine [Prochlorperazine] Unknown     Pt stated mouth freezes         MEDICATIONS:   Current Outpatient Medications   Medication     acetaminophen-codeine (TYLENOL #4) 300-60 MG per tablet     cephALEXin (KEFLEX) 500 MG capsule     cetirizine (ZYRTEC) 10 MG tablet     cholecalciferol 50 MCG (2000 UT) tablet     fluticasone propionate (FLONASE) 50 mcg/actuation nasal spray     glycerin-hypromellose- (VISINE) 0.2-0.2-1 % SOLN ophthalmic solution     ipratropium (ATROVENT) 42 mcg (0.06 %) nasal spray     lamoTRIgine (LAMICTAL) 100 MG tablet     morphine (MS CONTIN) 15 MG CR tablet     naloxone (NARCAN) 4 mg/actuation nasal spray     ondansetron (ZOFRAN) 4 MG tablet     tolterodine ER (DETROL LA) 4 MG 24 hr capsule     traZODone (DESYREL) 100 MG tablet     VIIBRYD 20 MG TABS tablet     DENTAGEL 1.1 % GEL topical gel     Current Facility-Administered Medications   Medication     denosumab (PROLIA) injection 60 mg     denosumab (PROLIA) injection 60 mg        Family History   Problem Relation Age of Onset     Cancer  Mother      Cancer Father      Cancer Sister      Breast Cancer Sister         lumptecomy 40s     Cancer Brother           Review of Systems - 10 point Review of Systems is negative except for ingrown nail which is noted in HPI.    OBJECTIVE:  Appearance: alert, well appearing, and in no distress.    VITAL SIGNS: Pulse 75   SpO2 97%       General appearance: Patient is alert and fully cooperative with history & exam.  No sign of distress is noted during the visit.     Psychiatric: Affect is pleasant & appropriate.  Patient appears motivated to improve health.     Respiratory: Breathing is regular & unlabored while sitting.     HEENT: Hearing is intact to spoken word.  Speech is clear.  No gross evidence of visual impairment that would impact ambulation.      Vascular: Dorsalis pedis and posterior tibial pulses are palpable. There is pedal hair growth bilateral.  CFT < 3 sec from anterior tibial surface to distal digits bilateral.  Localized edema medial border left hallux.  Dermatologic: Medial border left hallux is incurvated with localized erythema.  Neurologic: All epicritic and proprioceptive sensations are grossly intact bilateral.  Musculoskeletal: Pain to palpation medial border left hallux.        Again, thank you for allowing me to participate in the care of your patient.        Sincerely,        Mark Lechuga DPM

## 2023-06-06 NOTE — PROGRESS NOTES
Surgery Scheduled    Surgery date confirmed with pt.  Pt will have preop on Friday 6/9.  Sending instructions to pt's MyC.    Surgery/Procedure: Permanent nail avulsion medial border left hallux    CPT: 79311     Equipment: Phenol, alcohol, 1 inch Coban, English anvil    Location: [facility]:  [address] [place] [address] [address] [place] [address] (Fax: [phone])    Date: 6/15/23    Time: 7:00 AM    Admission Type: Outpatient    Surgeon: Dr. Lechuga    OR Confirmed & :  Yes with  [facility] on 6/6/2023    Entered on provider calendar:  Yes    Post Op:   6/20/2023 9:00 AM (Arrive by 8:45 AM) Mark Lechuga DPM [facility]       Blood Thinners Addressed: N/A

## 2023-06-06 NOTE — PATIENT INSTRUCTIONS
Conrad,    Your surgery with [facility] has been scheduled. Please read thoroughly and follow instructions.     Procedure:   toenail removal  Procedure Date :   TBD; you will be called with procedure date & time  *A surgery nurse will call you 1-2 days before surgery to inform you of the time of arrival for surgery.  Surgeon:   Dr. Mark Lechuga  Admission Type:   Outpatient  Procedure Location:   [facility]:  [address] [place] [address] [address] [place] [address] (Fax: [phone])        Preparation Instructions to complete:    []  You will need a Pre-op Physical within 30 days before surgery with your primary care provider. This exam is required by the anesthesiologist to ensure a safe surgical experience.    Failure  to obtain your pre-op physical will lead to cancellation of your surgery  Call them right away to schedule this. Please ensure your Preoperative Physical is faxed to the Hospital (numbers listed above)    [] Preoperative Medication Instructions  We would like you to stop your anticoagulation medications 3-5 days before surgery HOWEVER contact your prescribing provider for instructions before discontinuing any medications. (Examples: Coumadin, Plavix, Xarelto, Eliquis, Pradaxa, Effient, Brilinta) If you are on Coumadin, we would like the goal INR ? 1.2.  IT IS OK TO STAY ON ASPIRIN PRIOR TO SURGERY.   Hold Ibuprofen, Herbal Supplements and Vitamin E 7 days before  Stop Cialis, Levitra and Viagra 2-3 days before surgery    [] Fasting Requirements  Nothing to eat for 8 hours before surgery unless instructed differently by the surgery nurse.  You may have clear liquids up to two hours before your arrival time (coffee, tea, water, or Gatorade. No cream or milk)  If your primary care provider has instructed you to continue oral medications, you may take them on the morning of surgery with a small sip of water.    No alcohol or smoking after 12:00am the day of  surgery    []  COVID-19 test is no longer needed  If you are experiencing COVID symptoms or have tested positive for COVID-19 within 14 days of procedure date, reach out to the care team to reschedule please.     [] Contact your insurance regarding coverage  If you would like a Good Shayla Estimate for your upcoming procedure at [facility], contact Cost of Care Estimates   Advocates are available Monday through Friday 8am - 5pm   [phone]  You may also submit a request online through your [employer] account.  For all self-pay, estimate, or anesthesia billing questions at [facility], the contact information is below:  Who to contact: Page HUGHES  [facility] Network number: [phone]  Prepay number: [phone]    [] DO NOT BRING FMLA WITH TO SURGERY.  These should be sent to the provider's office by fax to [phone].     [] Day of Surgery  Medications - Take as indicated with sips of water.   Wear comfortable loose fitting clothes. Wear your glasses-Not contacts. Do not wear jewelry and remove body piercing's. Surgery may be cancelled if they are not removed.   You may have 1 family member wait in the lobby during your surgery. Visitor restrictions are subject to change. Please verify with the surgery nurse when they call.   If same day surgery-Have a someone come with you to surgery that can help you understand the surgeon's instructions, drive you home and stay with you overnight the first night.    [] If the community sees a new COVID-19 surge, your procedure may need to be postponed. We will contact you if this happens.    [] You will receive a call from a surgery nurse 1-3 days prior to surgery. They will go over more details with you.             ** AFTER SURGERY INSTRUCTIONS **      Post Nail Excision Instructions    Keep bandages clean, dry, and intact for the rest of the day of surgery.   The day after surgery, remove all bandages  Soak foot in warm water with  Epsom Salt for 10 minutes  Dry feet thoroughly   Apply a Band-Aid to the affected area  Continue this process daily for the next two weeks following the procedure  General bathing does not replace daily foot soaks  Do not anticipate severe pain. But if you do experience some discomfort, you can take Ibuprofen (Advil)  You can expect some drainage and weeping from the area. This may last anywhere from several days to several weeks. This is NORMAL.  If you experience any increase in pain, any swelling, or odor call our office immediately. As this may be a sign of infection.    If you have any questions in the meantime, please do not hesitate to call Podiatry at [phone]     [] It is important that you elevate your affected foot after surgery. Proper elevation is raising your foot above your waist. The fluid in your lower extremities needs to get back to your heart so it can get pumped to your kidneys and expelled through urination. So if you notice you have to go to the bathroom more frequently when you are elevating your leg this is a good sign that it is working.     [] It is important that you increase your protein intake after surgery. Protein is essential for wound healing. We recommend you take a protein supplement twice per day. This is in addition to your normal diet. Examples of protein supplements are Ensure, Boost, Glucerna (if you are diabetic), or protein powder. You can purchase these at your local retailer or grocery store.       Notify our office right away, if you have any changes in your health status, or if you develop a cold, flu, diarrhea, infection, fever or sore throat before your scheduled surgery date. We can be reached at [phone]   Monday-Friday 8 am-4:30 pm if you have any questions.     Thank you for trusting us with your care!

## 2023-06-07 RX ORDER — MORPHINE SULFATE 15 MG/1
15 TABLET, FILM COATED, EXTENDED RELEASE ORAL EVERY 12 HOURS
Qty: 60 TABLET | Refills: 0 | Status: SHIPPED | OUTPATIENT
Start: 2023-06-07 | End: 2023-07-08

## 2023-06-12 ENCOUNTER — OFFICE VISIT (OUTPATIENT)
Dept: INTERNAL MEDICINE | Facility: CLINIC | Age: 68
End: 2023-06-12
Payer: COMMERCIAL

## 2023-06-12 VITALS
DIASTOLIC BLOOD PRESSURE: 68 MMHG | OXYGEN SATURATION: 97 % | HEART RATE: 66 BPM | HEIGHT: 60 IN | BODY MASS INDEX: 26.45 KG/M2 | WEIGHT: 134.7 LBS | SYSTOLIC BLOOD PRESSURE: 144 MMHG | RESPIRATION RATE: 14 BRPM

## 2023-06-12 DIAGNOSIS — H61.21 IMPACTED CERUMEN OF RIGHT EAR: ICD-10-CM

## 2023-06-12 DIAGNOSIS — F41.9 ANXIETY: ICD-10-CM

## 2023-06-12 DIAGNOSIS — F11.20 CONTINUOUS OPIOID DEPENDENCE (H): ICD-10-CM

## 2023-06-12 DIAGNOSIS — E78.5 HYPERLIPIDEMIA LDL GOAL <100: ICD-10-CM

## 2023-06-12 DIAGNOSIS — G89.4 CHRONIC PAIN SYNDROME: ICD-10-CM

## 2023-06-12 DIAGNOSIS — N32.81 OVERACTIVE BLADDER: ICD-10-CM

## 2023-06-12 DIAGNOSIS — Z01.818 PREOP GENERAL PHYSICAL EXAM: Primary | ICD-10-CM

## 2023-06-12 DIAGNOSIS — L60.0 INGROWN TOENAIL: ICD-10-CM

## 2023-06-12 DIAGNOSIS — R94.31 ACUTE ELECTROCARDIOGRAM CHANGES: ICD-10-CM

## 2023-06-12 DIAGNOSIS — F33.42 RECURRENT MAJOR DEPRESSIVE DISORDER, IN FULL REMISSION (H): ICD-10-CM

## 2023-06-12 LAB
ANION GAP SERPL CALCULATED.3IONS-SCNC: 10 MMOL/L (ref 7–15)
BUN SERPL-MCNC: 12.7 MG/DL (ref 8–23)
CALCIUM SERPL-MCNC: 10 MG/DL (ref 8.8–10.2)
CHLORIDE SERPL-SCNC: 98 MMOL/L (ref 98–107)
CREAT SERPL-MCNC: 0.87 MG/DL (ref 0.51–0.95)
DEPRECATED HCO3 PLAS-SCNC: 29 MMOL/L (ref 22–29)
GFR SERPL CREATININE-BSD FRML MDRD: 73 ML/MIN/1.73M2
GLUCOSE SERPL-MCNC: 93 MG/DL (ref 70–99)
HGB BLD-MCNC: 12.7 G/DL (ref 11.7–15.7)
POTASSIUM SERPL-SCNC: 5 MMOL/L (ref 3.4–5.3)
SODIUM SERPL-SCNC: 137 MMOL/L (ref 136–145)

## 2023-06-12 PROCEDURE — 69209 REMOVE IMPACTED EAR WAX UNI: CPT | Mod: RT | Performed by: NURSE PRACTITIONER

## 2023-06-12 PROCEDURE — 93010 ELECTROCARDIOGRAM REPORT: CPT | Performed by: GENERAL ACUTE CARE HOSPITAL

## 2023-06-12 PROCEDURE — 80048 BASIC METABOLIC PNL TOTAL CA: CPT | Performed by: NURSE PRACTITIONER

## 2023-06-12 PROCEDURE — 36415 COLL VENOUS BLD VENIPUNCTURE: CPT | Performed by: NURSE PRACTITIONER

## 2023-06-12 PROCEDURE — 99214 OFFICE O/P EST MOD 30 MIN: CPT | Mod: 25 | Performed by: NURSE PRACTITIONER

## 2023-06-12 PROCEDURE — 85018 HEMOGLOBIN: CPT | Performed by: NURSE PRACTITIONER

## 2023-06-12 PROCEDURE — 93005 ELECTROCARDIOGRAM TRACING: CPT | Performed by: NURSE PRACTITIONER

## 2023-06-12 RX ORDER — MIRABEGRON 25 MG/1
25 TABLET, FILM COATED, EXTENDED RELEASE ORAL DAILY
Qty: 90 TABLET | Refills: 0 | Status: SHIPPED | OUTPATIENT
Start: 2023-06-12 | End: 2023-09-12

## 2023-06-12 RX ORDER — ROSUVASTATIN CALCIUM 5 MG/1
5 TABLET, COATED ORAL DAILY
Qty: 90 TABLET | Refills: 0 | Status: SHIPPED | OUTPATIENT
Start: 2023-06-12 | End: 2023-09-09

## 2023-06-12 NOTE — H&P (VIEW-ONLY)
[facility]  [address] [address]  Phone: [phone]  Fax: [phone]  Primary Provider: Trudy Pang  Pre-op Performing Provider: TRUDY PANG    PREOPERATIVE EVALUATION:  Today's date: 6/12/2023    Conrad Zhu is a 67 year old female who presents for a preoperative evaluation.      6/12/2023     7:48 AM   Additional Questions   Roomed by Mandy     Surgical Information:  Surgery/Procedure: Permanent nail avulsion medial border left hallux  Surgery Location: [facility]  Surgeon: Mark Lechuga  Surgery Date: 6/15/23  Time of Surgery: 0700  Where patient plans to recover: At home with family  Fax number for surgical facility: Note does not need to be faxed, will be available electronically in Epic.    Assessment & Plan     The proposed surgical procedure is considered LOW risk.    Preop general physical exam: EKG read by provider showed a sinus rhythm, right axis deviation, possible pulmonary disease pattern. Trouble with leads 1 and 2 sticking to her, could have been artifact. Will check an Echo just to confirm no issues, this should not stop surgery. She is asymptomatic. Hold pain meds the AM of surgery.   - EKG 12-lead, tracing only  - Hemoglobin  - Basic metabolic panel  (Ca, Cl, CO2, Creat, Gluc, K, Na, BUN)    Ingrown toenail: Left great toe, is on antibiotics, to have the toenail edge removed under anesthesia.     Hyperlipidemia LDL goal <100: With recent LDL, she is open to starting on Crestor. Recheck labs in 3 months.   - rosuvastatin (CRESTOR) 5 MG tablet  Dispense: 90 tablet; Refill: 0    Overactive bladder: Continued issues even on detrol. Will switch over to Myrbetriq. Follow up in three months.   - mirabegron (MYRBETRIQ) 25 MG 24 hr tablet  Dispense: 90 tablet; Refill: 0    Recurrent major depressive disorder, in full remission (H): She continues on Lamictal, Vybrid. Stable.     Anxiety: She continues on  Lamictal, Vybrid. Stable.    Acute electrocardiogram changes: EKG read by provider showed a sinus rhythm, right axis deviation, possible pulmonary disease pattern. Trouble with leads 1 and 2 sticking to her, could have been artifact. Will check an Echo just to confirm no issues, this should not stop surgery. She is asymptomatic.   - Echocardiogram Complete    Impacted cerumen of right ear: removed per CA with irrigation. Recheck of TM was normal.  - REMOVE IMPACTED CERUMEN    Chronic pain syndrome/F11.2 - Continuous opioid dependence (H): Continues on Chronic Tylenol #4 and MS Contin. To hold the AM of surgery. Stable.         Possible Sleep Apnea: Not sleep apnea, snores related to nose anatomy.        - No identified additional risk factors other than previously addressed    Antiplatelet or Anticoagulation Medication Instructions:   - Patient is on no antiplatelet or anticoagulation medications.    Additional Medication Instructions:  Hold pain medication the AM of surgery.    RECOMMENDATION:  APPROVAL GIVEN to proceed with proposed procedure, without further diagnostic evaluation.      Subjective       HPI related to upcoming procedure: The patient presents today for a preoperative examination. She will be having a left great toenail edge removed, it is currently infected.    She had a bad experience in the past with so they are going to use anesthesia to make this easier on her.    She reports that her OAB medication is not working, it hasn't for some time. Will try switching her over to Myrbetriq.    She is ready to start a statin. Will try Crestor for her.    She is on antibiotics for the ingrown toenail.    She reports otherwise doing well.    She continues on chronic opioids for chronic pain.    Her EKG today in office was difficult to perform, leads 1 and 2 did not want to stick to her skin.     She does admit that she snores, sleeps with her mouth open, no sleep apnea concerns.         6/9/2023     9:28 AM    Preop Questions   1. Have you ever had a heart attack or stroke? No   2. Have you ever had surgery on your heart or blood vessels, such as a stent placement, a coronary artery bypass, or surgery on an artery in your head, neck, heart, or legs? No   3. Do you have chest pain with activity? No   4. Do you have a history of  heart failure? No   5. Do you currently have a cold, bronchitis or symptoms of other infection? No   6. Do you have a cough, shortness of breath, or wheezing? No   7. Do you or anyone in your family have previous history of blood clots? No   8. Do you or does anyone in your family have a serious bleeding problem such as prolonged bleeding following surgeries or cuts? No   9. Have you ever had problems with anemia or been told to take iron pills? No   10. Have you had any abnormal blood loss such as black, tarry or bloody stools, or abnormal vaginal bleeding? No   11. Have you ever had a blood transfusion? No   12. Are you willing to have a blood transfusion if it is medically needed before, during, or after your surgery? Yes   13. Have you or any of your relatives ever had problems with anesthesia? No   14. Do you have sleep apnea, excessive snoring or daytime drowsiness? YES - Snoring   14a. Do you have a CPAP machine? No   15. Do you have any artifical heart valves or other implanted medical devices like a pacemaker, defibrillator, or continuous glucose monitor? No   16. Do you have artificial joints? No   17. Are you allergic to latex? No       Health Care Directive:  Patient does not have a Health Care Directive or Living Will: Full code    Preoperative Review of :   reviewed - controlled substances reflected in medication list.    To hold the AM of surgery.    Review of Systems  Constitutional, neuro, ENT, endocrine, pulmonary, cardiac, gastrointestinal, genitourinary, musculoskeletal, integument and psychiatric systems are negative, except as otherwise noted.    Patient Active Problem  List    Diagnosis Date Noted     Chronic pain 08/14/2016     Priority: High     F11.2 - Continuous opioid dependence (H) 06/12/2023     Priority: Medium     Ingrowing nail 06/06/2023     Priority: Medium     Paronychia, toe, left 06/06/2023     Priority: Medium     Vitamin deficiency 10/23/2020     Priority: Medium     Depression 02/10/2020     Priority: Medium     Long term (current) use of opiate analgesic 02/10/2020     Priority: Medium     Anxiety disorder, unspecified 02/10/2020     Priority: Medium     Neck pain 02/10/2020     Priority: Medium     Age-related osteoporosis without current pathological fracture 11/14/2019     Priority: Medium     Constipation, unspecified 11/14/2019     Priority: Medium     History of falling 11/14/2019     Priority: Medium     Unspecified fracture of shaft of left fibula, subsequent encounter for closed fracture with routine healing 11/14/2019     Priority: Medium     Displaced intertrochanteric fracture of left femur, subsequent encounter for closed fracture with routine healing 11/14/2019     Priority: Medium     Anemia due to blood loss, acute 11/12/2019     Priority: Medium     Acute post-operative pain 11/12/2019     Priority: Medium     Closed intertrochanteric fracture of hip, left, initial encounter (H) 11/11/2019     Priority: Medium     Left hip pain 11/11/2019     Priority: Medium     Added automatically from request for surgery [id number]         Pre-operative general physical examination 11/11/2019     Priority: Medium     Closed nondisplaced fracture of body of right calcaneus, initial encounter 07/09/2019     Priority: Medium     Achilles tendinitis of right lower extremity 07/01/2019     Priority: Medium     Sprain of right ankle, unspecified ligament, initial encounter 07/01/2019     Priority: Medium     Sprain of right foot, initial encounter 07/01/2019     Priority: Medium     Complex regional pain syndrome i of left lower limb 08/15/2018     Priority: Medium      Low back pain 08/14/2016     Priority: Medium     PN (peripheral neuropathy) 08/14/2016     Priority: Medium     Former smoker      Priority: Medium     Nausea 06/07/2016     Priority: Medium     Left foot drop 06/01/2016     Priority: Medium     Closed fracture of metatarsal bone 02/08/2016     Priority: Medium     Formatting of this note might be different from the original.  Overview:   Created by Conversion       Complex regional pain syndrome type 2 of upper extremity 02/08/2016     Priority: Medium     Formatting of this note might be different from the original.  Overview:   Created by Conversion       Degeneration of intervertebral disc of cervical region 02/08/2016     Priority: Medium     Formatting of this note might be different from the original.  Overview:   Created by Conversion       Edema 02/08/2016     Priority: Medium     Formatting of this note might be different from the original.  Overview:   Created by Conversion  [facility] Annotation: Feb 25 2014  1:28PM - Sixto Griffith: Left foot       Muscle weakness 02/08/2016     Priority: Medium     Formatting of this note might be different from the original.  Overview:   Created by Conversion       Neoplasm of uncertain behavior of skin 02/08/2016     Priority: Medium     Formatting of this note might be different from the original.  Overview:   Created by Conversion       Anxiety 01/21/2015     Priority: Medium     Left leg weakness 07/14/2013     Priority: Medium     Leukocytosis 07/12/2013     Priority: Medium     Corneal scarring 09/25/2012     Priority: Medium     Anemia 07/04/2012     Priority: Medium     Formatting of this note might be different from the original.  Overview:   Created by Conversion       Menopause present 12/08/2011     Priority: Medium     Formatting of this note might be different from the original.  Decreased libido.       Tear film insufficiency 09/23/2010     Priority: Medium     OAB (overactive bladder) 08/14/2016      Priority: Low     Insomnia 08/14/2016     Priority: Low     Osteoporosis 08/14/2016     Priority: Low      Past Medical History:   Diagnosis Date     Anxiety      Chronic pain 8/14/2016     Depression      Former smoker      History of cocaine abuse (H)      Insomnia 8/14/2016     Liver disease      Low back pain 8/14/2016     Migraine headache 8/14/2016     Osteoporosis 8/14/2016     PN (peripheral neuropathy) 8/14/2016     PONV (postoperative nausea and vomiting)      Past Surgical History:   Procedure Laterality Date     LAPAROTOMY EXPLORATORY       MAMMOPLASTY AUGMENTATION       TUBAL LIGATION       Current Outpatient Medications   Medication Sig Dispense Refill     acetaminophen-codeine (TYLENOL #4) 300-60 MG per tablet Take 1-2 Tablets by mouth every 8 hours if needed for severe pain 180 tablet 3     cephALEXin (KEFLEX) 500 MG capsule Take 1 capsule (500 mg) by mouth 3 times daily 30 capsule 0     cetirizine (ZYRTEC) 10 MG tablet Take 10 mg by mouth as needed       cholecalciferol 50 MCG (2000 UT) tablet Take 4,000 Units by mouth        fluticasone propionate (FLONASE) 50 mcg/actuation nasal spray Spray 2 sprays into both nostrils as needed       glycerin-hypromellose- (VISINE) 0.2-0.2-1 % SOLN ophthalmic solution INSTILL 1 DROP INTO BOTH EYES AS NEEDED FOR DRY EYES       ipratropium (ATROVENT) 42 mcg (0.06 %) nasal spray [IPRATROPIUM (ATROVENT) 42 MCG (0.06 %) NASAL SPRAY] INHALE 2 SPRAYS IN THE NOSTRIL(S) 3 TIMES DAILY. 15 mL 2     lamoTRIgine (LAMICTAL) 100 MG tablet Take 1 tablet (100 mg) by mouth daily 90 tablet 3     mirabegron (MYRBETRIQ) 25 MG 24 hr tablet Take 1 tablet (25 mg) by mouth daily 90 tablet 0     morphine (MS CONTIN) 15 MG CR tablet Take 1 tablet (15 mg) by mouth every 12 hours 60 tablet 0     naloxone (NARCAN) 4 mg/actuation nasal spray [NALOXONE (NARCAN) 4 MG/ACTUATION NASAL SPRAY] 1 spray (4 mg dose) into one nostril for opioid reversal. Call 911. May repeat if no response in 3  "minutes. (Patient taking differently: once as needed) 1 Box 0     ondansetron (ZOFRAN) 4 MG tablet TAKE 1 TABLET BY MOUTH EVERY 8 HOURS IF NEEDED FOR NAUSEA/VOMITING. 30 tablet 1     rosuvastatin (CRESTOR) 5 MG tablet Take 1 tablet (5 mg) by mouth daily 90 tablet 0     traZODone (DESYREL) 100 MG tablet Take 1 to 3 tablets by mouth at bedtime as needed 270 tablet 1     VIIBRYD 20 MG TABS tablet Take 1 tablet by mouth daily 90 tablet 3       Allergies   Allergen Reactions     Compazine [Prochlorperazine] Unknown     Pt stated mouth freezes        Social History     Tobacco Use     Smoking status: Former     Packs/day: 0.00     Types: Cigarettes     Smokeless tobacco: Never   Vaping Use     Vaping status: Never Used   Substance Use Topics     Alcohol use: No     Family History   Problem Relation Age of Onset     Cancer Mother      Cancer Father      Cancer Sister      Breast Cancer Sister         [id number] 40s     Cancer Brother      History   Drug Use No         Objective     BP (!) 144/68   Pulse 66   Resp 14   Ht 1.511 m (4' 11.5\")   Wt 61.1 kg (134 lb 11.2 oz)   SpO2 97%   BMI 26.75 kg/m      Physical Exam    GENERAL APPEARANCE: healthy, alert and no distress     EYES: EOMI, PERRL     HENT: ear canals and TM's normal and nose and mouth without ulcers or lesions     NECK: no adenopathy, no asymmetry, masses, or scars and thyroid normal to palpation     RESP: lungs clear to auscultation - no rales, rhonchi or wheezes     CV: regular rates and rhythm, normal S1 S2, no S3 or S4 and no murmur, click or rub     ABDOMEN:  soft, nontender, no HSM or masses and bowel sounds normal     MS: extremities normal- no gross deformities noted, no evidence of inflammation in joints, FROM in all extremities.     SKIN: no suspicious lesions or rashes     NEURO: Normal strength and tone, sensory exam grossly normal, mentation intact and speech normal     PSYCH: mentation appears normal. and affect normal/bright     LYMPHATICS: " No cervical adenopathy    Recent Labs   Lab Test 12/09/22  0835 05/09/22  1227   HGB 12.4 12.2    255    141   POTASSIUM 4.6 5.1*   CR 0.94 0.76        Diagnostics:  Recent Results (from the past 24 hour(s))   EKG 12-lead, tracing only    Collection Time: 06/12/23  8:13 AM   Result Value Ref Range    Systolic Blood Pressure  mmHg    Diastolic Blood Pressure  mmHg    Ventricular Rate 70 BPM    Atrial Rate 70 BPM    WY Interval  ms    QRS Duration 92 ms     ms    QTc 457 ms    P Axis  degrees    R AXIS 179 degrees    T Axis 144 degrees    Interpretation ECG       Sinus rhythm  Right axis deviation  Pulmonary disease pattern  Abnormal ECG  When compared with ECG of 25-FEB-2020 19:14,  QRS axis Shifted right     Hemoglobin    Collection Time: 06/12/23  9:03 AM   Result Value Ref Range    Hemoglobin 12.7 11.7 - 15.7 g/dL      EKG: Sinus rhythm, right axis deviation    Revised Cardiac Risk Index (RCRI):  The patient has the following serious cardiovascular risks for perioperative complications:   - No serious cardiac risks = 0 points     RCRI Interpretation: 0 points: Class I (very low risk - 0.4% complication rate)           Signed Electronically by: Sofie Pang CNP  Copy of this evaluation report is provided to requesting physician.

## 2023-06-12 NOTE — PROGRESS NOTES
[facility]  [address] [facility] [address]  Phone: [phone]  Fax: [phone]  Primary Provider: Trudy Pang  Pre-op Performing Provider: TRUDY PANG    PREOPERATIVE EVALUATION:  Today's date: 6/12/2023    Conrad Zhu is a 67 year old female who presents for a preoperative evaluation.      6/12/2023     7:48 AM   Additional Questions   Roomed by Mandy     Surgical Information:  Surgery/Procedure: Permanent nail avulsion medial border left hallux  Surgery Location: [facility]  Surgeon: Mark Lechuga  Surgery Date: 6/15/23  Time of Surgery: 0700  Where patient plans to recover: At home with family  Fax number for surgical facility: Note does not need to be faxed, will be available electronically in Epic.    Assessment & Plan     The proposed surgical procedure is considered LOW risk.    Preop general physical exam: EKG read by provider showed a sinus rhythm, right axis deviation, possible pulmonary disease pattern. Trouble with leads 1 and 2 sticking to her, could have been artifact. Will check an Echo just to confirm no issues, this should not stop surgery. She is asymptomatic. Hold pain meds the AM of surgery.   - EKG 12-lead, tracing only  - Hemoglobin  - Basic metabolic panel  (Ca, Cl, CO2, Creat, Gluc, K, Na, BUN)    Ingrown toenail: Left great toe, is on antibiotics, to have the toenail edge removed under anesthesia.     Hyperlipidemia LDL goal <100: With recent LDL, she is open to starting on Crestor. Recheck labs in 3 months.   - rosuvastatin (CRESTOR) 5 MG tablet  Dispense: 90 tablet; Refill: 0    Overactive bladder: Continued issues even on detrol. Will switch over to Myrbetriq. Follow up in three months.   - mirabegron (MYRBETRIQ) 25 MG 24 hr tablet  Dispense: 90 tablet; Refill: 0    Recurrent major depressive disorder, in full remission (H): She continues on Lamictal, Vybrid. Stable.     Anxiety: She continues on  Lamictal, Vybrid. Stable.    Acute electrocardiogram changes: EKG read by provider showed a sinus rhythm, right axis deviation, possible pulmonary disease pattern. Trouble with leads 1 and 2 sticking to her, could have been artifact. Will check an Echo just to confirm no issues, this should not stop surgery. She is asymptomatic.   - Echocardiogram Complete    Impacted cerumen of right ear: removed per CA with irrigation. Recheck of TM was normal.  - REMOVE IMPACTED CERUMEN    Chronic pain syndrome/F11.2 - Continuous opioid dependence (H): Continues on Chronic Tylenol #4 and MS Contin. To hold the AM of surgery. Stable.         Possible Sleep Apnea: Not sleep apnea, snores related to nose anatomy.        - No identified additional risk factors other than previously addressed    Antiplatelet or Anticoagulation Medication Instructions:   - Patient is on no antiplatelet or anticoagulation medications.    Additional Medication Instructions:  Hold pain medication the AM of surgery.    RECOMMENDATION:  APPROVAL GIVEN to proceed with proposed procedure, without further diagnostic evaluation.      Subjective       HPI related to upcoming procedure: The patient presents today for a preoperative examination. She will be having a left great toenail edge removed, it is currently infected.    She had a bad experience in the past with so they are going to use anesthesia to make this easier on her.    She reports that her OAB medication is not working, it hasn't for some time. Will try switching her over to Myrbetriq.    She is ready to start a statin. Will try Crestor for her.    She is on antibiotics for the ingrown toenail.    She reports otherwise doing well.    She continues on chronic opioids for chronic pain.    Her EKG today in office was difficult to perform, leads 1 and 2 did not want to stick to her skin.     She does admit that she snores, sleeps with her mouth open, no sleep apnea concerns.         6/9/2023     9:28 AM    Preop Questions   1. Have you ever had a heart attack or stroke? No   2. Have you ever had surgery on your heart or blood vessels, such as a stent placement, a coronary artery bypass, or surgery on an artery in your head, neck, heart, or legs? No   3. Do you have chest pain with activity? No   4. Do you have a history of  heart failure? No   5. Do you currently have a cold, bronchitis or symptoms of other infection? No   6. Do you have a cough, shortness of breath, or wheezing? No   7. Do you or anyone in your family have previous history of blood clots? No   8. Do you or does anyone in your family have a serious bleeding problem such as prolonged bleeding following surgeries or cuts? No   9. Have you ever had problems with anemia or been told to take iron pills? No   10. Have you had any abnormal blood loss such as black, tarry or bloody stools, or abnormal vaginal bleeding? No   11. Have you ever had a blood transfusion? No   12. Are you willing to have a blood transfusion if it is medically needed before, during, or after your surgery? Yes   13. Have you or any of your relatives ever had problems with anesthesia? No   14. Do you have sleep apnea, excessive snoring or daytime drowsiness? YES - Snoring   14a. Do you have a CPAP machine? No   15. Do you have any artifical heart valves or other implanted medical devices like a pacemaker, defibrillator, or continuous glucose monitor? No   16. Do you have artificial joints? No   17. Are you allergic to latex? No       Health Care Directive:  Patient does not have a Health Care Directive or Living Will: Full code    Preoperative Review of :   reviewed - controlled substances reflected in medication list.    To hold the AM of surgery.    Review of Systems  Constitutional, neuro, ENT, endocrine, pulmonary, cardiac, gastrointestinal, genitourinary, musculoskeletal, integument and psychiatric systems are negative, except as otherwise noted.    Patient Active Problem  List    Diagnosis Date Noted     Chronic pain 08/14/2016     Priority: High     F11.2 - Continuous opioid dependence (H) 06/12/2023     Priority: Medium     Ingrowing nail 06/06/2023     Priority: Medium     Paronychia, toe, left 06/06/2023     Priority: Medium     Vitamin deficiency 10/23/2020     Priority: Medium     Depression 02/10/2020     Priority: Medium     Long term (current) use of opiate analgesic 02/10/2020     Priority: Medium     Anxiety disorder, unspecified 02/10/2020     Priority: Medium     Neck pain 02/10/2020     Priority: Medium     Age-related osteoporosis without current pathological fracture 11/14/2019     Priority: Medium     Constipation, unspecified 11/14/2019     Priority: Medium     History of falling 11/14/2019     Priority: Medium     Unspecified fracture of shaft of left fibula, subsequent encounter for closed fracture with routine healing 11/14/2019     Priority: Medium     Displaced intertrochanteric fracture of left femur, subsequent encounter for closed fracture with routine healing 11/14/2019     Priority: Medium     Anemia due to blood loss, acute 11/12/2019     Priority: Medium     Acute post-operative pain 11/12/2019     Priority: Medium     Closed intertrochanteric fracture of hip, left, initial encounter (H) 11/11/2019     Priority: Medium     Left hip pain 11/11/2019     Priority: Medium     Added automatically from request for surgery [id number]         Pre-operative general physical examination 11/11/2019     Priority: Medium     Closed nondisplaced fracture of body of right calcaneus, initial encounter 07/09/2019     Priority: Medium     Achilles tendinitis of right lower extremity 07/01/2019     Priority: Medium     Sprain of right ankle, unspecified ligament, initial encounter 07/01/2019     Priority: Medium     Sprain of right foot, initial encounter 07/01/2019     Priority: Medium     Complex regional pain syndrome i of left lower limb 08/15/2018     Priority: Medium      Low back pain 08/14/2016     Priority: Medium     PN (peripheral neuropathy) 08/14/2016     Priority: Medium     Former smoker      Priority: Medium     Nausea 06/07/2016     Priority: Medium     Left foot drop 06/01/2016     Priority: Medium     Closed fracture of metatarsal bone 02/08/2016     Priority: Medium     Formatting of this note might be different from the original.  Overview:   Created by Conversion       Complex regional pain syndrome type 2 of upper extremity 02/08/2016     Priority: Medium     Formatting of this note might be different from the original.  Overview:   Created by Conversion       Degeneration of intervertebral disc of cervical region 02/08/2016     Priority: Medium     Formatting of this note might be different from the original.  Overview:   Created by Conversion       Edema 02/08/2016     Priority: Medium     Formatting of this note might be different from the original.  Overview:   Created by Conversion  [facility] Annotation: Feb 25 2014  1:28PM - Sixto Griffith: Left foot       Muscle weakness 02/08/2016     Priority: Medium     Formatting of this note might be different from the original.  Overview:   Created by Conversion       Neoplasm of uncertain behavior of skin 02/08/2016     Priority: Medium     Formatting of this note might be different from the original.  Overview:   Created by Conversion       Anxiety 01/21/2015     Priority: Medium     Left leg weakness 07/14/2013     Priority: Medium     Leukocytosis 07/12/2013     Priority: Medium     Corneal scarring 09/25/2012     Priority: Medium     Anemia 07/04/2012     Priority: Medium     Formatting of this note might be different from the original.  Overview:   Created by Conversion       Menopause present 12/08/2011     Priority: Medium     Formatting of this note might be different from the original.  Decreased libido.       Tear film insufficiency 09/23/2010     Priority: Medium     OAB (overactive bladder) 08/14/2016      Priority: Low     Insomnia 08/14/2016     Priority: Low     Osteoporosis 08/14/2016     Priority: Low      Past Medical History:   Diagnosis Date     Anxiety      Chronic pain 8/14/2016     Depression      Former smoker      History of cocaine abuse (H)      Insomnia 8/14/2016     Liver disease      Low back pain 8/14/2016     Migraine headache 8/14/2016     Osteoporosis 8/14/2016     PN (peripheral neuropathy) 8/14/2016     PONV (postoperative nausea and vomiting)      Past Surgical History:   Procedure Laterality Date     LAPAROTOMY EXPLORATORY       MAMMOPLASTY AUGMENTATION       TUBAL LIGATION       Current Outpatient Medications   Medication Sig Dispense Refill     acetaminophen-codeine (TYLENOL #4) 300-60 MG per tablet Take 1-2 Tablets by mouth every 8 hours if needed for severe pain 180 tablet 3     cephALEXin (KEFLEX) 500 MG capsule Take 1 capsule (500 mg) by mouth 3 times daily 30 capsule 0     cetirizine (ZYRTEC) 10 MG tablet Take 10 mg by mouth as needed       cholecalciferol 50 MCG (2000 UT) tablet Take 4,000 Units by mouth        fluticasone propionate (FLONASE) 50 mcg/actuation nasal spray Spray 2 sprays into both nostrils as needed       glycerin-hypromellose- (VISINE) 0.2-0.2-1 % SOLN ophthalmic solution INSTILL 1 DROP INTO BOTH EYES AS NEEDED FOR DRY EYES       ipratropium (ATROVENT) 42 mcg (0.06 %) nasal spray [IPRATROPIUM (ATROVENT) 42 MCG (0.06 %) NASAL SPRAY] INHALE 2 SPRAYS IN THE NOSTRIL(S) 3 TIMES DAILY. 15 mL 2     lamoTRIgine (LAMICTAL) 100 MG tablet Take 1 tablet (100 mg) by mouth daily 90 tablet 3     mirabegron (MYRBETRIQ) 25 MG 24 hr tablet Take 1 tablet (25 mg) by mouth daily 90 tablet 0     morphine (MS CONTIN) 15 MG CR tablet Take 1 tablet (15 mg) by mouth every 12 hours 60 tablet 0     naloxone (NARCAN) 4 mg/actuation nasal spray [NALOXONE (NARCAN) 4 MG/ACTUATION NASAL SPRAY] 1 spray (4 mg dose) into one nostril for opioid reversal. Call 911. May repeat if no response in 3  "minutes. (Patient taking differently: once as needed) 1 Box 0     ondansetron (ZOFRAN) 4 MG tablet TAKE 1 TABLET BY MOUTH EVERY 8 HOURS IF NEEDED FOR NAUSEA/VOMITING. 30 tablet 1     rosuvastatin (CRESTOR) 5 MG tablet Take 1 tablet (5 mg) by mouth daily 90 tablet 0     traZODone (DESYREL) 100 MG tablet Take 1 to 3 tablets by mouth at bedtime as needed 270 tablet 1     VIIBRYD 20 MG TABS tablet Take 1 tablet by mouth daily 90 tablet 3       Allergies   Allergen Reactions     Compazine [Prochlorperazine] Unknown     Pt stated mouth freezes        Social History     Tobacco Use     Smoking status: Former     Packs/day: 0.00     Types: Cigarettes     Smokeless tobacco: Never   Vaping Use     Vaping status: Never Used   Substance Use Topics     Alcohol use: No     Family History   Problem Relation Age of Onset     Cancer Mother      Cancer Father      Cancer Sister      Breast Cancer Sister         [id number] [id number]     Cancer Brother      History   Drug Use No         Objective     BP (!) 144/68   Pulse 66   Resp 14   Ht 1.511 m (4' 11.5\")   Wt 61.1 kg (134 lb 11.2 oz)   SpO2 97%   BMI 26.75 kg/m      Physical Exam    GENERAL APPEARANCE: healthy, alert and no distress     EYES: EOMI, PERRL     HENT: ear canals and TM's normal and nose and mouth without ulcers or lesions     NECK: no adenopathy, no asymmetry, masses, or scars and thyroid normal to palpation     RESP: lungs clear to auscultation - no rales, rhonchi or wheezes     CV: regular rates and rhythm, normal S1 S2, no S3 or S4 and no murmur, click or rub     ABDOMEN:  soft, nontender, no HSM or masses and bowel sounds normal     MS: extremities normal- no gross deformities noted, no evidence of inflammation in joints, FROM in all extremities.     SKIN: no suspicious lesions or rashes     NEURO: Normal strength and tone, sensory exam grossly normal, mentation intact and speech normal     PSYCH: mentation appears normal. and affect normal/bright     LYMPHATICS: " No cervical adenopathy    Recent Labs   Lab Test 12/09/22  0835 05/09/22  1227   HGB 12.4 12.2    255    141   POTASSIUM 4.6 5.1*   CR 0.94 0.76        Diagnostics:  Recent Results (from the past 24 hour(s))   EKG 12-lead, tracing only    Collection Time: 06/12/23  8:13 AM   Result Value Ref Range    Systolic Blood Pressure  mmHg    Diastolic Blood Pressure  mmHg    Ventricular Rate 70 BPM    Atrial Rate 70 BPM    VT Interval  ms    QRS Duration 92 ms     ms    QTc 457 ms    P Axis  degrees    R AXIS 179 degrees    T Axis 144 degrees    Interpretation ECG       Sinus rhythm  Right axis deviation  Pulmonary disease pattern  Abnormal ECG  When compared with ECG of 25-FEB-2020 19:14,  QRS axis Shifted right     Hemoglobin    Collection Time: 06/12/23  9:03 AM   Result Value Ref Range    Hemoglobin 12.7 11.7 - 15.7 g/dL      EKG: Sinus rhythm, right axis deviation    Revised Cardiac Risk Index (RCRI):  The patient has the following serious cardiovascular risks for perioperative complications:   - No serious cardiac risks = 0 points     RCRI Interpretation: 0 points: Class I (very low risk - 0.4% complication rate)           Signed Electronically by: Sofie Pang CNP  Copy of this evaluation report is provided to requesting physician.

## 2023-06-12 NOTE — PATIENT INSTRUCTIONS
Your labs are processing at this time, I released results to [employer] once they are back.    Stop the Detrol start the Myrbetriq for your overactive bladder.  Take this in the morning.    Start the Crestor or rosuvastatin for your cholesterol at suppertime.  If having any leg pain or weakness please update me and stop the medication.    Recheck lab work in 3 months with me.    You are cleared for surgery, no concerns about this.  You will heal well.

## 2023-06-13 LAB
ATRIAL RATE - MUSE: 70 BPM
DIASTOLIC BLOOD PRESSURE - MUSE: NORMAL MMHG
INTERPRETATION ECG - MUSE: NORMAL
P AXIS - MUSE: NORMAL DEGREES
PR INTERVAL - MUSE: NORMAL MS
QRS DURATION - MUSE: 92 MS
QT - MUSE: 424 MS
QTC - MUSE: 457 MS
R AXIS - MUSE: 179 DEGREES
SYSTOLIC BLOOD PRESSURE - MUSE: NORMAL MMHG
T AXIS - MUSE: 144 DEGREES
VENTRICULAR RATE- MUSE: 70 BPM

## 2023-06-13 RX ORDER — TRAMADOL HYDROCHLORIDE 50 MG/1
TABLET ORAL
COMMUNITY
Start: 2022-12-11 | End: 2023-09-13

## 2023-06-14 ENCOUNTER — ANESTHESIA EVENT (OUTPATIENT)
Dept: SURGERY | Facility: AMBULATORY SURGERY CENTER | Age: 68
End: 2023-06-14
Payer: COMMERCIAL

## 2023-06-15 ENCOUNTER — ANESTHESIA (OUTPATIENT)
Dept: SURGERY | Facility: AMBULATORY SURGERY CENTER | Age: 68
End: 2023-06-15
Payer: COMMERCIAL

## 2023-06-15 ENCOUNTER — HOSPITAL ENCOUNTER (OUTPATIENT)
Facility: AMBULATORY SURGERY CENTER | Age: 68
Discharge: HOME | End: 2023-06-15
Attending: PODIATRIST
Payer: COMMERCIAL

## 2023-06-15 VITALS
RESPIRATION RATE: 16 BRPM | DIASTOLIC BLOOD PRESSURE: 55 MMHG | TEMPERATURE: 97.5 F | OXYGEN SATURATION: 94 % | SYSTOLIC BLOOD PRESSURE: 109 MMHG | HEART RATE: 64 BPM

## 2023-06-15 DIAGNOSIS — L60.0 INGROWING NAIL: ICD-10-CM

## 2023-06-15 DIAGNOSIS — L03.032 PARONYCHIA, TOE, LEFT: Primary | ICD-10-CM

## 2023-06-15 PROCEDURE — 11750 EXCISION NAIL&NAIL MATRIX: CPT | Mod: TA | Performed by: PODIATRIST

## 2023-06-15 RX ORDER — FENTANYL CITRATE 0.05 MG/ML
50 INJECTION, SOLUTION INTRAMUSCULAR; INTRAVENOUS EVERY 5 MIN PRN
Status: DISCONTINUED | OUTPATIENT
Start: 2023-06-15 | End: 2023-06-16 | Stop reason: HOSPADM

## 2023-06-15 RX ORDER — ONDANSETRON 2 MG/ML
4 INJECTION INTRAMUSCULAR; INTRAVENOUS EVERY 30 MIN PRN
Status: DISCONTINUED | OUTPATIENT
Start: 2023-06-15 | End: 2023-06-16 | Stop reason: HOSPADM

## 2023-06-15 RX ORDER — HYDROMORPHONE HCL IN WATER/PF 6 MG/30 ML
0.4 PATIENT CONTROLLED ANALGESIA SYRINGE INTRAVENOUS EVERY 5 MIN PRN
Status: DISCONTINUED | OUTPATIENT
Start: 2023-06-15 | End: 2023-06-16 | Stop reason: HOSPADM

## 2023-06-15 RX ORDER — CEFAZOLIN SODIUM 2 G/100ML
2 INJECTION, SOLUTION INTRAVENOUS
Status: COMPLETED | OUTPATIENT
Start: 2023-06-15 | End: 2023-06-15

## 2023-06-15 RX ORDER — LIDOCAINE HYDROCHLORIDE 20 MG/ML
INJECTION, SOLUTION INFILTRATION; PERINEURAL PRN
Status: DISCONTINUED | OUTPATIENT
Start: 2023-06-15 | End: 2023-06-15 | Stop reason: HOSPADM

## 2023-06-15 RX ORDER — PROPOFOL 10 MG/ML
INJECTION, EMULSION INTRAVENOUS PRN
Status: DISCONTINUED | OUTPATIENT
Start: 2023-06-15 | End: 2023-06-15

## 2023-06-15 RX ORDER — FENTANYL CITRATE 0.05 MG/ML
25 INJECTION, SOLUTION INTRAMUSCULAR; INTRAVENOUS EVERY 5 MIN PRN
Status: DISCONTINUED | OUTPATIENT
Start: 2023-06-15 | End: 2023-06-16 | Stop reason: HOSPADM

## 2023-06-15 RX ORDER — ACETAMINOPHEN 325 MG/1
975 TABLET ORAL ONCE
Status: COMPLETED | OUTPATIENT
Start: 2023-06-15 | End: 2023-06-15

## 2023-06-15 RX ORDER — OXYCODONE HYDROCHLORIDE 5 MG/1
5-10 TABLET ORAL EVERY 6 HOURS PRN
Qty: 12 TABLET | Refills: 0 | Status: SHIPPED | OUTPATIENT
Start: 2023-06-15 | End: 2023-09-13

## 2023-06-15 RX ORDER — SILVER SULFADIAZINE 10 MG/G
CREAM TOPICAL PRN
Status: DISCONTINUED | OUTPATIENT
Start: 2023-06-15 | End: 2023-06-15 | Stop reason: HOSPADM

## 2023-06-15 RX ORDER — OXYCODONE HYDROCHLORIDE 10 MG/1
10 TABLET ORAL
Status: DISCONTINUED | OUTPATIENT
Start: 2023-06-15 | End: 2023-06-16 | Stop reason: HOSPADM

## 2023-06-15 RX ORDER — OXYCODONE HYDROCHLORIDE 5 MG/1
5 TABLET ORAL
Status: DISCONTINUED | OUTPATIENT
Start: 2023-06-15 | End: 2023-06-16 | Stop reason: HOSPADM

## 2023-06-15 RX ORDER — ONDANSETRON 2 MG/ML
INJECTION INTRAMUSCULAR; INTRAVENOUS PRN
Status: DISCONTINUED | OUTPATIENT
Start: 2023-06-15 | End: 2023-06-15

## 2023-06-15 RX ORDER — FENTANYL CITRATE 50 UG/ML
INJECTION, SOLUTION INTRAMUSCULAR; INTRAVENOUS PRN
Status: DISCONTINUED | OUTPATIENT
Start: 2023-06-15 | End: 2023-06-15

## 2023-06-15 RX ORDER — FENTANYL CITRATE 0.05 MG/ML
25 INJECTION, SOLUTION INTRAMUSCULAR; INTRAVENOUS
Status: DISCONTINUED | OUTPATIENT
Start: 2023-06-15 | End: 2023-06-16 | Stop reason: HOSPADM

## 2023-06-15 RX ORDER — LIDOCAINE 40 MG/G
CREAM TOPICAL
Status: DISCONTINUED | OUTPATIENT
Start: 2023-06-15 | End: 2023-06-16 | Stop reason: HOSPADM

## 2023-06-15 RX ORDER — ONDANSETRON 4 MG/1
4 TABLET, ORALLY DISINTEGRATING ORAL EVERY 30 MIN PRN
Status: DISCONTINUED | OUTPATIENT
Start: 2023-06-15 | End: 2023-06-16 | Stop reason: HOSPADM

## 2023-06-15 RX ORDER — PROPOFOL 10 MG/ML
INJECTION, EMULSION INTRAVENOUS CONTINUOUS PRN
Status: DISCONTINUED | OUTPATIENT
Start: 2023-06-15 | End: 2023-06-15

## 2023-06-15 RX ORDER — SODIUM CHLORIDE, SODIUM LACTATE, POTASSIUM CHLORIDE, CALCIUM CHLORIDE 600; 310; 30; 20 MG/100ML; MG/100ML; MG/100ML; MG/100ML
INJECTION, SOLUTION INTRAVENOUS CONTINUOUS
Status: DISCONTINUED | OUTPATIENT
Start: 2023-06-15 | End: 2023-06-16 | Stop reason: HOSPADM

## 2023-06-15 RX ORDER — CEFAZOLIN SODIUM 2 G/100ML
2 INJECTION, SOLUTION INTRAVENOUS SEE ADMIN INSTRUCTIONS
Status: DISCONTINUED | OUTPATIENT
Start: 2023-06-15 | End: 2023-06-16 | Stop reason: HOSPADM

## 2023-06-15 RX ORDER — HYDROMORPHONE HCL IN WATER/PF 6 MG/30 ML
0.2 PATIENT CONTROLLED ANALGESIA SYRINGE INTRAVENOUS EVERY 5 MIN PRN
Status: DISCONTINUED | OUTPATIENT
Start: 2023-06-15 | End: 2023-06-16 | Stop reason: HOSPADM

## 2023-06-15 RX ORDER — CEPHALEXIN 500 MG/1
500 CAPSULE ORAL 3 TIMES DAILY
Qty: 30 CAPSULE | Refills: 0 | Status: SHIPPED | OUTPATIENT
Start: 2023-06-15 | End: 2023-09-13

## 2023-06-15 RX ORDER — LIDOCAINE HYDROCHLORIDE 20 MG/ML
INJECTION, SOLUTION INFILTRATION; PERINEURAL PRN
Status: DISCONTINUED | OUTPATIENT
Start: 2023-06-15 | End: 2023-06-15

## 2023-06-15 RX ADMIN — FENTANYL CITRATE 25 MCG: 50 INJECTION, SOLUTION INTRAMUSCULAR; INTRAVENOUS at 07:18

## 2023-06-15 RX ADMIN — CEFAZOLIN SODIUM 2 G: 2 INJECTION, SOLUTION INTRAVENOUS at 06:59

## 2023-06-15 RX ADMIN — FENTANYL CITRATE 50 MCG: 50 INJECTION, SOLUTION INTRAMUSCULAR; INTRAVENOUS at 07:02

## 2023-06-15 RX ADMIN — PROPOFOL 120 MCG/KG/MIN: 10 INJECTION, EMULSION INTRAVENOUS at 07:02

## 2023-06-15 RX ADMIN — ACETAMINOPHEN 975 MG: 325 TABLET ORAL at 06:18

## 2023-06-15 RX ADMIN — SODIUM CHLORIDE, SODIUM LACTATE, POTASSIUM CHLORIDE, CALCIUM CHLORIDE: 600; 310; 30; 20 INJECTION, SOLUTION INTRAVENOUS at 06:32

## 2023-06-15 RX ADMIN — PROPOFOL 30 MG: 10 INJECTION, EMULSION INTRAVENOUS at 07:04

## 2023-06-15 RX ADMIN — FENTANYL CITRATE 25 MCG: 50 INJECTION, SOLUTION INTRAMUSCULAR; INTRAVENOUS at 07:14

## 2023-06-15 RX ADMIN — ONDANSETRON 4 MG: 2 INJECTION INTRAMUSCULAR; INTRAVENOUS at 07:05

## 2023-06-15 RX ADMIN — LIDOCAINE HYDROCHLORIDE 40 MG: 20 INJECTION, SOLUTION INFILTRATION; PERINEURAL at 07:03

## 2023-06-15 ASSESSMENT — LIFESTYLE VARIABLES: TOBACCO_USE: 1

## 2023-06-15 NOTE — INTERVAL H&P NOTE
"I have reviewed the surgical (or preoperative) H&P that is linked to this encounter, and examined the patient. There are no significant changes    Clinical Conditions Present on Arrival:  Clinically Significant Risk Factors Present on Admission                  # Overweight: Estimated body mass index is 26.75 kg/m  as calculated from the following:    Height as of 6/12/23: 1.511 m (4' 11.5\").    Weight as of 6/12/23: 61.1 kg (134 lb 11.2 oz).       "

## 2023-06-15 NOTE — ANESTHESIA POSTPROCEDURE EVALUATION
Patient: Conrad Zhu    Procedure: Procedure(s):  Permanent nail avulsion medial border left hallux       Anesthesia Type:  MAC    Note:  Disposition: Outpatient   Postop Pain Control: Uneventful            Sign Out: Well controlled pain   PONV: No   Neuro/Psych: Uneventful            Sign Out: Acceptable/Baseline neuro status   Airway/Respiratory: Uneventful            Sign Out: Acceptable/Baseline resp. status   CV/Hemodynamics: Uneventful            Sign Out: Acceptable CV status; No obvious hypovolemia; No obvious fluid overload   Other NRE: NONE   DID A NON-ROUTINE EVENT OCCUR? No           Last vitals:  Vitals Value Taken Time   /58 06/15/23 0800   Temp 97.5  F (36.4  C) 06/15/23 0725   Pulse 67 06/15/23 0814   Resp 16 06/15/23 0730   SpO2 99 % 06/15/23 0814   Vitals shown include unvalidated device data.    Electronically Signed By: Oz Warner MD  Shahnaz 15, 2023  8:27 AM

## 2023-06-15 NOTE — ANESTHESIA PREPROCEDURE EVALUATION
Anesthesia Pre-Procedure Evaluation    Patient: Conrad Zhu   MRN: [id number] : 1955        Procedure : Procedure(s):  Permanent nail avulsion medial border left hallux          Past Medical History:   Diagnosis Date     Anemia      Anxiety      Chronic pain 2016     Depression      Former smoker      History of cocaine abuse (H)      Insomnia 2016     Liver disease      Low back pain 2016     Migraine headache 2016     Osteoporosis 2016     PN (peripheral neuropathy) 2016     PONV (postoperative nausea and vomiting)       Past Surgical History:   Procedure Laterality Date     LAPAROTOMY EXPLORATORY       MAMMOPLASTY AUGMENTATION       TUBAL LIGATION        Allergies   Allergen Reactions     Compazine [Prochlorperazine] Unknown     Pt stated mouth freezes      Social History     Tobacco Use     Smoking status: Former     Packs/day: 0.00     Types: Cigarettes     Quit date:      Years since quittin.4     Smokeless tobacco: Never   Vaping Use     Vaping status: Never Used   Substance Use Topics     Alcohol use: No      Wt Readings from Last 1 Encounters:   23 61.1 kg (134 lb 11.2 oz)        Anesthesia Evaluation   Pt has had prior anesthetic.     History of anesthetic complications  - PONV.      ROS/MED HX  ENT/Pulmonary:  - neg pulmonary ROS   (+) tobacco use, Past use,     Neurologic:  - neg neurologic ROS   (+) peripheral neuropathy, - peripheral. migraines,     Cardiovascular:  - neg cardiovascular ROS     METS/Exercise Tolerance: >4 METS    Hematologic:  - neg hematologic  ROS   (+) anemia,     Musculoskeletal:  - neg musculoskeletal ROS     GI/Hepatic:  - neg GI/hepatic ROS     Renal/Genitourinary:  - neg Renal ROS     Endo:  - neg endo ROS     Psychiatric/Substance Use:  - neg psychiatric ROS   (+) psychiatric history anxiety and depression     Infectious Disease:  - neg infectious disease ROS     Malignancy:  - neg malignancy ROS     Other:  - neg  other ROS    (+) , H/O Chronic Pain,        Physical Exam    Airway        Mallampati: II   TM distance: > 3 FB   Neck ROM: full   Mouth opening: > 3 cm    Respiratory Devices and Support         Dental       (+) Multiple crowns, permanant bridges      Cardiovascular   cardiovascular exam normal          Pulmonary   pulmonary exam normal                OUTSIDE LABS:  CBC:   Lab Results   Component Value Date    WBC 5.5 12/09/2022    WBC 6.9 05/09/2022    HGB 12.7 06/12/2023    HGB 12.4 12/09/2022    HCT 38.0 12/09/2022    HCT 37.8 05/09/2022     12/09/2022     05/09/2022     BMP:   Lab Results   Component Value Date     06/12/2023     12/09/2022    POTASSIUM 5.0 06/12/2023    POTASSIUM 4.6 12/09/2022    CHLORIDE 98 06/12/2023    CHLORIDE 102 12/09/2022    CO2 29 06/12/2023    CO2 29 12/09/2022    BUN 12.7 06/12/2023    BUN 12.3 12/09/2022    CR 0.87 06/12/2023    CR 0.94 12/09/2022    GLC 93 06/12/2023    GLC 97 12/09/2022     COAGS:   Lab Results   Component Value Date    PTT 33 02/25/2020    INR 1.00 02/25/2020     POC: No results found for: BGM, HCG, HCGS  HEPATIC:   Lab Results   Component Value Date    ALBUMIN 4.2 12/09/2022    PROTTOTAL 7.0 12/09/2022    ALT 20 12/09/2022    AST 28 12/09/2022    ALKPHOS 56 12/09/2022    BILITOTAL 0.3 12/09/2022     OTHER:   Lab Results   Component Value Date    JUSTIN 10.0 06/12/2023    MAG 1.8 04/15/2019    LIPASE <9 09/25/2020    TSH 0.61 05/09/2022    CRP 0.1 09/25/2020       Anesthesia Plan    ASA Status:  3   NPO Status:  NPO Appropriate    Anesthesia Type: MAC.     - Reason for MAC: immobility needed, straight local not clinically adequate   Induction: Propofol.   Maintenance: TIVA.        Consents    Anesthesia Plan(s) and associated risks, benefits, and realistic alternatives discussed. Questions answered and patient/representative(s) expressed understanding.    - Discussed:     - Discussed with:  Patient         Postoperative Care    Pain  management: Multi-modal analgesia.   PONV prophylaxis: Ondansetron (or other 5HT-3), Dexamethasone or Solumedrol     Comments:    Other Comments:     Reviewed anesthetic options and risks. Patient agrees to proceed.             Oz Warner MD

## 2023-06-15 NOTE — OP NOTE
Date: 6/15/2023    Surgeon: DUKE Lechuga DPM    Preoperative diagnosis: Ingrown nail left hallux    Postoperative diagnosis: Same    Procedure: Permanent nail avulsion medial border left hallux    Anesthesia: Local with IV-sedation    Hemostasis: Turnicot    Pathology: none    Injectables: none    Materials: None    Complications: None    Blood loss: 1 cc      Findings: Patient presents for operative intervention for painful ingrown nail medial border left hallux.  Patient would not tolerate an office procedure and elected to have MAC anesthesia for the procedure today.  We reviewed the surgical procedure to include department removal of the medial border on the left hallux.  Risk include but limited to regrowth which occurs approximately 2% of these types of procedures.  Consent is been obtained.  Patient questions invited and answered, including appropriate risk, benefits and complications. No guarantees given or implied. Patient has been NPO.    Description: Patient was brought to the operating room and placed on the table in supine position. IV-sedation was administered by the anesthesia department.  Injection of 0.5% Marcaine plain was administered to digits left hallux. The foot was then prepped and draped in usual aseptic manner.  A turnicot was applied to the left hallux.  [place] elevator was then used to elevate the medial border and a English anvil was used to resect the medial border.  Hemostat was then used to remove the offending medial border.  3, 32nd applications of phenol was then applied followed on alcohol rinse.    Dressings consisted of Silvadene ointment, 2 x 2 and a Coban wrap.    The patient appeared to tolerate all the procedures and anesthesia well without apparent complications. Patient was transported from the operating room to the recovery room with vital signs stable and neurovascular status as it was pre-operatively to the left foot. Patient to be discharged home per anesthesia. Written  and verbal homecare instructions given.  I will extend Keflex for additional 10 days.  She will contact my office with any problems questions or concerns as they develop.

## 2023-06-15 NOTE — DISCHARGE INSTRUCTIONS
You have received 975 mg of Acetaminophen (Tylenol) at 6:20 AM. Please do not take an additional dose of Tylenol until after 12:20 PM     Do not exceed 4,000 mg of acetaminophen during a 24 hour period and keep in mind that acetaminophen can also be found in many over-the-counter cold medications as well as narcotics that may be given for pain.     If you have any questions or concerns regarding your procedure, please contact Dr. Lechuga, his office number is [phone].

## 2023-06-15 NOTE — ANESTHESIA CARE TRANSFER NOTE
Patient: Conrad Zhu    Procedure: Procedure(s):  Permanent nail avulsion medial border left hallux       Diagnosis: Ingrowing nail [L60.0]  Diagnosis Additional Information: No value filed.    Anesthesia Type:   MAC     Note:    Oropharynx: oropharynx clear of all foreign objects  Level of Consciousness: drowsy  Oxygen Supplementation: room air    Independent Airway: airway patency satisfactory and stable  Dentition: dentition unchanged  Vital Signs Stable: post-procedure vital signs reviewed and stable  Report to RN Given: handoff report given  Patient transferred to: Phase II    Handoff Report: Identifed the Patient, Identified the Reponsible Provider, Reviewed the pertinent medical history, Discussed the surgical course, Reviewed Intra-OP anesthesia mangement and issues during anesthesia, Set expectations for post-procedure period and Allowed opportunity for questions and acknowledgement of understanding      Vitals:  Vitals Value Taken Time   /55 06/15/23 0725   Temp 36.4  C (97.5  F) 06/15/23 0725   Pulse     Resp 16 06/15/23 0725   SpO2 95 % 06/15/23 0725       Electronically Signed By: Mayra Mojica CRNA, APRN CRNA  Shahnaz 15, 2023  7:25 AM

## 2023-06-16 ENCOUNTER — TELEPHONE (OUTPATIENT)
Dept: VASCULAR SURGERY | Facility: CLINIC | Age: 68
End: 2023-06-16
Payer: COMMERCIAL

## 2023-06-16 NOTE — TELEPHONE ENCOUNTER
Spoke with patient.  She has been on kelfex for over a week and now has diarrhea that is not improved with the use of anti-diarrheal medication.  She agree to add a probiotic and understands to not take at same time as antibiotic.  If diarrhea persists until tomorrow afternoon, she will stop Keflex and call back to discuss.      Dr. Lechuga stated if diarrhea persists patient could stop antibiotic for a couple of days and then restart.  Will update patient if she calls Monday.

## 2023-06-16 NOTE — TELEPHONE ENCOUNTER
[facility] Call Center    Phone Message    May a detailed message be left on voicemail: yes     Reason for Call: Other: Pt is having post surgery concerns and would like a call back asap      Action Taken: Other: pod    Travel Screening: Not Applicable

## 2023-06-20 ENCOUNTER — OFFICE VISIT (OUTPATIENT)
Dept: PODIATRY | Facility: CLINIC | Age: 68
End: 2023-06-20
Payer: COMMERCIAL

## 2023-06-20 VITALS — WEIGHT: 134 LBS | OXYGEN SATURATION: 95 % | BODY MASS INDEX: 26.61 KG/M2 | HEART RATE: 76 BPM

## 2023-06-20 DIAGNOSIS — L60.0 INGROWING NAIL: Primary | ICD-10-CM

## 2023-06-20 PROCEDURE — 99024 POSTOP FOLLOW-UP VISIT: CPT | Performed by: PODIATRIST

## 2023-06-20 ASSESSMENT — PAIN SCALES - GENERAL: PAINLEVEL: NO PAIN (0)

## 2023-06-20 NOTE — LETTER
6/20/2023         RE: Conrad Zhu  [id number] [facility] [id number]        Dear Colleague,    Thank you for referring your patient, Conrad Zhu, to the [facility]. Please see a copy of my visit note below.    Podiatry Progress Note    ASSESSMENT: S/P Nail avulsion left hallux      TREATMENT:  -Surgical site on the left hallux is progressing well. I recommend she finish course of Keflex.  -The patient will continue to apply a band aid during the day if drainage is noted, otherwise will avoid a dressing.   -The patient is discharged at this time, but encouraged to return as symptoms dictate.     Mark Lechuga DPM  [facility] Podiatry/Foot & Ankle Surgery      HPI: Conrad Zhu was seen again today s/p nail avulsion on the left hallux.  Patient is doing well today and denies any pain on the left great toe.  She is taking Keflex as directed.    Past Medical History:   Diagnosis Date     Anemia      Anxiety      Chronic pain 08/14/2016     Depression      Former smoker      History of cocaine abuse (H)      Insomnia 08/14/2016     Liver disease      Low back pain 08/14/2016     Migraine headache 08/14/2016     Osteoporosis 08/14/2016     PN (peripheral neuropathy) 08/14/2016     PONV (postoperative nausea and vomiting)        Allergies   Allergen Reactions     Compazine [Prochlorperazine] Unknown     Pt stated mouth freezes         Current Outpatient Medications:      acetaminophen-codeine (TYLENOL #4) 300-60 MG per tablet, Take 1-2 Tablets by mouth every 8 hours if needed for severe pain, Disp: 180 tablet, Rfl: 3     cephALEXin (KEFLEX) 500 MG capsule, Take 1 capsule (500 mg) by mouth 3 times daily, Disp: 30 capsule, Rfl: 0     cephALEXin (KEFLEX) 500 MG capsule, Take 1 capsule (500 mg) by mouth 3 times daily, Disp: 30 capsule, Rfl: 0     cetirizine (ZYRTEC) 10 MG tablet, Take 10 mg by mouth as needed, Disp: , Rfl:      cholecalciferol 50 MCG (2000  UT) tablet, Take 4,000 Units by mouth , Disp: , Rfl:      fluticasone propionate (FLONASE) 50 mcg/actuation nasal spray, Spray 2 sprays into both nostrils as needed, Disp: , Rfl:      glycerin-hypromellose- (VISINE) 0.2-0.2-1 % SOLN ophthalmic solution, INSTILL 1 DROP INTO BOTH EYES AS NEEDED FOR DRY EYES, Disp: , Rfl:      ipratropium (ATROVENT) 42 mcg (0.06 %) nasal spray, [IPRATROPIUM (ATROVENT) 42 MCG (0.06 %) NASAL SPRAY] INHALE 2 SPRAYS IN THE NOSTRIL(S) 3 TIMES DAILY., Disp: 15 mL, Rfl: 2     lamoTRIgine (LAMICTAL) 100 MG tablet, Take 1 tablet (100 mg) by mouth daily, Disp: 90 tablet, Rfl: 3     mirabegron (MYRBETRIQ) 25 MG 24 hr tablet, Take 1 tablet (25 mg) by mouth daily, Disp: 90 tablet, Rfl: 0     morphine (MS CONTIN) 15 MG CR tablet, Take 1 tablet (15 mg) by mouth every 12 hours, Disp: 60 tablet, Rfl: 0     naloxone (NARCAN) 4 mg/actuation nasal spray, [NALOXONE (NARCAN) 4 MG/ACTUATION NASAL SPRAY] 1 spray (4 mg dose) into one nostril for opioid reversal. Call 911. May repeat if no response in 3 minutes. (Patient taking differently: once as needed), Disp: 1 Box, Rfl: 0     ondansetron (ZOFRAN) 4 MG tablet, TAKE 1 TABLET BY MOUTH EVERY 8 HOURS IF NEEDED FOR NAUSEA/VOMITING., Disp: 30 tablet, Rfl: 1     oxyCODONE (ROXICODONE) 5 MG tablet, Take 1-2 tablets (5-10 mg) by mouth every 6 hours as needed for moderate to severe pain, Disp: 12 tablet, Rfl: 0     rosuvastatin (CRESTOR) 5 MG tablet, Take 1 tablet (5 mg) by mouth daily, Disp: 90 tablet, Rfl: 0     traMADol (ULTRAM) 50 MG tablet, , Disp: , Rfl:      traZODone (DESYREL) 100 MG tablet, Take 1 to 3 tablets by mouth at bedtime as needed, Disp: 270 tablet, Rfl: 1     VIIBRYD 20 MG TABS tablet, Take 1 tablet by mouth daily, Disp: 90 tablet, Rfl: 3    Current Facility-Administered Medications:      denosumab (PROLIA) injection 60 mg, 60 mg, Subcutaneous, Q6 Months, Blanca Duarte, NP, 60 mg at 02/07/23 0946     denosumab (PROLIA) injection 60 mg, 60  mg, Subcutaneous, Q6 Months, Blanca Duarte, NP, 60 mg at 08/02/22 0944    Review of Systems - Negative       OBJECTIVE:  Appearance: alert, well appearing, and in no distress.    Pulse 76   Wt 60.8 kg (134 lb)   SpO2 95%   BMI 26.61 kg/m      Surgical site on the medial border left hallux has granular base, no erythema.  Neurovascular status unchanged left foot.    Imaging: None        Again, thank you for allowing me to participate in the care of your patient.        Sincerely,        Mark Lechuga DPM

## 2023-06-20 NOTE — PATIENT INSTRUCTIONS
Continue taking your antibiotics until gone.    Continue daily foot soaks for a total of 3 weeks.    Follow up as needed.

## 2023-06-20 NOTE — PROGRESS NOTES
Podiatry Progress Note    ASSESSMENT: S/P Nail avulsion left hallux      TREATMENT:  -Surgical site on the left hallux is progressing well. I recommend she finish course of Keflex.  -The patient will continue to apply a band aid during the day if drainage is noted, otherwise will avoid a dressing.   -The patient is discharged at this time, but encouraged to return as symptoms dictate.     Mark Lechuga DPM  [facility] Podiatry/Foot & Ankle Surgery      HPI: Conrad Zhu was seen again today s/p nail avulsion on the left hallux.  Patient is doing well today and denies any pain on the left great toe.  She is taking Keflex as directed.    Past Medical History:   Diagnosis Date     Anemia      Anxiety      Chronic pain 08/14/2016     Depression      Former smoker      History of cocaine abuse (H)      Insomnia 08/14/2016     Liver disease      Low back pain 08/14/2016     Migraine headache 08/14/2016     Osteoporosis 08/14/2016     PN (peripheral neuropathy) 08/14/2016     PONV (postoperative nausea and vomiting)        Allergies   Allergen Reactions     Compazine [Prochlorperazine] Unknown     Pt stated mouth freezes         Current Outpatient Medications:      acetaminophen-codeine (TYLENOL #4) 300-60 MG per tablet, Take 1-2 Tablets by mouth every 8 hours if needed for severe pain, Disp: 180 tablet, Rfl: 3     cephALEXin (KEFLEX) 500 MG capsule, Take 1 capsule (500 mg) by mouth 3 times daily, Disp: 30 capsule, Rfl: 0     cephALEXin (KEFLEX) 500 MG capsule, Take 1 capsule (500 mg) by mouth 3 times daily, Disp: 30 capsule, Rfl: 0     cetirizine (ZYRTEC) 10 MG tablet, Take 10 mg by mouth as needed, Disp: , Rfl:      cholecalciferol 50 MCG (2000 UT) tablet, Take 4,000 Units by mouth , Disp: , Rfl:      fluticasone propionate (FLONASE) 50 mcg/actuation nasal spray, Spray 2 sprays into both nostrils as needed, Disp: , Rfl:      glycerin-hypromellose- (VISINE) 0.2-0.2-1 % SOLN ophthalmic solution, INSTILL  1 DROP INTO BOTH EYES AS NEEDED FOR DRY EYES, Disp: , Rfl:      ipratropium (ATROVENT) 42 mcg (0.06 %) nasal spray, [IPRATROPIUM (ATROVENT) 42 MCG (0.06 %) NASAL SPRAY] INHALE 2 SPRAYS IN THE NOSTRIL(S) 3 TIMES DAILY., Disp: 15 mL, Rfl: 2     lamoTRIgine (LAMICTAL) 100 MG tablet, Take 1 tablet (100 mg) by mouth daily, Disp: 90 tablet, Rfl: 3     mirabegron (MYRBETRIQ) 25 MG 24 hr tablet, Take 1 tablet (25 mg) by mouth daily, Disp: 90 tablet, Rfl: 0     morphine (MS CONTIN) 15 MG CR tablet, Take 1 tablet (15 mg) by mouth every 12 hours, Disp: 60 tablet, Rfl: 0     naloxone (NARCAN) 4 mg/actuation nasal spray, [NALOXONE (NARCAN) 4 MG/ACTUATION NASAL SPRAY] 1 spray (4 mg dose) into one nostril for opioid reversal. Call 911. May repeat if no response in 3 minutes. (Patient taking differently: once as needed), Disp: 1 Box, Rfl: 0     ondansetron (ZOFRAN) 4 MG tablet, TAKE 1 TABLET BY MOUTH EVERY 8 HOURS IF NEEDED FOR NAUSEA/VOMITING., Disp: 30 tablet, Rfl: 1     oxyCODONE (ROXICODONE) 5 MG tablet, Take 1-2 tablets (5-10 mg) by mouth every 6 hours as needed for moderate to severe pain, Disp: 12 tablet, Rfl: 0     rosuvastatin (CRESTOR) 5 MG tablet, Take 1 tablet (5 mg) by mouth daily, Disp: 90 tablet, Rfl: 0     traMADol (ULTRAM) 50 MG tablet, , Disp: , Rfl:      traZODone (DESYREL) 100 MG tablet, Take 1 to 3 tablets by mouth at bedtime as needed, Disp: 270 tablet, Rfl: 1     VIIBRYD 20 MG TABS tablet, Take 1 tablet by mouth daily, Disp: 90 tablet, Rfl: 3    Current Facility-Administered Medications:      denosumab (PROLIA) injection 60 mg, 60 mg, Subcutaneous, Q6 Months, Blanca Duarte NP, 60 mg at 02/07/23 0946     denosumab (PROLIA) injection 60 mg, 60 mg, Subcutaneous, Q6 Months, Blanca Duarte, NP, 60 mg at 08/02/22 0944    Review of Systems - Negative       OBJECTIVE:  Appearance: alert, well appearing, and in no distress.    Pulse 76   Wt 60.8 kg (134 lb)   SpO2 95%   BMI 26.61 kg/m      Surgical site on the  medial border left hallux has granular base, no erythema.  Neurovascular status unchanged left foot.    Imaging: None

## 2023-06-26 ENCOUNTER — TELEPHONE (OUTPATIENT)
Dept: INTERNAL MEDICINE | Facility: CLINIC | Age: 68
End: 2023-06-26
Payer: COMMERCIAL

## 2023-06-26 DIAGNOSIS — B37.31 YEAST INFECTION OF THE VAGINA: Primary | ICD-10-CM

## 2023-06-26 RX ORDER — FLUCONAZOLE 150 MG/1
150 TABLET ORAL
Qty: 2 TABLET | Refills: 0 | Status: SHIPPED | OUTPATIENT
Start: 2023-06-26 | End: 2023-09-25

## 2023-06-26 NOTE — TELEPHONE ENCOUNTER
"Pt recently had toe surgery. Pt on her second round of antibiotics, last dose today.     Started to have symptoms of a yeast infection yesterday, worse today. Itching, no discharge.     Last yeast infection \"many years\" ago.       "

## 2023-06-30 ENCOUNTER — TELEPHONE (OUTPATIENT)
Dept: INTERNAL MEDICINE | Facility: CLINIC | Age: 68
End: 2023-06-30
Payer: COMMERCIAL

## 2023-06-30 NOTE — TELEPHONE ENCOUNTER
S-(situation):   Patient is calling with no change in symptoms.    B-(background):   Phone message 6/26/23  Yeast infection post antibiotic.   RX for fluconazole.  Patient took 1 pill on 6/26 and second pill 6/28    A-(assessment):   Symptoms are the same (or maybe a little better).  Area is very itchy  No discharge  No odor    R-(recommendations):   Patient requesting advice for next step or if another option.    Patient instructed to call back if symptoms change or if new symptoms present. Patient verbalizes agreement with plan.    HAO Roblero  [place], [place]  [phone]

## 2023-07-03 NOTE — TELEPHONE ENCOUNTER
Saw message was routed back to PCP care team pool on Friday afternoon and was not seen until now, Mon am. Writer reviewed chart and it appears pt has not been seen for this yet since message to be seen was not communicated. Spoke with Sofie who instructed to call patient as advise her to be seen in walk in care today if possible since we do not have any clinic appointments until later this week.     Called and LVM apologizing that we did not get back to her on Friday and explaining that Sofie advises she be seen in walk in care today if possible and what time that opens here at .

## 2023-07-07 ENCOUNTER — HOSPITAL ENCOUNTER (OUTPATIENT)
Dept: CARDIOLOGY | Facility: CLINIC | Age: 68
Discharge: HOME | End: 2023-07-07
Attending: NURSE PRACTITIONER | Admitting: NURSE PRACTITIONER
Payer: COMMERCIAL

## 2023-07-07 DIAGNOSIS — R94.31 ACUTE ELECTROCARDIOGRAM CHANGES: ICD-10-CM

## 2023-07-07 LAB — LVEF ECHO: NORMAL

## 2023-07-07 PROCEDURE — 93306 TTE W/DOPPLER COMPLETE: CPT

## 2023-07-07 PROCEDURE — 93306 TTE W/DOPPLER COMPLETE: CPT | Mod: 26 | Performed by: INTERNAL MEDICINE

## 2023-07-08 ENCOUNTER — MYC REFILL (OUTPATIENT)
Dept: INTERNAL MEDICINE | Facility: CLINIC | Age: 68
End: 2023-07-08
Payer: COMMERCIAL

## 2023-07-08 DIAGNOSIS — G90.522 COMPLEX REGIONAL PAIN SYNDROME I OF LEFT LOWER LIMB: ICD-10-CM

## 2023-07-08 DIAGNOSIS — G89.4 CHRONIC PAIN SYNDROME: ICD-10-CM

## 2023-07-10 RX ORDER — MORPHINE SULFATE 15 MG/1
15 TABLET, FILM COATED, EXTENDED RELEASE ORAL EVERY 12 HOURS
Qty: 60 TABLET | Refills: 0 | Status: SHIPPED | OUTPATIENT
Start: 2023-07-10 | End: 2023-08-07

## 2023-07-14 DIAGNOSIS — G90.522 COMPLEX REGIONAL PAIN SYNDROME I OF LEFT LOWER LIMB: ICD-10-CM

## 2023-07-14 DIAGNOSIS — G89.4 CHRONIC PAIN SYNDROME: ICD-10-CM

## 2023-07-15 ENCOUNTER — MYC MEDICAL ADVICE (OUTPATIENT)
Dept: INTERNAL MEDICINE | Facility: CLINIC | Age: 68
End: 2023-07-15
Payer: COMMERCIAL

## 2023-07-17 ENCOUNTER — TELEPHONE (OUTPATIENT)
Dept: INTERNAL MEDICINE | Facility: CLINIC | Age: 68
End: 2023-07-17
Payer: COMMERCIAL

## 2023-07-17 RX ORDER — MORPHINE SULFATE 15 MG/1
15 TABLET, FILM COATED, EXTENDED RELEASE ORAL EVERY 12 HOURS
Qty: 60 TABLET | Refills: 0 | OUTPATIENT
Start: 2023-07-17

## 2023-07-17 NOTE — TELEPHONE ENCOUNTER
I called [facility]. Medication needs a prior auth completed before it can be dispensed. PA started.

## 2023-07-19 NOTE — TELEPHONE ENCOUNTER
Received a call from patient, she was given the PA department number - she has been without her medication for a week and wasn't notified that a PA was needed.

## 2023-07-19 NOTE — TELEPHONE ENCOUNTER
Central Prior Authorization Team   Phone: [phone]      Prior Authorization Approval    Medication: MORPHINE SULFATE ER 15 MG PO TBCR  Authorization Effective Date: 6/19/2023  Authorization Expiration Date: 7/18/2023  Approved Dose/Quantity: 60 tablets per 30 days  Reference #:     Insurance Company: [employer] - Phone [phone] Fax [phone]  Expected CoPay:       CoPay Card Available: No    Financial Assistance Needed: N/A  Which Pharmacy is filling the prescription: [facility] - [place], [place] - [address] [place] [address]  Pharmacy Notified: Yes  Patient Notified: Yes

## 2023-07-21 DIAGNOSIS — F33.42 RECURRENT MAJOR DEPRESSIVE DISORDER, IN FULL REMISSION (H): ICD-10-CM

## 2023-07-21 RX ORDER — LAMOTRIGINE 100 MG/1
TABLET ORAL
Qty: 90 TABLET | Refills: 3 | Status: SHIPPED | OUTPATIENT
Start: 2023-07-21 | End: 2023-12-22

## 2023-07-21 NOTE — TELEPHONE ENCOUNTER
"Routing refill request to provider for review/approval because:  Gap in use    Last Written Prescription Date:  5/9/22  Last Fill Quantity: 90,  # refills: 3   Last office visit provider:  6/12/23     Requested Prescriptions   Pending Prescriptions Disp Refills     lamoTRIgine (LAMICTAL) 100 MG tablet [Pharmacy Med Name: lamoTRIgine 100 mg tablet (LAMICTAL)] 90 tablet 3     Sig: Take 1 tablet by mouth daily       Anti-Seizure Meds Protocol  Failed - 7/21/2023 10:35 AM        Failed - Review Authorizing provider's last note.      Refer to last progress notes: confirm request is for original authorizing provider (cannot be through other providers).          Passed - Recent (12 mo) or future (30 days) visit within the authorizing provider's specialty     Patient has had an office visit with the authorizing provider or a provider within the authorizing providers department within the previous 12 mos or has a future within next 30 days. See \"Patient Info\" tab in inbasket, or \"Choose Columns\" in Meds & Orders section of the refill encounter.              Passed - Normal CBC on file in past 26 months     Recent Labs   Lab Test 06/12/23  0903 12/09/22  0835   WBC  --  5.5   RBC  --  4.17   HGB 12.7 12.4   HCT  --  38.0   PLT  --  327                 Passed - Normal ALT or AST on file in past 26 months     Recent Labs   Lab Test 12/09/22  0835   ALT 20     Recent Labs   Lab Test 12/09/22  0835   AST 28             Passed - Normal platelet count on file in past 26 months     Recent Labs   Lab Test 12/09/22  0835                  Passed - Medication is active on med list        Passed - No active pregnancy on record        Passed - No positive pregnancy test in last 12 months             Lory Cook RN 07/21/23 10:35 AM  "

## 2023-08-04 DIAGNOSIS — F33.42 RECURRENT MAJOR DEPRESSIVE DISORDER, IN FULL REMISSION (H): ICD-10-CM

## 2023-08-04 RX ORDER — TRAZODONE HYDROCHLORIDE 100 MG/1
TABLET ORAL
Qty: 270 TABLET | Refills: 3 | Status: SHIPPED | OUTPATIENT
Start: 2023-08-04 | End: 2023-12-22

## 2023-08-04 NOTE — TELEPHONE ENCOUNTER
"Due to multiple contraindication warnings, refill routed to provider for review.  Thank you-    Last Written Prescription Date:  2/1/2023  Last Fill Quantity: 270,  # refills: 1   Last office visit provider:  6/12/2023     Requested Prescriptions   Pending Prescriptions Disp Refills    traZODone (DESYREL) 100 MG tablet [Pharmacy Med Name: traZODone 100 mg tablet (DESYREL)] 270 tablet 1     Sig: Take 1 to 3 tablets by mouth at bedtime as needed       Serotonin Modulators Passed - 8/4/2023  9:09 AM        Passed - Recent (12 mo) or future (30 days) visit within the authorizing provider's specialty     Patient has had an office visit with the authorizing provider or a provider within the authorizing providers department within the previous 12 mos or has a future within next 30 days. See \"Patient Info\" tab in inbasket, or \"Choose Columns\" in Meds & Orders section of the refill encounter.              Passed - Medication is active on med list        Passed - Patient is age 18 or older        Passed - No active pregnancy on record        Passed - No positive pregnancy test in past 12 months             Patricia Ireland RN 08/04/23 11:35 AM  "

## 2023-08-07 ENCOUNTER — MYC REFILL (OUTPATIENT)
Dept: INTERNAL MEDICINE | Facility: CLINIC | Age: 68
End: 2023-08-07
Payer: COMMERCIAL

## 2023-08-07 DIAGNOSIS — G89.4 CHRONIC PAIN SYNDROME: ICD-10-CM

## 2023-08-07 DIAGNOSIS — G90.522 COMPLEX REGIONAL PAIN SYNDROME I OF LEFT LOWER LIMB: ICD-10-CM

## 2023-08-07 RX ORDER — MORPHINE SULFATE 15 MG/1
15 TABLET, FILM COATED, EXTENDED RELEASE ORAL EVERY 12 HOURS
Qty: 60 TABLET | Refills: 0 | Status: SHIPPED | OUTPATIENT
Start: 2023-08-07 | End: 2023-09-06

## 2023-08-08 ENCOUNTER — ALLIED HEALTH/NURSE VISIT (OUTPATIENT)
Dept: ENDOCRINOLOGY | Facility: CLINIC | Age: 68
End: 2023-08-08
Payer: COMMERCIAL

## 2023-08-08 DIAGNOSIS — Z92.29 PERSONAL HISTORY OF OTHER DRUG THERAPY: ICD-10-CM

## 2023-08-08 DIAGNOSIS — M81.8 OTHER OSTEOPOROSIS WITHOUT CURRENT PATHOLOGICAL FRACTURE: Primary | ICD-10-CM

## 2023-08-08 PROCEDURE — 96372 THER/PROPH/DIAG INJ SC/IM: CPT | Performed by: NURSE PRACTITIONER

## 2023-08-08 PROCEDURE — 99207 PR NO CHARGE NURSE ONLY: CPT

## 2023-08-08 NOTE — PROGRESS NOTES
Clinic Administered Medication Documentation      Prolia Documentation    Indication: Prolia  (denosumab) is a prescription medicine used to treat osteoporosis in patients who:   Are at high risk for fracture, meaning patients who have had a fracture related to osteoporosis, or who have multiple risk factors for fracture.  Cannot use another osteoporosis medicine or other osteoporosis medicines did not work well.  The timeline for early/late injections would be 4 weeks early and any time after the 6 month lucie. If a patient receives their injection late, then the subsequent injection would be 6 months from the date that they actually received the injection.    When was the last injection?  23  Was the last injection at least 6 months ago? Yes  Has the prior authorization been completed?  Yes  Is there an active order (written within the past 365 days, with administrations remaining, not ) in the chart?  Yes  Patient denies any dental work involving the bone (e.g. tooth extraction or dental implants) in the past 4 weeks?  Yes  Patient denies plans for any dental work involving the bone (e.g. tooth extraction or dental implants) in the next 4 weeks? Yes    The following steps were completed to comply with the REMS program for Prolia:  Reviewed information in the Medication Guide and Patient Counseling Chart, including the serious risks of Prolia  and the symptoms of each risk.  Advised patient to seek prompt medical attention if they have signs or symptoms of any of the serious risks.  Provided each patient a copy of the Medication Guide and Patient Brochure.    Prior to injection, verified patient identity using patient's name and date of birth. Medication was administered. Please see MAR and medication order for additional information. Patient instructed to remain in clinic for 15 minutes and report any adverse reaction to staff immediately.    Vial/Syringe: Syringe  Was this medication supplied by the  patient? No  Verified that the patient has refills remaining in their prescription.

## 2023-08-16 ENCOUNTER — TELEPHONE (OUTPATIENT)
Dept: VASCULAR SURGERY | Facility: CLINIC | Age: 68
End: 2023-08-16
Payer: COMMERCIAL

## 2023-08-16 NOTE — TELEPHONE ENCOUNTER
Caller: Conrad    Provider: SHENG Lechuga    Detailed reason for call: Patient states she has new onset of pain and draining of fluid on the outside of her toe where Dr. Lechuga did surgery a couple months ago.  She states it was fine and healing well until today.  She is scheduled for his first available on 8/29 but is worried she should be seen sooner.  She states she is unable to send a photo.  She is asking for a call back to advise what she should do until she is seen and to determine if she should be seen sooner.     Best phone number to contact: [phone]    Best time to contact: any    Ok to leave a detailed message: Yes    Ok to speak to authorized person if needed: No      (Noted to patient if reason is related to wound or incision, to please send a photo via email or [facility].)

## 2023-09-06 ENCOUNTER — MYC REFILL (OUTPATIENT)
Dept: INTERNAL MEDICINE | Facility: CLINIC | Age: 68
End: 2023-09-06
Payer: COMMERCIAL

## 2023-09-06 DIAGNOSIS — G89.4 CHRONIC PAIN SYNDROME: ICD-10-CM

## 2023-09-06 DIAGNOSIS — G90.522 COMPLEX REGIONAL PAIN SYNDROME I OF LEFT LOWER LIMB: ICD-10-CM

## 2023-09-06 RX ORDER — MORPHINE SULFATE 15 MG/1
15 TABLET, FILM COATED, EXTENDED RELEASE ORAL EVERY 12 HOURS
Qty: 60 TABLET | Refills: 0 | Status: SHIPPED | OUTPATIENT
Start: 2023-09-06 | End: 2023-10-02

## 2023-09-09 DIAGNOSIS — E78.5 HYPERLIPIDEMIA LDL GOAL <100: ICD-10-CM

## 2023-09-09 RX ORDER — ROSUVASTATIN CALCIUM 5 MG/1
5 TABLET, COATED ORAL DAILY
Qty: 90 TABLET | Refills: 1 | Status: SHIPPED | OUTPATIENT
Start: 2023-09-09 | End: 2023-12-22

## 2023-09-09 NOTE — TELEPHONE ENCOUNTER
"    Last Written Prescription Date:  6/12/23  Last Fill Quantity: 90,  # refills: 0   Last office visit provider:  6/12/23     Requested Prescriptions   Pending Prescriptions Disp Refills    rosuvastatin (CRESTOR) 5 MG tablet [Pharmacy Med Name: rosuvastatin 5 mg tablet (CRESTOR)] 90 tablet 0     Sig: Take 1 Tablet (5 mg) by mouth once daily.       Statins Protocol Passed - 9/9/2023  9:50 AM        Passed - LDL on file in past 12 months     Recent Labs   Lab Test 12/09/22  0835   *             Passed - No abnormal creatine kinase in past 12 months     Recent Labs   Lab Test 04/15/19  1435   *                Passed - Recent (12 mo) or future (30 days) visit within the authorizing provider's specialty     Patient has had an office visit with the authorizing provider or a provider within the authorizing providers department within the previous 12 mos or has a future within next 30 days. See \"Patient Info\" tab in inbasket, or \"Choose Columns\" in Meds & Orders section of the refill encounter.              Passed - Medication is active on med list        Passed - Patient is age 18 or older        Passed - No active pregnancy on record        Passed - No positive pregnancy test in past 12 months             Sapna Diaz RN 09/09/23 12:27 PM  "

## 2023-09-12 DIAGNOSIS — N32.81 OVERACTIVE BLADDER: ICD-10-CM

## 2023-09-12 RX ORDER — MIRABEGRON 25 MG/1
25 TABLET, FILM COATED, EXTENDED RELEASE ORAL DAILY
Qty: 90 TABLET | Refills: 1 | Status: SHIPPED | OUTPATIENT
Start: 2023-09-12 | End: 2023-12-22

## 2023-09-12 NOTE — TELEPHONE ENCOUNTER
Pt called to say that she is completely out of her medication. TC advised Pt that we received the refill request today and the turn around time is 1-3 business days.    Pt is requesting this refill be expedited if possible as she is out of medication.    Radha Rodas

## 2023-09-13 ENCOUNTER — TELEPHONE (OUTPATIENT)
Dept: INTERNAL MEDICINE | Facility: CLINIC | Age: 68
End: 2023-09-13
Payer: COMMERCIAL

## 2023-09-13 NOTE — TELEPHONE ENCOUNTER
Patient had oral surgery yesterday 9/12. Her oral surgeon asked her to call her pcp to confirm it is ok for him to prescribe pain medication since she is already on a pain management regimen.     Patient stated oral surgeon would like to prescribe 12 tablets of oxycodone 5mg. She stated oral surgeon only needs a call back from patient confirming this is ok.     Per medication list patient is currently on    Disp Refills Start     morphine (MS CONTIN) 15 MG CR tablet 60 tablet 0 9/6/2023     Sig - Route: Take 1 tablet (15 mg) by mouth every 12 hours - Oral         Routing to pcp to review if is it acceptable for acute pain management prescription to be sent with current medication she is on.

## 2023-09-18 DIAGNOSIS — G89.4 CHRONIC PAIN SYNDROME: ICD-10-CM

## 2023-09-18 RX ORDER — ACETAMINOPHEN AND CODEINE PHOSPHATE 300; 60 MG/1; MG/1
TABLET ORAL
Qty: 180 TABLET | Refills: 3 | Status: SHIPPED | OUTPATIENT
Start: 2023-09-18 | End: 2023-12-22

## 2023-09-19 ASSESSMENT — PATIENT HEALTH QUESTIONNAIRE - PHQ9: SUM OF ALL RESPONSES TO PHQ QUESTIONS 1-9: 4

## 2023-09-20 ASSESSMENT — PATIENT HEALTH QUESTIONNAIRE - PHQ9
10. IF YOU CHECKED OFF ANY PROBLEMS, HOW DIFFICULT HAVE THESE PROBLEMS MADE IT FOR YOU TO DO YOUR WORK, TAKE CARE OF THINGS AT HOME, OR GET ALONG WITH OTHER PEOPLE: NOT DIFFICULT AT ALL
SUM OF ALL RESPONSES TO PHQ QUESTIONS 1-9: 4

## 2023-09-25 ENCOUNTER — OFFICE VISIT (OUTPATIENT)
Dept: INTERNAL MEDICINE | Facility: CLINIC | Age: 68
End: 2023-09-25
Payer: COMMERCIAL

## 2023-09-25 VITALS
OXYGEN SATURATION: 96 % | BODY MASS INDEX: 27.62 KG/M2 | SYSTOLIC BLOOD PRESSURE: 148 MMHG | HEART RATE: 71 BPM | HEIGHT: 59 IN | WEIGHT: 137 LBS | RESPIRATION RATE: 16 BRPM | TEMPERATURE: 98.1 F | DIASTOLIC BLOOD PRESSURE: 70 MMHG

## 2023-09-25 DIAGNOSIS — E66.3 OVERWEIGHT (BMI 25.0-29.9): ICD-10-CM

## 2023-09-25 DIAGNOSIS — F41.9 ANXIETY: ICD-10-CM

## 2023-09-25 DIAGNOSIS — F33.42 RECURRENT MAJOR DEPRESSIVE DISORDER, IN FULL REMISSION (H): ICD-10-CM

## 2023-09-25 DIAGNOSIS — F11.90 CHRONIC, CONTINUOUS USE OF OPIOIDS: ICD-10-CM

## 2023-09-25 DIAGNOSIS — G89.4 CHRONIC PAIN SYNDROME: ICD-10-CM

## 2023-09-25 DIAGNOSIS — R03.0 ELEVATED BLOOD PRESSURE READING WITHOUT DIAGNOSIS OF HYPERTENSION: ICD-10-CM

## 2023-09-25 DIAGNOSIS — F42.2 MIXED OBSESSIONAL THOUGHTS AND ACTS: ICD-10-CM

## 2023-09-25 PROBLEM — K59.00 CONSTIPATION, UNSPECIFIED: Status: RESOLVED | Noted: 2019-11-14 | Resolved: 2023-09-25

## 2023-09-25 PROCEDURE — 90662 IIV NO PRSV INCREASED AG IM: CPT | Performed by: NURSE PRACTITIONER

## 2023-09-25 PROCEDURE — G0008 ADMIN INFLUENZA VIRUS VAC: HCPCS | Performed by: NURSE PRACTITIONER

## 2023-09-25 PROCEDURE — 99214 OFFICE O/P EST MOD 30 MIN: CPT | Mod: 25 | Performed by: NURSE PRACTITIONER

## 2023-09-25 RX ORDER — CHLORHEXIDINE GLUCONATE ORAL RINSE 1.2 MG/ML
SOLUTION DENTAL
COMMUNITY
Start: 2023-09-13 | End: 2024-04-02

## 2023-09-25 ASSESSMENT — ANXIETY QUESTIONNAIRES
6. BECOMING EASILY ANNOYED OR IRRITABLE: SEVERAL DAYS
2. NOT BEING ABLE TO STOP OR CONTROL WORRYING: SEVERAL DAYS
3. WORRYING TOO MUCH ABOUT DIFFERENT THINGS: SEVERAL DAYS
GAD7 TOTAL SCORE: 5
IF YOU CHECKED OFF ANY PROBLEMS ON THIS QUESTIONNAIRE, HOW DIFFICULT HAVE THESE PROBLEMS MADE IT FOR YOU TO DO YOUR WORK, TAKE CARE OF THINGS AT HOME, OR GET ALONG WITH OTHER PEOPLE: NOT DIFFICULT AT ALL
GAD7 TOTAL SCORE: 5
4. TROUBLE RELAXING: SEVERAL DAYS
1. FEELING NERVOUS, ANXIOUS, OR ON EDGE: NOT AT ALL
5. BEING SO RESTLESS THAT IT IS HARD TO SIT STILL: SEVERAL DAYS
7. FEELING AFRAID AS IF SOMETHING AWFUL MIGHT HAPPEN: NOT AT ALL

## 2023-09-25 NOTE — PROGRESS NOTES
"  Assessment & Plan     Chronic pain syndrome/Chronic, continuous use of opioids: Pain contract signed today.  reviewed. Refilled her Narcan. She continues on Codeine/Tylenol and MS Contin. Stable dosing.   - naloxone (NARCAN) 4 MG/0.1ML nasal spray  Dispense: 1 each; Refill: 1    Anxiety: Continues, will refer to a female therapist.   - Adult Mental Health  Referral    Mixed obsessional thoughts and acts: She has been obsessing over smaller details much more lately, to the point that it has been bothering her. Will refer to a therapist.   - Adult Mental Health  Referral    Recurrent major depressive disorder, in full remission (H): She continues on Lamictal, Vybrid. Stable.     Elevated blood pressure reading without diagnosis of hypertension: Blood pressure today in clinic was 148/70; related to anxiety likely. Check blood pressures at home, goals are less than 140/90. Follow up if running above goal.    Overweight (BMI 25.0-29.9): Discussed intermittent fasting, 30 day reset, to get Dr. Ann Marie Gregorio's book and start this journey.        BMI:   Estimated body mass index is 27.22 kg/m  as calculated from the following:    Height as of this encounter: 1.511 m (4' 11.49\").    Weight as of this encounter: 62.1 kg (137 lb).   Weight management plan: Discussed healthy diet and exercise guidelines      Sofie Pang [facility]    Ozzy Martines is a 68 year old, presenting for the following health issues:  Follow Up (3 month follow up)        9/25/2023    10:09 AM   Additional Questions   Roomed by Rosio RED       History of Present Illness       Hyperlipidemia:  She presents for follow up of hyperlipidemia.   She is taking medication to lower cholesterol. She is not having myalgia or other side effects to statin medications.    She eats 2-3 servings of fruits and vegetables daily.She consumes 0 sweetened beverage(s) daily.She exercises with enough effort " "to increase her heart rate 9 or less minutes per day.  She exercises with enough effort to increase her heart rate 3 or less days per week.   She is taking medications regularly.     The patient presents today for follow up.    She would like a flu shot today.    Signed her pain contract today. She continues on chronic opioids, dosing and quantity has not changed.    She reports that she is always a little anxious when she comes in to be seen, this is likely why her blood pressure is up a bit.    She is concerned about her weight. Discussed intermittent fasting with her today. Will have her check out Dr. Ann Marie Gregorio, Fast Like A Girl.    She also reports that she is doing things like, setting out cooking utensils for supper early in the day in her kitchen. Her mind is ruminating on tasks like this, until she gets them done. She admits to having some OCD. Will refer her for therapy for this.    Review of Systems   Constitutional, HEENT, cardiovascular, pulmonary, GI, , musculoskeletal, neuro, skin, endocrine and psych systems are negative, except as otherwise noted.      Objective    BP (!) 148/70 (BP Location: Right arm, Patient Position: Sitting)   Pulse 71   Temp 98.1  F (36.7  C)   Resp 16   Ht 1.511 m (4' 11.49\")   Wt 62.1 kg (137 lb)   SpO2 96%   BMI 27.22 kg/m    Body mass index is 27.22 kg/m .  Physical Exam   GENERAL: healthy, alert and no distress  RESP: lungs clear to auscultation - no rales, rhonchi or wheezes  CV: regular rate and rhythm, normal S1 S2, no S3 or S4, no murmur, click or rub, no peripheral edema  MS: no gross musculoskeletal defects noted  SKIN: no suspicious lesions or rashes  NEURO: Normal strength and tone, mentation intact and speech normal  PSYCH: mentation appears normal, affect normal/bright            "

## 2023-09-25 NOTE — PATIENT INSTRUCTIONS
Check out [employer] and [employer] for your puppy.    Flu shot given today.    This fall get the RSV and COVID monovalent booster.    Get this book, and follow her fasting cycle: This should help with weight loss, and mental health.     Dr. Ann Marie Gregorio, Fast Like a Girl.    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

## 2023-09-25 NOTE — LETTER
Opioid / Opioid Plus Controlled Substance Agreement    This is an agreement between you and your provider about the safe and appropriate use of controlled substance/opioids prescribed by your care team. Controlled substances are medicines that can cause physical and mental dependence (abuse).    There are strict laws about having and using these medicines. We here at [facility] are committing to working with you in your efforts to get better. To support you in this work, we ll help you schedule regular office appointments for medicine refills. If we must cancel or change your appointment for any reason, we ll make sure you have enough medicine to last until your next appointment.     As a Provider, I will:  Listen carefully to your concerns and treat you with respect.   Recommend a treatment plan that I believe is in your best interest. This plan may involve therapies other than opioid pain medication.   Talk with you often about the possible benefits, and the risk of harm of any medicine that we prescribe for you.   Provide a plan on how to taper (discontinue or go off) using this medicine if the decision is made to stop its use.    As a Patient, I understand that opioid(s):   Are a controlled substance prescribed by my care team to help me function or work and manage my condition(s).   Are strong medicines and can cause serious side effects such as:  Drowsiness, which can seriously affect my driving ability  A lower breathing rate, enough to cause death  Harm to my thinking ability   Depression   Abuse of and addiction to this medicine  Need to be taken exactly as prescribed. Combining opioids with certain medicines or chemicals (such as illegal drugs, sedatives, sleeping pills, and benzodiazepines) can be dangerous or even fatal. If I stop opioids suddenly, I may have severe withdrawal symptoms.  Do not work for all types of pain nor for all patients. If they re not helpful, I may be asked to stop  them.        The risks, benefits and side effects of these medicine(s) were explained to me. I agree that:  I will take part in other treatments as advised by my care team. This may be psychiatry or counseling, physical therapy, behavioral therapy, group treatment or a referral to a specialist.     I will keep all my appointments. I understand that this is part of the monitoring of opioids. My care team may require an office visit for EVERY opioid/controlled substance refill. If I miss appointments or don t follow instructions, my care team may stop my medicine.    I will take my medicines as prescribed. I will not change the dose or schedule unless my care team tells me to. There will be no refills if I run out early.     I may be asked to come to the clinic and complete a urine drug test or complete a pill count at any time. If I don t give a urine sample or participate in a pill count, the care team may stop my medicine.    I will only receive prescriptions from this clinic for chronic pain. If I am treated by another provider for acute pain issues, I will tell them that I am taking opioid pain medication for chronic pain and that I have a treatment agreement with this provider. I will inform my [facility] care team within one business day if I am given a prescription for any pain medication by another healthcare provider. My [facility] care team can contact other providers and pharmacists about my use of any medicines.    It is up to me to make sure that I don t run out of my medicines on weekends or holidays. If my care team is willing to refill my opioid prescription without a visit, I must request refills only during office hours. Refills may take up to 3 business days to process. I will use one pharmacy to fill all my opioid and other controlled substance prescriptions. I will notify the clinic about any changes to my insurance or medication availability.    I am responsible for my  prescriptions. If the medicine/prescription is lost, stolen or destroyed, it will not be replaced. I also agree not to share controlled substance medicines with anyone.    I am aware I should not use any illegal or recreational drugs. I agree not to drink alcohol unless my care team says I can.       If I enroll in the [place] Medical Cannabis program, I will tell my care team prior to my next refill.     I will tell my care team right away if I become pregnant, have a new medical problem treated outside of my regular clinic, or have a change in my medications.    I understand that this medicine can affect my thinking, judgment and reaction time. Alcohol and drugs affect the brain and body, which can affect the safety of my driving. Being under the influence of alcohol or drugs can affect my decision-making, behaviors, personal safety, and the safety of others. Driving while impaired (DWI) can occur if a person is driving, operating, or in physical control of a car, motorcycle, boat, snowmobile, ATV, motorbike, off-road vehicle, or any other motor vehicle ([place] Statute 169A.20). I understand the risk if I choose to drive or operate any vehicle or machinery.    I understand that if I do not follow any of the conditions above, my prescriptions or treatment may be stopped or changed.          Opioids  What You Need to Know    What are opioids?   Opioids are pain medicines that must be prescribed by a doctor. They are also known as narcotics.     Examples are:   morphine (MS Contin, Carmen)  oxycodone (Oxycontin)  oxycodone and acetaminophen (Percocet)  hydrocodone and acetaminophen (Vicodin, Norco)   fentanyl patch (Duragesic)   hydromorphone (Dilaudid)   methadone  codeine (Tylenol #3)     What do opioids do well?   Opioids are best for severe short-term pain such as after a surgery or injury. They may work well for cancer pain. They may help some people with long-lasting (chronic) pain.     What do opioids NOT do  well?   Opioids never get rid of pain entirely, and they don t work well for most patients with chronic pain. Opioids don t reduce swelling, one of the causes of pain.                                    Other ways to manage chronic pain and improve function include:     Treat the health problem that may be causing pain  Anti-inflammation medicines, which reduce swelling and tenderness, such as ibuprofen (Advil, Motrin) or naproxen (Aleve)  Acetaminophen (Tylenol)  Antidepressants and anti-seizure medicines, especially for nerve pain  Topical treatments such as patches or creams  Injections or nerve blocks  Chiropractic or osteopathic treatment  Acupuncture, massage, deep breathing, meditation, visual imagery, aromatherapy  Use heat or ice at the pain site  Physical therapy   Exercise  Stop smoking  Take part in therapy       Risks and side effects     Talk to your doctor before you start or decide to keep taking opioids. Possible side effects include:    Lowering your breathing rate enough to cause death  Overdose, including death, especially if taking higher than prescribed doses  Worse depression symptoms; less pleasure in things you usually enjoy  Feeling tired or sluggish  Slower thoughts or cloudy thinking  Being more sensitive to pain over time; pain is harder to control  Trouble sleeping or restless sleep  Changes in hormone levels (for example, less testosterone)  Changes in sex drive or ability to have sex  Constipation  Unsafe driving  Itching and sweating  Dizziness  Nausea, throwing up and dry mouth    What else should I know about opioids?    Opioids may lead to dependence, tolerance, or addiction.    Dependence means that if you stop or reduce the medicine too quickly, you will have withdrawal symptoms. These include loose poop (diarrhea), jitters, flu-like symptoms, nervousness and tremors. Dependence is not the same as addiction.                     Tolerance means needing higher doses over time to  get the same effect. This may increase the chance of serious side effects.    Addiction is when people improperly use a substance that harms their body, their mind or their relations with others. Use of opiates can cause a relapse of addiction if you have a history of drug or alcohol abuse.    People who have used opioids for a long time may have a lower quality of life, worse depression, higher levels of pain and more visits to doctors.    You can overdose on opioids. Take these steps to lower your risk of overdose:    Recognize the signs:  Signs of overdose include decrease or loss of consciousness (blackout), slowed breathing, trouble waking up and blue lips. If someone is worried about overdose, they should call 911.    Talk to your doctor about Narcan (naloxone).   If you are at risk for overdose, you may be given a prescription for Narcan. This medicine very quickly reverses the effects of opioids.   If you overdose, a friend or family member can give you Narcan while waiting for the ambulance. They need to know the signs of overdose and how to give Narcan.     Don't use alcohol or street drugs.   Taking them with opioids can cause death.    Do not take any of these medicines unless your doctor says it s OK. Taking these with opioids can cause death:  Benzodiazepines, such as lorazepam (Ativan), alprazolam (Xanax) or diazepam (Valium)  Muscle relaxers, such as cyclobenzaprine (Flexeril)  Sleeping pills like zolpidem (Ambien)   Other opioids      How to keep you and other people safe while taking opioids:    Never share your opioids with others.  Opioid medicines are regulated by the Drug Enforcement Agency (AJAY). Selling or sharing medications is a criminal act.    2. Be sure to store opioids in a secure place, locked up if possible. Young children can easily swallow them and overdose.    3. When you are traveling with your medicines, keep them in the original bottles. If you use a pill box, be sure you also  carry a copy of your medicine list from your clinic or pharmacy.    4. Safe disposal of opioids    Most pharmacies have places to get rid of medicine, called disposal kiosks. Medicine disposal options are also available in every [facility]. Search your county and  medication disposal  to find more options. You can find more details at:  https://www.pca.[facility].[place]./living-green/managing-unwanted-medications     I agree that my provider, clinic care team, and pharmacy may work with any city, state or federal law enforcement agency that investigates the misuse, sale, or other diversion of my controlled medicine. I will allow my provider to discuss my care with, or share a copy of, this agreement with any other treating provider, pharmacy or emergency room where I receive care.    I have read this agreement and have asked questions about anything I did not understand.    _______________________________________________________  Patient Signature - Conrad Zhu _____________________                   Date     _______________________________________________________  Provider Signature - Sofie Pang, JOESPH   _____________________                   Date     _______________________________________________________  Witness Signature (required if provider not present while patient signing)   _____________________                   Date

## 2023-10-02 ENCOUNTER — MYC REFILL (OUTPATIENT)
Dept: INTERNAL MEDICINE | Facility: CLINIC | Age: 68
End: 2023-10-02
Payer: COMMERCIAL

## 2023-10-02 DIAGNOSIS — G90.522 COMPLEX REGIONAL PAIN SYNDROME I OF LEFT LOWER LIMB: ICD-10-CM

## 2023-10-02 DIAGNOSIS — G89.4 CHRONIC PAIN SYNDROME: ICD-10-CM

## 2023-10-04 RX ORDER — MORPHINE SULFATE 15 MG/1
15 TABLET, FILM COATED, EXTENDED RELEASE ORAL EVERY 12 HOURS
Qty: 60 TABLET | Refills: 0 | Status: SHIPPED | OUTPATIENT
Start: 2023-10-04 | End: 2023-11-06

## 2023-11-06 ENCOUNTER — MYC REFILL (OUTPATIENT)
Dept: INTERNAL MEDICINE | Facility: CLINIC | Age: 68
End: 2023-11-06
Payer: COMMERCIAL

## 2023-11-06 DIAGNOSIS — G90.522 COMPLEX REGIONAL PAIN SYNDROME I OF LEFT LOWER LIMB: ICD-10-CM

## 2023-11-06 DIAGNOSIS — G89.4 CHRONIC PAIN SYNDROME: ICD-10-CM

## 2023-11-06 RX ORDER — MORPHINE SULFATE 15 MG/1
15 TABLET, FILM COATED, EXTENDED RELEASE ORAL EVERY 12 HOURS
Qty: 60 TABLET | Refills: 0 | Status: SHIPPED | OUTPATIENT
Start: 2023-11-06 | End: 2023-11-30

## 2023-11-09 ENCOUNTER — MYC MEDICAL ADVICE (OUTPATIENT)
Dept: INTERNAL MEDICINE | Facility: CLINIC | Age: 68
End: 2023-11-09
Payer: COMMERCIAL

## 2023-11-09 ENCOUNTER — PATIENT OUTREACH (OUTPATIENT)
Dept: CARE COORDINATION | Facility: CLINIC | Age: 68
End: 2023-11-09
Payer: COMMERCIAL

## 2023-11-09 DIAGNOSIS — Z13.29 SCREENING FOR ENDOCRINE, NUTRITIONAL, METABOLIC AND IMMUNITY DISORDER: ICD-10-CM

## 2023-11-09 DIAGNOSIS — E55.9 VITAMIN D DEFICIENCY: ICD-10-CM

## 2023-11-09 DIAGNOSIS — E78.5 HYPERLIPIDEMIA LDL GOAL <100: Primary | ICD-10-CM

## 2023-11-09 DIAGNOSIS — Z13.228 SCREENING FOR ENDOCRINE, NUTRITIONAL, METABOLIC AND IMMUNITY DISORDER: ICD-10-CM

## 2023-11-09 DIAGNOSIS — G89.4 CHRONIC PAIN SYNDROME: ICD-10-CM

## 2023-11-09 DIAGNOSIS — Z13.0 SCREENING FOR ENDOCRINE, NUTRITIONAL, METABOLIC AND IMMUNITY DISORDER: ICD-10-CM

## 2023-11-09 DIAGNOSIS — Z13.21 SCREENING FOR ENDOCRINE, NUTRITIONAL, METABOLIC AND IMMUNITY DISORDER: ICD-10-CM

## 2023-11-20 DIAGNOSIS — R11.0 NAUSEA: ICD-10-CM

## 2023-11-20 RX ORDER — ONDANSETRON 4 MG/1
TABLET, FILM COATED ORAL
Qty: 30 TABLET | Refills: 1 | Status: SHIPPED | OUTPATIENT
Start: 2023-11-20 | End: 2024-08-27

## 2023-11-23 ENCOUNTER — PATIENT OUTREACH (OUTPATIENT)
Dept: CARE COORDINATION | Facility: CLINIC | Age: 68
End: 2023-11-23
Payer: COMMERCIAL

## 2023-11-30 ENCOUNTER — MYC REFILL (OUTPATIENT)
Dept: INTERNAL MEDICINE | Facility: CLINIC | Age: 68
End: 2023-11-30
Payer: COMMERCIAL

## 2023-11-30 DIAGNOSIS — G89.4 CHRONIC PAIN SYNDROME: ICD-10-CM

## 2023-11-30 DIAGNOSIS — G90.522 COMPLEX REGIONAL PAIN SYNDROME I OF LEFT LOWER LIMB: ICD-10-CM

## 2023-12-01 RX ORDER — MORPHINE SULFATE 15 MG/1
15 TABLET, FILM COATED, EXTENDED RELEASE ORAL EVERY 12 HOURS
Qty: 60 TABLET | Refills: 0 | Status: SHIPPED | OUTPATIENT
Start: 2023-12-01 | End: 2023-12-22

## 2023-12-04 ENCOUNTER — TELEPHONE (OUTPATIENT)
Dept: INTERNAL MEDICINE | Facility: CLINIC | Age: 68
End: 2023-12-04
Payer: COMMERCIAL

## 2023-12-04 NOTE — CONFIDENTIAL NOTE
FYI - Status Update    Who is Calling: patient    Update: Patient called to say she took a home Covid test. It was negative. She is also experiencing GI symptoms and is taking medication for that.     Does caller want a call/response back: Yes     Could we send this information to you in [employer] or would you prefer to receive a phone call?:   Patient would prefer a phone call   Okay to leave a detailed message?: Yes at Cell number on file:    Telephone Information:   Mobile [phone]       I confirmed with patient someone would be returning her call to give further instruction.

## 2023-12-04 NOTE — TELEPHONE ENCOUNTER
Symptoms    Describe your symptoms: Cold symptoms    Any pain: No    How long have you been having symptoms: 7  days    Have you been seen for this:  No    Appointment offered?: Yes: Pt states she is going to take a covid test and see what the results are and she will call back. TC advised Pt that PCP has a opening at 4:30 today but PT declined the appointment. Pt was wondering if she can get a Rx for a possible sinus infection?    Triage offered?: Yes. Pt states she will take a covid test and see if she is positive or negative and she will call back with her results.    Home remedies tried: Dayquil & Nyquil    Preferred Pharmacy:   [facility] [place] [id number] [facility] [address]  Phone: [phone] Fax: [phone]    Could we send this information to you in [facility] or would you prefer to receive a phone call?:   Patient would prefer a phone call     Okay to leave a detailed message?: Yes at Cell number on file:    Telephone Information:   Mobile [phone]     Rdaha Rodas

## 2023-12-04 NOTE — TELEPHONE ENCOUNTER
"Tuesday evening pt started having a sore throat and cold. Sore throat is better. Has diarrhea, headache, and green nasal drainage when she blows her nose. Pt stated she is drinking enough fluids.    Denied chest pain, sob, trouble breathing, and no wheezing.    Appointment was offered this morning, she denied the apt. Requesting antibiotic to help \"kill\" this. Advised the patient that she would need an     Tested negative for COVID      Advised the pt that she should schedule a appointment between 7-10 days of her current symptoms as it could be a sinus infection and she could possibly need a antibiotic. Pt stated she will watch it at home for the next couple days and if she is still having headaches and green drainage she will back.     HAO Yan  "

## 2023-12-16 NOTE — COMMUNITY RESOURCES LIST (ENGLISH)
12/16/2023   [facility]  N/A  For questions about this resource list or additional care needs, please contact your primary care clinic or care manager.  Phone: [phone]   Email: N/A   Address: [address] [place] [address]   Hours: N/A        Hotlines and Helplines       Hotline - Housing crisis  1  [facility] Distance: 17.97 miles      Phone/Virtual   [address] [place] [address]  Language: English  Hours: Mon - Sun Open 24 Hours   Phone: (171) 384-1199 Email: communications@[facility].org Website: https://[facility].org/oursaviourshousing/     2  [facility] (Main Office) Distance: 18.03 miles      Phone/Virtual   [address] [place] [address]  Language: English  Hours: Mon - Sun Open 24 Hours   Phone: (247) 380-9539 Email: info@[facility].org Website: http://[facility].org          Housing       Coordinated Entry access point  3  [facility] Distance: 10.1 miles      In-Person, Phone/Virtual   [address] [place] [address]  Language: English  Hours: Mon - Fri 8:30 AM - 4:30 PM  Fees: Free   Phone: (306) 891-4602 Email: info@[facility].org Website: https://www.[facility].org/locations/[facility]/     4  [facility][1] ([facility] - Housing Services Distance: 18.12 miles      In-Person   [address] [place] [address]  Language: English  Hours: Mon - Fri 9:00 AM - 5:00 PM  Fees: Free   Phone: (653) 800-2008 Email: housing@[facility].org Website: http://www.[facility].org/housing     Drop-in center or day shelter  5  [facility] Distance: 9.06 miles      In-Person   [address] [place] [address]  Language: English  Hours: Mon - Fri 9:00 AM - 4:00 PM  Fees: Free   Phone: (593) 625-1463 Email: rae@[employer].org Website: http://listeninghouse.org     6  [facility] Distance: 18.08 miles       In-Person   [address] [place] [address]  Language: English, Iranian, Liberian  Hours: Mon - Sat 7:00 AM - 3:00 PM  Fees: Free, Self Pay   Phone: (428) 224-6732 Email: info@[employer] Website: https://www.[employer]/locations/opportunity-center/     Housing search assistance  7  [facility] - Housing Resources Distance: 2.58 miles      In-Person, Phone/Virtual   [address] [place] [address]  Language: English  Hours: Mon - Fri 8:00 AM - 4:30 PM  Fees: Free   Phone: (993) 340-3330 Email: office@[employer] Website: http://[employer]merce     8  [facility] Resources and Housing Information - Housing search assistance Distance: 6.47 miles      In-Person, Phone/Virtual   [address] [place] [address]  Language: English  Hours: Mon - Fri 8:00 AM - 4:30 PM  Fees: Free   Phone: (990) 530-6912 Email: yumi@[facility]. Website: https://www.[facility]./Facilities/Facility/Details/[facility]-22     Shelter for families  9  [facility] Distance: 17.43 miles      In-Person   [address] [place] [address]  Language: English  Hours: Mon - Fri 3:00 PM - 9:00 AM , Sat - Sun Open 24 Hours  Fees: Free   Phone: (917) 420-6160 Ext.1 Website: https://www.[employer].org/2020/07/03/emergency-family-shelter/     10  [facility] Distance: 22.28 miles      In-Person   [address] [place] [address]  Language: English  Hours: Mon - Sun Open 24 Hours  Fees: Free, Self Pay   Phone: (302) 994-2251 Email: Marilyn@[facility].org Website: http://www.[facility].org/     Shelter for individuals  11  [facility] - Homeless Resources and Housing Information - Emergency housing Distance: 6.47 miles      In-Person, Phone/Virtual   [address]  [place] [address]  Language: English  Hours: Mon - Fri 8:00 AM - 4:30 PM  Fees: Free   Phone: (252) 293-7459 Email: yumi@[facility]. Website: https://www.[facility]./Facilities/Facility/Details/[facility]-22     [address] [place] [address] - [facility] - [place] - Homeless Resources and Housing Information - Emergency housing Distance: 9.76 miles      In-Person, Phone/Virtual   [address], [place] [address]  Language: English  Hours: Mon - Fri 8:00 AM - 4:30 PM  Fees: Free   Phone: (601) 519-6488 Email: yumi@[facility]. Website: https://www.[facility]./469/Community-Services          Important Numbers & Websites       Emergency Services   911  [facility]   311  Poison Control   (771) 310-4658  Suicide Prevention Lifeline   (830) 944-1456 (TALK)  Child Abuse Hotline   (197) 776-2364 (4-A-Child)  Sexual Assault Hotline   (699) 648-6776 (HOPE)  National Runaway Safeline   (513) 238-9644 (RUNAWAY)  All-Options Talkline   (416) 709-4935  Substance Abuse Referral   (609) 597-4151 (HELP)

## 2023-12-20 NOTE — COMMUNITY RESOURCES LIST (ENGLISH)
12/20/2023   [facility]  N/A  For questions about this resource list or additional care needs, please contact your primary care clinic or care manager.  Phone: [phone]   Email: N/A   Address: [facility] [place] [address]   Hours: N/A        Hotlines and Helplines       Hotline - Housing crisis  1  [facility] Distance: 17.97 miles      Phone/Virtual   [address] [place] [address]  Language: English  Hours: Mon - Sun Open 24 Hours   Phone: (775) 312-2514 Email: communications@[facility].org Website: https://[facility].org/oursaviourshousing/     2  [facility] (Main Office) Distance: 18.03 miles      Phone/Virtual   [address] [place] [address]  Language: English  Hours: Mon - Sun Open 24 Hours   Phone: (651) 854-2387 Email: info@[facility].org Website: http://[facility].org          Housing       Coordinated Entry access point  3  [facility] Distance: 10.1 miles      In-Person, Phone/Virtual   [address] [place] [address]  Language: English  Hours: Mon - Fri 8:30 AM - 4:30 PM  Fees: Free   Phone: (830) 648-5971 Email: info@[facility].org Website: https://www.[facility].org/locations/[facility]/     4  [facility] ([facility] - Housing Services Distance: 18.12 miles      In-Person   [address] [place] [address]  Language: English  Hours: Mon - Fri 9:00 AM - 5:00 PM  Fees: Free   Phone: (192) 374-9336 Email: housing@[facility].org Website: http://www.[facility].org/housing     Drop-in center or day shelter  5  [facility] Distance: 9.06 miles      In-Person   [address] [place] [address]  Language: English  Hours: Mon - Fri 9:00 AM - 4:00 PM  Fees: Free   Phone: (546) 890-7693 Email: rae@[employer].org Website: http://listeninghouse.org     6  [facility] and [facility] Distance: 18.08 miles       In-Person   [address] [place] [address]  Language: English, Swedish, Turkmen  Hours: Mon - Sat 7:00 AM - 3:00 PM  Fees: Free, Self Pay   Phone: (125) 245-5172 Email: info@[employer].com Website: https://www.[employer]/locations/opportunity-center/     Housing search assistance  7  [facility] - Housing Resources Distance: 2.58 miles      In-Person, Phone/Virtual   [address] [place] [address]  Language: English  Hours: Mon - Fri 8:00 AM - 4:30 PM  Fees: Free   Phone: (604) 488-8681 Email: office@[employer] Website: http://washingtonWardrobe Housekeeper     8  [facility] Resources and Housing Information - Housing search assistance Distance: 6.47 miles      In-Person, Phone/Virtual   [address] [place] [address]  Language: English  Hours: Mon - Fri 8:00 AM - 4:30 PM  Fees: Free   Phone: (992) 257-7119 Email: yumi@[facility]. Website: https://www.[facility]./Facilities/Facility/Details/[facility]-22     Shelter for families  9  [facility] Distance: 17.43 miles      In-Person   [address] [place] [address]  Language: English  Hours: Mon - Fri 3:00 PM - 9:00 AM , Sat - Sun Open 24 Hours  Fees: Free   Phone: (599) 394-7746 Ext.1 Website: https://www.[employer].org/2020/07/03/emergency-family-shelter/     10  [facility] Distance: 22.28 miles      In-Person   [address] [place] [address]  Language: English  Hours: Mon - Sun Open 24 Hours  Fees: Free, Self Pay   Phone: (620) 345-1151 Email: Marilny@[facility].org Website: http://www.[facility].org/     Shelter for individuals  11  [facility] - Homeless Resources and Housing Information - Emergency housing Distance: 6.47 miles      In-Person, Phone/Virtual   [address]  [place] [address]  Language: English  Hours: Mon - Fri 8:00 AM - 4:30 PM  Fees: Free   Phone: (379) 831-3439 Email: yumi@[facility]. Website: https://www.[facility]./Facilities/Facility/Details/[facility]-22     [address] [place] [address] - [facility] Services - [place] - Homeless Resources and Housing Information - Emergency housing Distance: 9.76 miles      In-Person, Phone/Virtual   [address], [place] [address]  Language: English  Hours: Mon - Fri 8:00 AM - 4:30 PM  Fees: Free   Phone: (551) 111-3024 Email: yumi@[facility]. Website: https://www.[facility]./469/Community-Services          Important Numbers & Websites       Emergency Services   911  [facility]   311  Poison Control   (420) 317-7934  Suicide Prevention Lifeline   (907) 890-7732 (TALK)  Child Abuse Hotline   (972) 164-6119 (4-A-Child)  Sexual Assault Hotline   (552) 676-1496 (HOPE)  National Runaway Safeline   (715) 157-1988 (RUNAWAY)  All-Options Talkline   (508) 414-1566  Substance Abuse Referral   (248) 870-5713 (HELP)

## 2023-12-21 ENCOUNTER — MYC MEDICAL ADVICE (OUTPATIENT)
Dept: INTERNAL MEDICINE | Facility: CLINIC | Age: 68
End: 2023-12-21

## 2023-12-21 ENCOUNTER — LAB (OUTPATIENT)
Dept: LAB | Facility: CLINIC | Age: 68
End: 2023-12-21
Payer: COMMERCIAL

## 2023-12-21 ENCOUNTER — TELEPHONE (OUTPATIENT)
Dept: INTERNAL MEDICINE | Facility: CLINIC | Age: 68
End: 2023-12-21

## 2023-12-21 DIAGNOSIS — Z92.29 PERSONAL HISTORY OF OTHER DRUG THERAPY: ICD-10-CM

## 2023-12-21 DIAGNOSIS — G89.4 CHRONIC PAIN SYNDROME: ICD-10-CM

## 2023-12-21 DIAGNOSIS — Z13.0 SCREENING FOR ENDOCRINE, NUTRITIONAL, METABOLIC AND IMMUNITY DISORDER: ICD-10-CM

## 2023-12-21 DIAGNOSIS — Z13.228 SCREENING FOR ENDOCRINE, NUTRITIONAL, METABOLIC AND IMMUNITY DISORDER: ICD-10-CM

## 2023-12-21 DIAGNOSIS — Z13.21 SCREENING FOR ENDOCRINE, NUTRITIONAL, METABOLIC AND IMMUNITY DISORDER: ICD-10-CM

## 2023-12-21 DIAGNOSIS — Z13.29 SCREENING FOR ENDOCRINE, NUTRITIONAL, METABOLIC AND IMMUNITY DISORDER: ICD-10-CM

## 2023-12-21 DIAGNOSIS — E78.5 HYPERLIPIDEMIA LDL GOAL <100: ICD-10-CM

## 2023-12-21 DIAGNOSIS — M81.8 OTHER OSTEOPOROSIS WITHOUT CURRENT PATHOLOGICAL FRACTURE: ICD-10-CM

## 2023-12-21 DIAGNOSIS — E55.9 VITAMIN D DEFICIENCY: ICD-10-CM

## 2023-12-21 DIAGNOSIS — R73.9 HYPERGLYCEMIA: Primary | ICD-10-CM

## 2023-12-21 LAB
ALBUMIN SERPL BCG-MCNC: 4.2 G/DL (ref 3.5–5.2)
ALP SERPL-CCNC: 51 U/L (ref 40–150)
ALT SERPL W P-5'-P-CCNC: 18 U/L (ref 0–50)
AMPHETAMINES UR QL SCN: ABNORMAL
ANION GAP SERPL CALCULATED.3IONS-SCNC: 10 MMOL/L (ref 7–15)
AST SERPL W P-5'-P-CCNC: 32 U/L (ref 0–45)
BARBITURATES UR QL SCN: ABNORMAL
BASOPHILS # BLD AUTO: 0 10E3/UL (ref 0–0.2)
BASOPHILS NFR BLD AUTO: 1 %
BENZODIAZ UR QL SCN: ABNORMAL
BILIRUB SERPL-MCNC: 0.4 MG/DL
BUN SERPL-MCNC: 14.4 MG/DL (ref 8–23)
BZE UR QL SCN: ABNORMAL
CALCIUM SERPL-MCNC: 9.2 MG/DL (ref 8.8–10.2)
CANNABINOIDS UR QL SCN: ABNORMAL
CHLORIDE SERPL-SCNC: 103 MMOL/L (ref 98–107)
CHOLEST SERPL-MCNC: 117 MG/DL
CREAT SERPL-MCNC: 0.72 MG/DL (ref 0.51–0.95)
DEPRECATED HCO3 PLAS-SCNC: 27 MMOL/L (ref 22–29)
EGFRCR SERPLBLD CKD-EPI 2021: >90 ML/MIN/1.73M2
EOSINOPHIL # BLD AUTO: 0.1 10E3/UL (ref 0–0.7)
EOSINOPHIL NFR BLD AUTO: 1 %
ERYTHROCYTE [DISTWIDTH] IN BLOOD BY AUTOMATED COUNT: 13.1 % (ref 10–15)
FASTING STATUS PATIENT QL REPORTED: YES
FENTANYL UR QL: ABNORMAL
GLUCOSE SERPL-MCNC: 104 MG/DL (ref 70–99)
HBA1C MFR BLD: 5.6 % (ref 0–5.6)
HCT VFR BLD AUTO: 37.4 % (ref 35–47)
HDLC SERPL-MCNC: 49 MG/DL
HGB BLD-MCNC: 12.1 G/DL (ref 11.7–15.7)
IMM GRANULOCYTES # BLD: 0 10E3/UL
IMM GRANULOCYTES NFR BLD: 0 %
LDLC SERPL CALC-MCNC: 55 MG/DL
LYMPHOCYTES # BLD AUTO: 1.7 10E3/UL (ref 0.8–5.3)
LYMPHOCYTES NFR BLD AUTO: 26 %
MCH RBC QN AUTO: 29.6 PG (ref 26.5–33)
MCHC RBC AUTO-ENTMCNC: 32.4 G/DL (ref 31.5–36.5)
MCV RBC AUTO: 91 FL (ref 78–100)
MONOCYTES # BLD AUTO: 0.4 10E3/UL (ref 0–1.3)
MONOCYTES NFR BLD AUTO: 7 %
NEUTROPHILS # BLD AUTO: 4.2 10E3/UL (ref 1.6–8.3)
NEUTROPHILS NFR BLD AUTO: 65 %
NONHDLC SERPL-MCNC: 68 MG/DL
OPIATES UR QL SCN: ABNORMAL
PCP QUAL URINE (ROCHE): ABNORMAL
PLATELET # BLD AUTO: 336 10E3/UL (ref 150–450)
POTASSIUM SERPL-SCNC: 4.3 MMOL/L (ref 3.4–5.3)
PROT SERPL-MCNC: 7 G/DL (ref 6.4–8.3)
RBC # BLD AUTO: 4.09 10E6/UL (ref 3.8–5.2)
SODIUM SERPL-SCNC: 140 MMOL/L (ref 135–145)
TRIGL SERPL-MCNC: 63 MG/DL
VIT D+METAB SERPL-MCNC: 77 NG/ML (ref 20–50)
WBC # BLD AUTO: 6.4 10E3/UL (ref 4–11)

## 2023-12-21 PROCEDURE — 80307 DRUG TEST PRSMV CHEM ANLYZR: CPT

## 2023-12-21 PROCEDURE — 82306 VITAMIN D 25 HYDROXY: CPT

## 2023-12-21 PROCEDURE — 85025 COMPLETE CBC W/AUTO DIFF WBC: CPT

## 2023-12-21 PROCEDURE — 80053 COMPREHEN METABOLIC PANEL: CPT

## 2023-12-21 PROCEDURE — 36415 COLL VENOUS BLD VENIPUNCTURE: CPT

## 2023-12-21 PROCEDURE — 83036 HEMOGLOBIN GLYCOSYLATED A1C: CPT

## 2023-12-21 PROCEDURE — 80061 LIPID PANEL: CPT

## 2023-12-21 ASSESSMENT — ANXIETY QUESTIONNAIRES
3. WORRYING TOO MUCH ABOUT DIFFERENT THINGS: MORE THAN HALF THE DAYS
2. NOT BEING ABLE TO STOP OR CONTROL WORRYING: MORE THAN HALF THE DAYS
GAD7 TOTAL SCORE: 8
6. BECOMING EASILY ANNOYED OR IRRITABLE: SEVERAL DAYS
4. TROUBLE RELAXING: SEVERAL DAYS
GAD7 TOTAL SCORE: 8
1. FEELING NERVOUS, ANXIOUS, OR ON EDGE: SEVERAL DAYS
5. BEING SO RESTLESS THAT IT IS HARD TO SIT STILL: SEVERAL DAYS
7. FEELING AFRAID AS IF SOMETHING AWFUL MIGHT HAPPEN: NOT AT ALL
IF YOU CHECKED OFF ANY PROBLEMS ON THIS QUESTIONNAIRE, HOW DIFFICULT HAVE THESE PROBLEMS MADE IT FOR YOU TO DO YOUR WORK, TAKE CARE OF THINGS AT HOME, OR GET ALONG WITH OTHER PEOPLE: SOMEWHAT DIFFICULT

## 2023-12-21 NOTE — TELEPHONE ENCOUNTER
Patient dropped off signed opioid plus controlled substance agreement--in workroom IM folder behind .  Thank you

## 2023-12-21 NOTE — COMMUNITY RESOURCES LIST (ENGLISH)
12/21/2023   [facility]  N[facility]A  For questions about this resource list or additional care needs, please contact your primary care clinic or care manager.  Phone: [phone]   Email: N/A   Address: [facility] [place] [address]   Hours: N/A        Hotlines and Helplines       Hotline - Housing crisis  1  [facility] Distance: 17.97 miles      Phone/Virtual   [address] [place] [address]  Language: English  Hours: Mon - Sun Open 24 Hours   Phone: (405) 167-2268 Email: communications@[facility].org Website: https://[facility].org/oursaviourshousing/     2  [facility] (Main Office) Distance: 18.03 miles      Phone/Virtual   [address] [place] [address]  Language: English  Hours: Mon - Sun Open 24 Hours   Phone: (518) 115-8129 Email: info@[facility].org Website: http://[facility].org          Housing       Coordinated Entry access point  3  [facility] Distance: 10.1 miles      In-Person, Phone/Virtual   [address] [place] [address]  Language: English  Hours: Mon - Fri 8:30 AM - 4:30 PM  Fees: Free   Phone: (355) 389-6365 Email: info@[facility].org Website: https://www.[facility].org/locations/[facility]/     4  [facility] ([facility] - Housing Services Distance: 18.12 miles      In-Person   [address] [place] [address]  Language: English  Hours: Mon - Fri 9:00 AM - 5:00 PM  Fees: Free   Phone: (361) 749-4421 Email: housing@[facility].org Website: http://www.[facility].org/housing     Drop-in center or day shelter  5  [facility] Distance: 9.06 miles      In-Person   [address] [place] [address]  Language: English  Hours: Mon - Fri 9:00 AM - 4:00 PM  Fees: Free   Phone: (276) 721-9768 Email: rae@[employer].org Website: http://listeninghouse.org     6  [facility] Distance: 18.08 miles       In-Person   [address] [place] [address]  Language: English, Ecuadorean, Russian  Hours: Mon - Sat 7:00 AM - 3:00 PM  Fees: Free, Self Pay   Phone: (229) 113-8021 Email: info@[employer] Website: https://www.[facility].[employer]/locations/opportunity-center/     Housing search assistance  7  [facility] - Housing Resources Distance: 2.58 miles      In-Person, Phone/Virtual   [address] [place] [address]  Language: English  Hours: Mon - Fri 8:00 AM - 4:30 PM  Fees: Free   Phone: (350) 931-6727 Email: office@[employer] Website: http://washingtonSozializeMe     8  [facility] Resources and Housing Information - Housing search assistance Distance: 6.47 miles      In-Person, Phone/Virtual   [address] [place] [address]  Language: English  Hours: Mon - Fri 8:00 AM - 4:30 PM  Fees: Free   Phone: (282) 133-1429 Email: yumi@[facility]. Website: https://www.[facility]./Facilities/Facility/Details/[facility]-22     Shelter for families  9  [facility] Distance: 17.43 miles      In-Person   [address] [place] [address]  Language: English  Hours: Mon - Fri 3:00 PM - 9:00 AM , Sat - Sun Open 24 Hours  Fees: Free   Phone: (812) 749-3935 Ext.1 Website: https://www.[employer].org/2020/07/03/emergency-family-shelter/     10  [facility] Distance: 22.28 miles      In-Person   [address] [place] [address]  Language: English  Hours: Mon - Sun Open 24 Hours  Fees: Free, Self Pay   Phone: (493) 298-4920 Email: Marilyn@[facility].org Website: http://www.[facility].org/     Shelter for individuals  11  [facility] - Homeless Resources and Housing Information - Emergency housing Distance: 6.47 miles      In-Person, Phone/Virtual   [address]  [place] [address]  Language: English  Hours: Mon - Fri 8:00 AM - 4:30 PM  Fees: Free   Phone: (482) 268-2555 Email: yumi@[facility]. Website: https://www.[facility]./Facilities/Facility/Details/[facility]-22     [address] [place] [address] - [facility] Services - [place] - Homeless Resources and Housing Information - Emergency housing Distance: 9.76 miles      In-Person, Phone/Virtual   [address], [place] [address]  Language: English  Hours: Mon - Fri 8:00 AM - 4:30 PM  Fees: Free   Phone: (518) 697-3414 Email: yumi@[facility]. Website: https://www.[facility]./469/Community-Services          Important Numbers & Websites       Emergency Services   911  [facility]   311  Poison Control   (431) 126-3456  Suicide Prevention Lifeline   (243) 107-8633 (TALK)  Child Abuse Hotline   (910) 785-5263 (4-A-Child)  Sexual Assault Hotline   (968) 248-6415 (HOPE)  National Runaway Safeline   (238) 394-7171 (RUNAWAY)  All-Options Talkline   (645) 291-2650  Substance Abuse Referral   (971) 984-9564 (HELP)

## 2023-12-22 ENCOUNTER — VIRTUAL VISIT (OUTPATIENT)
Dept: INTERNAL MEDICINE | Facility: CLINIC | Age: 68
End: 2023-12-22
Payer: COMMERCIAL

## 2023-12-22 ENCOUNTER — TELEPHONE (OUTPATIENT)
Dept: INTERNAL MEDICINE | Facility: CLINIC | Age: 68
End: 2023-12-22

## 2023-12-22 DIAGNOSIS — E78.5 HYPERLIPIDEMIA LDL GOAL <100: ICD-10-CM

## 2023-12-22 DIAGNOSIS — G89.4 CHRONIC PAIN SYNDROME: ICD-10-CM

## 2023-12-22 DIAGNOSIS — F33.42 RECURRENT MAJOR DEPRESSIVE DISORDER, IN FULL REMISSION (H): ICD-10-CM

## 2023-12-22 DIAGNOSIS — F41.9 ANXIETY: ICD-10-CM

## 2023-12-22 DIAGNOSIS — N32.81 OVERACTIVE BLADDER: ICD-10-CM

## 2023-12-22 PROCEDURE — 99214 OFFICE O/P EST MOD 30 MIN: CPT | Mod: VID | Performed by: NURSE PRACTITIONER

## 2023-12-22 RX ORDER — ROSUVASTATIN CALCIUM 5 MG/1
5 TABLET, COATED ORAL DAILY
Qty: 90 TABLET | Refills: 3 | Status: SHIPPED | OUTPATIENT
Start: 2023-12-22

## 2023-12-22 RX ORDER — MIRABEGRON 25 MG/1
25 TABLET, FILM COATED, EXTENDED RELEASE ORAL DAILY
Qty: 90 TABLET | Refills: 3 | Status: SHIPPED | OUTPATIENT
Start: 2023-12-22

## 2023-12-22 RX ORDER — VILAZODONE HYDROCHLORIDE 20 MG/1
20 TABLET ORAL DAILY
Qty: 90 TABLET | Refills: 3 | Status: SHIPPED | OUTPATIENT
Start: 2023-12-22 | End: 2024-08-28 | Stop reason: ALTCHOICE

## 2023-12-22 RX ORDER — HYDROXYZINE HYDROCHLORIDE 25 MG/1
25-50 TABLET, FILM COATED ORAL 3 TIMES DAILY PRN
Qty: 30 TABLET | Refills: 3 | Status: SHIPPED | OUTPATIENT
Start: 2023-12-22 | End: 2024-02-12

## 2023-12-22 RX ORDER — LAMOTRIGINE 100 MG/1
100 TABLET ORAL DAILY
Qty: 90 TABLET | Refills: 3 | Status: SHIPPED | OUTPATIENT
Start: 2023-12-22

## 2023-12-22 RX ORDER — MORPHINE SULFATE 15 MG/1
15 TABLET, FILM COATED, EXTENDED RELEASE ORAL EVERY 12 HOURS
Qty: 60 TABLET | Refills: 0 | Status: SHIPPED | OUTPATIENT
Start: 2023-12-22 | End: 2024-01-29

## 2023-12-22 RX ORDER — TRAZODONE HYDROCHLORIDE 100 MG/1
TABLET ORAL
Qty: 270 TABLET | Refills: 3 | Status: SHIPPED | OUTPATIENT
Start: 2023-12-22

## 2023-12-22 RX ORDER — ACETAMINOPHEN AND CODEINE PHOSPHATE 300; 60 MG/1; MG/1
1-2 TABLET ORAL EVERY 8 HOURS PRN
Qty: 180 TABLET | Refills: 5 | Status: SHIPPED | OUTPATIENT
Start: 2023-12-22 | End: 2024-07-02

## 2023-12-22 NOTE — PATIENT INSTRUCTIONS
Try the hydroxyzine as needed for break through anxiety.    Establish care with Dr. Ksenia Sandoval in the next month.

## 2023-12-22 NOTE — TELEPHONE ENCOUNTER
General Call    Contacts         Type Contact Phone/Fax    12/22/2023 10:48 AM CST Phone (Incoming) Conrad Zhu (Self) [phone] (M)          Reason for Call:  Additional questions after appt today    What are your questions or concerns:  Patient is having stomach issues (diarrhea) off and on. She was wondering if cholestyramine LT 4G SUSP and dicyclomine 20 mg tabs would be appropriate? Please call patient to discuss.    Date of last appointment with provider: 12.22.23Ann Pang    Could we send this information to you in [employer] or would you prefer to receive a phone call?:   Patient would prefer a phone call   Okay to leave a detailed message?: Yes at Cell number on file:    Telephone Information:   Mobile [phone]

## 2023-12-22 NOTE — PROGRESS NOTES
Conrad is a 68 year old who is being evaluated via a billable video visit.      How would you like to obtain your AVS? MyChart  If the video visit is dropped, the invitation should be resent by: Text to cell phone: [phone]  Will anyone else be joining your video visit? No          Assessment & Plan     Chronic pain syndrome: Yearly urine drug screening done, will sign pain contract when I am back in the office so this is updated. Refilled her pain medications. Reviewed the PDMP, she has not had any additional pain medicine that I did not know about. She has been the model patient for chronic opioids.   - acetaminophen-codeine (TYLENOL #4) 300-60 MG per tablet  Dispense: 180 tablet; Refill: 5  - morphine (MS CONTIN) 15 MG CR tablet  Dispense: 60 tablet; Refill: 0    Anxiety: This time of year is very difficult for her. Will try some PRN hydroxyzine to help her through.   - hydrOXYzine HCl (ATARAX) 25 MG tablet  Dispense: 30 tablet; Refill: 3    Recurrent major depressive disorder, in full remission (H24): Refilled her medications today.  - vilazodone (VIIBRYD) 20 MG TABS tablet  Dispense: 90 tablet; Refill: 3  - traZODone (DESYREL) 100 MG tablet  Dispense: 270 tablet; Refill: 3  - lamoTRIgine (LAMICTAL) 100 MG tablet  Dispense: 90 tablet; Refill: 3    Hyperlipidemia LDL goal <100: LDL was within goal at 55, refilled her Rosuvastatin. Stable.   - rosuvastatin (CRESTOR) 5 MG tablet  Dispense: 90 tablet; Refill: 3    Overactive bladder: Refilled her medication today.   - mirabegron (MYRBETRIQ) 25 MG 24 hr tablet  Dispense: 90 tablet; Refill: 3    Sofie Pang [facility]   Conrad is a 68 year old, presenting for the following health issues:  Follow Up (Discuss labs that were done yesterday ; )        12/22/2023     7:11 AM   Additional Questions   Roomed by Deni LINARES       History of Present Illness       Mental Health Follow-up:  Patient presents to follow-up on  Depression & Anxiety.Patient's depression since last visit has been:  Medium  The patient is not having other symptoms associated with depression.  Patient's anxiety since last visit has been:  Medium  The patient is having other symptoms associated with anxiety.  Any significant life events: financial concerns, grief or loss and other  Patient is feeling anxious or having panic attacks.  Patient has no concerns about alcohol or drug use.    Reason for visit:  6 month check up    She eats 2-3 servings of fruits and vegetables daily.She consumes 0 sweetened beverage(s) daily.She exercises with enough effort to increase her heart rate 9 or less minutes per day.  She exercises with enough effort to increase her heart rate 3 or less days per week.   She is taking medications regularly.     The patient presents today for follow up.    She reports that her dog Merary is going to have both patellas fixed in January, and the cost of this has made her very anxious.    She reports that this is the anniversary of her daughter, and her daughter's unborn child's death, this time of year is very difficult.    Went through her labs with her, urine drug test was positive for her pain medication and her Vybrid, as expected. Refilled her medications today.     She continues on MS Contin and Tylenol #4 for chronic pain, these are chronic doses, and she has not abused them ever in the past.    Review of Systems   Constitutional, HEENT, cardiovascular, pulmonary, GI, , musculoskeletal, neuro, skin, endocrine and psych systems are negative, except as otherwise noted.      Objective           Vitals:  No vitals were obtained today due to virtual visit.    Physical Exam   GENERAL: Healthy, alert and no distress  EYES: Eyes grossly normal to inspection.  No discharge or erythema, or obvious scleral/conjunctival abnormalities.  RESP: No audible wheeze, cough, or visible cyanosis.  No visible retractions or increased work of breathing.     SKIN: Visible skin clear. No significant rash, abnormal pigmentation or lesions.  NEURO: Cranial nerves grossly intact.  Mentation and speech appropriate for age.  PSYCH: Mentation appears normal, affect normal/bright, judgement and insight intact, normal speech and appearance well-groomed.        Video-Visit Details    Type of service:  Video Visit   Video Start Time:  725am  Video End Time: 750am    Originating Location (pt. Location): Home    Distant Location (provider location):  Off-site  Platform used for Video Visit: [employer]

## 2023-12-26 ENCOUNTER — PATIENT OUTREACH (OUTPATIENT)
Dept: CARE COORDINATION | Facility: CLINIC | Age: 68
End: 2023-12-26
Payer: COMMERCIAL

## 2024-01-09 ENCOUNTER — PATIENT OUTREACH (OUTPATIENT)
Dept: CARE COORDINATION | Facility: CLINIC | Age: 69
End: 2024-01-09
Payer: COMMERCIAL

## 2024-01-11 ENCOUNTER — OFFICE VISIT (OUTPATIENT)
Dept: ENDOCRINOLOGY | Facility: CLINIC | Age: 69
End: 2024-01-11
Payer: COMMERCIAL

## 2024-01-11 VITALS
HEIGHT: 60 IN | OXYGEN SATURATION: 96 % | WEIGHT: 134 LBS | BODY MASS INDEX: 26.31 KG/M2 | DIASTOLIC BLOOD PRESSURE: 76 MMHG | SYSTOLIC BLOOD PRESSURE: 133 MMHG | HEART RATE: 75 BPM

## 2024-01-11 DIAGNOSIS — M81.8 OTHER OSTEOPOROSIS WITHOUT CURRENT PATHOLOGICAL FRACTURE: Primary | ICD-10-CM

## 2024-01-11 PROCEDURE — 99214 OFFICE O/P EST MOD 30 MIN: CPT | Performed by: NURSE PRACTITIONER

## 2024-01-11 RX ORDER — AMOXICILLIN 500 MG/1
500 CAPSULE ORAL EVERY 8 HOURS
COMMUNITY
Start: 2024-01-10 | End: 2024-01-17

## 2024-01-11 RX ORDER — HYDROCODONE BITARTRATE AND ACETAMINOPHEN 10; 325 MG/1; MG/1
1 TABLET ORAL
COMMUNITY
Start: 2024-01-10 | End: 2024-04-02

## 2024-01-11 NOTE — PROGRESS NOTES
[facility]  ENDOCRINOLOGY    Osteoporosis Follow Up 1/11/2024    Conrad Zhu, 1955, [id number]          Reason for visit      1. Other osteoporosis without current pathological fracture        History     Conrad Zhu is a very pleasant 68 year old old female who presents for follow up.   SUMMARY:    1. OSTEOPOROSIS. The diagnosis was made based on fragility fracture of her left 5th metatarsals, Right wrist-Colles type s/p ORIF in 2014. She also had a baseline DXA scan confirming osteoporosis.   The patient has the following risk factors for osteoporosis:   Age, gender, , BMI-she keeps her weight close to underweight range since adolescence but denied eating disorders. The patient is not on high risk medications such as glucocorticoids, anti-coagulants, chemotherapy, levothyroxine. BUT she is on gabapentin.   The following high- risk conditions have been ruled out: celiac disease, gastric bypass, hyperparathyroidism, inflammatory bowel disease, hyperthyroidism, rheumatoid arthritis, lupus, chronic kidney disease. I suspect an eating disorder but she vehemently denies.   Gyn History: Patient denied any prior hysterectomy, ovariectomy, breast cancer or family history of breast cancer Menopause was at age: 48 years. There has been a height loss of 0 inches.   Patient is currently on calcium supplements: 600 mg/day and vitamin D supplements 1000 IU/day. Dairy intake: She has lactose intolerance so no dairy for many years.   Investigations so far:   DXA scan dated 3/26/2014: (attached to chart):   Left Femoral Neck T-Score: -2.5   Right femoral Neck T-Score: -2.3   Total Left femoral T-Score: -2.8   Total Right femoral T-score: -2.4   A-P Spine T-Score: -1.9     TODAY:    Conrad is here today in follow-up for Osteoporosis. She continues on Prolia injections without difficulties. She has had no falls in the last year. Her last Dexa Scan of a year ago showed: COMPARISON: There has been a 3.1%  increase in lumbar spine BMD. There has been a 4.0% increase in right hip BMD. She is at the end of treatment, and has had implants placed. She will be ready for her next Prolia injection next month. She is aware that she needs to get a green light from her Dentist as far as healing, prior to getting this injection.     Risk Factors     The following high- risk conditions have been ruled out: celiac disease, eating disorders, gastric bypass, hyperparathyroidism, inflammatory bowel disease, hyperthyroidism, rheumatoid arthritis, lupus, chronic kidney disease.     Conrad Zhu has the following risk factors: Age, Female gender and      She is not on high risk medications such as glucocorticoids, anti-coagulants, anti-convulsants, chemotherapy or levothyroxine.    Patient deniesHysterectomy, Oophrectomy, Breast cancer and Family history of breast cancer.         Past Medical History     Patient Active Problem List   Diagnosis    Anxiety    Nausea    OAB (overactive bladder)    Low back pain    Chronic pain    PN (peripheral neuropathy)    Insomnia    Osteoporosis    Former smoker    Achilles tendinitis of right lower extremity    Sprain of right ankle, unspecified ligament, initial encounter    Sprain of right foot, initial encounter    Closed nondisplaced fracture of body of right calcaneus, initial encounter    Corneal scarring    Left foot drop    Left leg weakness    Leukocytosis    Tear film insufficiency    Closed intertrochanteric fracture of hip, left, initial encounter (H)    Left hip pain    Pre-operative general physical examination    Anemia due to blood loss, acute    Acute post-operative pain    Complex regional pain syndrome i of left lower limb    Depression    Long term (current) use of opiate analgesic    Anxiety disorder, unspecified    Neck pain    Vitamin deficiency    Age-related osteoporosis without current pathological fracture    Anemia    Closed fracture of metatarsal bone     "Complex regional pain syndrome type 2 of upper extremity    Degeneration of intervertebral disc of cervical region    Edema    History of falling    Menopause present    Muscle weakness    Neoplasm of uncertain behavior of skin    Unspecified fracture of shaft of left fibula, subsequent encounter for closed fracture with routine healing    Displaced intertrochanteric fracture of left femur, subsequent encounter for closed fracture with routine healing    Ingrowing nail    Paronychia, toe, left    F11.2 - Continuous opioid dependence (H)       Family History       family history includes Breast Cancer in her sister; Cancer in her brother, father, mother, and sister.    Social History      reports that she quit smoking about 24 years ago. Her smoking use included cigarettes. She has never used smokeless tobacco. She reports that she does not drink alcohol and does not use drugs.      Review of Systems     Patient denies current pain, limited mobility, fractures.   Remainder per HPI.      Vital Signs     /76 (BP Location: Left arm, Patient Position: Sitting, Cuff Size: Adult Regular)   Pulse 75   Ht 1.514 m (4' 11.61\")   Wt 60.8 kg (134 lb)   SpO2 96%   BMI 26.52 kg/m      Physical Exam     Constitutional:  Well developed, Well nourished, ambulates with a limp  HENT:  Normocephalic,   Neck: normal in appearance  Eyes:  PERRL, Conjunctiva pink  Respiratory:  No respiratory distress  Skin: No acanthosis nigricans, lipoatrophy or lipodystrophy  Neurologic:  Alert & oriented x 3, nonfocal  Psychiatric:  Affect, Mood, Insight appropriate      Assessment     1. Other osteoporosis without current pathological fracture        Plan     Conrad will let us know if she needs to wait to get her next Prolia injection next month. Otherwise, injection as planned, and follow-up with me in 1 year. She will need another Dexa Scan next January (1/25).         Blanca Duarte NP  [facility]  1/11/2024  8:32 AM    Current " Medications     Outpatient Medications Prior to Visit   Medication Sig Dispense Refill    acetaminophen-codeine (TYLENOL #4) 300-60 MG per tablet Take 1-2 tablets by mouth every 8 hours as needed for severe pain 180 tablet 5    amoxicillin (AMOXIL) 500 MG capsule Take 500 mg by mouth every 8 hours      cetirizine (ZYRTEC) 10 MG tablet Take 10 mg by mouth as needed      chlorhexidine (PERIDEX) 0.12 % solution Use 15 mL in the mouth or throat in the morning and at bedtime for 16 days.      cholecalciferol 50 MCG (2000 UT) tablet Take 4,000 Units by mouth       HYDROcodone-acetaminophen (NORCO)  MG per tablet Take 1 tablet by mouth      hydrOXYzine HCl (ATARAX) 25 MG tablet Take 1-2 tablets (25-50 mg) by mouth 3 times daily as needed for itching 30 tablet 3    lamoTRIgine (LAMICTAL) 100 MG tablet Take 1 tablet (100 mg) by mouth daily 90 tablet 3    mirabegron (MYRBETRIQ) 25 MG 24 hr tablet Take 1 tablet (25 mg) by mouth daily 90 tablet 3    morphine (MS CONTIN) 15 MG CR tablet Take 1 tablet (15 mg) by mouth every 12 hours 60 tablet 0    naloxone (NARCAN) 4 MG/0.1ML nasal spray [NALOXONE (NARCAN) 4 MG/ACTUATION NASAL SPRAY] 1 spray (4 mg dose) into one nostril for opioid reversal. Call 911. May repeat if no response in 3 minutes. 1 each 1    ondansetron (ZOFRAN) 4 MG tablet TAKE 1 TABLET BY MOUTH EVERY 8 HOURS IF NEEDED FOR NAUSEA/VOMITING. 30 tablet 1    rosuvastatin (CRESTOR) 5 MG tablet Take 1 tablet (5 mg) by mouth daily 90 tablet 3    traZODone (DESYREL) 100 MG tablet Take 1 to 3 tablets by mouth at bedtime as needed 270 tablet 3    vilazodone (VIIBRYD) 20 MG TABS tablet Take 1 tablet (20 mg) by mouth daily 90 tablet 3     Facility-Administered Medications Prior to Visit   Medication Dose Route Frequency Provider Last Rate Last Admin    denosumab (PROLIA) injection 60 mg  60 mg Subcutaneous Q6 Months Blanca Duarte, NP   60 mg at 08/08/23 0947    denosumab (PROLIA) injection 60 mg  60 mg Subcutaneous Q6  Months Blanca Duarte, NP   60 mg at 08/02/22 0944         Lab Results     TSH   Date Value Ref Range Status   05/09/2022 0.61 0.30 - 5.00 uIU/mL Final           Imaging Results   Last DEXA scan:  No valid procedures specified.    Study Result    Narrative & Impression   EXAM: DX HIP/PELVIS/SPINE  LOCATION: [facility]  DATE/TIME: 1/24/2023 11:24 AM     INDICATION: Osteoporosis without current pathological fracture, unspecified osteoporosis type.  COMPARISON: 1/12/2021  TECHNIQUE: Dual-energy x-ray absorptiometry performed with routine technique.     FINDINGS:     Lumbar Spine: L1-L4: BMD: 1.214 g/cm2. T-score: 0.3. Z-score: 1.9  RIGHT Hip Total: BMD: 0.977 g/cm2. T-score: -0.2. Z-score: 1.1  RIGHT Hip Femoral neck: BMD: 0.952 g/cm2. T-score: -0.6. Z-score: 0.9     WHO Criteria:  Normal: T-score at or above -1 SD  Osteopenia: T-score between -1 and -2.5 SD  Osteoporosis: T-score at or below -2.5 SD     COMPARISON: There has been a 3.1% increase in lumbar spine BMD. There has been a 4.0% increase in right hip BMD.     FRAX Results: Not applicable due to normal bone mineral density.

## 2024-01-11 NOTE — LETTER
1/11/2024         RE: Conrad Zhu  [address] [facility] [id number]        Dear Colleague,    Thank you for referring your patient, Conrad Zhu, to the [facility] [place]. Please see a copy of my visit note below.    [facility]  ENDOCRINOLOGY    Osteoporosis Follow Up 1/11/2024    Conrad Zhu, 1955, [id number]          Reason for visit      1. Other osteoporosis without current pathological fracture        History     Conrad Zhu is a very pleasant 68 year old old female who presents for follow up.   SUMMARY:    1. OSTEOPOROSIS. The diagnosis was made based on fragility fracture of her left 5th metatarsals, Right wrist-Colles type s/p ORIF in 2014. She also had a baseline DXA scan confirming osteoporosis.   The patient has the following risk factors for osteoporosis:   Age, gender, , BMI-she keeps her weight close to underweight range since adolescence but denied eating disorders. The patient is not on high risk medications such as glucocorticoids, anti-coagulants, chemotherapy, levothyroxine. BUT she is on gabapentin.   The following high- risk conditions have been ruled out: celiac disease, gastric bypass, hyperparathyroidism, inflammatory bowel disease, hyperthyroidism, rheumatoid arthritis, lupus, chronic kidney disease. I suspect an eating disorder but she vehemently denies.   Gyn History: Patient denied any prior hysterectomy, ovariectomy, breast cancer or family history of breast cancer Menopause was at age: 48 years. There has been a height loss of 0 inches.   Patient is currently on calcium supplements: 600 mg/day and vitamin D supplements 1000 IU/day. Dairy intake: She has lactose intolerance so no dairy for many years.   Investigations so far:   DXA scan dated 3/26/2014: (attached to chart):   Left Femoral Neck T-Score: -2.5   Right femoral Neck T-Score: -2.3   Total Left femoral T-Score: -2.8   Total Right femoral T-score:  -2.4   A-P Spine T-Score: -1.9     TODAY:    Conrad is here today in follow-up for Osteoporosis. She continues on Prolia injections without difficulties. She has had no falls in the last year. Her last Dexa Scan of a year ago showed: COMPARISON: There has been a 3.1% increase in lumbar spine BMD. There has been a 4.0% increase in right hip BMD. She is at the end of treatment, and has had implants placed. She will be ready for her next Prolia injection next month. She is aware that she needs to get a green light from her Dentist as far as healing, prior to getting this injection.     Risk Factors     The following high- risk conditions have been ruled out: celiac disease, eating disorders, gastric bypass, hyperparathyroidism, inflammatory bowel disease, hyperthyroidism, rheumatoid arthritis, lupus, chronic kidney disease.     Conrad Zhu has the following risk factors: Age, Female gender and      She is not on high risk medications such as glucocorticoids, anti-coagulants, anti-convulsants, chemotherapy or levothyroxine.    Patient deniesHysterectomy, Oophrectomy, Breast cancer and Family history of breast cancer.         Past Medical History     Patient Active Problem List   Diagnosis     Anxiety     Nausea     OAB (overactive bladder)     Low back pain     Chronic pain     PN (peripheral neuropathy)     Insomnia     Osteoporosis     Former smoker     Achilles tendinitis of right lower extremity     Sprain of right ankle, unspecified ligament, initial encounter     Sprain of right foot, initial encounter     Closed nondisplaced fracture of body of right calcaneus, initial encounter     Corneal scarring     Left foot drop     Left leg weakness     Leukocytosis     Tear film insufficiency     Closed intertrochanteric fracture of hip, left, initial encounter (H)     Left hip pain     Pre-operative general physical examination     Anemia due to blood loss, acute     Acute post-operative pain     Complex  "regional pain syndrome i of left lower limb     Depression     Long term (current) use of opiate analgesic     Anxiety disorder, unspecified     Neck pain     Vitamin deficiency     Age-related osteoporosis without current pathological fracture     Anemia     Closed fracture of metatarsal bone     Complex regional pain syndrome type 2 of upper extremity     Degeneration of intervertebral disc of cervical region     Edema     History of falling     Menopause present     Muscle weakness     Neoplasm of uncertain behavior of skin     Unspecified fracture of shaft of left fibula, subsequent encounter for closed fracture with routine healing     Displaced intertrochanteric fracture of left femur, subsequent encounter for closed fracture with routine healing     Ingrowing nail     Paronychia, toe, left     F11.2 - Continuous opioid dependence (H)       Family History       family history includes Breast Cancer in her sister; Cancer in her brother, father, mother, and sister.    Social History      reports that she quit smoking about 24 years ago. Her smoking use included cigarettes. She has never used smokeless tobacco. She reports that she does not drink alcohol and does not use drugs.      Review of Systems     Patient denies current pain, limited mobility, fractures.   Remainder per HPI.      Vital Signs     /76 (BP Location: Left arm, Patient Position: Sitting, Cuff Size: Adult Regular)   Pulse 75   Ht 1.514 m (4' 11.61\")   Wt 60.8 kg (134 lb)   SpO2 96%   BMI 26.52 kg/m      Physical Exam     Constitutional:  Well developed, Well nourished, ambulates with a limp  HENT:  Normocephalic,   Neck: normal in appearance  Eyes:  PERRL, Conjunctiva pink  Respiratory:  No respiratory distress  Skin: No acanthosis nigricans, lipoatrophy or lipodystrophy  Neurologic:  Alert & oriented x 3, nonfocal  Psychiatric:  Affect, Mood, Insight appropriate      Assessment     1. Other osteoporosis without current pathological " fracture        Plan     Conrad will let us know if she needs to wait to get her next Prolia injection next month. Otherwise, injection as planned, and follow-up with me in 1 year. She will need another Dexa Scan next January (1/25).         Blanca Duarte NP  [facility]  1/11/2024  8:32 AM    Current Medications     Outpatient Medications Prior to Visit   Medication Sig Dispense Refill     acetaminophen-codeine (TYLENOL #4) 300-60 MG per tablet Take 1-2 tablets by mouth every 8 hours as needed for severe pain 180 tablet 5     amoxicillin (AMOXIL) 500 MG capsule Take 500 mg by mouth every 8 hours       cetirizine (ZYRTEC) 10 MG tablet Take 10 mg by mouth as needed       chlorhexidine (PERIDEX) 0.12 % solution Use 15 mL in the mouth or throat in the morning and at bedtime for 16 days.       cholecalciferol 50 MCG (2000 UT) tablet Take 4,000 Units by mouth        HYDROcodone-acetaminophen (NORCO)  MG per tablet Take 1 tablet by mouth       hydrOXYzine HCl (ATARAX) 25 MG tablet Take 1-2 tablets (25-50 mg) by mouth 3 times daily as needed for itching 30 tablet 3     lamoTRIgine (LAMICTAL) 100 MG tablet Take 1 tablet (100 mg) by mouth daily 90 tablet 3     mirabegron (MYRBETRIQ) 25 MG 24 hr tablet Take 1 tablet (25 mg) by mouth daily 90 tablet 3     morphine (MS CONTIN) 15 MG CR tablet Take 1 tablet (15 mg) by mouth every 12 hours 60 tablet 0     naloxone (NARCAN) 4 MG/0.1ML nasal spray [NALOXONE (NARCAN) 4 MG/ACTUATION NASAL SPRAY] 1 spray (4 mg dose) into one nostril for opioid reversal. Call 911. May repeat if no response in 3 minutes. 1 each 1     ondansetron (ZOFRAN) 4 MG tablet TAKE 1 TABLET BY MOUTH EVERY 8 HOURS IF NEEDED FOR NAUSEA/VOMITING. 30 tablet 1     rosuvastatin (CRESTOR) 5 MG tablet Take 1 tablet (5 mg) by mouth daily 90 tablet 3     traZODone (DESYREL) 100 MG tablet Take 1 to 3 tablets by mouth at bedtime as needed 270 tablet 3     vilazodone (VIIBRYD) 20 MG TABS tablet Take 1 tablet (20 mg)  by mouth daily 90 tablet 3     Facility-Administered Medications Prior to Visit   Medication Dose Route Frequency Provider Last Rate Last Admin     denosumab (PROLIA) injection 60 mg  60 mg Subcutaneous Q6 Months Blanca Duarte NP   60 mg at 08/08/23 0947     denosumab (PROLIA) injection 60 mg  60 mg Subcutaneous Q6 Months Blanca Duarte NP   60 mg at 08/02/22 0944         Lab Results     TSH   Date Value Ref Range Status   05/09/2022 0.61 0.30 - 5.00 uIU/mL Final           Imaging Results   Last DEXA scan:  No valid procedures specified.    Study Result    Narrative & Impression   EXAM: DX HIP/PELVIS/SPINE  LOCATION: [facility]  DATE/TIME: 1/24/2023 11:24 AM     INDICATION: Osteoporosis without current pathological fracture, unspecified osteoporosis type.  COMPARISON: 1/12/2021  TECHNIQUE: Dual-energy x-ray absorptiometry performed with routine technique.     FINDINGS:     Lumbar Spine: L1-L4: BMD: 1.214 g/cm2. T-score: 0.3. Z-score: 1.9  RIGHT Hip Total: BMD: 0.977 g/cm2. T-score: -0.2. Z-score: 1.1  RIGHT Hip Femoral neck: BMD: 0.952 g/cm2. T-score: -0.6. Z-score: 0.9     WHO Criteria:  Normal: T-score at or above -1 SD  Osteopenia: T-score between -1 and -2.5 SD  Osteoporosis: T-score at or below -2.5 SD     COMPARISON: There has been a 3.1% increase in lumbar spine BMD. There has been a 4.0% increase in right hip BMD.     FRAX Results: Not applicable due to normal bone mineral density.         Again, thank you for allowing me to participate in the care of your patient.        Sincerely,        Blanca Duarte NP

## 2024-01-18 DIAGNOSIS — Z92.29 PERSONAL HISTORY OF OTHER DRUG THERAPY: ICD-10-CM

## 2024-01-18 DIAGNOSIS — M81.8 OTHER OSTEOPOROSIS WITHOUT CURRENT PATHOLOGICAL FRACTURE: Primary | ICD-10-CM

## 2024-01-25 ENCOUNTER — HOSPITAL ENCOUNTER (OUTPATIENT)
Dept: MAMMOGRAPHY | Facility: CLINIC | Age: 69
Discharge: HOME | End: 2024-01-25
Attending: NURSE PRACTITIONER | Admitting: NURSE PRACTITIONER
Payer: COMMERCIAL

## 2024-01-25 DIAGNOSIS — Z12.31 VISIT FOR SCREENING MAMMOGRAM: ICD-10-CM

## 2024-01-25 PROCEDURE — 77063 BREAST TOMOSYNTHESIS BI: CPT

## 2024-01-29 ENCOUNTER — MYC REFILL (OUTPATIENT)
Dept: INTERNAL MEDICINE | Facility: CLINIC | Age: 69
End: 2024-01-29
Payer: COMMERCIAL

## 2024-01-29 DIAGNOSIS — G89.4 CHRONIC PAIN SYNDROME: ICD-10-CM

## 2024-01-29 RX ORDER — MORPHINE SULFATE 15 MG/1
15 TABLET, FILM COATED, EXTENDED RELEASE ORAL EVERY 12 HOURS
Qty: 60 TABLET | Refills: 0 | Status: SHIPPED | OUTPATIENT
Start: 2024-01-29 | End: 2024-02-29

## 2024-02-01 ENCOUNTER — TELEPHONE (OUTPATIENT)
Dept: ENDOCRINOLOGY | Facility: CLINIC | Age: 69
End: 2024-02-01
Payer: COMMERCIAL

## 2024-02-01 NOTE — TELEPHONE ENCOUNTER
[facility] Call Center    Phone Message    May a detailed message be left on voicemail: yes     Reason for Call: Other: Other: Patient is having problems with dental implants, they are infected and patient is getting ready to go on antibiotics. Patient was instructed to reach out to NP Blanca Duarte and see what she recommends in regards to her 4000 mg of Vitamin D 3 every other day. Patient was recommented to add K 2 vitamins to that per dentist. Patient wanted to run that by Np. Thank you!        Action Taken: Other: Endo    Travel Screening: Not Applicable

## 2024-02-01 NOTE — TELEPHONE ENCOUNTER
I called and informed that Syeda is ok with adding the vitamins, however dosing should be up to her dentist.

## 2024-02-11 DIAGNOSIS — F41.9 ANXIETY: ICD-10-CM

## 2024-02-12 RX ORDER — HYDROXYZINE HYDROCHLORIDE 25 MG/1
25-50 TABLET, FILM COATED ORAL 3 TIMES DAILY PRN
Qty: 30 TABLET | Refills: 3 | Status: SHIPPED | OUTPATIENT
Start: 2024-02-12 | End: 2024-04-11

## 2024-02-29 ENCOUNTER — MYC REFILL (OUTPATIENT)
Dept: INTERNAL MEDICINE | Facility: CLINIC | Age: 69
End: 2024-02-29
Payer: COMMERCIAL

## 2024-02-29 DIAGNOSIS — G89.4 CHRONIC PAIN SYNDROME: ICD-10-CM

## 2024-02-29 RX ORDER — MORPHINE SULFATE 15 MG/1
15 TABLET, FILM COATED, EXTENDED RELEASE ORAL EVERY 12 HOURS
Qty: 60 TABLET | Refills: 0 | Status: SHIPPED | OUTPATIENT
Start: 2024-02-29 | End: 2024-04-01

## 2024-03-02 ENCOUNTER — HEALTH MAINTENANCE LETTER (OUTPATIENT)
Age: 69
End: 2024-03-02

## 2024-03-11 ENCOUNTER — TELEPHONE (OUTPATIENT)
Dept: ENDOCRINOLOGY | Facility: CLINIC | Age: 69
End: 2024-03-11
Payer: COMMERCIAL

## 2024-03-11 NOTE — TELEPHONE ENCOUNTER
----- Message from Keira BRAVO RN sent at 2/8/2024  9:21 AM CST -----  Call pt to see how extraction went. Pt having extraction on 2/14/24

## 2024-03-11 NOTE — TELEPHONE ENCOUNTER
LM for pt to c/b- pt was to have extraction in February. Did pt have it and when. Is the site healed and no plans for implant at this time

## 2024-03-21 ENCOUNTER — ALLIED HEALTH/NURSE VISIT (OUTPATIENT)
Dept: ENDOCRINOLOGY | Facility: CLINIC | Age: 69
End: 2024-03-21
Payer: COMMERCIAL

## 2024-03-21 DIAGNOSIS — M81.0 OSTEOPOROSIS WITHOUT CURRENT PATHOLOGICAL FRACTURE, UNSPECIFIED OSTEOPOROSIS TYPE: ICD-10-CM

## 2024-03-21 DIAGNOSIS — M81.8 OTHER OSTEOPOROSIS WITHOUT CURRENT PATHOLOGICAL FRACTURE: Primary | ICD-10-CM

## 2024-03-21 DIAGNOSIS — Z92.29 PERSONAL HISTORY OF OTHER DRUG THERAPY: ICD-10-CM

## 2024-03-21 PROCEDURE — 96372 THER/PROPH/DIAG INJ SC/IM: CPT | Performed by: NURSE PRACTITIONER

## 2024-03-21 PROCEDURE — 99207 PR NO CHARGE NURSE ONLY: CPT

## 2024-03-28 ENCOUNTER — MYC REFILL (OUTPATIENT)
Dept: INTERNAL MEDICINE | Facility: CLINIC | Age: 69
End: 2024-03-28
Payer: COMMERCIAL

## 2024-03-28 DIAGNOSIS — G89.4 CHRONIC PAIN SYNDROME: ICD-10-CM

## 2024-03-29 RX ORDER — MORPHINE SULFATE 15 MG/1
15 TABLET, FILM COATED, EXTENDED RELEASE ORAL EVERY 12 HOURS
Qty: 60 TABLET | Refills: 0 | OUTPATIENT
Start: 2024-03-29

## 2024-03-29 NOTE — TELEPHONE ENCOUNTER
Outgoing call to patient, scheduled appointment with didi knott for bridge until she can be seen with new provider.    Patient is a 55y old  Male who presents with a chief complaint of ARDS?, sepsis (09 Jan 2019 14:21)        SUBJECTIVE / OVERNIGHT EVENTS: No events overnight.       MEDICATIONS  (STANDING):  cefTAZidime/avibactam IVPB 2.5 Gram(s) IV Intermittent every 8 hours  cefTAZidime/avibactam IVPB      chlorhexidine 4% Liquid 1 Application(s) Topical <User Schedule>  cyanocobalamin 1000 MICROGram(s) Oral daily  dextrose 5%. 1000 milliLiter(s) (50 mL/Hr) IV Continuous <Continuous>  dextrose 50% Injectable 12.5 Gram(s) IV Push once  dextrose 50% Injectable 25 Gram(s) IV Push once  dextrose 50% Injectable 25 Gram(s) IV Push once  folic acid 1 milliGRAM(s) Enteral Tube daily  hydrocortisone sodium succinate Injectable 50 milliGRAM(s) IV Push every 8 hours  insulin lispro (HumaLOG) corrective regimen sliding scale   SubCutaneous every 6 hours  lacosamide Solution 100 milliGRAM(s) Oral two times a day  metroNIDAZOLE  IVPB      metroNIDAZOLE  IVPB 500 milliGRAM(s) IV Intermittent every 8 hours  midazolam Infusion 0.02 mG/kG/Hr (1.994 mL/Hr) IV Continuous <Continuous>  midodrine 30 milliGRAM(s) Oral three times a day  potassium chloride   Powder 40 milliEquivalent(s) Oral every 4 hours  propofol Infusion 5 MICROgram(s)/kG/Min (2.991 mL/Hr) IV Continuous <Continuous>  QUEtiapine 25 milliGRAM(s) Oral daily  risperiDONE   Solution 1 milliGRAM(s) Oral daily  sodium chloride 3%  Inhalation 4 milliLiter(s) Inhalation every 6 hours    MEDICATIONS  (PRN):  acetaminophen    Suspension .. 650 milliGRAM(s) Oral every 6 hours PRN Temp greater or equal to 38C (100.4F), Mild Pain (1 - 3), Moderate Pain (4 - 6)  dextrose 40% Gel 15 Gram(s) Oral once PRN Blood Glucose LESS THAN 70 milliGRAM(s)/deciliter  glucagon  Injectable 1 milliGRAM(s) IntraMuscular once PRN Glucose LESS THAN 70 milligrams/deciliter        CAPILLARY BLOOD GLUCOSE      POCT Blood Glucose.: 109 mg/dL (10 Antonio 2019 06:26)  POCT Blood Glucose.: 119 mg/dL (09 Jan 2019 23:54)  POCT Blood Glucose.: 133 mg/dL (09 Jan 2019 18:24)  POCT Blood Glucose.: 104 mg/dL (09 Jan 2019 11:34)    I&O's Summary    09 Jan 2019 07:01  -  10 Antonio 2019 07:00  --------------------------------------------------------  IN: 1245.1 mL / OUT: 2370 mL / NET: -1124.9 mL        PHYSICAL EXAM  GENERAL: sedated, well-developed  HEAD:  Atraumatic, Normocephalic  EYES: EOMI, PERRLA, conjunctiva and sclera clear  NECK: Supple, No JVD, clean tracheostomy site  CHEST/LUNG: Clear to auscultation bilaterally; No wheeze  HEART: Regular rate and rhythm; No murmurs, rubs, or gallops  ABDOMEN: Soft, Nontender, Nondistended; Bowel sounds present  EXTREMITIES:  2+ Peripheral Pulses, No clubbing, cyanosis, or edema  PSYCH: sedated  SKIN: No rashes or lesions    LABS:                        9.3    5.28  )-----------( 379      ( 10 Antonio 2019 03:20 )             29.2     01-10    141  |  103  |  6<L>  ----------------------------<  117<H>  3.0<L>   |  30  |  < 0.20<L>    Ca    7.8<L>      10 Antonio 2019 03:20  Phos  2.9     01-10  Mg     1.9     01-10    TPro  5.1<L>  /  Alb  1.9<L>  /  TBili  0.5  /  DBili  x   /  AST  17  /  ALT  12  /  AlkPhos  189<H>  01-10    PT/INR - ( 09 Jan 2019 04:50 )   PT: 13.1 SEC;   INR: 1.14          PTT - ( 09 Jan 2019 04:50 )  PTT:20.8 SEC          RADIOLOGY & ADDITIONAL TESTS:    Imaging Personally Reviewed:  Consultant(s) Notes Reviewed:    Care Discussed with Consultants/Other Providers:

## 2024-03-29 NOTE — TELEPHONE ENCOUNTER
03/29/2024    PT is a previous PT of Chioma Selfs.  Chioma gave the name of someone to change to as her PCP.  PT has an appt to establish care on 05/28/2024.  She needs a bridge of her Morphine until her appointment.  Please call PT and let her know when RX has been sent over.    Marc Pathak

## 2024-03-31 ASSESSMENT — PATIENT HEALTH QUESTIONNAIRE - PHQ9
SUM OF ALL RESPONSES TO PHQ QUESTIONS 1-9: 9
10. IF YOU CHECKED OFF ANY PROBLEMS, HOW DIFFICULT HAVE THESE PROBLEMS MADE IT FOR YOU TO DO YOUR WORK, TAKE CARE OF THINGS AT HOME, OR GET ALONG WITH OTHER PEOPLE: SOMEWHAT DIFFICULT
SUM OF ALL RESPONSES TO PHQ QUESTIONS 1-9: 9

## 2024-04-01 ENCOUNTER — OFFICE VISIT (OUTPATIENT)
Dept: FAMILY MEDICINE | Facility: CLINIC | Age: 69
End: 2024-04-01
Payer: COMMERCIAL

## 2024-04-01 VITALS
SYSTOLIC BLOOD PRESSURE: 152 MMHG | BODY MASS INDEX: 26.08 KG/M2 | RESPIRATION RATE: 18 BRPM | OXYGEN SATURATION: 98 % | TEMPERATURE: 98.5 F | DIASTOLIC BLOOD PRESSURE: 78 MMHG | HEART RATE: 77 BPM | WEIGHT: 131.8 LBS

## 2024-04-01 DIAGNOSIS — R03.0 ELEVATED BP WITHOUT DIAGNOSIS OF HYPERTENSION: ICD-10-CM

## 2024-04-01 DIAGNOSIS — F32.89 OTHER DEPRESSION: ICD-10-CM

## 2024-04-01 DIAGNOSIS — F11.20 CONTINUOUS OPIOID DEPENDENCE (H): ICD-10-CM

## 2024-04-01 DIAGNOSIS — G89.4 CHRONIC PAIN SYNDROME: Primary | ICD-10-CM

## 2024-04-01 PROCEDURE — 99214 OFFICE O/P EST MOD 30 MIN: CPT | Performed by: NURSE PRACTITIONER

## 2024-04-01 RX ORDER — MORPHINE SULFATE 15 MG/1
15 TABLET, FILM COATED, EXTENDED RELEASE ORAL EVERY 12 HOURS
Qty: 60 TABLET | Refills: 0 | Status: SHIPPED | OUTPATIENT
Start: 2024-04-01 | End: 2024-05-02

## 2024-04-01 NOTE — PROGRESS NOTES
Assessment & Plan     Chronic pain syndrome  Continuous opioid dependence (H)    - morphine (MS CONTIN) 15 MG CR tablet; Take 1 tablet (15 mg) by mouth every 12 hours    Patient does not have establish care visit until the end of May with her new provider.  I did discuss I would refill this 1 more time for her and she could send [employer] message directed to me for that refill.    Reviewed PDMP.  Patient did have additional prescription for oxycodone/acetaminophen on 2/16 and 2/21 for dental procedure.  She is no longer taking any additional medication for this.  All other medications have been coming from Chioma Pang or colleagues here at [facility].  Extended release morphine most recently filled on 3/1 for 30 days so she would have been out on 3/31.  Today 4/1 is an appropriate refill today.    Reviewed documentation from Chioma Pang. Patient was last seen on 12/22/23.  Patient did urine drug screen at that visit as well as signed opioid contract.  Urine drug screen was consistent with prescribed medications.      Other depression  Patient requesting referral to discuss medications with psychiatry. Feels like symptoms are not currently well controlled with current medications and needs referral.  I put this in for patient.  - Adult Mental Health  Referral; Future    Elevated BP without diagnosis of hypertension  Blood pressure is elevated today.  Patient has been out of her medication for 2 days.  She reports some significant pain today.  Patient never been diagnosed with high blood pressure.  On chart review it looks like blood pressure has been borderline for the last year. iLooks like at times it will be normal but at other times elevated.  It could be related to pain but I think we need to address this further with her new primary  I think it is reasonable to recheck at her next visit          BMI  Estimated body mass index is 26.08 kg/m  as calculated from the following:    Height as  "of 1/11/24: 1.514 m (4' 11.61\").    Weight as of this encounter: 59.8 kg (131 lb 12.8 oz).             Ozzy Martines is a 68 year old, presenting for the following health issues:  Medication Request      4/1/2024    12:41 PM   Additional Questions   Roomed by SALAZAR KRAUS   Accompanied by self     History of Present Illness       Reason for visit:  Renew prescription. Cannot get in to see new Dr. til May 28th    She eats 2-3 servings of fruits and vegetables daily.She consumes 0 sweetened beverage(s) daily.She exercises with enough effort to increase her heart rate 10 to 19 minutes per day.  She exercises with enough effort to increase her heart rate 3 or less days per week.   She is taking medications regularly.     Has been seen at pain clinic. Was recommended to continue morphine. Was being followed by Chioma Daniels for opioid management.  Patient                   Objective    BP (!) 152/78   Pulse 77   Temp 98.5  F (36.9  C) (Oral)   Resp 18   Wt 59.8 kg (131 lb 12.8 oz)   SpO2 98%   BMI 26.08 kg/m    Body mass index is 26.08 kg/m .  Physical Exam               Signed Electronically by: ANUEL RUBI CNP    "

## 2024-04-11 DIAGNOSIS — F41.9 ANXIETY: ICD-10-CM

## 2024-04-11 RX ORDER — HYDROXYZINE HYDROCHLORIDE 25 MG/1
TABLET, FILM COATED ORAL
Qty: 30 TABLET | Refills: 0 | Status: SHIPPED | OUTPATIENT
Start: 2024-04-11 | End: 2024-04-25

## 2024-04-25 ENCOUNTER — MYC REFILL (OUTPATIENT)
Dept: INTERNAL MEDICINE | Facility: CLINIC | Age: 69
End: 2024-04-25
Payer: COMMERCIAL

## 2024-04-25 DIAGNOSIS — F41.9 ANXIETY: ICD-10-CM

## 2024-04-26 RX ORDER — HYDROXYZINE HYDROCHLORIDE 25 MG/1
TABLET, FILM COATED ORAL
Qty: 30 TABLET | Refills: 0 | Status: SHIPPED | OUTPATIENT
Start: 2024-04-26 | End: 2024-05-02

## 2024-05-02 ENCOUNTER — ALLIED HEALTH/NURSE VISIT (OUTPATIENT)
Dept: FAMILY MEDICINE | Facility: CLINIC | Age: 69
End: 2024-05-02
Payer: COMMERCIAL

## 2024-05-02 VITALS — SYSTOLIC BLOOD PRESSURE: 125 MMHG | DIASTOLIC BLOOD PRESSURE: 61 MMHG | HEART RATE: 88 BPM

## 2024-05-02 DIAGNOSIS — F41.9 ANXIETY: ICD-10-CM

## 2024-05-02 DIAGNOSIS — Z01.30 BLOOD PRESSURE CHECK: Primary | ICD-10-CM

## 2024-05-02 DIAGNOSIS — G89.4 CHRONIC PAIN SYNDROME: ICD-10-CM

## 2024-05-02 PROCEDURE — 99207 PR NO CHARGE NURSE ONLY: CPT

## 2024-05-02 RX ORDER — MORPHINE SULFATE 15 MG/1
15 TABLET, FILM COATED, EXTENDED RELEASE ORAL EVERY 12 HOURS
Qty: 60 TABLET | Refills: 0 | Status: SHIPPED | OUTPATIENT
Start: 2024-05-02 | End: 2024-05-28

## 2024-05-02 RX ORDER — HYDROXYZINE HYDROCHLORIDE 25 MG/1
TABLET, FILM COATED ORAL
Qty: 60 TABLET | Refills: 0 | Status: SHIPPED | OUTPATIENT
Start: 2024-05-08 | End: 2024-05-28

## 2024-05-02 NOTE — Clinical Note
Medications pended, I did the Hydroxyzine starting 5/8/24 for 60 tablets to get her until her PCP appt. Feel free to change that one if you do not want to give her 60 tablets. She takes 4 a day and has like 1 weekish left right now.

## 2024-05-02 NOTE — PROGRESS NOTES
Conrad Zhu is a 68 year old year old patient who comes in today for a Blood Pressure check because of ongoing blood pressure monitoring.  Vital Signs as repeated by HAO Sheriff   Patient is taking medication as prescribed- Patient not currently on any blood pressure medications   Patient is tolerating medications well.  Patient is not monitoring Blood Pressure at home.    Current complaints: none    I met with Conrad Zhu at the request of Patricia Graham to recheck her blood pressure.  Blood pressure medications on the med list were reviewed with patient.    Patient has taken all medications as per usual regimen: not currently on a BP med  Patient reports tolerating them without any issues or concerns: Yes    Vitals:    05/02/24 0832 05/02/24 0843   BP: (!) 146/65 125/61   BP Location: Left arm Left arm   Patient Position: Sitting Sitting   Cuff Size:  Adult Regular   Pulse: 79 88       After 5 minutes, the patient's blood pressure was <140/90, the previous encounter was reviewed, recorded blood pressure below 140/90. Patient was discharged and the note will be sent to the provider for final review.      Patient does not have establish care visit until 5/28/24    Medications reviewed with patient and she is almost out of Morphine and Hydroxyzine- huddle with Patricia Graham who is okay refilling medication today.

## 2024-05-28 ENCOUNTER — OFFICE VISIT (OUTPATIENT)
Dept: INTERNAL MEDICINE | Facility: CLINIC | Age: 69
End: 2024-05-28
Payer: COMMERCIAL

## 2024-05-28 VITALS
BODY MASS INDEX: 27 KG/M2 | OXYGEN SATURATION: 97 % | TEMPERATURE: 97.9 F | RESPIRATION RATE: 12 BRPM | HEART RATE: 77 BPM | SYSTOLIC BLOOD PRESSURE: 124 MMHG | HEIGHT: 59 IN | WEIGHT: 133.9 LBS | DIASTOLIC BLOOD PRESSURE: 71 MMHG

## 2024-05-28 DIAGNOSIS — G47.00 INSOMNIA, UNSPECIFIED TYPE: ICD-10-CM

## 2024-05-28 DIAGNOSIS — F41.9 ANXIETY: ICD-10-CM

## 2024-05-28 DIAGNOSIS — E78.2 MIXED HYPERLIPIDEMIA: ICD-10-CM

## 2024-05-28 DIAGNOSIS — F11.20 CONTINUOUS OPIOID DEPENDENCE (H): ICD-10-CM

## 2024-05-28 DIAGNOSIS — G89.4 CHRONIC PAIN SYNDROME: Primary | ICD-10-CM

## 2024-05-28 DIAGNOSIS — F33.42 RECURRENT MAJOR DEPRESSIVE DISORDER, IN FULL REMISSION (H): ICD-10-CM

## 2024-05-28 DIAGNOSIS — M81.0 AGE-RELATED OSTEOPOROSIS WITHOUT CURRENT PATHOLOGICAL FRACTURE: ICD-10-CM

## 2024-05-28 DIAGNOSIS — N32.81 OAB (OVERACTIVE BLADDER): ICD-10-CM

## 2024-05-28 PROBLEM — S93.401A SPRAIN OF RIGHT ANKLE, UNSPECIFIED LIGAMENT, INITIAL ENCOUNTER: Status: RESOLVED | Noted: 2019-07-01 | Resolved: 2024-05-28

## 2024-05-28 PROBLEM — L03.032 PARONYCHIA, TOE, LEFT: Status: RESOLVED | Noted: 2023-06-06 | Resolved: 2024-05-28

## 2024-05-28 PROBLEM — Z79.891 LONG TERM (CURRENT) USE OF OPIATE ANALGESIC: Status: RESOLVED | Noted: 2020-02-10 | Resolved: 2024-05-28

## 2024-05-28 PROBLEM — S93.601A SPRAIN OF RIGHT FOOT, INITIAL ENCOUNTER: Status: RESOLVED | Noted: 2019-07-01 | Resolved: 2024-05-28

## 2024-05-28 PROBLEM — Z01.818 PRE-OPERATIVE GENERAL PHYSICAL EXAMINATION: Status: RESOLVED | Noted: 2019-11-11 | Resolved: 2024-05-28

## 2024-05-28 PROBLEM — D62 ANEMIA DUE TO BLOOD LOSS, ACUTE: Status: RESOLVED | Noted: 2019-11-12 | Resolved: 2024-05-28

## 2024-05-28 LAB
AMPHETAMINES UR QL SCN: ABNORMAL
BARBITURATES UR QL SCN: ABNORMAL
BENZODIAZ UR QL SCN: ABNORMAL
BZE UR QL SCN: ABNORMAL
CANNABINOIDS UR QL SCN: ABNORMAL
FENTANYL UR QL: ABNORMAL
OPIATES UR QL SCN: ABNORMAL
PCP QUAL URINE (ROCHE): ABNORMAL

## 2024-05-28 PROCEDURE — G2211 COMPLEX E/M VISIT ADD ON: HCPCS | Performed by: INTERNAL MEDICINE

## 2024-05-28 PROCEDURE — 80307 DRUG TEST PRSMV CHEM ANLYZR: CPT | Performed by: INTERNAL MEDICINE

## 2024-05-28 PROCEDURE — G0480 DRUG TEST DEF 1-7 CLASSES: HCPCS | Mod: 90 | Performed by: INTERNAL MEDICINE

## 2024-05-28 PROCEDURE — 99214 OFFICE O/P EST MOD 30 MIN: CPT | Performed by: INTERNAL MEDICINE

## 2024-05-28 RX ORDER — HYDROXYZINE HYDROCHLORIDE 25 MG/1
TABLET, FILM COATED ORAL
Qty: 60 TABLET | Refills: 3 | Status: SHIPPED | OUTPATIENT
Start: 2024-05-28 | End: 2024-10-04

## 2024-05-28 RX ORDER — MORPHINE SULFATE 15 MG/1
15 TABLET, FILM COATED, EXTENDED RELEASE ORAL EVERY 12 HOURS
Qty: 60 TABLET | Refills: 0 | Status: SHIPPED | OUTPATIENT
Start: 2024-05-31 | End: 2024-06-25

## 2024-05-28 ASSESSMENT — PAIN SCALES - GENERAL: PAINLEVEL: MODERATE PAIN (5)

## 2024-05-28 ASSESSMENT — PATIENT HEALTH QUESTIONNAIRE - PHQ9: SUM OF ALL RESPONSES TO PHQ QUESTIONS 1-9: 4

## 2024-05-28 NOTE — ASSESSMENT & PLAN NOTE
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 Previously followed in pain clinic, transitioned to primary care 10/2021. Has been on stable regimen since then.   - Okay to continue same regimen of MS Contin 15 mg for #60/month and tylenol #4 #180/month  - UDS today, last two with screen positive for amphetamine, patient adamantly denies any illicit drug use, will get repeat today with confirmatory testing reflex  - CSA today as well

## 2024-05-28 NOTE — LETTER
Opioid / Opioid Plus Controlled Substance Agreement    This is an agreement between you and your provider about the safe and appropriate use of controlled substance/opioids prescribed by your care team. Controlled substances are medicines that can cause physical and mental dependence (abuse).    There are strict laws about having and using these medicines. We here at [facility] are committing to working with you in your efforts to get better. To support you in this work, we ll help you schedule regular office appointments for medicine refills. If we must cancel or change your appointment for any reason, we ll make sure you have enough medicine to last until your next appointment.     As a Provider, I will:  Listen carefully to your concerns and treat you with respect.   Recommend a treatment plan that I believe is in your best interest. This plan may involve therapies other than opioid pain medication.   Talk with you often about the possible benefits, and the risk of harm of any medicine that we prescribe for you.   Provide a plan on how to taper (discontinue or go off) using this medicine if the decision is made to stop its use.    As a Patient, I understand that opioid(s):   Are a controlled substance prescribed by my care team to help me function or work and manage my condition(s).   Are strong medicines and can cause serious side effects such as:  Drowsiness, which can seriously affect my driving ability  A lower breathing rate, enough to cause death  Harm to my thinking ability   Depression   Abuse of and addiction to this medicine  Need to be taken exactly as prescribed. Combining opioids with certain medicines or chemicals (such as illegal drugs, sedatives, sleeping pills, and benzodiazepines) can be dangerous or even fatal. If I stop opioids suddenly, I may have severe withdrawal symptoms.  Do not work for all types of pain nor for all patients. If they re not helpful, I may be asked to stop  them.    I am also being prescribed a benzodiazepine (tranquilizer) controlled substance: I understand this type of medicine is sedating and can increase the risk of death when taken together with opioids. I have talked to my care team about having prescription Narcan available for reversing the opioid medicine and have been instructed in its use. I will be very careful to take my medicines only as directed    The risks, benefits and side effects of these medicine(s) were explained to me. I agree that:  I will take part in other treatments as advised by my care team. This may be psychiatry or counseling, physical therapy, behavioral therapy, group treatment or a referral to a specialist.     I will keep all my appointments. I understand that this is part of the monitoring of opioids. My care team may require an office visit for EVERY opioid/controlled substance refill. If I miss appointments or don t follow instructions, my care team may stop my medicine.    I will take my medicines as prescribed. I will not change the dose or schedule unless my care team tells me to. There will be no refills if I run out early.     I may be asked to come to the clinic and complete a urine drug test or complete a pill count at any time. If I don t give a urine sample or participate in a pill count, the care team may stop my medicine.    I will only receive prescriptions from this clinic for chronic pain. If I am treated by another provider for acute pain issues, I will tell them that I am taking opioid pain medication for chronic pain and that I have a treatment agreement with this provider. I will inform my [facility] care team within one business day if I am given a prescription for any pain medication by another healthcare provider. My [facility] care team can contact other providers and pharmacists about my use of any medicines.    It is up to me to make sure that I don t run out of my medicines on weekends or  holidays. If my care team is willing to refill my opioid prescription without a visit, I must request refills only during office hours. Refills may take up to 3 business days to process. I will use one pharmacy to fill all my opioid and other controlled substance prescriptions. I will notify the clinic about any changes to my insurance or medication availability.    I am responsible for my prescriptions. If the medicine/prescription is lost, stolen or destroyed, it will not be replaced. I also agree not to share controlled substance medicines with anyone.    I am aware I should not use any illegal or recreational drugs. I agree not to drink alcohol unless my care team says I can.       If I enroll in the [place] Medical Cannabis program, I will tell my care team prior to my next refill.     I will tell my care team right away if I become pregnant, have a new medical problem treated outside of my regular clinic, or have a change in my medications.    I understand that this medicine can affect my thinking, judgment and reaction time. Alcohol and drugs affect the brain and body, which can affect the safety of my driving. Being under the influence of alcohol or drugs can affect my decision-making, behaviors, personal safety, and the safety of others. Driving while impaired (DWI) can occur if a person is driving, operating, or in physical control of a car, motorcycle, boat, snowmobile, ATV, motorbike, off-road vehicle, or any other motor vehicle ([place] Statute 169A.20). I understand the risk if I choose to drive or operate any vehicle or machinery.    I understand that if I do not follow any of the conditions above, my prescriptions or treatment may be stopped or changed.          Opioids  What You Need to Know    What are opioids?   Opioids are pain medicines that must be prescribed by a doctor. They are also known as narcotics.     Examples are:   morphine (MS Contin, Carmen)  oxycodone (Oxycontin)  oxycodone and  acetaminophen (Percocet)  hydrocodone and acetaminophen (Vicodin, Norco)   fentanyl patch (Duragesic)   hydromorphone (Dilaudid)   methadone  codeine (Tylenol #3)     What do opioids do well?   Opioids are best for severe short-term pain such as after a surgery or injury. They may work well for cancer pain. They may help some people with long-lasting (chronic) pain.     What do opioids NOT do well?   Opioids never get rid of pain entirely, and they don t work well for most patients with chronic pain. Opioids don t reduce swelling, one of the causes of pain.                                    Other ways to manage chronic pain and improve function include:     Treat the health problem that may be causing pain  Anti-inflammation medicines, which reduce swelling and tenderness, such as ibuprofen (Advil, Motrin) or naproxen (Aleve)  Acetaminophen (Tylenol)  Antidepressants and anti-seizure medicines, especially for nerve pain  Topical treatments such as patches or creams  Injections or nerve blocks  Chiropractic or osteopathic treatment  Acupuncture, massage, deep breathing, meditation, visual imagery, aromatherapy  Use heat or ice at the pain site  Physical therapy   Exercise  Stop smoking  Take part in therapy       Risks and side effects     Talk to your doctor before you start or decide to keep taking opioids. Possible side effects include:    Lowering your breathing rate enough to cause death  Overdose, including death, especially if taking higher than prescribed doses  Worse depression symptoms; less pleasure in things you usually enjoy  Feeling tired or sluggish  Slower thoughts or cloudy thinking  Being more sensitive to pain over time; pain is harder to control  Trouble sleeping or restless sleep  Changes in hormone levels (for example, less testosterone)  Changes in sex drive or ability to have sex  Constipation  Unsafe driving  Itching and sweating  Dizziness  Nausea, throwing up and dry mouth    What else  should I know about opioids?    Opioids may lead to dependence, tolerance, or addiction.    Dependence means that if you stop or reduce the medicine too quickly, you will have withdrawal symptoms. These include loose poop (diarrhea), jitters, flu-like symptoms, nervousness and tremors. Dependence is not the same as addiction.                     Tolerance means needing higher doses over time to get the same effect. This may increase the chance of serious side effects.    Addiction is when people improperly use a substance that harms their body, their mind or their relations with others. Use of opiates can cause a relapse of addiction if you have a history of drug or alcohol abuse.    People who have used opioids for a long time may have a lower quality of life, worse depression, higher levels of pain and more visits to doctors.    You can overdose on opioids. Take these steps to lower your risk of overdose:    Recognize the signs:  Signs of overdose include decrease or loss of consciousness (blackout), slowed breathing, trouble waking up and blue lips. If someone is worried about overdose, they should call 911.    Talk to your doctor about Narcan (naloxone).   If you are at risk for overdose, you may be given a prescription for Narcan. This medicine very quickly reverses the effects of opioids.   If you overdose, a friend or family member can give you Narcan while waiting for the ambulance. They need to know the signs of overdose and how to give Narcan.     Don't use alcohol or street drugs.   Taking them with opioids can cause death.    Do not take any of these medicines unless your doctor says it s OK. Taking these with opioids can cause death:  Benzodiazepines, such as lorazepam (Ativan), alprazolam (Xanax) or diazepam (Valium)  Muscle relaxers, such as cyclobenzaprine (Flexeril)  Sleeping pills like zolpidem (Ambien)   Other opioids      How to keep you and other people safe while taking opioids:    Never share  your opioids with others.  Opioid medicines are regulated by the Drug Enforcement Agency (AJAY). Selling or sharing medications is a criminal act.    2. Be sure to store opioids in a secure place, locked up if possible. Young children can easily swallow them and overdose.    3. When you are traveling with your medicines, keep them in the original bottles. If you use a pill box, be sure you also carry a copy of your medicine list from your clinic or pharmacy.    4. Safe disposal of opioids    Most pharmacies have places to get rid of medicine, called disposal kiosks. Medicine disposal options are also available in every [facility]. Search your [facility] and  medication disposal  to find more options. You can find more details at:  [email or web address]://www.pca.[email or web address]-green/managing-unwanted-medications     I agree that my provider, clinic care team, and pharmacy may work with any city, state or federal law enforcement agency that investigates the misuse, sale, or other diversion of my controlled medicine. I will allow my provider to discuss my care with, or share a copy of, this agreement with any other treating provider, pharmacy or emergency room where I receive care.    I have read this agreement and have asked questions about anything I did not understand.    _______________________________________________________  Patient Signature - Conrad Zhu _____________________                   Date     _______________________________________________________  Provider Signature - Ksenia Sandoval MD   _____________________                   Date     _______________________________________________________  Witness Signature (required if provider not present while patient signing)   _____________________                   Date

## 2024-05-28 NOTE — ASSESSMENT & PLAN NOTE
Anxiety slightly worse in the last few months.  Is better with as needed hydroxyzine.  Notes that she is fine if things going on in her usual routine, has a hard time adjusting to any changes to her routine.  She does have an appointment coming up with psychiatry to discuss her medications.  - For now, continue the last on 20 mg daily, Lamictal 100 mg daily and hydroxyzine 25 mg 2-3 times daily PRN  - Would recommend seeing therapy, referral placed today   - F/up in 3 months after she has met with psychiatry

## 2024-05-28 NOTE — ASSESSMENT & PLAN NOTE
Insomnia is stable with trazodone 300 mg at bedtime.  Discussed it would be reasonable to see if she can get by with less of this, would allow us more room to adjust her other antidepressants to address depression and anxiety.  - Try to decrease trazodone to 200 mg nightly

## 2024-05-28 NOTE — PROGRESS NOTES
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 Previously followed in pain clinic, transitioned to primary care 10/2021. Has been on stable regimen since then.   - Okay to continue same regimen of MS Contin 15 mg for #60/month and tylenol #4 #180/month  - UDS today, last two with screen positive for amphetamine, patient adamantly denies any illicit drug use, will get repeat today with confirmatory testing reflex  - CSA today as well          Relevant Medications    morphine (MS CONTIN) 15 MG CR tablet (Start on 5/31/2024)       Endocrine    Mixed hyperlipidemia     Well-controlled with Crestor 5 mg daily.  Lipids 12/2023 showed LDL of 55.            Musculoskeletal and Integumentary    Age-related osteoporosis without current pathological fracture     Follows with endocrinology.  On Prolia.         Relevant Medications    morphine (MS CONTIN) 15 MG CR tablet (Start on 5/31/2024)       Urinary    OAB (overactive bladder)     Stable with Myrbetriq 25 mg daily.            Behavioral    Depression     Mood feels stable today.  She is going to discuss medications with psychiatry in June.         Relevant Medications    hydrOXYzine HCl (ATARAX) 25 MG tablet    Other Relevant Orders    Adult Mental Health  Referral    Urine Drug Screen       Other    Anxiety     Anxiety slightly worse in the last few months.  Is better with as needed hydroxyzine.  Notes that she is fine if things going on in her usual routine, has a hard time adjusting to any changes to her routine.  She does have an appointment coming up with psychiatry to discuss her medications.  - For now, continue the last on 20 mg daily, Lamictal 100 mg daily and  "hydroxyzine 25 mg 2-3 times daily PRN  - Would recommend seeing therapy, referral placed today   - F/up in 3 months after she has met with psychiatry          Relevant Medications    hydrOXYzine HCl (ATARAX) 25 MG tablet    Other Relevant Orders    Adult Mental Health [facility] Referral    F11.2 - Continuous opioid dependence (H)     As per chronic pain.         Relevant Orders    Urine Drug Screen    Insomnia     Insomnia is stable with trazodone 300 mg at bedtime.  Discussed it would be reasonable to see if she can get by with less of this, would allow us more room to adjust her other antidepressants to address depression and anxiety.  - Try to decrease trazodone to 200 mg nightly              The longitudinal plan of care for the diagnosis(es)/condition(s) as documented above were addressed during this visit. Due to the added complexity in care, I will continue to support Conrad in the subsequent management and with ongoing continuity of care.     BMI  Estimated body mass index is 27.04 kg/m  as calculated from the following:    Height as of this encounter: 1.499 m (4' 11\").    Weight as of this encounter: 60.7 kg (133 lb 14.4 oz).   Weight management plan: Discussed healthy diet and exercise guidelines    FUTURE APPOINTMENTS:       - Follow-up visit in 3 months for AWV      Subjective   Conrad is a 68 year old, presenting for the following health issues:  Establish Care        5/28/2024     1:29 PM   Additional Questions   Roomed by HENNY Maher   Accompanied by ROMEL     History of Present Illness     Reason for visit:  Meeting my new DrJeanne and to refill my prescriptions    Conrad is here to establish care. We reviewed her medical history as below:     Osteoporosis: Saw wicho 1/11/24, continues on prolia. Next visit scheduled September.     Chronic Pain: in L foot and leg after accident. 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 Started out pain clinic, last saw many years ago but transitioned to primary care. Current regimen is MS Contin 15 mg #60 a month, last fill 5/2/24. Tylenol #4 #180 a month (takes two tablets 2-3 times a day), last fill 5/9/24.   - Tells me they went through a lot of medications to get to this regimen and none were effective. Last was at pain clinic 10/2021 and from there transitioned to previous PCP.     MDD / ISIDORO: vilazodone 20 mg daily and lamictal 100 mg daily for many years. Mood feels stable, anxiety worsening over last few months (however better with hydroxyzine, taking 25 mg twice daily most days). Has psychiatry appt scheduled 6/21/24 to discuss. Previously saw a therapist but it was a man and she didn't feel comfortable.     HLD: rosuvastatin 5 mg daily. Last lipids 12/2023 Tchol 117, TG 63, HDL 49, LDL 55. No ASCVD history.     OAB: Myrbetriq 25 mg daily. On this only in last year. Working well.     Insomnia: trazodone 100-300 mg at bedtime PRN. Takes 300 mg every night. Sleeps well.     She eats 2-3 servings of fruits and vegetables daily.She consumes 0 sweetened beverage(s) daily.She exercises with enough effort to increase her heart rate 20 to 29 minutes per day.  She exercises with enough effort to increase her heart rate 5 days per week.   She is taking medications regularly.       Review of Systems  Constitutional, neuro, ENT, endocrine, pulmonary, cardiac, gastrointestinal, genitourinary, musculoskeletal, integument and psychiatric systems are negative, except as otherwise noted.      Objective    /71 (BP Location: Right arm, Patient Position: Sitting, Cuff Size: Adult Regular)   Pulse 77   Temp 97.9  F (36.6  C) (Temporal)   Resp 12   Ht 1.499 m (4' 11\")   Wt 60.7 kg (133 lb 14.4 oz)   LMP  (LMP Unknown)   SpO2 97%   BMI 27.04 kg/m    Body mass index is 27.04 kg/m .  Physical Exam   GENERAL: alert and " no distress  EYES: Eyes grossly normal to inspection, PERRL and conjunctivae and sclerae normal  HENT: ear canals and TM's normal, nose and mouth without ulcers or lesions  NECK: no adenopathy, no asymmetry, masses, or scars  RESP: lungs clear to auscultation - no rales, rhonchi or wheezes  CV: regular rate and rhythm, normal S1 S2, no S3 or S4, no murmur, click or rub, no peripheral edema  ABDOMEN: soft, nontender, no hepatosplenomegaly, no masses and bowel sounds normal  MS: no gross musculoskeletal defects noted, no edema  SKIN: no suspicious lesions or rashes  NEURO: Normal strength and tone, mentation intact and speech normal  PSYCH: mentation appears normal, affect normal/bright    No results found for this or any previous visit (from the past 24 hour(s)).        Signed Electronically by: Ksenia Sandoval MD

## 2024-05-31 LAB
6MAM UR CFM-MCNC: <10 NG/ML
CODEINE UR CFM-MCNC: 151 NG/ML
HYDROCODONE UR CFM-MCNC: 147 NG/ML
HYDROMORPHONE UR CFM-MCNC: <20 NG/ML
MORPHINE UR CFM-MCNC: 318 NG/ML
NORHYDROCODONE UR CFM-MCNC: 170 NG/ML
NOROXYCODONE UR CFM-MCNC: <20 NG/ML
NOROXYMORPHONE UR QL SCN: <20 NG/ML
OXYCODONE UR CFM-MCNC: <20 NG/ML
OXYMORPHONE UR CFM-MCNC: <20 NG/ML

## 2024-06-06 ENCOUNTER — PATIENT OUTREACH (OUTPATIENT)
Dept: CARE COORDINATION | Facility: CLINIC | Age: 69
End: 2024-06-06
Payer: COMMERCIAL

## 2024-07-01 ENCOUNTER — MYC MEDICAL ADVICE (OUTPATIENT)
Dept: INTERNAL MEDICINE | Facility: CLINIC | Age: 69
End: 2024-07-01
Payer: COMMERCIAL

## 2024-07-01 DIAGNOSIS — G89.4 CHRONIC PAIN SYNDROME: ICD-10-CM

## 2024-07-02 NOTE — TELEPHONE ENCOUNTER
Pt calling to refill her acetaminophen-codeine, PCP is out of office until Monday and pt stated she will be out of medication by Monday.    LOV 5/28/24. Order pended, will route to provider to advise.    HAO Roca.

## 2024-07-03 RX ORDER — ACETAMINOPHEN AND CODEINE PHOSPHATE 300; 60 MG/1; MG/1
1-2 TABLET ORAL EVERY 8 HOURS PRN
Qty: 180 TABLET | Refills: 0 | Status: SHIPPED | OUTPATIENT
Start: 2024-07-03 | End: 2024-07-26

## 2024-07-03 NOTE — TELEPHONE ENCOUNTER
Patient calling to get a refill before she goes to [place]     Patient was hoping to get this before leaving and PCP is out of the office     Looking to see if the Covering provider can send this in     Patient has been on this medication for a very long time just was with a PCP who left       Call Back   Contact Information  [phone] (Mobile)

## 2024-07-23 ENCOUNTER — MYC REFILL (OUTPATIENT)
Dept: INTERNAL MEDICINE | Facility: CLINIC | Age: 69
End: 2024-07-23
Payer: COMMERCIAL

## 2024-07-23 DIAGNOSIS — G89.4 CHRONIC PAIN SYNDROME: ICD-10-CM

## 2024-07-23 RX ORDER — MORPHINE SULFATE 15 MG/1
15 TABLET, FILM COATED, EXTENDED RELEASE ORAL EVERY 12 HOURS
Qty: 60 TABLET | Refills: 0 | Status: SHIPPED | OUTPATIENT
Start: 2024-07-31 | End: 2024-08-20

## 2024-07-26 ENCOUNTER — OFFICE VISIT (OUTPATIENT)
Dept: FAMILY MEDICINE | Facility: CLINIC | Age: 69
End: 2024-07-26
Payer: COMMERCIAL

## 2024-07-26 ENCOUNTER — NURSE TRIAGE (OUTPATIENT)
Dept: INTERNAL MEDICINE | Facility: CLINIC | Age: 69
End: 2024-07-26

## 2024-07-26 VITALS
RESPIRATION RATE: 16 BRPM | HEART RATE: 73 BPM | DIASTOLIC BLOOD PRESSURE: 82 MMHG | BODY MASS INDEX: 27.62 KG/M2 | HEIGHT: 59 IN | WEIGHT: 137 LBS | OXYGEN SATURATION: 97 % | TEMPERATURE: 97.6 F | SYSTOLIC BLOOD PRESSURE: 124 MMHG

## 2024-07-26 DIAGNOSIS — G89.4 CHRONIC PAIN SYNDROME: ICD-10-CM

## 2024-07-26 DIAGNOSIS — S39.012A STRAIN OF LUMBAR REGION, INITIAL ENCOUNTER: Primary | ICD-10-CM

## 2024-07-26 PROCEDURE — 99213 OFFICE O/P EST LOW 20 MIN: CPT | Performed by: NURSE PRACTITIONER

## 2024-07-26 RX ORDER — METHYLPREDNISOLONE 4 MG
TABLET, DOSE PACK ORAL
Qty: 21 TABLET | Refills: 0 | Status: CANCELLED | OUTPATIENT
Start: 2024-07-26

## 2024-07-26 RX ORDER — ACETAMINOPHEN AND CODEINE PHOSPHATE 300; 60 MG/1; MG/1
1-2 TABLET ORAL EVERY 8 HOURS PRN
Qty: 180 TABLET | Refills: 0 | Status: SHIPPED | OUTPATIENT
Start: 2024-08-02 | End: 2024-08-23

## 2024-07-26 RX ORDER — CYCLOBENZAPRINE HCL 5 MG
5-10 TABLET ORAL 3 TIMES DAILY PRN
Qty: 30 TABLET | Refills: 2 | Status: SHIPPED | OUTPATIENT
Start: 2024-07-26 | End: 2024-10-04

## 2024-07-26 ASSESSMENT — ENCOUNTER SYMPTOMS: BACK PAIN: 1

## 2024-07-26 NOTE — PROGRESS NOTES
"  Assessment & Plan     Strain of lumbar region, initial encounter  No red flag symptoms. Does not appear consistent with sciatica.  Recommend conservative management with active rest, gentle stretching, and ice/heat.  She can continue her chronic pain medications.  Consider Ibuprofen 600-800 mg TID with food.  Prescribed Flexeril TID PRN for muscle spasms.  Hesitant to restart Soma due to possible CNS depression in combination with her other medications and risk for habit formation.  Discussed that these injuries can take 3-4 weeks to resolve.  Discussed red flag symptoms that would warrant emergent care.  Recommend follow up with PCP with new or worsening symptoms.  Patient agrees with plan of care.   - cyclobenzaprine (FLEXERIL) 5 MG tablet  Dispense: 30 tablet; Refill: 2        Subjective   Conrad is a 68 year old who presents with complaints of left low back pain.  Symptoms started about 4 days ago.  There was no known injury or trauma.  Pain initially worsened, but has now leveled off.  Describes a generalized ache in that area, but pain gets significantly worse with any change in position such as standing up, sitting down, or bending over.  Tells me it \"takes her breath away\".  Patient denies any radicular pain down her left leg, lower extremity weakness, or episodes of incontinence.  She is sleeping comfortably at night.  Denies any fever, dysuria, hematuria, or nausea/vomiting.  No previous history of kidney stones.  Patient is on chronic continuous opioids for treatment of chronic pain.  She is on MS Contin and Tylenol No. 4.  Has not found benefit with these medications.  She has been utilizing ice/heat as well as ibuprofen.  Those do not seem to be terribly helpful.  Patient has a history of back pain and has received an injection in the past.  Has not had back issues in the last coupe of years.  Previously benefitted from Soma.     Back Pain (Lower left pain ; thinks may be a pinch nerve ; gotten worse " "this last week )      History of Present Illness       Back Pain:  She presents for follow up of back pain. Patient's back pain is a new problem.    Original cause of back pain: not sure  First noticed back pain: in the last week  Patient feels back pain: constantlyLocation of back pain:  Left lower back  Description of back pain: burning, sharp and stabbing  Back pain spreads: left buttocks    Since patient first noticed back pain, pain is: rapidly worsening  Does back pain interfere with her job:  Not applicable  On a scale of 1-10 (10 being the worst), patient describes pain as:  10  What makes back pain worse: bending, certain positions and standing   Acupuncture: not tried  Acetaminophen: not helpful  Activity or exercise: not helpful  Chiropractor:  Not tried  Cold: not helpful  Heat: not helpful  Massage: not tried  Muscle relaxants: not tried  NSAIDS: not helpful  Opioids: not helpful  Physical Therapy: not tried  Rest: not helpful  Steroid Injection: not tried  Stretching: not helpful  Surgery: not tried  TENS unit: not tried  Topical pain relievers: not helpful  Other healthcare providers patient is seeing for back pain: None    She eats 2-3 servings of fruits and vegetables daily.She consumes 0 sweetened beverage(s) daily.She exercises with enough effort to increase her heart rate 10 to 19 minutes per day.  She exercises with enough effort to increase her heart rate 4 days per week.   She is taking medications regularly.             Objective    /82 (BP Location: Right arm, Patient Position: Sitting, Cuff Size: Adult Regular)   Pulse 73   Temp 97.6  F (36.4  C) (Temporal)   Resp 16   Ht 1.499 m (4' 11\")   Wt 62.1 kg (137 lb)   LMP  (LMP Unknown)   SpO2 97%   BMI 27.67 kg/m    Body mass index is 27.67 kg/m .  Physical Exam   GENERAL: alert and no distress  Comprehensive back pain exam:  Tenderness of left lumbar paraspinal muscles, Pain limits the following motions: forward flexion, back " extension, bilateral rotation, Lower extremity strength functional and equal on both sides, and Straight leg raise negative bilaterally          Signed Electronically by: Rosalba Abernathy NP

## 2024-07-26 NOTE — TELEPHONE ENCOUNTER
"Patient calling as started having back pain about a week ago on left lower side.  States that she does not have any other symptoms but has severe pain with movement.  States that it \"takes her breath away\".  Denies any injury or overuse.  Per disposition should be seen today.  Scheduled with provider at  & advise to call back if symptoms worsening  Reason for Disposition   SEVERE back pain (e.g., excruciating, unable to do any normal activities) and not improved after pain medicine and CARE ADVICE    Additional Information   Negative: Fever > 100.4 F (38.0 C) and flank pain   Negative: Pain or burning with passing urine (urination)   Negative: Pain radiates into groin, scrotum   Negative: Blood in urine (red, pink, or tea-colored)   Negative: Vomiting and pain over lower ribs of back (i.e., flank - kidney area)   Negative: Weakness of a leg or foot (e.g., unable to bear weight, dragging foot)   Negative: Patient sounds very sick or weak to the triager   Negative: SEVERE back pain of sudden onset and age > 60 years   Negative: SEVERE abdominal pain (e.g., excruciating)   Negative: Abdominal pain and age > 60 years   Negative: Unable to urinate (or only a few drops) and bladder feels very full   Negative: Loss of bladder or bowel control (urine or bowel incontinence; wetting self, leaking stool) of new-onset   Negative: Numbness (loss of sensation) in groin or rectal area   Negative: Major injury to the back (e.g., MVA, fall > 10 feet or 3 meters, penetrating injury, etc.)   Negative: Pain in the upper back over the ribs (rib cage) that radiates (travels) into the chest   Negative: Pain in the upper back over the ribs (rib cage) and worsened by coughing (or clearly increases with breathing)   Negative: Back pain during pregnancy   Negative: Passed out (i.e., fainted, collapsed and was not responding)   Negative: Shock suspected (e.g., cold/pale/clammy skin, too weak to stand, low BP, rapid pulse)   Negative: Sounds " "like a life-threatening emergency to the triager    Answer Assessment - Initial Assessment Questions  1. ONSET: \"When did the pain begin?\"       Monday  2. LOCATION: \"Where does it hurt?\" (upper, mid or lower back)      Lower left back  3. SEVERITY: \"How bad is the pain?\"  (e.g., Scale 1-10; mild, moderate, or severe)    - MILD (1-3): Doesn't interfere with normal activities.     - MODERATE (4-7): Interferes with normal activities or awakens from sleep.     - SEVERE (8-10): Excruciating pain, unable to do any normal activities.       10  4. PATTERN: \"Is the pain constant?\" (e.g., yes, no; constant, intermittent)       constant  5. RADIATION: \"Does the pain shoot into your legs or somewhere else?\"      no  6. CAUSE:  \"What do you think is causing the back pain?\"       unknown  7. BACK OVERUSE:  \"Any recent lifting of heavy objects, strenuous work or exercise?\"      no  8. MEDICINES: \"What have you taken so far for the pain?\" (e.g., nothing, acetaminophen, NSAIDS)      Tylenol, biofreeze, heat  9. NEUROLOGIC SYMPTOMS: \"Do you have any weakness, numbness, or problems with bowel/bladder control?\"      no  10. OTHER SYMPTOMS: \"Do you have any other symptoms?\" (e.g., fever, abdomen pain, burning with urination, blood in urine)        no  11. PREGNANCY: \"Is there any chance you are pregnant?\" \"When was your last menstrual period?\"        na    Protocols used: Back Pain-A-[place]    "

## 2024-07-31 ENCOUNTER — TELEPHONE (OUTPATIENT)
Dept: INTERNAL MEDICINE | Facility: CLINIC | Age: 69
End: 2024-07-31
Payer: COMMERCIAL

## 2024-07-31 NOTE — TELEPHONE ENCOUNTER
Prior Authorization Approval    Authorization Effective Date: 7/1/2024  Authorization Expiration Date: 7/31/2025  Medication: Morphine Sulfate ER 15MG er tablets    Approved Dose/Quantity:   Reference #:     Insurance Company: [employer] Non-Specialty PA's - Phone [phone] Fax [phone]  Expected CoPay:       CoPay Card Available:      Foundation Assistance Needed:    Which Pharmacy is filling the prescription (Not needed for infusion/clinic administered): [facility] - [place], [place] - [address] [place] [address]  Pharmacy Notified:  yes  Patient Notified:  yes- Pharmacy will contact patient when ready to /ship

## 2024-07-31 NOTE — TELEPHONE ENCOUNTER
Central Prior Authorization Team   Phone: [phone]    PA Initiation    Medication: Morphine Sulfate ER 15MG er tablets    Insurance Company: [employer] Non-Specialty PA's - Phone [phone] Fax [phone]  Pharmacy Filling the Rx: [facility] - [place], [place] - [address]  Filling Pharmacy Phone: [phone]  Filling Pharmacy Fax:    Start Date: 7/31/2024

## 2024-07-31 NOTE — TELEPHONE ENCOUNTER
General Call      Reason for Call:  Prior Auth needed    -Patient spoke to pharmacy and confirmed     Medication:    morphine (MS CONTIN) 15 MG CR tablet 60 tablet 0 7/31/2024 -- No   Sig - Route: Take 1 tablet (15 mg) by mouth every 12 hours - Oral   Sent to pharmacy as: Morphine Sulfate ER 15 MG Oral Tablet Extended Release (MS CONTIN)   Class: E-Prescribe   Earliest Fill Date: 7/31/2024   Order: [id number]   E-Prescribing Status: Receipt confirmed by pharmacy (7/23/2024  7:48 AM CDT)   No prior authorization was found for this prescription.   Found prior authorization for another prescription for the same medication: Approved       -Looking to have this as a High Priority as the medication was placed 7/31/2024 and patient has been taking this prior      Contact Information  [phone] (Mobile)

## 2024-08-05 ENCOUNTER — TELEPHONE (OUTPATIENT)
Dept: INTERNAL MEDICINE | Facility: CLINIC | Age: 69
End: 2024-08-05
Payer: COMMERCIAL

## 2024-08-05 NOTE — TELEPHONE ENCOUNTER
General Call      Reason for Call:  fraud waste and abuse of [employer]    What are your questions or concerns:      duplicate therapy of opoid medications     -received tylenol 4 from [facility] but differnt opiods are listed and they are received from another pcp    Writer asked about the phone number for the [employer] representative due to it doesn't have any trace on [employer] or any hits     Your search - [phone] phone number - did not match any documents.    Suggestions:    Make sure all words are spelled correctly.  Try different keywords.  Try more general keywords.  Try fewer keywords.    -This is alarming    Writer had them fax the request     Wait till fax comes in to check credentials and make sure     fraud waste and abuse of [employer]  Annel   [phone]

## 2024-08-06 NOTE — TELEPHONE ENCOUNTER
They may be reporting this issue because Silvana filled July fill while I was out and I just filled for August and patient did fill prior prescriptions from old PCP, Chioma Pang in May and before that. Prior PCP left the system. No concerns on my end. Will wait if they fax as well.

## 2024-08-07 NOTE — TELEPHONE ENCOUNTER
Spoke to Annel with [employer] to review use of narcotic Rx's    Verifying if PCP aware of following prescriptions not prescribe by [facility] provider:   In July, Hydrocodone 10mg 5 day supply given by Dr Terrence Sanchez  In May, Oxycodone 5mg 4 day supply given by Dr Irwin Up states that the providers above are listed as dental providers. Wanted to ensure that  is being monitored.       Annel would like update on to whether these refills would be a break in the controlled substance agreement, call back # [phone] from [employer] Fraud Waste & Abuse dept

## 2024-08-07 NOTE — TELEPHONE ENCOUNTER
Do need to discuss below with patient. I have approval required slot tomorrow at 8:30, can we see if she is available for telephone or video visit?     Please let me know if not. Thanks!

## 2024-08-09 ENCOUNTER — VIRTUAL VISIT (OUTPATIENT)
Dept: INTERNAL MEDICINE | Facility: CLINIC | Age: 69
End: 2024-08-09
Payer: COMMERCIAL

## 2024-08-09 DIAGNOSIS — F11.20 CONTINUOUS OPIOID DEPENDENCE (H): ICD-10-CM

## 2024-08-09 DIAGNOSIS — G89.4 CHRONIC PAIN SYNDROME: Primary | ICD-10-CM

## 2024-08-09 DIAGNOSIS — M81.0 AGE-RELATED OSTEOPOROSIS WITHOUT CURRENT PATHOLOGICAL FRACTURE: ICD-10-CM

## 2024-08-09 PROCEDURE — 99442 PR PHYSICIAN TELEPHONE EVALUATION 11-20 MIN: CPT | Mod: 93 | Performed by: INTERNAL MEDICINE

## 2024-08-09 NOTE — TELEPHONE ENCOUNTER
Left a callback message for Annel at the provided number [phone].     If call is returned for update in regards to below.     Please relay PCP's message below.

## 2024-08-09 NOTE — TELEPHONE ENCOUNTER
Continue: [address] (sodium chloride): ointment: 5% a small amount at bedtime into left eye Returning Call    Reason for call:  returning call to clinic - Annel with [employer]ud [employer] Waste [employer]    Information relayed to caller:  Relayed ANUPAM Sandoval's message    Caller has additional questions:  No

## 2024-08-09 NOTE — PROGRESS NOTES
Conrad is a 68 year old who is being evaluated via a billable telephone visit.    What phone number would you like to be contacted at? [phone]  How would you like to obtain your AVS? Christy  Originating Location (pt. Location): Home  Distant Location (provider location):  On-site    Assessment & Plan   Problem List Items Addressed This Visit          Nervous and Auditory    Chronic pain - Primary     Multi-site pain secondary to left foot/leg pain diagnosis of mononeuritis multiplex following rhabdomyolysis injury, lower back pain status post L4 hemilaminectomy, history of left hip fracture with ORIF on 11/2019. Previously followed in pain clinic, transitioned to primary care 10/2021. Has been on stable regimen since then. She would like to try to come off of MS contin today, unsure how much it really is helping.   - MTM referral placed today for help with MS Contin taper plan   - Until she meets with MTM, plan to continue same regimen of MS Contin 15 mg for #60/month and tylenol #4 #180/month  - No concerns with fills of opiates from her dentist, will have her let us know about prescriptions in the future   - UDS appropriate 5/2024  - CSA 5/2024  - F/up as scheduled 10/2024         Relevant Orders    Med Therapy Management Referral       Musculoskeletal and Integumentary    Age-related osteoporosis without current pathological fracture     Follows with endocrinology.  On Prolia.            Other    F11.2 - Continuous opioid dependence (H)     As per chronic pain.         Relevant Orders    Med Therapy Management Referral         FUTURE APPOINTMENTS:       - Follow-up visit in October as scheduled       Subjective   Conrad is a 68 year old, presenting for the following health issues:  Recheck Medication        8/9/2024     7:56 AM   Additional Questions   Roomed by HENNY Rosales   Accompanied by N/A     History of Present Illness     Phone visit today to review chronic pain and question of med refills from  dentist in last few months brought up to us from [employer].     Questionable fills:   - #20 of hydrocodone-APAP 10/325 from an optometrist on 6/7/24, Terrence Sanchez (dentist)   - #12 oxycodone 5 mg from dentis 5/31/24, Oz Barragan DDS    She tells me she has been following closely with Dr. Barragan over last year. Has had two dental implants, one didn't take b/c of osteoporosis medication and had to redo (redo was in May/Shahnaz).     Chronic regimen:  - MS Contin 15 mg for #60/month and tylenol #4 #180/month     Had flare of back pain 7/26, seen in [facility]. Tells me this is improving slowly.     Would like to discuss coming down on morphine.     She eats 2-3 servings of fruits and vegetables daily.She consumes 0 sweetened beverage(s) daily.She exercises with enough effort to increase her heart rate 10 to 19 minutes per day.  She exercises with enough effort to increase her heart rate 4 days per week.     She is taking medications regularly.     Review of Systems  Constitutional, neuro, ENT, endocrine, pulmonary, cardiac, gastrointestinal, genitourinary, musculoskeletal, integument and psychiatric systems are negative, except as otherwise noted.      Objective    Vitals - Patient Reported  Pain Score: Severe Pain (6)  Pain Loc: Low Back (Pt states left side)      Vitals:  No vitals were obtained today due to virtual visit.    Physical Exam   General: Alert and no distress //Respiratory: No audible wheeze, cough, or shortness of breath // Psychiatric:  Appropriate affect, tone, and pace of words          Phone call duration: 11 minutes  Signed Electronically by: Ksenia Sandoval MD

## 2024-08-09 NOTE — ASSESSMENT & PLAN NOTE
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 Previously followed in pain clinic, transitioned to primary care 10/2021. Has been on stable regimen since then. She would like to try to come off of MS contin today, unsure how much it really is helping.   - MTM referral placed today for help with MS Contin taper plan   - Until she meets with MTM, plan to continue same regimen of MS Contin 15 mg for #60/month and tylenol #4 #180/month  - No concerns with fills of opiates from her dentist, will have her let us know about prescriptions in the future   - UDS appropriate 5/2024  - CSA 5/2024  - F/up as scheduled 10/2024

## 2024-08-09 NOTE — TELEPHONE ENCOUNTER
Talked with patient, fills below were from her dental office that she has been following with over the last year. I did look up office and both Dr. Sanchez and Dr. Barragan work in same dental office.     No concerns. I am reviewing PDMP with all refills.

## 2024-08-15 ENCOUNTER — NURSE TRIAGE (OUTPATIENT)
Dept: INTERNAL MEDICINE | Facility: CLINIC | Age: 69
End: 2024-08-15
Payer: COMMERCIAL

## 2024-08-15 NOTE — TELEPHONE ENCOUNTER
"Nurse Triage SBAR    Is this a 2nd Level Triage? NO    Situation: Patient calling in reporting pain in knee.     Background: Patient seen in office on 7/26/24 for back pain.     Virtual visit with PCP on 8/9/24.     Assessment: Patient reports pain in left knee, around knee cap.   Rates pain 7/10.   States it is constant since around 8/10/24.   Describes pain as cramping, sharp and aching.  Does not radiate.     States she is able to walk per her baseline. States left leg is her handicapped leg and is already has numbness in leg and foot.     States pain was in her back on left side, but is now gone and she feels it has moved to the knee. States, \"something is going on in my body and I don't know what it is\".     States she is taking pain medications, but she wants to know what is causing the pain.     Denies injury, swelling, redness, bruising, chest pain, difficulty breathing, fever, or calf pain.     Protocol Recommended Disposition:   See in Office Within 3 Days    Recommendation: Patient scheduled for office visit tomorrow 8/16/24.     Advised to call back with new or worsening symptoms in the meantime such as swelling or fever. Patient verbalized understanding and is in agreement with plan.      Does the patient meet one of the following criteria for ADS visit consideration? 16+ years old, with an [facility] PCP     Reason for Disposition   MODERATE pain (e.g., symptoms interfere with work or school, limping) and present > 3 days    Additional Information   Negative: Sounds like a life-threatening emergency to the triager   Negative: Followed a knee injury   Negative: Swollen knee joint and fever   Negative: Patient sounds very sick or weak to the triager   Negative: Can't move swollen joint at all   Negative: Thigh or calf pain and only 1 side and present > 1 hour   Negative: Thigh or calf swelling and only 1 side   Negative: SEVERE pain (e.g., excruciating, unable to walk)   Negative: Very swollen knee joint   " "Negative: Painful rash with multiple small blisters grouped together (i.e., dermatomal distribution or 'band' or 'stripe')   Negative: Looks like a boil, infected sore, deep ulcer, or other infected rash (spreading redness, pus)    Answer Assessment - Initial Assessment Questions  1. LOCATION and RADIATION: \"Where is the pain located?\"       Left knee in knee area  2. QUALITY: \"What does the pain feel like?\"  (e.g., sharp, dull, aching, burning)      Cramping, sharp, aching  3. SEVERITY: \"How bad is the pain?\" \"What does it keep you from doing?\"   (Scale 1-10; or mild, moderate, severe)    -  MILD (1-3): doesn't interfere with normal activities     -  MODERATE (4-7): interferes with normal activities (e.g., work or school) or awakens from sleep, limping     -  SEVERE (8-10): excruciating pain, unable to do any normal activities, unable to walk      7/10 now, usually 5/10 all the time.   4. ONSET: \"When did the pain start?\" \"Does it come and go, or is it there all the time?\"      Constant. 8/10/24- but started on left side in back. Not in back anymore.   5. RECURRENT: \"Have you had this pain before?\" If Yes, ask: \"When, and what happened then?\"      No  6. SETTING: \"Has there been any recent work, exercise or other activity that involved that part of the body?\"       No  7. AGGRAVATING FACTORS: \"What makes the knee pain worse?\" (e.g., walking, climbing stairs, running)      Nothing  8. ASSOCIATED SYMPTOMS: \"Is there any swelling or redness of the knee?\"      No  9. OTHER SYMPTOMS: \"Do you have any other symptoms?\" (e.g., chest pain, difficulty breathing, fever, calf pain)      No  10. PREGNANCY: \"Is there any chance you are pregnant?\" \"When was your last menstrual period?\"        No    Protocols used: Knee Pain[facility]  Monica Ribeiro RN  [facility]  "

## 2024-08-16 ENCOUNTER — OFFICE VISIT (OUTPATIENT)
Dept: FAMILY MEDICINE | Facility: CLINIC | Age: 69
End: 2024-08-16
Payer: COMMERCIAL

## 2024-08-16 VITALS
RESPIRATION RATE: 16 BRPM | SYSTOLIC BLOOD PRESSURE: 130 MMHG | HEART RATE: 99 BPM | HEIGHT: 59 IN | OXYGEN SATURATION: 95 % | DIASTOLIC BLOOD PRESSURE: 78 MMHG | TEMPERATURE: 98.5 F | WEIGHT: 137.1 LBS | BODY MASS INDEX: 27.64 KG/M2

## 2024-08-16 DIAGNOSIS — M25.562 ACUTE PAIN OF LEFT KNEE: ICD-10-CM

## 2024-08-16 DIAGNOSIS — M76.32 IT BAND SYNDROME, LEFT: Primary | ICD-10-CM

## 2024-08-16 PROCEDURE — 99213 OFFICE O/P EST LOW 20 MIN: CPT | Performed by: NURSE PRACTITIONER

## 2024-08-16 NOTE — PROGRESS NOTES
Assessment & Plan     It band syndrome, left  Acute pain of left knee  This is 60-year-old female presenting today with concerns of lateral left knee pain for 1 week.  She has a history of left ORIF hip fracture in 2019, and the left lower leg surgical procedure of Achilles lengthening secondary to foot drop, currently being treated by primary care Dr. Sandoval in [place] for chronic pain receiving morphine which she feels is not helpful for the pain in her knee.    Discussed her diagnosis of IT band syndrome  Identified and demonstrated home exercises  Physical therapy referral placed  May ice the knee  Elevate  Stretch twice a day as tolerated  Continue follow-up with PCP if no improvement seen    - Physical Therapy  Referral    Patient verbalizes understanding and agrees with plan of care                Subjective   Conrad is a 68 year old, presenting for the following health issues:  Knee Pain (Pain in the knee follow-up from a couple weeks ago. Pain starts at her back and is constant and both sharp and dull. Pain rating 7/10.)        8/16/2024     1:14 PM   Additional Questions   Roomed by HAO Yan     History of Present Illness       Back Pain:  She presents for follow up of back pain. Patient's back pain is a new problem.    Original cause of back pain: not sure  First noticed back pain: in the last week  Patient feels back pain: constantlyLocation of back pain:  Left lower back  Description of back pain: burning, sharp and stabbing  Back pain spreads: left buttocks    Since patient first noticed back pain, pain is: rapidly worsening  Does back pain interfere with her job:  Not applicable  On a scale of 1-10 (10 being the worst), patient describes pain as:  10  What makes back pain worse: bending, certain positions and standing   Acupuncture: not tried  Acetaminophen: not helpful  Activity or exercise: not helpful  Chiropractor:  Not tried  Cold: not helpful  Heat: not helpful  Massage: not  "tried  Muscle relaxants: not tried  NSAIDS: not helpful  Opioids: not helpful  Physical Therapy: not tried  Rest: not helpful  Steroid Injection: not tried  Stretching: not helpful  Surgery: not tried  TENS unit: not tried  Topical pain relievers: not helpful  Other healthcare providers patient is seeing for back pain: None    She eats 2-3 servings of fruits and vegetables daily.She consumes 0 sweetened beverage(s) daily.She exercises with enough effort to increase her heart rate 10 to 19 minutes per day.  She exercises with enough effort to increase her heart rate 4 days per week.   She is taking medications regularly.     Pt with back pain seen 7/26/24  Was given a muscle relaxant 2 weeks ago  Knee pain started a week ago   No trauma that she recalls - no acute pop or known cause to injury     In 2013? Had an \"accident on the left leg\"    Date of Surgery: 02/08/2016   Pre-Operative Diagnosis: Left foot drop with left equinus deformity and mild varus.   Procedure: Achilles lengthening. Posterior ankle and subtalar joint releases. Tibialis anterior tendon transfer.     Left hip fracture with ORIF on 11/2019                 Objective    /78 (BP Location: Right arm, Patient Position: Sitting, Cuff Size: Adult Regular)   Pulse 99   Temp 98.5  F (36.9  C) (Oral)   Resp 16   Ht 1.499 m (4' 11.02\")   Wt 62.2 kg (137 lb 1.6 oz)   LMP  (LMP Unknown)   SpO2 95%   BMI 27.68 kg/m    Body mass index is 27.68 kg/m .  Physical Exam   GENERAL: alert and no distress  Left knee: Patient with pain on hip abduction, no pain on palpation of greater trochanter of the left hip significant deformity recognized at distal calf, status post surgical procedure, scar visible anterior and posterior drawer, test negative, valgus and varum test negative, pain on palpation of IT band insertion site minimal swelling seen in this area, no redness, unremarkable patellar mobility, decreased range of motion of knee flexion and external " rotation on the left compared to right pain with this motion            Signed Electronically by: ANUEL Cm CNP

## 2024-08-20 ENCOUNTER — MYC REFILL (OUTPATIENT)
Dept: INTERNAL MEDICINE | Facility: CLINIC | Age: 69
End: 2024-08-20
Payer: COMMERCIAL

## 2024-08-20 DIAGNOSIS — G89.4 CHRONIC PAIN SYNDROME: ICD-10-CM

## 2024-08-20 RX ORDER — MORPHINE SULFATE 15 MG/1
15 TABLET, FILM COATED, EXTENDED RELEASE ORAL EVERY 12 HOURS
Qty: 60 TABLET | Refills: 0 | Status: SHIPPED | OUTPATIENT
Start: 2024-08-30 | End: 2024-10-04

## 2024-08-23 ENCOUNTER — TELEPHONE (OUTPATIENT)
Dept: INTERNAL MEDICINE | Facility: CLINIC | Age: 69
End: 2024-08-23
Payer: COMMERCIAL

## 2024-08-23 ENCOUNTER — MYC REFILL (OUTPATIENT)
Dept: INTERNAL MEDICINE | Facility: CLINIC | Age: 69
End: 2024-08-23
Payer: COMMERCIAL

## 2024-08-23 DIAGNOSIS — G89.4 CHRONIC PAIN SYNDROME: ICD-10-CM

## 2024-08-23 RX ORDER — ACETAMINOPHEN AND CODEINE PHOSPHATE 300; 60 MG/1; MG/1
1-2 TABLET ORAL EVERY 8 HOURS PRN
Qty: 180 TABLET | Refills: 0 | Status: SHIPPED | OUTPATIENT
Start: 2024-08-28 | End: 2024-09-23

## 2024-08-26 ENCOUNTER — TELEPHONE (OUTPATIENT)
Dept: INTERNAL MEDICINE | Facility: CLINIC | Age: 69
End: 2024-08-26
Payer: COMMERCIAL

## 2024-08-26 ENCOUNTER — VIRTUAL VISIT (OUTPATIENT)
Dept: PHARMACY | Facility: CLINIC | Age: 69
End: 2024-08-26
Attending: INTERNAL MEDICINE
Payer: COMMERCIAL

## 2024-08-26 DIAGNOSIS — G47.00 INSOMNIA, UNSPECIFIED TYPE: ICD-10-CM

## 2024-08-26 DIAGNOSIS — E56.9 VITAMIN DEFICIENCY: ICD-10-CM

## 2024-08-26 DIAGNOSIS — G89.4 CHRONIC PAIN SYNDROME: ICD-10-CM

## 2024-08-26 DIAGNOSIS — M81.0 AGE-RELATED OSTEOPOROSIS WITHOUT CURRENT PATHOLOGICAL FRACTURE: ICD-10-CM

## 2024-08-26 DIAGNOSIS — R11.0 NAUSEA: ICD-10-CM

## 2024-08-26 DIAGNOSIS — N32.81 OAB (OVERACTIVE BLADDER): ICD-10-CM

## 2024-08-26 DIAGNOSIS — F33.42 RECURRENT MAJOR DEPRESSIVE DISORDER, IN FULL REMISSION (H): ICD-10-CM

## 2024-08-26 DIAGNOSIS — E78.2 MIXED HYPERLIPIDEMIA: ICD-10-CM

## 2024-08-26 DIAGNOSIS — F41.9 ANXIETY: Primary | ICD-10-CM

## 2024-08-26 DIAGNOSIS — F11.90 CHRONIC, CONTINUOUS USE OF OPIOIDS: ICD-10-CM

## 2024-08-26 PROCEDURE — 99607 MTMS BY PHARM ADDL 15 MIN: CPT | Mod: 93

## 2024-08-26 PROCEDURE — 99605 MTMS BY PHARM NP 15 MIN: CPT | Mod: 93
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
## 2024-08-26 NOTE — TELEPHONE ENCOUNTER
Order/Referral Request    Who is requesting: Conrad    Orders being requested: Physical Therapy    Reason service is needed/diagnosis: Back / Knee pain    When are orders needed by: ASAP    Has this been discussed with Provider: Yes    Does patient have a preference on a Group/Provider/Facility? [facility] - Physical Therapy [facility]    Does patient have an appointment scheduled?: Yes: 8/28/24 At [facility]    Where to send orders: Fax    Could we send this information to you in [place] or would you prefer to receive a phone call?:   Patient would prefer a phone call   Okay to leave a detailed message?: Yes at Cell number on file:    Telephone Information:   Mobile [phone]

## 2024-08-26 NOTE — LETTER
"Recommended To-Do List      Prepared on: Aug 26, 2024       You can get the best results from your medications by completing the items on this \"To-Do List.\"      Bring your To-Do List when you go to your doctor. And, share it with your family or caregivers.    My To-Do List:  What we talked about: What I should do:   What my medicines are for, how to know if my medicines are working, made sure my medicines are safe for me and reviewed how to take my medicines.     Take my medicines every day                "

## 2024-08-26 NOTE — Clinical Note
Conrad Holliday Dr. will be establishing care with you today. I met her for the first time this week because she is interested in tapering her morphine (does not feel it is helping anymore). Her mental health is not well-controlled, probably in part because of her uncontrolled pain. In 2020 she was recommended to switch from Viibryd to duloxetine to see if this might help, but since she will be seeing you I didn't want to make any changes to those things quite yet.   Let me know how I can assist based on what you discuss with her. I am planning on asking her PCP about medical cannabis as an option as well for pain.  Thanks, Meseret Stout, PharmD Medication Therapy Management (MTM) Pharmacist

## 2024-08-26 NOTE — PATIENT INSTRUCTIONS
"Recommendations from today's MTM visit:                                                    MTM (medication therapy management) is a service provided by a clinical pharmacist designed to help you get the most of out of your medicines.   Today we reviewed what your medicines are for, how to know if they are working, that your medicines are safe and how to make your medicine regimen as easy as possible.      Consider Cymbalta in place of Viibryd - to discuss with new psychiatrist  You can take a calcium supplement for your bones (500-600mg twice daily)  I will discuss medical cannabis as an option with your PCP    Follow-up: ~2 months, after PCP visit on 10/4    It was great speaking with you today.  I value your experience and would be very thankful for your time in providing feedback in our clinic survey. In the next few days, you may receive an email or text message from [employer] with a link to a survey related to your  clinical pharmacist.\"     To schedule another MTM appointment, please call the clinic directly or you may call the MTM scheduling line at [phone].    My Clinical Pharmacist's contact information:                                                      Please feel free to contact me with any questions or concerns you have.      Meseret Stout, PharmD  Medication Therapy Management (MTM) Pharmacist   "

## 2024-08-26 NOTE — LETTER
August 26, 2024  Conrad Zhu  [address] [address] [place] [address]    Dear Ms. Zhu, [facility]     Thank you for talking with me on Aug 26, 2024 about your health and medications. As a follow-up to our conversation, I have included two documents:      Your Recommended To-Do List has steps you should take to get the best results from your medications.  Your Medication List will help you keep track of your medications and how to take them.    If you want to talk about these documents, please call Meseret Stout at phone: [phone], Monday-Friday 8-4:30pm.    I look forward to working with you and your doctors to make sure your medications work well for you.    Sincerely,  Meseret Stout  [facility] Pharmacist, [facility]

## 2024-08-26 NOTE — PROGRESS NOTES
Medication Therapy Management (MTM) Encounter    ASSESSMENT:                            Medication Adherence/Access: No issues identified    Hyperlipidemia   LDL at goal <70 on moderate intensity statin. The current medical regimen is effective;  continue present plan and medications.    Anxiety and Depression and Insomnia  Patient's symptoms are not well controlled on current regimen, much of which seems to be attributed to severe, chronic pain. Cymbalta was suggested as an alternative to Viibryd in 2020 but does not appear this was ever tried. This would be a reasonable next step for both pain and mood and to reduce serotonergic effect of Viibryd and trazodone, though she is not having symptoms of serotonin syndrome. She is on a high dose of trazodone and still is not getting full benefit. Consider medications to treat insomnia once we address her pain.    Osteoporosis  Appropriately on Prolia therapy. DXA in 2023 showed 3.1% increase in lumbar spine BMD and 4.0% increase in right hip BMD. BMD measurements were within normal limits using T-score. We discussed adding a calcium supplement since patient is receiving minimal calcium in her diet right now.     Nausea:  Symptoms controlled with as needed ondansetron    Overactive bladder:  Symptoms are well-controlled on Mybetriq.    Chronic Pain  Pain is not controlled on current regimen. She has a long history of medications that were ineffective. She has not tried Cymbalta and this would be a reasonable next step to replace Viibryd. We also discussed being certified for medical cannabis which she is interested in and may also be beneficial for both pain and sleep. She was previously seen by pain clinic until her regimen was stabilized.   Given her 10/10 pain, it would make more sense to optimize non-opioid options first before initiating a taper. She is also trying to get scheduled with PT.     Vitamin D Deficiency  Taking vitamin D every other day due to elevated  level last time. Has appointment to have vitamin D level rechecked.    PLAN:                            Consider Cymbalta in place of Viibryd - to discuss with new psychiatrist  You can take a calcium supplement for your bones (500-600mg twice daily)  I will discuss medical cannabis as an option with your PCP    Follow-up: ~2 months, after PCP visit on 10/4    SUBJECTIVE/OBJECTIVE:                          Conrad Zhu is a 68 year old female seen for an initial visit. She was referred to me from Erlinda Sandoval.      Reason for visit: opioid taper    Allergies/ADRs: Reviewed in chart  Past Medical History: Reviewed in chart  Tobacco: She reports that she quit smoking about 24 years ago. Her smoking use included cigarettes. She has never been exposed to tobacco smoke. She has never used smokeless tobacco.  Alcohol: Less than 1 beverage / month    Medication Adherence/Access: no issues reported    Hyperlipidemia   Rosuvastatin 5mg once daily   Just started a couple years ago  Patient reports no significant myalgias or other side effects.  The ASCVD Risk score (Duglas DK, et al., 2019) failed to calculate for the following reasons:    The systolic blood pressure is missing    The valid total cholesterol range is 130 to 320 mg/dL     Mental Health   Anxiety and Depression and Insomnia  Hydroxyzine 25-50mg three times daily as needed (anxiety)  Not currently usin. Used it for a couple months. Didn't help with sleep.   Viibryd 20mg once daily - Has been on for years     Trazodone 100-300mg at bedtime as needed  Patient reports uses 300mg every night. Started with 25mg in 1995.   Does not think she has tried other sleep medications except for melatonin  Lamotrigine 100mg once daily    Has appointment to re-establish with psychiatry this week.   Patient reports no current medication side effects.  No thoughts of hurting herself.  Does not think she has tried Cymbalta.    She reports racing thoughts, make it difficult  to fall asleep.  Patient reports the following stressors: chronic health condition - pain     Bone Health   Osteoporosis:   denosumab (Prolia) 60 mg subcutaneous every 6 months (last injectio 3/21/24)  Patient is not experiencing side effects.  DEXA History: 1/24/23  Lumbar Spine: L1-L4: BMD: 1.214 g/cm2. T-score: 0.3. Z-score: 1.9  RIGHT Hip Total: BMD: 0.977 g/cm2. T-score: -0.2. Z-score: 1.1  RIGHT Hip Femoral neck: BMD: 0.952 g/cm2. T-score: -0.6. Z-score: 0.9    Patient is getting minimal dietary calcium in their diet. Not taking calcium supplement. Appetite is low in general.  Risk factors: post-menopausal  Follows with endocrinology     Nausea:  Ondansetron 4mg every 8 hours as needed - Patient reports she used 2 tablets yesterday and 2 tabs today. Nausea is attributed to not feeling well, poor appetite rather than medication side effect.    Overactive bladder:  Myrbetriq 25mg once daily   Denies side effects  Feels this is more effective than what she was on previously. Per chart, has tried Detrol.     Chronic Pain   Morphine 15mg every 12 hours  Has been on for years. Used to follow pain clinic [facility]. Has been on same dose.  Mentioned tapering off morphine to her PCP because she does not feel like it is helping anymore. Open to increasing if that is helpful but is not really looking for more medications  Tylenol #4 1-2 tablets every 8 hours as needed  Usually 1 tablet is not enough. Uses three times daily.    Cyclobenzaprine 5-10mg three times daily as needed muscle spasms - not taking  Narcan as needed     Patient reports pain is 10/10 24/7. She is unable to function. Recent injury to IT band making pain worse and she cannot get into PT for 1 month which is frustrating. She is not sleeping well or eating well.   Denies side effects.     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 Previously followed in pain clinic, transitioned to primary care 10/2021. Has been on stable regimen since then.     Medication history:  Gabapentin - ineffective, weight gain per chart  Lyrica - ineffective, per chart has trialed 2-3 times  Venlafaxine - per chart, stopped working but was using for mood  Tramadol - ineffective  THC gummy - Tried it once, helped with sleep and \"shut my mind off\"  Amitriptyline - Per chart, ineffective  Fentanyl - Per chart, no difference whether she took or not  Ketamine ambar - Per chart, did not tolerate (stomach upset), ineffective    Vitamin D Deficiency  Vitamin D 4000 units every other day - because levels were high.   Will have labs rechecked.         Today's Vitals: LMP  (LMP Unknown)   ----------------    I spent 40 minutes with this patient today. All changes were made via collaborative practice agreement with Ksenia Sandoval MD. A copy of the visit note was provided to the patient's provider(s).    A summary of these recommendations was sent via [employer].    Meseret Stout PharmD  Medication Therapy Management (MTM) Pharmacist    Telemedicine Visit Details  Type of service:  Telephone visit  Start Time: 9:01 AM  End Time: 9:41 AM     Medication Therapy Recommendations  Age-related osteoporosis without current pathological fracture    Rationale: Synergistic therapy - Needs additional medication therapy - Indication   Recommendation: Start Medication - CALCIUM CITRATE   Status: Accepted - no CPA Needed            "

## 2024-08-26 NOTE — LETTER
_  Medication List        Prepared on: Aug 26, 2024     Bring your Medication List when you go to the doctor, hospital, or   emergency room. And, share it with your family or caregivers.     Note any changes to how you take your medications.  Cross out medications when you no longer use them.    Medication How I take it Why I use it Prescriber   acetaminophen-codeine (TYLENOL #4) 300-60 MG per tablet [START ON 8/28/2024] Take 1-2 tablets by mouth every 8 hours as needed for severe pain. Do not start before August 28, 2024. Chronic Pain Syndrome Ksenia Sandoval MD   cholecalciferol 50 MCG (2000 UT) tablet Take 4,000 Units by mouth Every other day General Health Patient Reported   cyclobenzaprine (FLEXERIL) 5 MG tablet Take 1-2 tablets (5-10 mg) by mouth 3 times daily as needed for muscle spasms Back pain Rosalba Abernathy NP   denosumab (PROLIA) injection 60 mg Inject 60mg subcutaneous every 6 months Osteoporosis Blanca Duarte NP   hydrOXYzine HCl (ATARAX) 25 MG tablet Take 1-2 Tablets (25-50 mg) by mouth 3 times daily if needed for anxiety. Anxiety Ksenia Sandoval MD   lamoTRIgine (LAMICTAL) 100 MG tablet Take 1 tablet (100 mg) by mouth daily Depression Sofie Pang CNP   mirabegron (MYRBETRIQ) 25 MG 24 hr tablet Take 1 tablet (25 mg) by mouth daily Overactive Bladder Sofie Pang CNP   morphine (MS CONTIN) 15 MG CR tablet [START ON 8/30/2024] Take 1 tablet (15 mg) by mouth every 12 hours Chronic Pain Syndrome Ksenia Sandoval MD   naloxone (NARCAN) 4 MG/0.1ML nasal spray 1 spray (4 mg dose) into one nostril for opioid reversal. Call 911. May repeat if no response in 3 minutes. Opioid overdose Ksenia Sandoval MD   ondansetron (ZOFRAN) 4 MG tablet TAKE 1 TABLET BY MOUTH EVERY 8 HOURS IF NEEDED FOR NAUSEA/VOMITING. Nausea Sofie Pang CNP   rosuvastatin (CRESTOR) 5 MG tablet Take 1 tablet (5 mg) by mouth daily High cholesterol Sofie Pang CNP   traZODone  (DESYREL) 100 MG tablet Take 1 to 3 tablets by mouth at bedtime as needed Depression, insomnia Sofie Pang CNP   vilazodone (VIIBRYD) 20 MG TABS tablet Take 1 tablet (20 mg) by mouth daily Depression Sofie Pang CNP         Add new medications, over-the-counter drugs, herbals, vitamins, or  minerals in the blank rows below.    Medication How I take it Why I use it Prescriber                                      Allergies:      - Compazine [prochlorperazine] - Unknown        Side effects I have had:      Not on File        Other Information:              My notes and questions:

## 2024-08-26 NOTE — Clinical Note
Hi Dr. Sandoval,  I am discussing a taper plan with my pharmacy colleague who works with pain patients and he thinks a 10-20% decrease each month is reasonable. I have not implemented this quite yet because I want her to discuss switching Viibryd to duloxetine with her psychiatry provider first (establishing care tomorrow).   I also wanted to see what your thoughts are on medical cannabis for this patient? I don't know which providers can certify. She has tried a THC gummy in the past with some effect. Seeing that her pain is so poorly controlled on the morphine, if we do take her off, I would want there to be some other options in place.   Thank you, Meseret Stout, PharmD Medication Therapy Management (MTM) Pharmacist

## 2024-08-27 DIAGNOSIS — R11.0 NAUSEA: ICD-10-CM

## 2024-08-27 RX ORDER — ONDANSETRON 4 MG/1
TABLET, FILM COATED ORAL
Qty: 30 TABLET | Refills: 1 | Status: SHIPPED | OUTPATIENT
Start: 2024-08-27

## 2024-08-27 ASSESSMENT — PATIENT HEALTH QUESTIONNAIRE - PHQ9
SUM OF ALL RESPONSES TO PHQ QUESTIONS 1-9: 11
10. IF YOU CHECKED OFF ANY PROBLEMS, HOW DIFFICULT HAVE THESE PROBLEMS MADE IT FOR YOU TO DO YOUR WORK, TAKE CARE OF THINGS AT HOME, OR GET ALONG WITH OTHER PEOPLE: EXTREMELY DIFFICULT

## 2024-08-28 ENCOUNTER — VIRTUAL VISIT (OUTPATIENT)
Dept: PSYCHIATRY | Facility: CLINIC | Age: 69
End: 2024-08-28
Payer: COMMERCIAL

## 2024-08-28 ENCOUNTER — VIRTUAL VISIT (OUTPATIENT)
Dept: BEHAVIORAL HEALTH | Facility: CLINIC | Age: 69
End: 2024-08-28
Payer: COMMERCIAL

## 2024-08-28 DIAGNOSIS — F34.1 PERSISTENT DEPRESSIVE DISORDER: Primary | ICD-10-CM

## 2024-08-28 DIAGNOSIS — F41.1 GAD (GENERALIZED ANXIETY DISORDER): Primary | ICD-10-CM

## 2024-08-28 DIAGNOSIS — F34.1 PERSISTENT DEPRESSIVE DISORDER: ICD-10-CM

## 2024-08-28 DIAGNOSIS — G89.29 OTHER CHRONIC PAIN: ICD-10-CM

## 2024-08-28 DIAGNOSIS — F41.1 GAD (GENERALIZED ANXIETY DISORDER): ICD-10-CM

## 2024-08-28 PROCEDURE — G2211 COMPLEX E/M VISIT ADD ON: HCPCS | Mod: 95 | Performed by: PSYCHIATRY & NEUROLOGY

## 2024-08-28 PROCEDURE — 90791 PSYCH DIAGNOSTIC EVALUATION: CPT | Mod: 95 | Performed by: COUNSELOR

## 2024-08-28 PROCEDURE — 99214 OFFICE O/P EST MOD 30 MIN: CPT | Mod: 95 | Performed by: PSYCHIATRY & NEUROLOGY

## 2024-08-28 ASSESSMENT — COLUMBIA-SUICIDE SEVERITY RATING SCALE - C-SSRS
ATTEMPT LIFETIME: NO
TOTAL  NUMBER OF INTERRUPTED ATTEMPTS LIFETIME: NO
REASONS FOR IDEATION PAST MONTH: COMPLETELY TO END OR STOP THE PAIN (YOU COULDN'T GO ON LIVING WITH THE PAIN OR HOW YOU WERE FEELING)
TOTAL  NUMBER OF ABORTED OR SELF INTERRUPTED ATTEMPTS LIFETIME: NO
1. HAVE YOU WISHED YOU WERE DEAD OR WISHED YOU COULD GO TO SLEEP AND NOT WAKE UP?: YES
1. IN THE PAST MONTH, HAVE YOU WISHED YOU WERE DEAD OR WISHED YOU COULD GO TO SLEEP AND NOT WAKE UP?: YES
6. HAVE YOU EVER DONE ANYTHING, STARTED TO DO ANYTHING, OR PREPARED TO DO ANYTHING TO END YOUR LIFE?: NO
REASONS FOR IDEATION LIFETIME: COMPLETELY TO END OR STOP THE PAIN (YOU COULDN'T GO ON LIVING WITH THE PAIN OR HOW YOU WERE FEELING)
2. HAVE YOU ACTUALLY HAD ANY THOUGHTS OF KILLING YOURSELF?: NO

## 2024-08-28 ASSESSMENT — PATIENT HEALTH QUESTIONNAIRE - PHQ9
SUM OF ALL RESPONSES TO PHQ QUESTIONS 1-9: 11
10. IF YOU CHECKED OFF ANY PROBLEMS, HOW DIFFICULT HAVE THESE PROBLEMS MADE IT FOR YOU TO DO YOUR WORK, TAKE CARE OF THINGS AT HOME, OR GET ALONG WITH OTHER PEOPLE: EXTREMELY DIFFICULT
SUM OF ALL RESPONSES TO PHQ QUESTIONS 1-9: 11

## 2024-08-28 ASSESSMENT — PAIN SCALES - GENERAL: PAINLEVEL: EXTREME PAIN (8)

## 2024-08-28 NOTE — PATIENT INSTRUCTIONS
Treatment Plan:  Continue trazodone 100-330 mg at bedtime for sleep. Discussed trying to decrease from 300 mg to 200 mg before next appointment.   Continue lamotrigine 100 mg daily for mood and to augment antidepressant medication.    Discontinue Viibryd/vilazodone: take 10 mg (half-tab) daily with 20 mg duloxetine for 5 days then STOP vilazodone.   Start duloxetine ER/Cymbalta for pain, mood, and anxiety: take 20 mg ONCE daily (with 10 mg vilazodone) for 5 days then increase to 20 mg TWICE daily (or 40 mg once daily is ok too).    Consider individual psychotherapy for additional support and ongoing development of nonpharmacologic coping skills and strategies.  Continue all other cares per primary care provider.   Continue all other medications as reviewed per electronic medical record today.   Safety plan reviewed. To the Emergency Department as needed or call after hours crisis line at [phone] or [phone]. [place] Crisis Text Line: Text [place] to 970787  or  Suicide LifeLine Chat: suicidepreventionSkyWireline.org/chat  Schedule an appointment with me in 6 weeks or sooner as needed.  Call [facility] at [phone] to schedule.  Follow up with primary care provider as planned or sooner if needed for acute medical concerns.  Call the psychiatric nurse line with medication questions or concerns at [phone].  [employer] may be used to communicate with your provider, but this is not intended to be used for emergencies.    Patient Education:  Get Out of Your Mind & Into Your Life   By Jay Jay Castillo    The Happiness Trap: How to Stop Struggling and Start Living  By Darvin Douglas    The Reality Slap: Finding Peace & Fulfillment When Life Hurts  By Darvin Douglas    Things Might Go Terribly, Horribly Wrong: A Guide to Life Liberated from Anxiety  By Sapna Arndt, PhD    Stress Less, Live More: How Acceptance and Commitment Therapy Can Help You Live a Busy Yet Balanced Life  By Sean Liz    Finding  "Life Beyond Trauma: Using Acceptance and Commitment Therapy to Heal From Post-Traumatic Stress and Trauma-Related Problems  By Zakia Berry and Renu Lucas    Other ACT Skills References     Darvin Douglas on YouTube - Demonstrates several different skills to deal with difficult thoughts and feelings     Book:  \"A Liberated Mind: How to Pivot Toward What Matters\" by Jay Jay Castillo     Psychological flexibility: How love turns pain into purpose  Tedx Talk by Dr. Castillo discussing his struggle with anxiety and panic disorder which motivated him to develop ACT therapy (Acceptance and Commitment Therapy)     You Are Not Your Thoughts      Care team has reviewed attendance agreement with patient. Patient advised that two failed appointments within 6 months may lead to termination of current episode of care.     Community Resources:    National Suicide Prevention Lifeline: 908.882.5125 (TTY: 100.693.4358). Call anytime for help.  (www.suicidepreventionlifeline.org)  National [place] on Mental Illness (www.graeme.org): 846.767.3081 or 567-373-1103.   Mental Health Association (www.mentalhealth.org): 617.820.4349 or 107-355-2460.  [place] Crisis Text Line: Text [place] to 085635  Suicide LifeLine Chat: suicidepreIntelliWare Systemsline.org/chat    Patient Education   Collaborative Care Psychiatry Service  What to Expect  Here's what to expect from your Collaborative Care Psychiatry Service (CCPS).   About CCPS  CCPS means 2 people work together to help you get better. You'll meet with a behavioral health clinician and a psychiatric doctor. A behavioral health clinician helps people with mental health problems by talking with them. A psychiatric doctor helps people by giving them medicine.  How it works  At every visit, you'll see the behavioral health clinician (BHC) first. They'll talk with you about how you're doing and teach you how to feel better.   Then you'll see the psychiatric doctor. This doctor can help you deal " "with troubling thoughts and feelings by giving you medicine. They'll make sure you know the plan for your care.   CCPS usually takes 3 to 6 visits. If you need more visits, we may have you start seeing a different psychiatric doctor for ongoing care.  If you have any questions or concerns, we'll be glad to talk with you.  About visits  Be open  At your visits, please talk openly about your problems. It may feel hard, but it's the best way for us to help you.  Cancelling visits  If you can't come to your visit, please call us right away at [phone]. If you don't cancel at least 24 hours (1 full day) before your visit, that's \"late cancellation.\"  Being late to visits  Being very late is the same as not showing up. You will be a \"no show\" if:  Your appointment starts with a [facility], and you're more than 15 minutes late for a 30-minute (half hour) visit. This will also cancel your appointment with the psychiatric doctor.  Your appointment is with a psychiatric doctor only, and you're more than 15 minutes late for a 30-minute (half hour) visit.  Your appointment is with a psychiatric doctor only, and you're more than 30 minutes late for a 60-minute (full hour) visit.  If you cancel late or don't show up 2 times within 6 months, we may end your care.   Getting help between visits  If you need help between visits, you can call us Monday to Friday from 8 a.m. to 4:30 p.m. at [phone].  Emergency care  Call 911 or go to the nearest emergency department if your life or someone else's life is in danger.  Call 988 anytime to reach the national Suicide and Crisis hotline.  Medicine refills  To refill your medicine, call your pharmacy. You can also call [facility]'s Behavioral Access at [phone], Monday to Friday, 8 a.m. to 4:30 p.m. It can take 1 to 3 business days to get a refill.   Forms, letters, and tests  You may have papers to fill out, like FMLA, short-term disability, and workability. We can help " you with these forms at your visits, but you must have an appointment. You may need more than 1 visit for this, to be in an intensive therapy program, or both.  Before we can give you medicine for ADHD, we may refer you to get tested for it or confirm it another way.  We may not be able to give you an emotional support animal letter.  We don't do mental health checks ordered by the court.   We don't do mental health testing, but we can refer you to get tested.   Thank you for choosing us for your care.  For informational purposes only. Not to replace the advice of your health care provider. Copyright   2022 [facility]. All rights reserved. [employer] 129252 - 12/22.

## 2024-08-28 NOTE — NURSING NOTE
Is the patient currently in the state of [place]? YES    Current patient location: [address] [place] [id number]    Visit mode:VIDEO    If the visit is dropped, the patient can be reconnected by: VIDEO VISIT:  Send e-mail to at [email or web address]    Will anyone else be joining the visit? No  (If patient encounters technical issues they should call [phone])    How would you like to obtain your AVS? MyChart    Are changes needed to the allergy or medication list? No    Are refills needed on medications prescribed by this physician? NO    Rooming Documentation: Questionnaire(s) completed.    Reason for visit: Consult     REESE Lopez      Care team has reviewed attendance agreement with patient. Patient advised that two failed appointments within 6 months may lead to termination of current episode of care.

## 2024-08-28 NOTE — PROGRESS NOTES
"Telemedicine Visit: The patient's condition can be safely assessed and treated via synchronous audio and visual telemedicine encounter.      Reason for Telemedicine Visit: Patient has requested telehealth visit    Originating Site (Patient Location): Patient's home    Distant Location (provider location):  Off-site    Consent:  The patient/guardian has verbally consented to: the potential risks and benefits of telemedicine (video visit) versus in person care; bill my insurance or make self-payment for services provided; and responsibility for payment of non-covered services.     Mode of Communication:  Video Conference via [employer]    As the provider I attest to compliance with applicable laws and regulations related to telemedicine.                                              Outpatient Psychiatric Evaluation- Standard  Adult    Name:  Conrad Zhu  : 1955    Source of Referral:  Primary Care Provider: Ksenia Sandoval MD   Current Psychotherapist: None     Per primary care provider referral:  My Clinical Question Is: depression/anxiety-not improving with current medication viibryd , lamitical, hydroxyzine, trazodone.       Identifying Data:  Patient is a 68 year old, partnered / significant other  White Not  or  who presents for initial visit with me.  Patient is currently disabled. Patient attended the phone/video session alone. Patient prefers to be called: \"Conrad\"    Chief Complaint:  Patient presents with:  Consult      HPI:  Conrad Zhu is a 68 year old with past history including depression and anxiety who presents today for psychiatric assessment.     Patient with long mental health history.  Patient struggled with substance abuse over 20-30 years ago.  Per chart review, had struggled with cocaine and used Fioricet and opioids.  Had treatment under the care of Dr. Levi.  Today patient denies any problematic drug or alcohol use for 20-30 years.  Patient does " "struggle with chronic pain and has been on prescribed opioid pain medication.  Currently interested in tapering off morphine.  Patient has had some persistent depression symptoms and anxiety.  No panic symptoms.  More recently has been feeling stuck.  Does not remember much about past mental health medication trials.  Currently she reports \"my head will not shut down.\"  She reports her mind is frequently racing with her depression.  Patient also states she often feels alone.  Patient had been followed by psychiatrist, Dr. Davis and psychotherapy in the past.  Patient currently taking Viibryd, lamotrigine, and trazodone.  Not sure how helpful current medications have been and has been thinking about a washout of mental health medications for a while.  Patient has challenging relationship with her current partner with whom she lives.  Reports feeling physically safe.  Patient has struggled significantly with chronic pain and disability since 2016.  Patient also with significant amount of grief after losing her daughter to a car accident in 1997-her daughter was 9 months pregnant at the time.  Chronic passive SI with no plan or intent to harm self.    Psych Meds at Intake:  Trazodone 300 mg daily at bedtime   Viibryd 20 mg   Lamotrigine 100 mg - several years to augment      Past Psych Meds:  Wellbutrin   Lexapro  Topamax  Paxil  zoloft    Per [facility] [place] Chanelle Garcia [facility], during today's team-based visit:  MH update:  ROS above  Stresses:  is getting off morphine, chronic pain, grief/loss, resentment from needing caregiving/support  Appetite: n/a  Sleep: very poor/pain  Outpatient Provider updates: intake 11/14, [facility].  Decline MHS.  SI/SIB/HI:  Chronic passive ideation without plan or intent.  Denies previous attempts.  SHANTE:  Denies concerns.  Interested in medical THC.  Hx of cocaine/opiate abuse with tx/detox  Side effects/compliance:  Interventions:  [facility] [place] engaged in completing DA with psychoeducation and tx " planning  Most important:  meds aren't working, mind not shutting off.     Patient reports the following compulsive behaviors and treatment history:  n/a .    Past diagnoses include: depression and anxiety  Current medications include: has a current medication list which includes the following prescription(s): acetaminophen-codeine, cholecalciferol, lamotrigine, mirabegron, [START ON 8/30/2024] morphine, naloxone, ondansetron, rosuvastatin, trazodone, vilazodone, cyclobenzaprine, and hydroxyzine hcl, and the following Facility-Administered Medications: denosumab.   Medication side effects: Denies  Current stressors include: Symptoms and see HPI above  Coping mechanisms and supports include: Family and Hobbies    Psychiatric Review of Symptoms Per [facility] during today's team-based visit:  Depression:     Change in sleep, Lack of interest, Excessive or inappropriate guilt, Change in energy level, Difficulties concentrating, Feelings of hopelessness, Feelings of helplessness, Low self-worth, Ruminations, Irritability, Feeling sad, down, or depressed, Withdrawn, and Anger outbursts, brain fog  Passive suicidal ideation without out planning/intent chronic due to pain  One instant post her daughter/granddaughters death where ideation increased, no intent/plan/attempt  Digna:             No Symptoms  Psychosis:       No Symptoms  Anxiety:           Excessive worry, Nervousness, Physical complaints, such as headaches, stomachaches, muscle tension, Separation anxiety, Sleep disturbance, Ruminations, Poor concentration, Irritability, and Anger outbursts  Panic:              No symptoms  Post Traumatic Stress Disorder:  No Symptoms   Eating Disorder:          No Symptoms  ADD / ADHD:              No symptoms  Conduct Disorder:       No symptoms  Autism Spectrum Disorder:     No symptoms  Obsessive Compulsive Disorder:       No Symptoms    All other ROS negative.     PHQ-9 scores:       5/28/2024     1:42 PM  8/27/2024     3:11 PM 8/28/2024     7:56 AM   PHQ-9 SCORE   PHQ-9 Total Score MyChart  11 (Moderate depression) 11 (Moderate depression)   PHQ-9 Total Score 4 11    11 11       ISIDORO-7 scores:        5/9/2022    12:07 PM 9/25/2023     9:59 AM 12/21/2023    10:49 AM   ISIDORO-7 SCORE   Total Score  5 (mild anxiety) 8 (mild anxiety)   Total Score 6 5 8       Medical Review of Systems:  10 systems (general, cardiovascular, respiratory, eyes, ENT, endocrine, GI, , M/S, neurological) were reviewed. Most pertinent finding(s) is/are: chronic pain. The remaining systems are all unremarkable.    A 12-item WHODAS 2.0 assessment was not completed.    Psychiatric History:   Hospitalizations: None  History of Commitment? No   Past Treatment: counseling, medication(s) from physician / PCP, and psychiatry  Suicide Attempts: No   Current Suicide Risk: Suicide Assessment Completed Today.  Self-injurious Behavior: Denies  Electroconvulsive Therapy (ECT) or Transcranial Magnetic Stimulation (TMS): No   [employer] Genetic Testing: No     Past medication trials include but are not limited to:   Psych Meds at Intake:  Trazodone 300 mg daily at bedtime   Viibryd 20 mg   Lamotrigine 100 mg - several years to augment      Past Psych Meds:  Wellbutrin   Lexapro  Topamax  Paxil  zoloft    Substance Use History:  Current Use of Drugs/Alcohol: Denies except for rare social alcohol use  Past Use of Drugs/Alcohol: hx of drug abuse 20+ years ago (per chart review hx of cocaine and heroin use); hx of cannabis use  Patient reports problems as a result of their drinking / drug use.  Patient has received chemical dependency treatment in the past at unknown (over 20-30 years ago).  Recovery Programming Involvement: None    Tobacco use: History/former    Based on the clinical interview, there  are not indications of drug or alcohol abuse. Continue to monitor.   Discussed effect of substance use on overall health.     Past Medical History:  Past Medical  History:   Diagnosis Date    Anemia     Anxiety     Chronic pain 08/14/2016    Depression     Former smoker     History of cocaine abuse (H)     Insomnia 08/14/2016    Liver disease     Low back pain 08/14/2016    Migraine headache 08/14/2016    Osteoporosis 08/14/2016    PN (peripheral neuropathy) 08/14/2016    PONV (postoperative nausea and vomiting)       Surgery:   Past Surgical History:   Procedure Laterality Date    EXCISE TOENAIL(S) Left 06/15/2023    Procedure: Permanent nail avulsion medial border left hallux;  Surgeon: Mark Lechuga DPM;  Location: [facility]    LAPAROTOMY EXPLORATORY      Left ankle surgery  02/08/2016    [facility] - Pre-Operative Diagnosis: Left foot drop with left equinus deformity and mild varus.  Procedure: Achilles lengthening. Posterior ankle and subtalar joint releases. Tibialis anterior tendon transfer.    MAMMOPLASTY AUGMENTATION      ORIF ACETABULUM FRACTURE      left hip fracture with ORIF on 11/2019    TUBAL LIGATION       Food and Medicine Allergies:     Allergies   Allergen Reactions    Compazine [Prochlorperazine] Unknown     Pt stated mouth freezes     Seizures or Head Injury: Yes  hx of 2 x concussions (no known seizures)  Diet:  not discussed  Exercise: not discussed  Supplements: Reviewed per Electronic Medical Record Today    Current Medications:    Current Outpatient Medications:     acetaminophen-codeine (TYLENOL #4) 300-60 MG per tablet, Take 1-2 tablets by mouth every 8 hours as needed for severe pain. Do not start before August 28, 2024., Disp: 180 tablet, Rfl: 0    cholecalciferol 50 MCG (2000 UT) tablet, Take 4,000 Units by mouth Every other day, Disp: , Rfl:     lamoTRIgine (LAMICTAL) 100 MG tablet, Take 1 tablet (100 mg) by mouth daily, Disp: 90 tablet, Rfl: 3    mirabegron (MYRBETRIQ) 25 MG 24 hr tablet, Take 1 tablet (25 mg) by mouth daily, Disp: 90 tablet, Rfl: 3    [START ON 8/30/2024] morphine (MS CONTIN) 15 MG CR tablet, Take 1  tablet (15 mg) by mouth every 12 hours, Disp: 60 tablet, Rfl: 0    naloxone (NARCAN) 4 MG/0.1ML nasal spray, [NALOXONE (NARCAN) 4 MG/ACTUATION NASAL SPRAY] 1 spray (4 mg dose) into one nostril for opioid reversal. Call 911. May repeat if no response in 3 minutes., Disp: 1 each, Rfl: 1    ondansetron (ZOFRAN) 4 MG tablet, Take 1 Tablet (4 mg) by mouth every 8 hours if needed for nausea/vomiting, Disp: 30 tablet, Rfl: 1    rosuvastatin (CRESTOR) 5 MG tablet, Take 1 tablet (5 mg) by mouth daily, Disp: 90 tablet, Rfl: 3    traZODone (DESYREL) 100 MG tablet, Take 1 to 3 tablets by mouth at bedtime as needed, Disp: 270 tablet, Rfl: 3    vilazodone (VIIBRYD) 20 MG TABS tablet, Take 1 tablet (20 mg) by mouth daily, Disp: 90 tablet, Rfl: 3    cyclobenzaprine (FLEXERIL) 5 MG tablet, Take 1-2 tablets (5-10 mg) by mouth 3 times daily as needed for muscle spasms (Patient not taking: Reported on 8/26/2024), Disp: 30 tablet, Rfl: 2    hydrOXYzine HCl (ATARAX) 25 MG tablet, Take 1-2 Tablets (25-50 mg) by mouth 3 times daily if needed for anxiety. (Patient not taking: Reported on 8/26/2024), Disp: 60 tablet, Rfl: 3    Current Facility-Administered Medications:     denosumab (PROLIA) injection 60 mg, 60 mg, Subcutaneous, Q6 Months, Blanca Duarte, NP, 60 mg at 03/21/24 0753    The [place] Prescription Monitoring Program has been reviewed and there are no concerns about diversionary activity for controlled substances at this time.    07/31/2024 07/23/2024 1 Morphine Sulf Er 15 Mg Tablet 60.00 30 Ma Adrianne [id number] All (0069) 0/0 30.00 [place]   07/30/2024 07/26/2024 1 Acetaminophen-Cod #4 Tablet 180.00 30 Ma Adrianne [id number] All (0069) 0/0 54.00 [place]   07/03/2024 07/03/2024 1 Acetaminophen-Cod #4 Tablet 180.00 30 Me Rot [id number] All (0069) 0/0 54.00 [place]   07/01/2024 06/26/2024 1 Morphine Sulf Er 15 Mg Tablet 60.00 30 Ma Adrianne [id number] All (0069) 0/0 30.00 [place]   06/07/2024 06/07/2024 1  Hydrocodone-Acetamin  Mg 20.00 5 Mi Flako [id number] All (0069) 0/0 40.00 [place]       Vital Signs:  None since this is a phone/video visit.     Labs:  Most recent laboratory results reviewed and the pertinent results include:   Office Visit on 05/28/2024   Component Date Value Ref Range Status    Amphetamines Urine 05/28/2024 Screen Negative  Screen Negative Final    Cutoff for a negative amphetamine is less than 500 ng/mL.    Barbituates Urine 05/28/2024 Screen Negative  Screen Negative Final    Cutoff for a negative barbiturate is less than 200 ng/mL.    Benzodiazepine Urine 05/28/2024 Screen Negative  Screen Negative Final    Cutoff for a negative benzodiazepine is less than 100 ng/mL.    Cannabinoids Urine 05/28/2024 Screen Negative  Screen Negative Final    Cutoff for a negative cannabinoid is less than 50 ng/mL.    Cocaine Urine 05/28/2024 Screen Negative  Screen Negative Final    Cutoff for a negative cocaine is less than 300 ng/mL.    Fentanyl Qual Urine 05/28/2024 Screen Negative  Screen Negative Final    Cutoff for negative fentanyl is less than 5 ng/mL.    Opiates Urine 05/28/2024 Screen Positive (A)  Screen Negative Final    Cutoff for a positive opiate is 300 ng/mL or greater.  This is an unconfirmed screening result to be used for medical purposes only.    PCP Urine 05/28/2024 Screen Negative  Screen Negative Final    Cutoff for a negative PCP is less than 25 ng/mL.    Hydrocodone, Urine 05/28/2024 147  ng/mL Final    Hydromorphone, Urine 05/28/2024 <20  ng/mL Final    Codeine, Urine 05/28/2024 151  ng/mL Final    Morphine, Urine 05/28/2024 318  ng/mL Final    6-acetylmorphine, Urn, Quant 05/28/2024 <10  ng/mL Final    Comment: INTERPRETIVE INFORMATION: Opiates, Urine,                             Quantitative    Methodology: Quantitative Liquid Chromatography-Tandem Mass   Spectrometry    Positive cutoff: 20 ng/mL except as specified below  6-acetylmorphine      10 ng/mL    For medical  purposes only; not valid for forensic use.     Identification of specific drug(s) taken by specimen donor   is problematic due to common metabolites, some of which are   prescription drugs themselves. The absence of expected   drug(s) and/or drug metabolite(s) may indicate   non-compliance, inappropriate timing of specimen collection   relative to drug administration, poor drug absorption,   diluted/adulterated urine, or limitations of testing. All   drug analytes covered are in the non-glucuronidated (free)   forms. The concentration value must be greater than or   equal to the cutoff to be reported as positive. A very   small amount of an unexpected drug analyte in the presence   of a large amount of an expected drug analyte                            may reflect   pharmaceutical impurity. Interpretive questions should be   directed to the laboratory.    This test was developed and its performance characteristics   determined by [facility]. It has not been cleared or   approved by the US Food and Drug Administration. This test   was performed in a CLIA certified laboratory and is   intended for clinical purposes.    Noroxycodone, Urn, Quant 05/28/2024 <20  ng/mL Final    Oxycodone, Urn, Quant 05/28/2024 <20  ng/mL Final    Oxymorphone, Urn, Quant 05/28/2024 <20  ng/mL Final    Norhydrocodone, Urn, Quant 05/28/2024 170  ng/mL Final    Performed By: [facility]  [address] [place] [id number]  : Tristan Mitchell MD, PhD  CLIA Number: [id number]    Noroxymorphone, Urn, Quant 05/28/2024 <20  ng/mL Final   Lab on 12/21/2023   Component Date Value Ref Range Status    Sodium 12/21/2023 140  135 - 145 mmol/L Final    Reference intervals for this test were updated on 09/26/2023 to more accurately reflect our healthy population. There may be differences in the flagging of prior results with similar values performed with this method. Interpretation of those prior results can be  made in the context of the updated reference intervals.     Potassium 12/21/2023 4.3  3.4 - 5.3 mmol/L Final    Carbon Dioxide (CO2) 12/21/2023 27  22 - 29 mmol/L Final    Anion Gap 12/21/2023 10  7 - 15 mmol/L Final    Urea Nitrogen 12/21/2023 14.4  8.0 - 23.0 mg/dL Final    Creatinine 12/21/2023 0.72  0.51 - 0.95 mg/dL Final    GFR Estimate 12/21/2023 >90  >60 mL/min/1.73m2 Final    Calcium 12/21/2023 9.2  8.8 - 10.2 mg/dL Final    Chloride 12/21/2023 103  98 - 107 mmol/L Final    Glucose 12/21/2023 104 (H)  70 - 99 mg/dL Final    Alkaline Phosphatase 12/21/2023 51  40 - 150 U/L Final    Reference intervals for this test were updated on 11/14/2023 to more accurately reflect our healthy population. There may be differences in the flagging of prior results with similar values performed with this method. Interpretation of those prior results can be made in the context of the updated reference intervals.    AST 12/21/2023 32  0 - 45 U/L Final    Reference intervals for this test were updated on 6/12/2023 to more accurately reflect our healthy population. There may be differences in the flagging of prior results with similar values performed with this method. Interpretation of those prior results can be made in the context of the updated reference intervals.    ALT 12/21/2023 18  0 - 50 U/L Final    Reference intervals for this test were updated on 6/12/2023 to more accurately reflect our healthy population. There may be differences in the flagging of prior results with similar values performed with this method. Interpretation of those prior results can be made in the context of the updated reference intervals.      Protein Total 12/21/2023 7.0  6.4 - 8.3 g/dL Final    Albumin 12/21/2023 4.2  3.5 - 5.2 g/dL Final    Bilirubin Total 12/21/2023 0.4  <=1.2 mg/dL Final    Cholesterol 12/21/2023 117  <200 mg/dL Final    Triglycerides 12/21/2023 63  <150 mg/dL Final    Direct Measure HDL 12/21/2023 49 (L)  >=50 mg/dL Final     LDL Cholesterol Calculated 12/21/2023 55  <=100 mg/dL Final    Non HDL Cholesterol 12/21/2023 68  <130 mg/dL Final    Patient Fasting > 8hrs? 12/21/2023 Yes   Final    Vitamin D, Total (25-Hydroxy) 12/21/2023 77 (H)  20 - 50 ng/mL Final    indicates supplementation, with increased risk of hypercalciuria    WBC Count 12/21/2023 6.4  4.0 - 11.0 10e3/uL Final    RBC Count 12/21/2023 4.09  3.80 - 5.20 10e6/uL Final    Hemoglobin 12/21/2023 12.1  11.7 - 15.7 g/dL Final    Hematocrit 12/21/2023 37.4  35.0 - 47.0 % Final    MCV 12/21/2023 91  78 - 100 fL Final    MCH 12/21/2023 29.6  26.5 - 33.0 pg Final    MCHC 12/21/2023 32.4  31.5 - 36.5 g/dL Final    RDW 12/21/2023 13.1  10.0 - 15.0 % Final    Platelet Count 12/21/2023 336  150 - 450 10e3/uL Final    % Neutrophils 12/21/2023 65  % Final    % Lymphocytes 12/21/2023 26  % Final    % Monocytes 12/21/2023 7  % Final    % Eosinophils 12/21/2023 1  % Final    % Basophils 12/21/2023 1  % Final    % Immature Granulocytes 12/21/2023 0  % Final    Absolute Neutrophils 12/21/2023 4.2  1.6 - 8.3 10e3/uL Final    Absolute Lymphocytes 12/21/2023 1.7  0.8 - 5.3 10e3/uL Final    Absolute Monocytes 12/21/2023 0.4  0.0 - 1.3 10e3/uL Final    Absolute Eosinophils 12/21/2023 0.1  0.0 - 0.7 10e3/uL Final    Absolute Basophils 12/21/2023 0.0  0.0 - 0.2 10e3/uL Final    Absolute Immature Granulocytes 12/21/2023 0.0  <=0.4 10e3/uL Final    Amphetamines Urine 12/21/2023 Screen Positive (A)  Screen Negative Final    Cutoff for a positive amphetamine is 500 ng/mL or greater.   This is an unconfirmed screening result to be used for medical purposes only.    Barbituates Urine 12/21/2023 Screen Negative  Screen Negative Final    Cutoff for a negative barbiturate is less than 200 ng/mL.    Benzodiazepine Urine 12/21/2023 Screen Negative  Screen Negative Final    Cutoff for a negative benzodiazepine is less than 100 ng/mL.    Cannabinoids Urine 12/21/2023 Screen Negative  Screen Negative Final     Cutoff for a negative cannabinoid is less than 50 ng/mL.    Cocaine Urine 12/21/2023 Screen Negative  Screen Negative Final    Cutoff for a negative cocaine is less than 300 ng/mL.    Fentanyl Qual Urine 12/21/2023 Screen Negative  Screen Negative Final    Cutoff for negative fentanyl is less than 5 ng/mL.    Opiates Urine 12/21/2023 Screen Positive (A)  Screen Negative Final    Cutoff for a positive opiate is 300 ng/mL or greater.  This is an unconfirmed screening result to be used for medical purposes only.    PCP Urine 12/21/2023 Screen Negative  Screen Negative Final    Cutoff for a negative PCP is less than 25 ng/mL.    Hemoglobin A1C 12/21/2023 5.6  0.0 - 5.6 % Final    Normal <5.7%   Prediabetes 5.7-6.4%    Diabetes 6.5% or higher     Note: Adopted from ADA consensus guidelines.     Most recent EKG from 6/12/2023 reviewed. QTc interval 457.      Family History:   Patient reported family history includes:   Family History   Problem Relation Age of Onset    Cancer Mother     Cancer Father     Cancer Sister     Breast Cancer Sister         lumptecomy 40s    Cancer Brother      Mental Illness History: Yes: sister  Substance Abuse History: Yes: sister  Suicide History: Unknown  Medications: Unknown     Social History:    Social History                [per patient report]   Financial- What are your current financial sources?: other, If other, please list below:: Trust and SSI, Does your finances cause stress?: does  Employment- What is your employment status?: disabled, If you work in a paying job or as a volunteer, describe the job and how long you have held it: : No Did you serve in the ?: did not  Living situation- What is your housing situation?: staying in own home/apartment  Feels safe at home- Yes  Household / family- Name: Brock Norris, Age: 66, Relationship: Boyfried, Living in same house?: yes  Relationships- What is your current relationship status? : has a partner or significant other, What is  "your sexual orientation?: heterosexual  Children- Do you have children?: yes, How many children do you have?: 1, Ages of girls:: 41  Social/spiritual support- Who are the most supportive people in your life?  : partner  Cultural- What is your cultural background? : , What are ethnic, cultural, or Methodist influences that may be useful to know about you (for example history of experiencing discrimination, growing up rural/urban, valuing culturally specific treatments)?  : Rastafari, What is your preferred language?  : English  Education- What is your highest education? : some college  Early history- Where did you grow up?: other, If other, please list below:: [facility], Who took care of you as a child?: biological father;stepmother  Raised by- How would you describe your parent's relationships?: one or both remarried, How old were you when they remarried?: 10  Siblings- Do you have siblings?: yes, How many full siblings do you have?: 2  Quality of family relationships- How would you describe your current family relationships?: good  Legal- Have you been involved with the legal system (child custody, order for protection, DWI, etc.)?: have not, Do you have a ?  : does not    Social History Per [facility] Chanelle Garcia [facility], during today's team-based visit:  Patient reported they grew up in  [place], moved to [place] when Pt was 39 years ago.  They were raised by biological father; stepmother  .  Parents one or both remarried.  Patient reported that their childhood was \"everything was fine, Pt was 8 when dad took custody.  Sister whom is older stayed with mom.   Pt has 2 siblings.  Pt notes that her dad remarried at age 10 . Patient described their current relationships with family of origin as dad passed away a few years ago.  Good relationship with step mom. Good relationship with brother.  Poor relationship with sister who struggles with addiction and mental health concerns.      The patient " "describes their cultural background as .  Cultural influences and impact on patient's life structure, values, norms, and healthcare: Alevism.  Contextual influences on patient's health include: N/a.    These factors will be addressed in the Preliminary Treatment plan. Patient identified their preferred language to be English. Patient reported they does not need the assistance of an  or other support involved in therapy.      Patient reported had no significant delays in developmental tasks.   Patient's highest education level was some college  .  Patient identified the following learning problems: none reported.  Modifications will not be used to assist communication in therapy.  Patient reports they are  able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

## 2024-09-06 ENCOUNTER — TELEPHONE (OUTPATIENT)
Dept: INTERNAL MEDICINE | Facility: CLINIC | Age: 69
End: 2024-09-06
Payer: COMMERCIAL

## 2024-09-06 DIAGNOSIS — G90.522 COMPLEX REGIONAL PAIN SYNDROME I OF LEFT LOWER LIMB: Primary | ICD-10-CM

## 2024-09-06 NOTE — TELEPHONE ENCOUNTER
Order/Referral Request    Who is requesting: Pt    Orders being requested: Referral for Left leg and foot brace and new Insoles for both feet    Reason service is needed/diagnosis: N/A    When are orders needed by: ASAP    Has this been discussed with Provider: No    Does patient have a preference on a Group/Provider/Facility? N/A    Does patient have an appointment scheduled?: No    Where to send orders: Fax- [phone] - [facility] in [place]    Could we send this information to you in [facility] or would you prefer to receive a phone call?:   Patient would prefer a phone call   Okay to leave a detailed message?: Yes at Cell number on file:    Telephone Information:   Mobile [phone]

## 2024-09-06 NOTE — TELEPHONE ENCOUNTER
I don't see any previous orders in the chart. Need more info from patient and/or [facility] about diagnosis that braces are for and what type, etc.     Often for these things [facility] is able to fax us with specific request.

## 2024-09-16 ENCOUNTER — LAB (OUTPATIENT)
Dept: LAB | Facility: CLINIC | Age: 69
End: 2024-09-16
Payer: COMMERCIAL

## 2024-09-16 DIAGNOSIS — M81.8 OTHER OSTEOPOROSIS WITHOUT CURRENT PATHOLOGICAL FRACTURE: ICD-10-CM

## 2024-09-16 LAB
CALCIUM SERPL-MCNC: 9.5 MG/DL (ref 8.8–10.4)
CREAT SERPL-MCNC: 0.75 MG/DL (ref 0.51–0.95)
EGFRCR SERPLBLD CKD-EPI 2021: 86 ML/MIN/1.73M2

## 2024-09-16 PROCEDURE — 82565 ASSAY OF CREATININE: CPT

## 2024-09-16 PROCEDURE — 82310 ASSAY OF CALCIUM: CPT

## 2024-09-16 PROCEDURE — 36415 COLL VENOUS BLD VENIPUNCTURE: CPT

## 2024-09-23 ENCOUNTER — MYC MEDICAL ADVICE (OUTPATIENT)
Dept: ENDOCRINOLOGY | Facility: CLINIC | Age: 69
End: 2024-09-23
Payer: COMMERCIAL

## 2024-09-23 DIAGNOSIS — G89.4 CHRONIC PAIN SYNDROME: ICD-10-CM

## 2024-09-23 RX ORDER — ACETAMINOPHEN AND CODEINE PHOSPHATE 300; 60 MG/1; MG/1
1-2 TABLET ORAL EVERY 8 HOURS PRN
Qty: 180 TABLET | Refills: 0 | Status: SHIPPED | OUTPATIENT
Start: 2024-09-28

## 2024-09-23 NOTE — TELEPHONE ENCOUNTER
Spoke to -  states pt has had a poor healing event prior. They would like to know when they should do the implant. Informed  4-5 months after Prolia injection. Closer to 5 month would be better for pt. As pt's history with poor healing and previous ONJ. Provider would like if a letter/order can be sent to their clinic stating this.

## 2024-09-23 NOTE — LETTER
9/23/2024      Conrad Zhu  [address] [address] [address]       and Team;    Patient will be receiving Prolia injection on 9/26/24, any extractions or implants should be held till 5 months post injection. Patient will also need a clearance from  office 4-6 weeks after extractions or implants that the site has healed, before receiving next Prolia injection.     If you have any questions please contact our clinic at [phone]      Sincerely,        Blanca Duarte NP

## 2024-09-26 ENCOUNTER — ALLIED HEALTH/NURSE VISIT (OUTPATIENT)
Dept: ENDOCRINOLOGY | Facility: CLINIC | Age: 69
End: 2024-09-26
Payer: COMMERCIAL

## 2024-09-26 DIAGNOSIS — M81.8 OTHER OSTEOPOROSIS WITHOUT CURRENT PATHOLOGICAL FRACTURE: Primary | ICD-10-CM

## 2024-09-26 DIAGNOSIS — Z92.29 PERSONAL HISTORY OF OTHER DRUG THERAPY: ICD-10-CM

## 2024-09-26 PROCEDURE — 99207 PR NO CHARGE NURSE ONLY: CPT

## 2024-09-26 PROCEDURE — 96372 THER/PROPH/DIAG INJ SC/IM: CPT | Performed by: NURSE PRACTITIONER

## 2024-09-26 PROCEDURE — 99207 PR NO CHARGE NURSE ONLY: CPT | Performed by: NURSE PRACTITIONER

## 2024-09-26 NOTE — PROGRESS NOTES
Clinic Administered Medication Documentation      Prolia Documentation    Indication: Prolia  (denosumab) is a prescription medicine used to treat osteoporosis in patients who:   Are at high risk for fracture, meaning patients who have had a fracture related to osteoporosis, or who have multiple risk factors for fracture.  Cannot use another osteoporosis medicine or other osteoporosis medicines did not work well.  The timeline for early/late injections would be 4 weeks early and any time after the 6 month lucie. If a patient receives their injection late, then the subsequent injection would be 6 months from the date that they actually received the injection.    When was the last injection?  24  Was the last injection at least 6 months ago? Yes  Has the prior authorization been completed?  Yes  Is there an active order (written within the past 365 days, with administrations remaining, not ) in the chart?  Yes   GFR Estimate   Date Value Ref Range Status   2024 86 >60 mL/min/1.73m2 Final     Comment:     eGFR calculated using  CKD-EPI equation.   2021 >60 >60 mL/min/1.73m2 Final     Has patient had a GFR within the last 12 months? Yes   Is GFR under 30, or patient has a diagnosis of CKD4 or CKD5? No   Patient denies gastric bypass or parathyroid surgery in past 6 months? Yes - patient denies.   Patient denies dental work in the past two months involving drilling into the bone, such as implants/extractions, oral surgery or a tooth extraction that has not healed yet?  Yes  Patient denies plans for an emergency tooth extraction within the next week? Yes    The following steps were completed to comply with the REMS program for Prolia:  Reviewed information in the Medication Guide, including the serious risks of Prolia  and the symptoms of each risk.  Advised patient to seek prompt medical attention if they have signs or symptoms of any of the serious risks.  Provided each patient a copy of the  Medication Guide and Patient Guide.    Prior to injection, verified patient identity using patient's name and date of birth. Medication was administered. Please see MAR and medication order for additional information. Patient instructed to remain in clinic for 15 minutes and report any adverse reaction to staff immediately.    Vial/Syringe: Syringe  Was this medication supplied by the patient? No  Verified that the patient has refills remaining in their prescription.

## 2024-09-29 SDOH — HEALTH STABILITY: PHYSICAL HEALTH: ON AVERAGE, HOW MANY MINUTES DO YOU ENGAGE IN EXERCISE AT THIS LEVEL?: 0 MIN

## 2024-09-29 SDOH — HEALTH STABILITY: PHYSICAL HEALTH: ON AVERAGE, HOW MANY DAYS PER WEEK DO YOU ENGAGE IN MODERATE TO STRENUOUS EXERCISE (LIKE A BRISK WALK)?: 0 DAYS

## 2024-09-29 ASSESSMENT — SOCIAL DETERMINANTS OF HEALTH (SDOH): HOW OFTEN DO YOU GET TOGETHER WITH FRIENDS OR RELATIVES?: ONCE A WEEK

## 2024-10-03 ASSESSMENT — PATIENT HEALTH QUESTIONNAIRE - PHQ9
SUM OF ALL RESPONSES TO PHQ QUESTIONS 1-9: 5
SUM OF ALL RESPONSES TO PHQ QUESTIONS 1-9: 5
10. IF YOU CHECKED OFF ANY PROBLEMS, HOW DIFFICULT HAVE THESE PROBLEMS MADE IT FOR YOU TO DO YOUR WORK, TAKE CARE OF THINGS AT HOME, OR GET ALONG WITH OTHER PEOPLE: NOT DIFFICULT AT ALL

## 2024-10-04 ENCOUNTER — OFFICE VISIT (OUTPATIENT)
Dept: INTERNAL MEDICINE | Facility: CLINIC | Age: 69
End: 2024-10-04
Payer: COMMERCIAL

## 2024-10-04 VITALS
OXYGEN SATURATION: 94 % | RESPIRATION RATE: 12 BRPM | HEIGHT: 59 IN | DIASTOLIC BLOOD PRESSURE: 70 MMHG | SYSTOLIC BLOOD PRESSURE: 152 MMHG | BODY MASS INDEX: 27.74 KG/M2 | TEMPERATURE: 98.9 F | HEART RATE: 90 BPM | WEIGHT: 137.6 LBS

## 2024-10-04 DIAGNOSIS — G56.42 COMPLEX REGIONAL PAIN SYNDROME TYPE 2 OF LEFT UPPER EXTREMITY: ICD-10-CM

## 2024-10-04 DIAGNOSIS — G89.4 CHRONIC PAIN SYNDROME: ICD-10-CM

## 2024-10-04 DIAGNOSIS — E78.2 MIXED HYPERLIPIDEMIA: ICD-10-CM

## 2024-10-04 DIAGNOSIS — M25.562 ACUTE PAIN OF LEFT KNEE: ICD-10-CM

## 2024-10-04 DIAGNOSIS — R03.0 ELEVATED BP WITHOUT DIAGNOSIS OF HYPERTENSION: ICD-10-CM

## 2024-10-04 DIAGNOSIS — F11.20 CONTINUOUS OPIOID DEPENDENCE (H): ICD-10-CM

## 2024-10-04 DIAGNOSIS — Z23 ENCOUNTER FOR VACCINATION: ICD-10-CM

## 2024-10-04 DIAGNOSIS — Z00.00 ENCOUNTER FOR MEDICARE ANNUAL WELLNESS EXAM: Primary | ICD-10-CM

## 2024-10-04 DIAGNOSIS — N32.81 OAB (OVERACTIVE BLADDER): ICD-10-CM

## 2024-10-04 DIAGNOSIS — G47.00 INSOMNIA, UNSPECIFIED TYPE: ICD-10-CM

## 2024-10-04 DIAGNOSIS — F41.9 ANXIETY: ICD-10-CM

## 2024-10-04 DIAGNOSIS — M81.0 AGE-RELATED OSTEOPOROSIS WITHOUT CURRENT PATHOLOGICAL FRACTURE: ICD-10-CM

## 2024-10-04 PROBLEM — G89.18 ACUTE POST-OPERATIVE PAIN: Status: RESOLVED | Noted: 2019-11-12 | Resolved: 2024-10-04

## 2024-10-04 PROBLEM — L60.0 INGROWING NAIL: Status: RESOLVED | Noted: 2023-06-06 | Resolved: 2024-10-04

## 2024-10-04 LAB
ALBUMIN SERPL BCG-MCNC: 4.2 G/DL (ref 3.5–5.2)
ALP SERPL-CCNC: 50 U/L (ref 40–150)
ALT SERPL W P-5'-P-CCNC: 19 U/L (ref 0–50)
ANION GAP SERPL CALCULATED.3IONS-SCNC: 9 MMOL/L (ref 7–15)
AST SERPL W P-5'-P-CCNC: 27 U/L (ref 0–45)
BILIRUB SERPL-MCNC: 0.3 MG/DL
BUN SERPL-MCNC: 9.5 MG/DL (ref 8–23)
CALCIUM SERPL-MCNC: 9.5 MG/DL (ref 8.8–10.4)
CHLORIDE SERPL-SCNC: 102 MMOL/L (ref 98–107)
CHOLEST SERPL-MCNC: 135 MG/DL
CREAT SERPL-MCNC: 0.78 MG/DL (ref 0.51–0.95)
EGFRCR SERPLBLD CKD-EPI 2021: 82 ML/MIN/1.73M2
ERYTHROCYTE [DISTWIDTH] IN BLOOD BY AUTOMATED COUNT: 12.7 % (ref 10–15)
EST. AVERAGE GLUCOSE BLD GHB EST-MCNC: 117 MG/DL
FASTING STATUS PATIENT QL REPORTED: YES
FASTING STATUS PATIENT QL REPORTED: YES
GLUCOSE SERPL-MCNC: 103 MG/DL (ref 70–99)
HBA1C MFR BLD: 5.7 % (ref 0–5.6)
HCO3 SERPL-SCNC: 28 MMOL/L (ref 22–29)
HCT VFR BLD AUTO: 36.1 % (ref 35–47)
HDLC SERPL-MCNC: 61 MG/DL
HGB BLD-MCNC: 11.9 G/DL (ref 11.7–15.7)
LDLC SERPL CALC-MCNC: 62 MG/DL
MCH RBC QN AUTO: 29.7 PG (ref 26.5–33)
MCHC RBC AUTO-ENTMCNC: 33 G/DL (ref 31.5–36.5)
MCV RBC AUTO: 90 FL (ref 78–100)
NONHDLC SERPL-MCNC: 74 MG/DL
PLATELET # BLD AUTO: 397 10E3/UL (ref 150–450)
POTASSIUM SERPL-SCNC: 4.6 MMOL/L (ref 3.4–5.3)
PROT SERPL-MCNC: 6.9 G/DL (ref 6.4–8.3)
RBC # BLD AUTO: 4.01 10E6/UL (ref 3.8–5.2)
SODIUM SERPL-SCNC: 139 MMOL/L (ref 135–145)
TRIGL SERPL-MCNC: 58 MG/DL
TSH SERPL DL<=0.005 MIU/L-ACNC: 0.82 UIU/ML (ref 0.3–4.2)
WBC # BLD AUTO: 5.9 10E3/UL (ref 4–11)

## 2024-10-04 PROCEDURE — 91320 SARSCV2 VAC 30MCG TRS-SUC IM: CPT | Performed by: INTERNAL MEDICINE

## 2024-10-04 PROCEDURE — G0008 ADMIN INFLUENZA VIRUS VAC: HCPCS | Performed by: INTERNAL MEDICINE

## 2024-10-04 PROCEDURE — 90662 IIV NO PRSV INCREASED AG IM: CPT | Performed by: INTERNAL MEDICINE

## 2024-10-04 PROCEDURE — 80053 COMPREHEN METABOLIC PANEL: CPT | Performed by: INTERNAL MEDICINE

## 2024-10-04 PROCEDURE — 80061 LIPID PANEL: CPT | Performed by: INTERNAL MEDICINE

## 2024-10-04 PROCEDURE — 85027 COMPLETE CBC AUTOMATED: CPT | Performed by: INTERNAL MEDICINE

## 2024-10-04 PROCEDURE — G0439 PPPS, SUBSEQ VISIT: HCPCS | Performed by: INTERNAL MEDICINE

## 2024-10-04 PROCEDURE — 99214 OFFICE O/P EST MOD 30 MIN: CPT | Mod: 25 | Performed by: INTERNAL MEDICINE

## 2024-10-04 PROCEDURE — 84443 ASSAY THYROID STIM HORMONE: CPT | Performed by: INTERNAL MEDICINE

## 2024-10-04 PROCEDURE — 83036 HEMOGLOBIN GLYCOSYLATED A1C: CPT | Performed by: INTERNAL MEDICINE

## 2024-10-04 PROCEDURE — 36415 COLL VENOUS BLD VENIPUNCTURE: CPT | Performed by: INTERNAL MEDICINE

## 2024-10-04 PROCEDURE — 90480 ADMN SARSCOV2 VAC 1/ONLY CMP: CPT | Performed by: INTERNAL MEDICINE

## 2024-10-04 ASSESSMENT — PAIN SCALES - GENERAL: PAINLEVEL: SEVERE PAIN (7)

## 2024-10-04 NOTE — ASSESSMENT & PLAN NOTE
Well-controlled with Crestor 5 mg daily.  Lipids 12/2023 showed LDL of 55.  - Repeat lipids today, goal LDL <100

## 2024-10-04 NOTE — ASSESSMENT & PLAN NOTE
BP elevated today, did come down on recheck but still above goal. Was normal at last few visits.   - Will have her check BP at home and bring log to next appt   - certainly could be elevated with increase in L knee pain recently

## 2024-10-04 NOTE — PROGRESS NOTES
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 Previously followed in pain clinic, transitioned to primary care 10/2021. Was on stable regimen with PCP since then. We met virtually 8/2024 and she then met with MTM with goal in coming off of MS Contin. Her last fill was >1 month ago. She says she hasn't taken for last week and feels things are going okay in regard to her chronic pain. That being said, she is having flare in knee pain that could also be flare related to tapering off of MS Contin.   - For now, happy she has been able to stop the MS Contin, she will monitor pain closely and follow with MTM; this was fairly quick self taper, so would not be surprised if we need to add small amount back for more smooth transition  - Okay to continue tylenol #4 #180/month  - Will certify her for medical cannabis to help with bridging gap of MS Contin  - Cymbalta added by psychiatry likely will also help   - UDS appropriate 5/2024  - CSA 5/2024  - F/up in January/Feb when I come back from maternity leave          Complex regional pain syndrome type 2 of upper extremity     As per chronic pain.          Acute pain of left knee     New in last month. Mild improvement with 6 weeks PT but still anterior pain with palpation.   - Will have her see orthopedics at [facility] for further evaluation, referral placed today          Relevant Orders    Orthopedic  Referral       Endocrine    Mixed hyperlipidemia     Well-controlled with Crestor 5 mg daily.  Lipids 12/2023 showed LDL of 55.  - Repeat lipids today, goal LDL <100          Relevant Orders    Lipid panel reflex to direct LDL Fasting       Musculoskeletal and Integumentary    Age-related osteoporosis without current pathological fracture     Follows with endocrinology.  On Prolia, last injection 9/2024.             Urinary    OAB (overactive bladder)     Stable with Myrbetriq 25 mg daily.            Other    Anxiety     Now following in collaborative care psychiatry clinic.  Notes her anxiety and worry are significantly improved with change in the last set onto Cymbalta in the last month or so.  - Continue to follow with psychiatry and therapy  - Continue cymbalta and lamictal, she will discuss with psychiatry if it would be feasible to come off lamictal in the future          F11.2 - Continuous opioid dependence (H)     As per chronic pain.         Encounter for Medicare annual wellness exam - Primary     We discussed healthy lifestyle, nutrition, cardiovascular risk reduction, self care, safety, sunscreen, and timing of cancer screening.  Health maintenance screening and immunizations reviewed with the patient.    - Will update labs today  - Mammo BIRADS2 1/2024, repeat due 1/2025  - Cologuard negative 1/2023         Relevant Orders    Comprehensive metabolic panel    CBC with platelets (Completed)    Hemoglobin A1c (Completed)    TSH with free T4 reflex    Elevated BP without diagnosis of hypertension     BP elevated today, did come down on recheck but still above goal. Was normal at last few visits.   - Will have her check BP at home and bring log to next appt   - certainly could be elevated with increase in L knee pain recently          Insomnia     Insomnia is stable with trazodone 300 mg at bedtime.  Agree with psychiatry recommendation to try to decrease.   - Try to decrease trazodone to 200 mg nightly   - Adding medical cannabis may help as well           Other Visit Diagnoses       Encounter for vaccination        Relevant Orders    INFLUENZA HIGH DOSE, TRIVALENT, PF (FLUZONE)  (Completed)    COVID-19 12+ (PFIZER) (Completed)             Patient has been advised of split billing requirements and indicates understanding: Yes       Counseling  Appropriate preventive services were addressed with this patient via screening, questionnaire, or discussion as appropriate for fall prevention, nutrition, physical activity, Tobacco-use cessation, social engagement, weight loss and cognition.  Checklist reviewing preventive services available has been given to the patient.  Reviewed patient's diet, addressing concerns and/or questions.   She is at risk for psychosocial distress and has been provided with information to reduce risk.   Patient reported safety concerns were addressed today.The patient's PHQ-9 score is consistent with mild depression. She was provided with information regarding depression.   I have reviewed Opioid Use Disorder and Substance Use Disorder risk factors and made any needed referrals.     FUTURE APPOINTMENTS:       - Follow-up visit in 4 months       Ozzy Martines is a 69 year old, presenting for the following:  Physical (AWV) and Physical Therapy (PT isn't helping. Still in pain. Wants to know what to do next.)        10/4/2024     7:07 AM   Additional Questions   Roomed by HENNY Maher   Accompanied by ROMEL         Health Care Directive  Patient does not have a Health Care Directive or Living Will: Discussed advance care planning with patient; however, patient declined at this time.    DOTTY Martines is here for AWV and for general f/up. Discussed recent flare in pain as below along with other chronic issues.     Chronic Pain: Located in L foot and lower leg after accident. 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 Describes as aching pain in lower leg and numbness in lower leg and foot. Started out  pain clinic, last saw many years ago but transitioned to primary care. Regimen at our last visit was MS Contin 15 mg #60 a month, last fill 8/30/24. Tylenol #4 #180 a month (takes two tablets 2-3 times a day), last fill 9/28/24.   - Saw MTM 8/26 to discuss opiate taper (per patient's request). Noted to consider medical marijuana.   - Saw psych 8/28, switched antidepressant to cymbalta to help address pain   - Today she tells me that she has totally tapered/stopped MS Contin since our phone call, last took maybe a week or so ago and feels her chronic pain is about the same    New pain: July had new L sided low back pain (seen 7/26) then then moved to the L knee. No preceding injury. Seen again by colleague in August and referred to PT for likely IT band syndrome. Now the pain is just in L knee and the lower leg and foot feels more numb than previous. There has been mild improvement in the knee pain with PT (started 8/28, continues, has been 6 weeks, has 3 more sessions planned). Wonders about next steps. This does feel different and separate from her chronic pain.     Osteoporosis: Prolia 9/26/24. Follows with endo.     MDD / ISIDORO: Had psychiatry and therapy appt 8/28/24, plan to stop vilazodone and add cymbalta in its place with a cross taper. Now up to 30 mg BID mg of cymbalta. Next appts scheduled for next week.   - Anxiety and worry are much better with cymbalta vs vilazodone    HLD: rosuvastatin 5 mg daily. Last lipids 12/2023 Tchol 117, TG 63, HDL 49, LDL 55. No ASCVD history.      OAB: Myrbetriq 25 mg daily. On this only in last year. Working well.      Insomnia: trazodone 100-300 mg at bedtime PRN. At psych appt 8/28/24, asked to decrease trazodone from 300 -> 200 mg nightly. Still on 300 mg dose.     HCM: Mammo BIRADS2 1/25/24. Cologuard negative 1/2023.         9/29/2024   General Health   How would you rate your overall physical health? Good   Feel stress (tense, anxious, or unable to sleep) Only a little           9/29/2024   Nutrition   Diet: Regular (no restrictions)            9/29/2024   Exercise   Days per week of moderate/strenous exercise 0 days   Average minutes spent exercising at this level 0 min      (!) EXERCISE CONCERN      9/29/2024   Social Factors   Frequency of gathering with friends or relatives Once a week   Worry food won't last until get money to buy more No   Food not last or not have enough money for food? No   Do you have housing? (Housing is defined as stable permanent housing and does not include staying ouside in a car, in a tent, in an abandoned building, in an overnight shelter, or couch-surfing.) Yes   Are you worried about losing your housing? No   Lack of transportation? No   Unable to get utilities (heat,electricity)? No            10/4/2024   Fall Risk   Gait Speed Test (Document in seconds) 3.75   Gait Speed Test Interpretation Less than or equal to 5.00 seconds - PASS             9/29/2024   Activities of Daily Living- Home Safety   Needs help with the following daily activites None of the above   Safety concerns in the home No grab bars in the bathroom            9/29/2024   Dental   Dentist two times every year? Yes            9/29/2024   Hearing Screening   Hearing concerns? None of the above            9/29/2024   Driving Risk Screening   Patient/family members have concerns about driving No            9/29/2024   General Alertness/Fatigue Screening   Have you been more tired than usual lately? No            9/29/2024   Urinary Incontinence Screening   Bothered by leaking urine in past 6 months No            9/29/2024   TB Screening   Were you born outside of the [place]? No          Today's PHQ-9 Score:       10/3/2024     6:51 PM   PHQ-9 SCORE   PHQ-9 Total Score MyChart 5 (Mild depression)   PHQ-9 Total Score 5         9/29/2024   Substance Use   Alcohol more than 3/day or more than 7/wk No   Do you have a current opioid prescription? (!) YES   How severe/bad is pain from 1 to  10? 6/10   Do you use any other substances recreationally? No            10/4/2024    10:53 AM   OPIOID RISK TOOL TOTAL SCORE   Total Score 1     Low Risk (0-3)  Moderate Risk (4-7)  High Risk (>8)  Social History     Tobacco Use    Smoking status: Former     Current packs/day: 0.00     Types: Cigarettes     Quit date:      Years since quittin.7     Passive exposure: Never    Smokeless tobacco: Never   Vaping Use    Vaping status: Never Used   Substance Use Topics    Alcohol use: No    Drug use: No           2024   LAST FHS-7 RESULTS   1st degree relative breast or ovarian cancer Yes   Any relative bilateral breast cancer Unknown   Any male have breast cancer No   Any ONE woman have BOTH breast AND ovarian cancer No   Any woman with breast cancer before 50yrs Yes   2 or more relatives with breast AND/OR ovarian cancer No   2 or more relatives with breast AND/OR bowel cancer No           Mammogram Screening - Mammogram every 1-2 years updated in Health Maintenance based on mutual decision making      History of abnormal Pap smear: No - age 65 or older with adequate negative prior screening test results (3 consecutive negative cytology results, 2 consecutive negative cotesting results, or 2 consecutive negative HrHPV test results within 10 years, with the most recent test occurring within the recommended screening interval for the test used)       ASCVD Risk   The ASCVD Risk score (Duglas DK, et al., 2019) failed to calculate for the following reasons:    The valid total cholesterol range is 130 to 320 mg/dL    Reviewed and updated as needed this visit by Provider   Tobacco  Allergies  Meds  Problems  Med Hx  Surg Hx  Fam Hx     Sexual Activity          Past Medical History:   Diagnosis Date    Anemia     Anxiety     Chronic pain 2016    Depression     Former smoker     History of cocaine abuse (H)     Insomnia 2016    Liver disease     Low back pain 2016    Migraine  headache 08/14/2016    Osteoporosis 08/14/2016    PN (peripheral neuropathy) 08/14/2016    PONV (postoperative nausea and vomiting)      Past Surgical History:   Procedure Laterality Date    EXCISE TOENAIL(S) Left 06/15/2023    Procedure: Permanent nail avulsion medial border left hallux;  Surgeon: Mark Lechuga DPM;  Location: [facility]    LAPAROTOMY EXPLORATORY      Left ankle surgery  02/08/2016    [facility] - Pre-Operative Diagnosis: Left foot drop with left equinus deformity and mild varus.  Procedure: Achilles lengthening. Posterior ankle and subtalar joint releases. Tibialis anterior tendon transfer.    MAMMOPLASTY AUGMENTATION      ORIF ACETABULUM FRACTURE      left hip fracture with ORIF on 11/2019    TUBAL LIGATION       Current providers sharing in care for this patient include:  Patient Care Team:  Ksenia Sandoval MD as PCP - General (Internal Medicine)  Josafat Chung, PharmD as Pharmacist (Pharmacist)  Blanca Duarte NP as Nurse Practitioner  Mark Lechuga DPM as Assigned Surgical Provider  Ksenia Sandoval MD as Assigned Pain Medication Provider  Ksenia Sandoval MD as Assigned PCP  Meseret Stout as Pharmacist (Pharmacist)  Elodia Dupont DO as Assigned Behavioral Health Provider  Meseret Stout as Assigned MTM Pharmacist    The following health maintenance items are reviewed in Epic and correct as of today:  Health Maintenance   Topic Date Due    LIPID  12/21/2024    ISIDORO ASSESSMENT  12/22/2024    URINE DRUG SCREEN  05/28/2025    CONTROLLED SUBSTANCE AGREEMENT FOR CHRONIC PAIN MANAGEMENT  05/29/2025    ANNUAL REVIEW OF HM ORDERS  08/16/2025    MEDICARE ANNUAL WELLNESS VISIT  10/04/2025    FALL RISK ASSESSMENT  10/04/2025    PHQ-9  10/04/2025    COLORECTAL CANCER SCREENING  01/02/2026    MAMMO SCREENING  01/25/2026    GLUCOSE  12/21/2026    DTAP/TDAP/TD IMMUNIZATION (3 - Td or Tdap) 05/19/2028    ADVANCE CARE PLANNING  10/04/2029    DEXA   "01/24/2038    HEPATITIS C SCREENING  Completed    DEPRESSION ACTION PLAN  Completed    INFLUENZA VACCINE  Completed    Pneumococcal Vaccine: 65+ Years  Completed    ZOSTER IMMUNIZATION  Completed    RSV VACCINE  Completed    COVID-19 Vaccine  Completed    HPV IMMUNIZATION  Aged Out    MENINGITIS IMMUNIZATION  Aged Out    RSV MONOCLONAL ANTIBODY  Aged Out    HEPATITIS A IMMUNIZATION  Discontinued    LUNG CANCER SCREENING  Discontinued     Review of Systems  Constitutional, neuro, ENT, endocrine, pulmonary, cardiac, gastrointestinal, genitourinary, musculoskeletal, integument and psychiatric systems are negative, except as otherwise noted.     Objective    Exam  BP (!) 152/70 (BP Location: Right arm, Patient Position: Sitting, Cuff Size: Adult Regular)   Pulse 90   Temp 98.9  F (37.2  C) (Oral)   Resp 12   Ht 1.499 m (4' 11\")   Wt 62.4 kg (137 lb 9.6 oz)   LMP  (LMP Unknown)   SpO2 94%   BMI 27.79 kg/m     Estimated body mass index is 27.79 kg/m  as calculated from the following:    Height as of this encounter: 1.499 m (4' 11\").    Weight as of this encounter: 62.4 kg (137 lb 9.6 oz).    Physical Exam  GENERAL: alert and no distress  EYES: Eyes grossly normal to inspection and conjunctivae and sclerae normal  HENT: ear canals and TM's normal, nose and mouth without ulcers or lesions  NECK: no adenopathy, no asymmetry, masses, or scars  RESP: lungs clear to auscultation - no rales, rhonchi or wheezes  CV: regular rate and rhythm, normal S1 S2, no S3 or S4, no murmur, click or rub, no peripheral edema  ABDOMEN: soft, nontender, no hepatosplenomegaly, no masses and bowel sounds normal  MS: Brace on LLE, L knee with TTP anteriorly and palpable crepitus of b/l knees with ROM. No edema, erythema or other deformities noted.   SKIN: no suspicious lesions or rashes  NEURO: No focal deficits, mentation intact and speech normal  PSYCH: mentation appears normal, affect normal/bright         10/4/2024   Mini Cog   Clock " Draw Score 0 Abnormal   3 Item Recall 1 object recalled   Mini Cog Total Score 1             Answers submitted by the patient for this visit:  Patient Health Questionnaire (Submitted on 10/3/2024)  If you checked off any problems, how difficult have these problems made it for you to do your work, take care of things at home, or get along with other people?: Not difficult at all  PHQ9 TOTAL SCORE: 5      Signed Electronically by: Ksenia Sandoval MD

## 2024-10-04 NOTE — ASSESSMENT & PLAN NOTE
Insomnia is stable with trazodone 300 mg at bedtime.  Agree with psychiatry recommendation to try to decrease.   - Try to decrease trazodone to 200 mg nightly   - Adding medical cannabis may help as well

## 2024-10-04 NOTE — PATIENT INSTRUCTIONS
Get blood pressure cuff at home for arm. Keep log at home, bring to next visit.     Patient Education   Preventive Care Advice   This is general advice given by our system to help you stay healthy. However, your care team may have specific advice just for you. Please talk to your care team about your preventive care needs.  Nutrition  Eat 5 or more servings of fruits and vegetables each day.  Try wheat bread, brown rice and whole grain pasta (instead of white bread, rice, and pasta).  Get enough calcium and vitamin D. Check the label on foods and aim for 100% of the RDA (recommended daily allowance).  Lifestyle  Exercise at least 150 minutes each week  (30 minutes a day, 5 days a week).  Do muscle strengthening activities 2 days a week. These help control your weight and prevent disease.  No smoking.  Wear sunscreen to prevent skin cancer.  Have a dental exam and cleaning every 6 months.  Yearly exams  See your health care team every year to talk about:  Any changes in your health.  Any medicines your care team has prescribed.  Preventive care, family planning, and ways to prevent chronic diseases.  Shots (vaccines)   HPV shots (up to age 26), if you've never had them before.  Hepatitis B shots (up to age 59), if you've never had them before.  COVID-19 shot: Get this shot when it's due.  Flu shot: Get a flu shot every year.  Tetanus shot: Get a tetanus shot every 10 years.  Pneumococcal, hepatitis A, and RSV shots: Ask your care team if you need these based on your risk.  Shingles shot (for age 50 and up)  General health tests  Diabetes screening:  Starting at age 35, Get screened for diabetes at least every 3 years.  If you are younger than age 35, ask your care team if you should be screened for diabetes.  Cholesterol test: At age 39, start having a cholesterol test every 5 years, or more often if advised.  Bone density scan (DEXA): At age 50, ask your care team if you should have this scan for osteoporosis  (brittle bones).  Hepatitis C: Get tested at least once in your life.  STIs (sexually transmitted infections)  Before age 24: Ask your care team if you should be screened for STIs.  After age 24: Get screened for STIs if you're at risk. You are at risk for STIs (including HIV) if:  You are sexually active with more than one person.  You don't use condoms every time.  You or a partner was diagnosed with a sexually transmitted infection.  If you are at risk for HIV, ask about PrEP medicine to prevent HIV.  Get tested for HIV at least once in your life, whether you are at risk for HIV or not.  Cancer screening tests  Cervical cancer screening: If you have a cervix, begin getting regular cervical cancer screening tests starting at age 21.  Breast cancer scan (mammogram): If you've ever had breasts, begin having regular mammograms starting at age 40. This is a scan to check for breast cancer.  Colon cancer screening: It is important to start screening for colon cancer at age 45.  Have a colonoscopy test every 10 years (or more often if you're at risk) Or, ask your provider about stool tests like a FIT test every year or Cologuard test every 3 years.  To learn more about your testing options, visit:   .  For help making a decision, visit:   https://bit.ly/zk38716.  Prostate cancer screening test: If you have a prostate, ask your care team if a prostate cancer screening test (PSA) at age 55 is right for you.  Lung cancer screening: If you are a current or former smoker ages 50 to 80, ask your care team if ongoing lung cancer screenings are right for you.  For informational purposes only. Not to replace the advice of your health care provider. Copyright   2023 [employer]. All rights reserved. Clinically reviewed by the [facility] Transitions Program. [employer] [id number] - REV 01/24.  Learning About Activities of Daily Living  What are activities of daily living?     Activities of daily living (ADLs) are  the basic self-care tasks you do every day. These include eating, bathing, dressing, and moving around.  As you age, and if you have health problems, you may find that it's harder to do some of these tasks. If so, your doctor can suggest ideas that may help.  To measure what kind of help you may need, your doctor will ask how well you are able to do ADLs. Let your doctor know if there are any tasks that you are having trouble doing. This is an important first step to getting help. And when you have the help you need, you can stay as independent as possible.  How will a doctor assess your ADLs?  Asking about ADLs is part of a routine health checkup your doctor will likely do as you age. Your health check might be done in a doctor's office, in your home, or at a hospital. The goal is to find out if you are having any problems that could make it hard to care for yourself or that make it unsafe for you to be on your own.  To measure your ADLs, your doctor will ask how hard it is for you to do routine tasks. Your doctor may also want to know if you have changed the way you do a task because of a health problem. Your doctor may watch how you:  Walk back and forth.  Keep your balance while you stand or walk.  Move from sitting to standing or from a bed to a chair.  Button or unbutton a shirt or sweater.  Remove and put on your shoes.  It's common to feel a little worried or anxious if you find you can't do all the things you used to be able to do. Talking with your doctor about ADLs is a way to make sure you're as safe as possible and able to care for yourself as well as you can. You may want to bring a caregiver, friend, or family member to your checkup. They can help you talk to your doctor.  Follow-up care is a key part of your treatment and safety. Be sure to make and go to all appointments, and call your doctor if you are having problems. It's also a good idea to know your test results and keep a list of the medicines  you take.  Current as of: October 24, 2023  Content Version: 14.2 2024 [facility] [employer].   Care instructions adapted under license by your healthcare professional. If you have questions about a medical condition or this instruction, always ask your healthcare professional. [employer] disclaims any warranty or liability for your use of this information.    Preventing Falls: Care Instructions  Injuries and health problems such as trouble walking or poor eyesight can increase your risk of falling. So can some medicines. But there are things you can do to help prevent falls. You can exercise to get stronger. You can also arrange your home to make it safer.    Talk to your doctor about the medicines you take. Ask if any of them increase the risk of falls and whether they can be changed or stopped.   Try to exercise regularly. It can help improve your strength and balance. This can help lower your risk of falling.         Practice fall safety and prevention.   Wear low-heeled shoes that fit well and give your feet good support. Talk to your doctor if you have foot problems that make this hard.  Carry a cellphone or wear a medical alert device that you can use to call for help.  Use stepladders instead of chairs to reach high objects. Don't climb if you're at risk for falls. Ask for help, if needed.  Wear the correct eyeglasses, if you need them.        Make your home safer.   Remove rugs, cords, clutter, and furniture from walkways.  Keep your house well lit. Use night-lights in hallways and bathrooms.  Install and use sturdy handrails on stairways.  Wear nonskid footwear, even inside. Don't walk barefoot or in socks without shoes.        Be safe outside.   Use handrails, curb cuts, and ramps whenever possible.  Keep your hands free by using a shoulder bag or backpack.  Try to walk in well-lit areas. Watch out for uneven ground, changes in pavement, and debris.  Be careful in the winter. Walk on the  "grass or gravel when sidewalks are slippery. Use de-icer on steps and walkways. Add non-slip devices to shoes.    Put grab bars and nonskid mats in your shower or tub and near the toilet. Try to use a shower chair or bath bench when bathing.   Get into a tub or shower by putting in your weaker leg first. Get out with your strong side first. Have a phone or medical alert device in the bathroom with you.   Where can you learn more?  Go to https://www.Mediafly.net/patiented  Enter G117 in the search box to learn more about \"Preventing Falls: Care Instructions.\"  Current as of: July 17, 2023  Content Version: 14.2 2024 [employer].   Care instructions adapted under license by your healthcare professional. If you have questions about a medical condition or this instruction, always ask your healthcare professional. [employer] disclaims any warranty or liability for your use of this information.    Learning About Depression Screening  What is depression screening?  Depression screening is a way to see if you have depression symptoms. It may be done by a doctor or counselor. It's often part of a routine checkup. That's because your mental health is just as important as your physical health.  Depression is a mental health condition that affects how you feel, think, and act. You may:  Have less energy.  Lose interest in your daily activities.  Feel sad and grouchy for a long time.  Depression is very common. It affects people of all ages.  Many things can lead to depression. Some people become depressed after they have a stroke or find out they have a major illness like cancer or heart disease. The death of a loved one or a breakup may lead to depression. It can run in families. Most experts believe that a combination of inherited genes and stressful life events can cause it.  What happens during screening?  You may be asked to fill out a form about your depression symptoms. You and the doctor " "will discuss your answers. The doctor may ask you more questions to learn more about how you think, act, and feel.  What happens after screening?  If you have symptoms of depression, your doctor will talk to you about your options.  Doctors usually treat depression with medicines or counseling. Often, combining the two works best. Many people don't get help because they think that they'll get over the depression on their own. But people with depression may not get better unless they get treatment.  The cause of depression is not well understood. There may be many factors involved. But if you have depression, it's not your fault.  A serious symptom of depression is thinking about death or suicide. If you or someone you care about talks about this or about feeling hopeless, get help right away.  It's important to know that depression can be treated. Medicine, counseling, and self-care may help.  Where can you learn more?  Go to https://www.[employer]ate.net/patiented  Enter T185 in the search box to learn more about \"Learning About Depression Screening.\"  Current as of: June 24, 2023  Content Version: 14.2 2024 [facility] [employer].   Care instructions adapted under license by your healthcare professional. If you have questions about a medical condition or this instruction, always ask your healthcare professional. Healthwise, Incorporated disclaims any warranty or liability for your use of this information.    Chronic Pain: Care Instructions  Your Care Instructions     Chronic pain is pain that lasts a long time (months or even years) and may or may not have a clear cause. It is different from acute pain, which usually does have a clear cause--like an injury or illness--and gets better over time. Chronic pain:  Lasts over time but may vary from day to day.  Does not go away despite efforts to end it.  May disrupt your sleep and lead to fatigue.  May cause depression or anxiety.  May make your muscles tense, causing " more pain.  Can disrupt your work, hobbies, home life, and relationships with friends and family.  Chronic pain is a very real condition. It is not just in your head. Treatment can help and usually includes several methods used together, such as medicines, physical therapy, exercise, and other treatments. Learning how to relax and changing negative thought patterns can also help you cope.  Chronic pain is complex. Taking an active role in your treatment will help you better manage your pain. Tell your doctor if you have trouble dealing with your pain. You may have to try several things before you find what works best for you.  Follow-up care is a key part of your treatment and safety. Be sure to make and go to all appointments, and call your doctor if you are having problems. It's also a good idea to know your test results and keep a list of the medicines you take.  How can you care for yourself at home?  Pace yourself. Break up large jobs into smaller tasks. Save harder tasks for days when you have less pain, or go back and forth between hard tasks and easier ones. Take rest breaks.  Relax, and reduce stress. Relaxation techniques such as deep breathing or meditation can help.  Keep moving. Gentle, daily exercise can help reduce pain over the long run. Try low- or no-impact exercises such as walking, swimming, and stationary biking. Do stretches to stay flexible.  Try heat, cold packs, and massage.  Get enough sleep. Chronic pain can make you tired and drain your energy. Talk with your doctor if you have trouble sleeping because of pain.  Think positive. Your thoughts can affect your pain level. Do things that you enjoy to distract yourself when you have pain instead of focusing on the pain. See a movie, read a book, listen to music, or spend time with a friend.  If you think you are depressed, talk to your doctor about treatment.  Keep a daily pain diary. Record how your moods, thoughts, sleep patterns,  activities, and medicine affect your pain. You may find that your pain is worse during or after certain activities or when you are feeling a certain emotion. Having a record of your pain can help you and your doctor find the best ways to treat your pain.  Take pain medicines exactly as directed.  If the doctor gave you a prescription medicine for pain, take it as prescribed.  If you are not taking a prescription pain medicine, ask your doctor if you can take an over-the-counter medicine.  Reducing constipation caused by pain medicine  Talk to your doctor about a laxative. If a laxative doesn't work, your doctor may suggest a prescription medicine.  Include fruits, vegetables, beans, and whole grains in your diet each day. These foods are high in fiber.  If your doctor recommends it, get more exercise. Walking is a good choice. Bit by bit, increase the amount you walk every day. Try for at least 30 minutes on most days of the week.  Schedule time each day for a bowel movement. A daily routine may help. Take your time and do not strain when having a bowel movement.  When should you call for help?   Call your doctor now or seek immediate medical care if:    Your pain gets worse or is out of control.     You feel down or blue, or you do not enjoy things like you once did. You may be depressed, which is common in people with chronic pain. Depression can be treated.     You have vomiting or cramps for more than 2 hours.   Watch closely for changes in your health, and be sure to contact your doctor if:    You cannot sleep because of pain.     You are very worried or anxious about your pain.     You have trouble taking your pain medicine.     You have any concerns about your pain medicine.     You have trouble with bowel movements, such as:  No bowel movement in 3 days.  Blood in the anal area, in your stool, or on the toilet paper.  Diarrhea for more than 24 hours.   Where can you learn more?  Go to  "https://www.edenes.net/patiented  Enter N004 in the search box to learn more about \"Chronic Pain: Care Instructions.\"  Current as of: July 10, 2023  Content Version: 14.2 2024 [facility] [employer], [facility].   Care instructions adapted under license by your healthcare professional. If you have questions about a medical condition or this instruction, always ask your healthcare professional. Healthwise, Incorporated disclaims any warranty or liability for your use of this information.       "

## 2024-10-04 NOTE — ASSESSMENT & PLAN NOTE
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 Previously followed in pain clinic, transitioned to primary care 10/2021. Was on stable regimen with PCP since then. We met virtually 8/2024 and she then met with MTM with goal in coming off of MS Contin. Her last fill was >1 month ago. She says she hasn't taken for last week and feels things are going okay in regard to her chronic pain. That being said, she is having flare in knee pain that could also be flare related to tapering off of MS Contin.   - For now, happy she has been able to stop the MS Contin, she will monitor pain closely and follow with MTM; this was fairly quick self taper, so would not be surprised if we need to add small amount back for more smooth transition  - Okay to continue tylenol #4 #180/month  - Will certify her for medical cannabis to help with bridging gap of MS Contin  - Cymbalta added by psychiatry likely will also help   - UDS appropriate 5/2024  - CSA 5/2024  - F/up in January/Feb when I come back from maternity leave

## 2024-10-04 NOTE — ASSESSMENT & PLAN NOTE
We discussed healthy lifestyle, nutrition, cardiovascular risk reduction, self care, safety, sunscreen, and timing of cancer screening.  Health maintenance screening and immunizations reviewed with the patient.    - Will update labs today  - Mammo BIRADS2 1/2024, repeat due 1/2025  - Cologuard negative 1/2023

## 2024-10-04 NOTE — ASSESSMENT & PLAN NOTE
Now following in collaborative care psychiatry clinic.  Notes her anxiety and worry are significantly improved with change in the last set onto Cymbalta in the last month or so.  - Continue to follow with psychiatry and therapy  - Continue cymbalta and lamictal, she will discuss with psychiatry if it would be feasible to come off lamictal in the future

## 2024-10-04 NOTE — ASSESSMENT & PLAN NOTE
New in last month. Mild improvement with 6 weeks PT but still anterior pain with palpation.   - Will have her see orthopedics at [place] for further evaluation, referral placed today

## 2024-10-07 ENCOUNTER — PATIENT OUTREACH (OUTPATIENT)
Dept: CARE COORDINATION | Facility: CLINIC | Age: 69
End: 2024-10-07
Payer: COMMERCIAL

## 2024-10-09 ENCOUNTER — PATIENT OUTREACH (OUTPATIENT)
Dept: CARE COORDINATION | Facility: CLINIC | Age: 69
End: 2024-10-09
Payer: COMMERCIAL

## 2024-10-09 NOTE — PROGRESS NOTES
[facility]  October 10, 2024      Behavioral Health Clinician Progress Note    Patient Name: Conrad Zhu           Service Type:  Individual      [place]   [facility] [place] (patient reached)     Session Start Time: 1030am  Session End Time: 1043am      Session Length: 13min     Attendees: Client     Service Modality:  Video Visit:      Provider verified identity through the following two step process.  Patient provided:  Patient is known previously to provider    Telemedicine Visit: The patient's condition can be safely assessed and treated via synchronous audio and visual telemedicine encounter.      Reason for Telemedicine Visit: Services only offered telehealth    Originating Site (Patient Location): Patient's home    Distant Site (Provider Location): Provider Remote Setting- Home Office    Consent:  The patient/guardian has verbally consented to: the potential risks and benefits of telemedicine (video visit) versus in person care; bill my insurance or make self-payment for services provided; and responsibility for payment of non-covered services.     Patient would like the video invitation sent by:  My Chart    Mode of Communication:  Video Conference via [facility]    Distant Location (Provider):  Off-site    As the provider I attest to compliance with applicable laws and regulations related to telemedicine.    Visit Activities (Refresh list every visit): [facility] Only    Diagnostic Assessment Date: 8/28/24 Chanelle Garcia MA [facility]  Treatment Plan Review Date: 10/10/24  See Flowsheets for today's PHQ-9 and ISIDORO-7 results  Previous PHQ-9:       8/28/2024     7:56 AM 10/3/2024     6:51 PM 10/10/2024    10:27 AM   PHQ-9 SCORE   PHQ-9 Total Score [facility] 11 (Moderate depression) 5 (Mild depression) 3 (Minimal depression)   PHQ-9 Total Score 11 5 3     Previous ISIDORO-7:       5/9/2022    12:07 PM 9/25/2023     9:59 AM 12/21/2023    10:49 AM   ISIDORO-7 SCORE   Total  Score  5 (mild anxiety) 8 (mild anxiety)   Total Score 6 5 8       DATA  Extended Session (60+ minutes): No  Interactive Complexity: No  Crisis: No  BH Patient: No    Treatment Objective(s) Addressed in This Session:  Target Behavior(s): disease management/lifestyle changes reducing sx    Depressed Mood: Increase interest, engagement, and pleasure in doing things  Decrease frequency and intensity of feeling down, depressed, hopeless    Current Stressors / Issues:  MH update:  Started cymbalta.  Racing thoughts are better.  Mood better.  The fatigue is way better.  Pain better.  Not worrying about things that don't matter.  Getting up and functioning, completing tasks!  Stresses:  Grief/loss hx, chronic pain (much better)  Appetite: Denies concerns  Sleep: Denies concerns  Outpatient Provider updates: Declines at this time  SI/SIB/HI:  Denies any!  SHANTE:  Denies concerns.  Hx of poly sub tx.  Side effects/compliance:  None!  Interventions:  [facility] discussed treatment options for sx mgmt  Most important:  can she take all at once?  Or does it have to be BID?  Feels great!!    8/28  MH update:  ROS above  Stresses:  is getting off morphine, chronic pain, grief/loss, resentment from needing caregiving/support  Appetite: n/a  Sleep: very poor/pain  Outpatient Provider updates: intake 11/14, [facility].  Decline MHS.  SI/SIB/HI:  Chronic passive ideation without plan or intent.  Denies previous attempts.  SHANTE:  Denies concerns.  Interested in medical THC.  Hx of cocaine/opiate abuse with tx/detox  Side effects/compliance:  Interventions:  [facility] engaged in completing DA with psychoeducation and tx planning  Most important:  meds aren't working, mind not shutting off.      Progress on Treatment Objective(s) / Homework:  New Objective established this session - CONTEMPLATION (Considering change and yet undecided); Intervened by assessing the negative and positive thinking (ambivalence) about behavior change    Motivational  Interviewing    MI Intervention: Co-Developed Goal: reduced sx and Expressed Empathy/Understanding     Change Talk Expressed by the Patient: Desire to change Ability to change    Provider Response to Change Talk: E - Evoked more info from patient about behavior change and A - Affirmed patient's thoughts, decisions, or attempts at behavior change    Also provided psychoeducation about behavioral health condition, symptoms, and treatment options    Assessments completed prior to visit:  The following assessments were completed by patient for this visit:  GAD2:       8/25/2024     8:58 AM 8/28/2024     7:57 AM 10/5/2024     9:43 AM   ISIDORO-2   Feeling nervous, anxious, or on edge 1 1 1   Not being able to stop or control worrying 0 0 1   ISIDORO-2 Total Score 1    1 1 2    2       Care Plan review completed: Yes    Medication Review:  Changes to psychiatric medications, see updated Medication List in EPIC.     Medication Compliance:  Yes    Changes in Health Issues:   None reported    Chemical Use Review:   Substance Use: Chemical use reviewed, no active concerns identified ; maintain sobriety from problematic substances     Tobacco Use: No current tobacco use.      Assessment: Current Emotional / Mental Status (status of significant symptoms):  Risk status (Self / Other harm or suicidal ideation)  Patient has had a history of suicidal ideation: chronic baseline passive suicidal ideation without previous attempts/planning/intent  Patient denies current fears or concerns for personal safety.  Patient denies current or recent suicidal ideation or behaviors.  Patient denies current or recent homicidal ideation or behaviors.  Patient denies current or recent self injurious behavior or ideation.  Patient denies other safety concerns.  A safety and risk management plan has not been developed at this time, however patient was encouraged to call [facility] should there be a change in any of these risk  factors.    Appearance:   Appropriate   Eye Contact:   Good   Psychomotor Behavior: Normal   Attitude:   Cooperative   Orientation:   All  Speech   Rate / Production: Normal    Volume:  Normal   Mood:    Normal  Affect:    Appropriate   Thought Content:  Clear   Thought Form:  Coherent  Logical   Insight:    Good     Diagnoses:  1. Persistent depressive disorder    2. ISIDORO (generalized anxiety disorder)        Collateral Reports Completed:  Communicated with: Dr Dpuont    Plan: (Homework, other):  Patient was given information about behavioral services and encouraged to schedule a follow up appointment with the clinic [facility] as needed.  She was also given information about mental health symptoms and treatment options .  CD Recommendations: Maintain Sobriety.       Chanelle Garcia [facility]    ______________________________________________________________________    Integrated Primary Care Behavioral Health Treatment Plan    Individual Treatment Plan    Patient's Name: Conrad Zhu   YOB: 1955  Date of Creation: 10/10/24  Date Treatment Plan Last Reviewed/Revised: 10/10/24    DSM5 Diagnoses:   1. Persistent depressive disorder    2. ISIDORO (generalized anxiety disorder)      Psychosocial / Contextual Factors: Medical Complexities, Trauma History, Substance Use history/concerns, Interpersonal Concerns, and Grief/Loss  PROMIS (reviewed every 90 days):   The following assessments were completed by patient for this visit:  PROMIS 10-Global Health (only subscores and total score):       8/25/2024     9:01 AM 8/28/2024     7:57 AM   PROMIS-10 Scores Only   Global Mental Health Score 7    7 7   Global Physical Health Score 9    9 9   PROMIS TOTAL - SUBSCORES 16    16 16        Referral / Collaboration:  Referral to another professional/service is not indicated at this time..    Anticipated number of session for this episode of care: 6-9 sessions  Anticipation frequency of session: Monthly  Anticipated Duration of each  "session: 16-37 minutes  Treatment plan will be reviewed in 90 days or when goals have been changed.       MeasurableTreatment Goal(s) related to diagnosis / functional impairment(s)  Goal 1: Patient will reduce sx of depression   \"I will know I've met my goal when I can functioning and complete tasks required.\"     Objective #A (Patient Action)    Patient will Increase interest, engagement, and pleasure in doing things  Decrease frequency and intensity of feeling down, depressed, hopeless  Status: New - Date: 10/10/24      Intervention(s)  [facility] will provide support through CBT, MI, Acceptance and Commitment Therapy, Dialectic Behavioral Therapy and problem solving model to explore and overcome barriers.        Patient has reviewed and agreed to the above plan.    Written by  Chanelle Garcia LPCC, C     "

## 2024-10-10 ENCOUNTER — VIRTUAL VISIT (OUTPATIENT)
Dept: BEHAVIORAL HEALTH | Facility: CLINIC | Age: 69
End: 2024-10-10
Payer: COMMERCIAL

## 2024-10-10 ENCOUNTER — VIRTUAL VISIT (OUTPATIENT)
Dept: PSYCHIATRY | Facility: CLINIC | Age: 69
End: 2024-10-10
Payer: COMMERCIAL

## 2024-10-10 DIAGNOSIS — G89.29 OTHER CHRONIC PAIN: ICD-10-CM

## 2024-10-10 DIAGNOSIS — F41.1 GAD (GENERALIZED ANXIETY DISORDER): ICD-10-CM

## 2024-10-10 DIAGNOSIS — F34.1 PERSISTENT DEPRESSIVE DISORDER: Primary | ICD-10-CM

## 2024-10-10 PROCEDURE — 99207 PR NO CHARGE LOS: CPT | Mod: 95 | Performed by: COUNSELOR

## 2024-10-10 PROCEDURE — 99214 OFFICE O/P EST MOD 30 MIN: CPT | Mod: 95 | Performed by: PSYCHIATRY & NEUROLOGY

## 2024-10-10 PROCEDURE — G2211 COMPLEX E/M VISIT ADD ON: HCPCS | Mod: 95 | Performed by: PSYCHIATRY & NEUROLOGY

## 2024-10-10 RX ORDER — DULOXETIN HYDROCHLORIDE 30 MG/1
60 CAPSULE, DELAYED RELEASE ORAL DAILY
Qty: 180 CAPSULE | Refills: 1 | Status: SHIPPED | OUTPATIENT
Start: 2024-10-10

## 2024-10-10 ASSESSMENT — PAIN SCALES - GENERAL: PAINLEVEL: NO PAIN (0)

## 2024-10-10 NOTE — PROGRESS NOTES
"Virtual Visit Details    Type of service:  Video Visit   Video Start Time: {video visit start/end time for provider to select:952970}  Video End Time:{video visit start/end time for provider to select:215517}    Originating Location (pt. Location): {video visit patient location:206795::\"Home\"}  {PROVIDER LOCATION On-site should be selected for visits conducted from your clinic location or adjoining [facility] hospital, academic office, or other nearby [facility] building. Off-site should be selected for all other provider locations, including home:[address]}  Distant Location (provider location):  {virtual location provider:990808}  Platform used for Video Visit: {Virtual Visit Platforms:439885::\"[employer]\"}  "

## 2024-10-10 NOTE — NURSING NOTE
Is the patient currently in the state of [place]? YES    Current patient location: [address] [place] [address]    Visit mode:VIDEO    If the visit is dropped, the patient can be reconnected by: VIDEO VISIT:  Send e-mail to at [email or web address]    Will anyone else be joining the visit? No  (If patient encounters technical issues they should call [phone])    Are changes needed to the allergy or medication list? No    Are refills needed on medications prescribed by this physician? No    Rooming Documentation: Questionnaire(s) completed.    Reason for visit: REESE Brock

## 2024-10-10 NOTE — PATIENT INSTRUCTIONS
Treatment Plan:  Continue duloxetine 60 mg daily for chronic pain, depression, anxiety.  Discontinue lamotrigine: Take 50 mg daily for about 5 days then stop the medication.  Okay to start slowly tapering off/down trazodone: Decrease your total dose at bedtime by only 50 mg every 1-2 weeks (ie take 250 mg at bedtime for 1-2 weeks then 200 mg for 1-2 weeks, etc).  Continue all other cares per primary care provider.   Continue all other medications as reviewed per electronic medical record today.   Safety plan reviewed. To the Emergency Department as needed or call after hours crisis line at [phone] or [phone]. [place] Crisis Text Line. Text [place] to 180618 or Suicide LifeLine Chat: [email or web address] Speechline.org/chat  Consider individual psychotherapy as needed.  Schedule an appointment with me in 6-8 weeks or sooner as needed. Call [facility] at [phone] to schedule.  Follow up with primary care provider as planned or for acute medical concerns.  Call the psychiatric nurse line with medication questions or concerns at [phone].  [employer] may be used to communicate with your provider, but this is not intended to be used for emergencies.    Patient Education   Collaborative Care Psychiatry Service  What to Expect  Here's what to expect from your Collaborative Care Psychiatry Service (CCPS).   About CCPS  CCPS means 2 people work together to help you get better. You'll meet with a behavioral health clinician and a psychiatric doctor. A behavioral health clinician helps people with mental health problems by talking with them. A psychiatric doctor helps people by giving them medicine.  How it works  At every visit, you'll see the behavioral health clinician (BHC) first. They'll talk with you about how you're doing and teach you how to feel better.   Then you'll see the psychiatric doctor. This doctor can help you deal with troubling thoughts and feelings by giving you medicine. They'll  "make sure you know the plan for your care.   CCPS usually takes 3 to 6 visits. If you need more visits, we may have you start seeing a different psychiatric doctor for ongoing care.  If you have any questions or concerns, we'll be glad to talk with you.  About visits  Be open  At your visits, please talk openly about your problems. It may feel hard, but it's the best way for us to help you.  Cancelling visits  If you can't come to your visit, please call us right away at [phone]. If you don't cancel at least 24 hours (1 full day) before your visit, that's \"late cancellation.\"  Being late to visits  Being very late is the same as not showing up. You will be a \"no show\" if:  Your appointment starts with a C, and you're more than 15 minutes late for a 30-minute (half hour) visit. This will also cancel your appointment with the psychiatric doctor.  Your appointment is with a psychiatric doctor only, and you're more than 15 minutes late for a 30-minute (half hour) visit.  Your appointment is with a psychiatric doctor only, and you're more than 30 minutes late for a 60-minute (full hour) visit.  If you cancel late or don't show up 2 times within 6 months, we may end your care.   Getting help between visits  If you need help between visits, you can call us Monday to Friday from 8 a.m. to 4:30 p.m. at [phone].  Emergency care  Call 911 or go to the nearest emergency department if your life or someone else's life is in danger.  Call 988 anytime to reach the national Suicide and Crisis hotline.  Medicine refills  To refill your medicine, call your pharmacy. You can also call [facility]'s Behavioral Access at [phone], Monday to Friday, 8 a.m. to 4:30 p.m. It can take 1 to 3 business days to get a refill.   Forms, letters, and tests  You may have papers to fill out, like FMLA, short-term disability, and workability. We can help you with these forms at your visits, but you must have an appointment. " You may need more than 1 visit for this, to be in an intensive therapy program, or both.  Before we can give you medicine for ADHD, we may refer you to get tested for it or confirm it another way.  We may not be able to give you an emotional support animal letter.  We don't do mental health checks ordered by the court.   We don't do mental health testing, but we can refer you to get tested.   Thank you for choosing us for your care.  For informational purposes only. Not to replace the advice of your health care provider. Copyright   2022 [facility]. All rights reserved. [employer] [id number] - 12/22.

## 2024-10-10 NOTE — PROGRESS NOTES
"Telemedicine Visit: The patient's condition can be safely assessed and treated via synchronous audio and visual telemedicine encounter.      Reason for Telemedicine Visit: Patient has requested telehealth visit    Originating Site (Patient Location): Patient's home    Distant Location (provider location):  Off-site    Consent:  The patient/guardian has verbally consented to: the potential risks and benefits of telemedicine (video visit) versus in person care; bill my insurance or make self-payment for services provided; and responsibility for payment of non-covered services.     Mode of Communication:  Video Conference via [employer]    As the provider I attest to compliance with applicable laws and regulations related to telemedicine.         Outpatient Psychiatric Progress Note    Name: Conrad Zhu   : 1955                    Primary Care Provider: Ksenia Sandoval MD   Therapist: None    PHQ-9 scores:      2024     7:56 AM 10/3/2024     6:51 PM 10/10/2024    10:27 AM   PHQ-9 SCORE   PHQ-9 Total Score MyChart 11 (Moderate depression) 5 (Mild depression) 3 (Minimal depression)   PHQ-9 Total Score 11 5 3       ISIDORO-7 scores:      2022    12:07 PM 2023     9:59 AM 2023    10:49 AM   ISIDORO-7 SCORE   Total Score  5 (mild anxiety) 8 (mild anxiety)   Total Score 6 5 8       Patient Identification:  Patient is a 69 year old, partnered / significant other  White Not  or  female  who presents for return visit with me.  Patient is currently disabled. Patient attended the phone/video session alone. Patient prefers to be called: \"Conrad\".    Interim History:  I last saw Conrad Toribiocal for outpatient psychiatry Consultation on 2024. During that appointment, we:    Continue trazodone 100-330 mg at bedtime for sleep. Discussed trying to decrease from 300 mg to 200 mg before next appointment.   Continue lamotrigine 100 mg daily for mood and to augment antidepressant " medication.    Discontinue Viibryd/vilazodone: take 10 mg (half-tab) daily with 20 mg duloxetine for 5 days then STOP vilazodone.   Start duloxetine ER/Cymbalta for pain, mood, and anxiety: take 20 mg ONCE daily (with 10 mg vilazodone) for 5 days then increase to 20 mg TWICE daily (or 40 mg once daily is ok too).    Consider individual psychotherapy for additional support and ongoing development of nonpharmacologic coping skills and strategies.    10/10: Patient with significant improvement since starting duloxetine.  Pain is greatly improved.  Mood and anxiety have also improved.  Functioning much better.  Patient very pleased.  Tolerating well with no major negative side effects.  Remains interested in coming off lamotrigine and at some point decreasing trazodone.  No acute safety concerns.  No SI.  No problematic drug or alcohol use-maintain sobriety.  Patient completing paperwork for medicinal cannabis for chronic pain.    Per [facility], [facility], during today's team-based visit:  Current Stressors / Issues:  MH update:  Started cymbalta.  Racing thoughts are better.  Mood better.  The fatigue is way better.  Pain better.  Not worrying about things that don't matter.  Getting up and functioning, completing tasks!  Stresses:  Grief/loss hx, chronic pain (much better)  Appetite: Denies concerns  Sleep: Denies concerns  Outpatient Provider updates: Declines at this time  SI/SIB/HI:  Denies any!  SHANTE:  Denies concerns.  Hx of poly sub tx.  Side effects/compliance:  None!  Interventions:  [facility] discussed treatment options for sx mgmt  Most important:  can she take all at once?  Or does it have to be BID?  Feels great!!    Past Psychiatric Med Trials:  Psych Meds at Intake:  Trazodone 300 mg daily at bedtime   Viibryd 20 mg   Lamotrigine 100 mg - several years to augment       Past Psych Meds:  Wellbutrin   Lexapro  Topamax  Paxil  zoloft    Psychiatric ROS:  See HPI above for all pertinent positives and negatives.  Rest of systems negative.     PHQ9 and GAD7 scores were reviewed today if completed.   Medication side effects: Denies  Current stressors include: Symptoms and see HPI above  Coping mechanisms and supports include: Family, Hobbies, and Friends    Current medications include:   Current Outpatient Medications   Medication Sig Dispense Refill    acetaminophen-codeine (TYLENOL #4) 300-60 MG per tablet Take 1-2 tablets by mouth every 8 hours as needed for severe pain. 180 tablet 0    cholecalciferol 50 MCG (2000 UT) tablet Take 4,000 Units by mouth Every other day      DULoxetine (CYMBALTA) 30 MG capsule Take 1 capsule (30 mg) by mouth daily for 5 days, THEN 1 capsule (30 mg) 2 times daily. 115 capsule 0    lamoTRIgine (LAMICTAL) 100 MG tablet Take 1 tablet (100 mg) by mouth daily 90 tablet 3    mirabegron (MYRBETRIQ) 25 MG 24 hr tablet Take 1 tablet (25 mg) by mouth daily 90 tablet 3    naloxone (NARCAN) 4 MG/0.1ML nasal spray [NALOXONE (NARCAN) 4 MG/ACTUATION NASAL SPRAY] 1 spray (4 mg dose) into one nostril for opioid reversal. Call 911. May repeat if no response in 3 minutes. 1 each 1    ondansetron (ZOFRAN) 4 MG tablet Take 1 Tablet (4 mg) by mouth every 8 hours if needed for nausea/vomiting 30 tablet 1    rosuvastatin (CRESTOR) 5 MG tablet Take 1 tablet (5 mg) by mouth daily 90 tablet 3    traZODone (DESYREL) 100 MG tablet Take 1 to 3 tablets by mouth at bedtime as needed 270 tablet 3     No current facility-administered medications for this visit.       Past Medical/Surgical History:  Past Medical History:   Diagnosis Date    Anemia     Anxiety     Chronic pain 08/14/2016    Depression     Former smoker     History of cocaine abuse (H)     Insomnia 08/14/2016    Liver disease     Low back pain 08/14/2016    Migraine headache 08/14/2016    Osteoporosis 08/14/2016    PN (peripheral neuropathy) 08/14/2016    PONV (postoperative nausea and vomiting)       has a past medical history of Anemia, Anxiety, Chronic pain  (08/14/2016), Depression, Former smoker, History of cocaine abuse (H), Insomnia (08/14/2016), Liver disease, Low back pain (08/14/2016), Migraine headache (08/14/2016), Osteoporosis (08/14/2016), PN (peripheral neuropathy) (08/14/2016), and PONV (postoperative nausea and vomiting).    She has no past medical history of Arrhythmia, Arthritis, Cerebral artery occlusion with cerebral infarction (H), Chronic infection, Coagulation disorder (H), Congenital heart disease, Congestive heart failure (H), COPD (chronic obstructive pulmonary disease) (H), Coronary artery disease, Diabetes (H), Dialysis patient (H), Difficult intubation, Difficulty walking, Dyspnea on exertion, Gastroesophageal reflux disease, Heart attack (H), Heart murmur, Hepatitis, Hiatal hernia, History of angina, History of blood transfusion, Hypertension, Irregular heart beat, Malignant hyperthermia, Multiple sclerosis (H), Muscular dystrophy (H), Noninfectious ileitis, Obese, Orthopnea, Oxygen dependent, Pacemaker, Pancreatic disease, Parkinsons disease (H), Pneumonia, Pulmonary hypertension (H), Renal disease, Seizures (H), Sleep apnea, Spinal headache, Stented coronary artery, Thrombosis, Thyroid disease, Tracheostomy in place (H), Uncomplicated asthma, or Walking troubles.    Social History:  Reviewed. No changes to social history except as noted above in HPI.    Vital Signs:   None. This is phone/video visit.     Labs:  Most recent laboratory results reviewed and no new labs.     Review of Systems:  10 systems (general, cardiovascular, respiratory, eyes, ENT, endocrine, GI, , M/S, neurological) were reviewed. Most pertinent finding(s) is/are: Chronic pain. The remaining systems are all unremarkable.    Mental Status Examination (limited as this is by phone/video):  Appearance: Awake, alert, appears stated age, no acute distress, well-groomed   Attitude:  cooperative, pleasant   Motor: No gross abnormalities observed via video, not formally tested    Oriented to:  person, place, time, and situation  Attention Span and Concentration:  normal  Speech:  clear, coherent, regular rate, rhythm, and volume  Language: intact  Mood:  better  Affect:  appropriate and in normal range and mood congruent  Associations:  no loose associations  Thought Process:  logical, linear and goal oriented  Thought Content:  no evidence of suicidal ideation or homicidal ideation, no evidence of psychotic thought, no auditory hallucinations present and no visual hallucinations present  Recent and Remote Memory:  Intact to interview. Not formally assessed. No amnesia.  Fund of Knowledge: appropriate  Insight:  good  Judgment:  intact, adequate for safety  Impulse Control:  intact    Suicide Risk Assessment:  Today Conrad Zhu reports no suicidal ideation. Based on all available evidence including the factors cited above, Conrad Zhu does not appear to be at imminent risk for self-harm, does not meet criteria for a 72-hr hold, and therefore remains appropriate for ongoing outpatient level of care.  A thorough assessment of risk factors related to suicide and self-harm have been reviewed and are noted above. The patient convincingly denies suicidality on several occasions. Local community safety resources reviewed for patient to use if needed. There was no deceit detected, and the patient presented in a manner that was believable.     DSM5 Diagnosis:  Persistent Depressive Disorder, in remission  300.02 (F41.1) Generalized Anxiety Disorder    Medical comorbidities include:   Patient Active Problem List    Diagnosis Date Noted    Chronic pain 08/14/2016     Priority: High    Encounter for Medicare annual wellness exam 10/04/2024     Priority: Medium    Elevated BP without diagnosis of hypertension 10/04/2024     Priority: Medium    Acute pain of left knee 10/04/2024     Priority: Medium    Mixed hyperlipidemia 05/28/2024     Priority: Medium    F11.2 - Continuous opioid dependence  (H) 06/12/2023     Priority: Medium    Vitamin deficiency 10/23/2020     Priority: Medium    Depression 02/10/2020     Priority: Medium    Neck pain 02/10/2020     Priority: Medium    Age-related osteoporosis without current pathological fracture 11/14/2019     Priority: Medium    History of falling 11/14/2019     Priority: Medium    Unspecified fracture of shaft of left fibula, subsequent encounter for closed fracture with routine healing 11/14/2019     Priority: Medium    Displaced intertrochanteric fracture of left femur, subsequent encounter for closed fracture with routine healing 11/14/2019     Priority: Medium    Closed intertrochanteric fracture of hip, left, initial encounter (H) 11/11/2019     Priority: Medium    Left hip pain 11/11/2019     Priority: Medium     Added automatically from request for surgery [id number]        Closed nondisplaced fracture of body of right calcaneus, initial encounter 07/09/2019     Priority: Medium    Achilles tendinitis of right lower extremity 07/01/2019     Priority: Medium    Complex regional pain syndrome i of left lower limb 08/15/2018     Priority: Medium    PN (peripheral neuropathy) 08/14/2016     Priority: Medium    Former smoker      Priority: Medium    Nausea 06/07/2016     Priority: Medium    Left foot drop 06/01/2016     Priority: Medium    Closed fracture of metatarsal bone 02/08/2016     Priority: Medium     Formatting of this note might be different from the original.  Overview:   Created by Conversion      Complex regional pain syndrome type 2 of upper extremity 02/08/2016     Priority: Medium     Formatting of this note might be different from the original.  Overview:   Created by Conversion      Degeneration of intervertebral disc of cervical region 02/08/2016     Priority: Medium     Formatting of this note might be different from the original.  Overview:   Created by Conversion      Edema 02/08/2016     Priority: Medium     Formatting of this note might be  different from the original.  Overview:   Created by [facility] Annotation: Feb 25 2014  1:28PM - Sixto Griffiht: Left foot      Anxiety 01/21/2015     Priority: Medium    Corneal scarring 09/25/2012     Priority: Medium    Menopause present 12/08/2011     Priority: Medium     Formatting of this note might be different from the original.  Decreased libido.      OAB (overactive bladder) 08/14/2016     Priority: Low    Insomnia 08/14/2016     Priority: Low       Psychosocial & Contextual Factors: see HPI above    Assessment:  From Intake, 8/28/2024:  Conrad Zhu is a 68-year-old with past psychiatric history including depression, anxiety, substance abuse in remission who presents today for psychiatric evaluation.  Patient with significant chronic pain and persistent depressive symptoms for many years.  Mood and anxiety often incredibly influenced by her pain symptoms.  Patient not sure her current depression medications are doing much for her mood.  Patient has met with a couple providers over the past few years who have suggested Cymbalta/duloxetine.  Patient is agreeable to tapering off Viibryd and onto duloxetine.  Discussed risks and benefits of the transition/change.  If duloxetine incredibly helpful, could consider tapering off lamotrigine.  Patient may also try to decrease trazodone in an attempt to continue to reduce polypharmacy.  Patient denies any concerns or struggles with substances for 20-30 years.  Occasional social alcohol use.  Patient may be interested in medicinal cannabis for her pain.  No current cannabis use.  Passive suicidal thoughts with no plan or intent to harm self or past suicide attempts.  Psychotherapy encouraged.     10/10/2024:  Patient with significant improvements on duloxetine for pain, mood, anxiety.  Did well with the transition off Viibryd.  Patient now on duloxetine 60 mg total daily dose.    Medication side effects and alternatives were reviewed. Health  promotion activities recommended and reviewed today. All questions addressed. Education and counseling completed regarding risks and benefits of medications and psychotherapy options. Recommend therapy for additional support.     Treatment Plan:  Continue duloxetine 60 mg daily for chronic pain, depression, anxiety.  Discontinue lamotrigine: Take 50 mg daily for about 5 days then stop the medication.  Okay to start slowly tapering off/down trazodone: Decrease your total dose at bedtime by only 50 mg every 1-2 weeks (ie take 250 mg at bedtime for 1-2 weeks then 200 mg for 1-2 weeks, etc).  Continue all other cares per primary care provider.   Continue all other medications as reviewed per electronic medical record today.   Safety plan reviewed. To the Emergency Department as needed or call after hours crisis line at [phone] or [phone]. [place] Crisis Text Line. Text [place] to 151899 or Suicide LifeLine Chat: suicidepreventionBeyond Gamesline.org/chat  Consider individual psychotherapy as needed.  Schedule an appointment with me in 6-8 weeks or sooner as needed. Call [facility] at [phone] to schedule.  Follow up with primary care provider as planned or for acute medical concerns.  Call the psychiatric nurse line with medication questions or concerns at [phone].  [employer] may be used to communicate with your provider, but this is not intended to be used for emergencies.    Administrative Billing:   Phone Call/Video Duration: 10 Minutes  Start: 10:50a  Stop: 11:00a    The longitudinal plan of care for the diagnosis(es)/condition(s) as documented were addressed during this visit. Due to the added complexity in care, I will continue to support Conrad in the subsequent management and with ongoing continuity of care.    Episode of Care #: 2    Patient Status:  Patient will continue to be seen for ongoing consultation and stabilization.    Signed:   Elodia Dupont DO  [facility]    Disclaimer:  This note consists of symbols derived from keyboarding, dictation and/or voice recognition software. As a result, there may be errors in the script that have gone undetected. Please consider this when interpreting information found in this chart.

## 2024-10-17 ENCOUNTER — TRANSFERRED RECORDS (OUTPATIENT)
Dept: HEALTH INFORMATION MANAGEMENT | Facility: CLINIC | Age: 69
End: 2024-10-17
Payer: COMMERCIAL

## 2024-10-23 DIAGNOSIS — G89.4 CHRONIC PAIN SYNDROME: ICD-10-CM

## 2024-10-23 RX ORDER — ACETAMINOPHEN AND CODEINE PHOSPHATE 300; 60 MG/1; MG/1
1-2 TABLET ORAL EVERY 8 HOURS PRN
Qty: 180 TABLET | Refills: 0 | Status: SHIPPED | OUTPATIENT
Start: 2024-10-23

## 2024-10-23 NOTE — TELEPHONE ENCOUNTER
Refill request:     Pending Prescriptions:                       Disp   Refills    acetaminophen-codeine (TYLENOL #4) 300-60*180 ta*0            Sig: Take 1-2 tablets by mouth every 8 hours as needed           for severe pain.

## 2024-11-19 DIAGNOSIS — G89.4 CHRONIC PAIN SYNDROME: ICD-10-CM

## 2024-11-19 RX ORDER — ACETAMINOPHEN AND CODEINE PHOSPHATE 300; 60 MG/1; MG/1
1-2 TABLET ORAL EVERY 8 HOURS PRN
Qty: 180 TABLET | Refills: 0 | Status: SHIPPED | OUTPATIENT
Start: 2024-11-19

## 2024-11-20 NOTE — PROGRESS NOTES
"Telemedicine Visit: The patient's condition can be safely assessed and treated via synchronous audio and visual telemedicine encounter.      Reason for Telemedicine Visit: Patient has requested telehealth visit    Originating Site (Patient Location): Patient's home    Distant Location (provider location):  Off-site    Consent:  The patient/guardian has verbally consented to: the potential risks and benefits of telemedicine (video visit) versus in person care; bill my insurance or make self-payment for services provided; and responsibility for payment of non-covered services.     Mode of Communication:  Video Conference via Linkurious    As the provider I attest to compliance with applicable laws and regulations related to telemedicine.         Outpatient Psychiatric Progress Note    Name: Conrad Zhu   : 1955                    Primary Care Provider: Ksenia Sandoval MD   Therapist: None    PHQ-9 scores:      10/3/2024     6:51 PM 10/10/2024    10:27 AM 2024     7:51 AM   PHQ-9 SCORE   PHQ-9 Total Score MyChart 5 (Mild depression) 3 (Minimal depression) 4 (Minimal depression)   PHQ-9 Total Score 5 3 4        Patient-reported       ISIDORO-7 scores:      2022    12:07 PM 2023     9:59 AM 2023    10:49 AM   ISIDORO-7 SCORE   Total Score  5 (mild anxiety) 8 (mild anxiety)   Total Score 6 5 8       Patient Identification:  Patient is a 69 year old, partnered / significant other  White Not  or  female  who presents for return visit with me.  Patient is currently disabled. Patient attended the phone/video session alone. Patient prefers to be called: \"Conrad\".    Interim History:  I last saw Conrad Zhu for outpatient psychiatry return visit on 10/10/2024. During that appointment, we:    Continue duloxetine 60 mg daily for chronic pain, depression, anxiety.  Discontinue lamotrigine: Take 50 mg daily for about 5 days then stop the medication.  Okay to start slowly " tapering off/down trazodone: Decrease your total dose at bedtime by only 50 mg every 1-2 weeks (ie take 250 mg at bedtime for 1-2 weeks then 200 mg for 1-2 weeks, etc).    : Has had sustained improvements on duloxetine.  Does wonder about increasing the dose.  Has not yet obtained an home blood pressure cuff.  We will be starting medicinal cannabis soon to help with pain.  No acute safety concerns.  No SI.  No problematic drug or alcohol use.  Ongoing stressors at home with .    Per [facility], Chanelle Garcia [facility], during today's team-based visit:  MH update:  Feeling good.  Came off Lamictal well.  Got approved for medical THC to see if it would help with pain.  Doing some social events.   Brock (b/f) ETOH use high- things are difficult.   Stresses:  slight pain-weather?.   Started PT struggles with home exercises.  Cousin was just found . ETOH? Unknown why.  Misses family.  Worst time of year due to daughters previous death and fathers previous death.  Missing spiritual connection.   Appetite: n/a  Sleep: Still using Trazodone.   Outpatient Provider updates:   SI/SIB/HI:  Denies  SHANTE: Denies  Side effects/compliance:  Interventions:  [facility] engaged in discussing missing connection, community and current partnership not meeting needs.  Considering making moves/changes in life  Most important:  Doing well    Past Psychiatric Med Trials:  Psych Meds at Intake:  Trazodone 300 mg daily at bedtime   Viibryd 20 mg   Lamotrigine 100 mg - several years to augment       Past Psych Meds:  Wellbutrin   Lexapro  Topamax  Paxil  zoloft    Psychiatric ROS:  See HPI above for all pertinent positives and negatives. Rest of systems negative.     PHQ9 and GAD7 scores were reviewed today if completed.   Medication side effects: Denies  Current stressors include: Symptoms and see HPI above  Coping mechanisms and supports include: Family, Hobbies, and Friends    Current medications include:   Current Outpatient Medications    Medication Sig Dispense Refill    acetaminophen-codeine (TYLENOL #4) 300-60 MG per tablet Take 1-2 tablets by mouth every 8 hours as needed for severe pain. 180 tablet 0    cholecalciferol 50 MCG (2000 UT) tablet Take 4,000 Units by mouth Every other day      DULoxetine (CYMBALTA) 30 MG capsule Take 2 capsules (60 mg) by mouth daily. 180 capsule 1    lamoTRIgine (LAMICTAL) 100 MG tablet Take 1 tablet (100 mg) by mouth daily 90 tablet 3    mirabegron (MYRBETRIQ) 25 MG 24 hr tablet Take 1 tablet (25 mg) by mouth daily 90 tablet 3    naloxone (NARCAN) 4 MG/0.1ML nasal spray [NALOXONE (NARCAN) 4 MG/ACTUATION NASAL SPRAY] 1 spray (4 mg dose) into one nostril for opioid reversal. Call 911. May repeat if no response in 3 minutes. 1 each 1    ondansetron (ZOFRAN) 4 MG tablet Take 1 Tablet (4 mg) by mouth every 8 hours if needed for nausea/vomiting 30 tablet 1    rosuvastatin (CRESTOR) 5 MG tablet Take 1 tablet (5 mg) by mouth daily 90 tablet 3    traZODone (DESYREL) 100 MG tablet Take 1 to 3 tablets by mouth at bedtime as needed 270 tablet 3     No current facility-administered medications for this visit.       Past Medical/Surgical History:  Past Medical History:   Diagnosis Date    Anemia     Anxiety     Chronic pain 08/14/2016    Depression     Former smoker     History of cocaine abuse (H)     Insomnia 08/14/2016    Liver disease     Low back pain 08/14/2016    Migraine headache 08/14/2016    Osteoporosis 08/14/2016    PN (peripheral neuropathy) 08/14/2016    PONV (postoperative nausea and vomiting)       has a past medical history of Anemia, Anxiety, Chronic pain (08/14/2016), Depression, Former smoker, History of cocaine abuse (H), Insomnia (08/14/2016), Liver disease, Low back pain (08/14/2016), Migraine headache (08/14/2016), Osteoporosis (08/14/2016), PN (peripheral neuropathy) (08/14/2016), and PONV (postoperative nausea and vomiting).    She has no past medical history of Arrhythmia, Arthritis, Cerebral artery  occlusion with cerebral infarction (H), Chronic infection, Coagulation disorder (H), Congenital heart disease, Congestive heart failure (H), COPD (chronic obstructive pulmonary disease) (H), Coronary artery disease, Diabetes (H), Dialysis patient (H), Difficult intubation, Difficulty walking, Dyspnea on exertion, Gastroesophageal reflux disease, Heart attack (H), Heart murmur, Hepatitis, Hiatal hernia, History of angina, History of blood transfusion, Hypertension, Irregular heart beat, Malignant hyperthermia, Multiple sclerosis (H), Muscular dystrophy (H), Noninfectious ileitis, Obese, Orthopnea, Oxygen dependent, Pacemaker, Pancreatic disease, Parkinsons disease (H), Pneumonia, Pulmonary hypertension (H), Renal disease, Seizures (H), Sleep apnea, Spinal headache, Stented coronary artery, Thrombosis, Thyroid disease, Tracheostomy in place (H), Uncomplicated asthma, or Walking troubles.    Social History:  Reviewed. No changes to social history except as noted above in HPI.    Vital Signs:   None. This is phone/video visit.     Labs:  Most recent laboratory results reviewed and no new labs.     Review of Systems:  10 systems (general, cardiovascular, respiratory, eyes, ENT, endocrine, GI, , M/S, neurological) were reviewed. Most pertinent finding(s) is/are: Chronic pain. The remaining systems are all unremarkable.    Mental Status Examination (limited as this is by phone/video):  Appearance: Awake, alert, appears stated age, no acute distress, well-groomed   Attitude:  cooperative, pleasant   Motor: No gross abnormalities observed via video, not formally tested   Oriented to:  person, place, time, and situation  Attention Span and Concentration:  normal  Speech:  clear, coherent, regular rate, rhythm, and volume  Language: intact  Mood: Pretty good  Affect:  appropriate and in normal range and mood congruent  Associations:  no loose associations  Thought Process:  logical, linear and goal oriented  Thought Content:   no evidence of suicidal ideation or homicidal ideation, no evidence of psychotic thought, no auditory hallucinations present and no visual hallucinations present  Recent and Remote Memory:  Intact to interview. Not formally assessed. No amnesia.  Fund of Knowledge: appropriate  Insight:  good  Judgment:  intact, adequate for safety  Impulse Control:  intact    Suicide Risk Assessment:  Today Conrad Zhu reports no suicidal ideation. Based on all available evidence including the factors cited above, Conrad Zhu does not appear to be at imminent risk for self-harm, does not meet criteria for a 72-hr hold, and therefore remains appropriate for ongoing outpatient level of care.  A thorough assessment of risk factors related to suicide and self-harm have been reviewed and are noted above. The patient convincingly denies suicidality on several occasions. Local community safety resources reviewed for patient to use if needed. There was no deceit detected, and the patient presented in a manner that was believable.     DSM5 Diagnosis:  Persistent Depressive Disorder, in remission  300.02 (F41.1) Generalized Anxiety Disorder    Medical comorbidities include:   Patient Active Problem List    Diagnosis Date Noted    Chronic pain 08/14/2016     Priority: High    Encounter for Medicare annual wellness exam 10/04/2024     Priority: Medium    Elevated BP without diagnosis of hypertension 10/04/2024     Priority: Medium    Acute pain of left knee 10/04/2024     Priority: Medium    Mixed hyperlipidemia 05/28/2024     Priority: Medium    F11.2 - Continuous opioid dependence (H) 06/12/2023     Priority: Medium    Vitamin deficiency 10/23/2020     Priority: Medium    Depression 02/10/2020     Priority: Medium    Neck pain 02/10/2020     Priority: Medium    Age-related osteoporosis without current pathological fracture 11/14/2019     Priority: Medium    History of falling 11/14/2019     Priority: Medium    Unspecified fracture  of shaft of left fibula, subsequent encounter for closed fracture with routine healing 11/14/2019     Priority: Medium    Displaced intertrochanteric fracture of left femur, subsequent encounter for closed fracture with routine healing 11/14/2019     Priority: Medium    Closed intertrochanteric fracture of hip, left, initial encounter (H) 11/11/2019     Priority: Medium    Left hip pain 11/11/2019     Priority: Medium     Added automatically from request for surgery [id number]        Closed nondisplaced fracture of body of right calcaneus, initial encounter 07/09/2019     Priority: Medium    Achilles tendinitis of right lower extremity 07/01/2019     Priority: Medium    Complex regional pain syndrome i of left lower limb 08/15/2018     Priority: Medium    PN (peripheral neuropathy) 08/14/2016     Priority: Medium    Former smoker      Priority: Medium    Nausea 06/07/2016     Priority: Medium    Left foot drop 06/01/2016     Priority: Medium    Closed fracture of metatarsal bone 02/08/2016     Priority: Medium     Formatting of this note might be different from the original.  Overview:   Created by Conversion      Complex regional pain syndrome type 2 of upper extremity 02/08/2016     Priority: Medium     Formatting of this note might be different from the original.  Overview:   Created by Conversion      Degeneration of intervertebral disc of cervical region 02/08/2016     Priority: Medium     Formatting of this note might be different from the original.  Overview:   Created by Conversion      Edema 02/08/2016     Priority: Medium     Formatting of this note might be different from the original.  Overview:   Created by Conversion  [facility] Annotation: Feb 25 2014  1:28PM - Sixto Griffith: Left foot      Anxiety 01/21/2015     Priority: Medium    Corneal scarring 09/25/2012     Priority: Medium    Menopause present 12/08/2011     Priority: Medium     Formatting of this note might be different from the  original.  Decreased libido.      OAB (overactive bladder) 08/14/2016     Priority: Low    Insomnia 08/14/2016     Priority: Low       Psychosocial & Contextual Factors: see HPI above    Assessment:  From Intake, 8/28/2024:  Conrad Zhu is a 68-year-old with past psychiatric history including depression, anxiety, substance abuse in remission who presents today for psychiatric evaluation.  Patient with significant chronic pain and persistent depressive symptoms for many years.  Mood and anxiety often incredibly influenced by her pain symptoms.  Patient not sure her current depression medications are doing much for her mood.  Patient has met with a couple providers over the past few years who have suggested Cymbalta/duloxetine.  Patient is agreeable to tapering off Viibryd and onto duloxetine.  Discussed risks and benefits of the transition/change.  If duloxetine incredibly helpful, could consider tapering off lamotrigine.  Patient may also try to decrease trazodone in an attempt to continue to reduce polypharmacy.  Patient denies any concerns or struggles with substances for 20-30 years.  Occasional social alcohol use.  Patient may be interested in medicinal cannabis for her pain.  No current cannabis use.  Passive suicidal thoughts with no plan or intent to harm self or past suicide attempts.  Psychotherapy encouraged.     10/10/2024:  Patient with significant improvements on duloxetine for pain, mood, anxiety.  Did well with the transition off Viibryd.  Patient now on duloxetine 60 mg total daily dose.  Will taper off lamotrigine.    11/21/2024:  Overall doing quite well.  Patient wondering about increasing duloxetine some.  Since blood pressure most recently documented as elevated, we will wait on increasing the dose until she is able to get in at home blood pressure cuff, which she has planned to do, and is able to ensure most recent blood pressures are not elevated.  I would like to see blood pressure at  least 130/85 or less before increasing duloxetine.  Otherwise, mental health symptoms much more stable.  Off lamotrigine with no worsening of symptoms.  No acute safety concerns.  No SI.  No problematic drug or alcohol use.    Medication side effects and alternatives were reviewed. Health promotion activities recommended and reviewed today. All questions addressed. Education and counseling completed regarding risks and benefits of medications and psychotherapy options. Recommend therapy for additional support.     Treatment Plan:  Continue duloxetine 60 mg daily for chronic pain, depression, anxiety. Could consider increasing before next visit IF - you send me a couple blood pressure coughs and get you at-home BP monitor (as discussed at today's visit).   Continue trazodone 100-300 mg at bedtime for sleep.   Continue all other cares per primary care provider.   Continue all other medications as reviewed per electronic medical record today.   Safety plan reviewed. To the Emergency Department as needed or call after hours crisis line at [phone] or [phone]. [place] Crisis Text Line. Text [place] to 031292 or Suicide LifeLine Chat: [email or web address].org/chat  Consider individual psychotherapy as needed.  Schedule an appointment with me in 2 months or sooner as needed. Call [facility] at [phone] to schedule.  Follow up with primary care provider as planned or for acute medical concerns.  Call the psychiatric nurse line with medication questions or concerns at [phone].  ET Waterhart may be used to communicate with your provider, but this is not intended to be used for emergencies.    Administrative Billing:   Phone Call/Video Duration: 17 Minutes  Start: 11:02a  Stop: 11:19a    The longitudinal plan of care for the diagnosis(es)/condition(s) as documented were addressed during this visit. Due to the added complexity in care, I will continue to support Luis Afabiano in the subsequent management  and with ongoing continuity of care.    Episode of Care #: 3    Patient Status:  Patient will continue to be seen for ongoing consultation and stabilization.    Signed:   Elodia Dupont DO  [facility]    Disclaimer: This note consists of symbols derived from keyboarding, dictation and/or voice recognition software. As a result, there may be errors in the script that have gone undetected. Please consider this when interpreting information found in this chart.

## 2024-11-21 ENCOUNTER — VIRTUAL VISIT (OUTPATIENT)
Dept: PSYCHIATRY | Facility: CLINIC | Age: 69
End: 2024-11-21
Payer: COMMERCIAL

## 2024-11-21 ENCOUNTER — VIRTUAL VISIT (OUTPATIENT)
Dept: BEHAVIORAL HEALTH | Facility: CLINIC | Age: 69
End: 2024-11-21
Payer: COMMERCIAL

## 2024-11-21 VITALS — WEIGHT: 129 LBS | BODY MASS INDEX: 26 KG/M2 | HEIGHT: 59 IN

## 2024-11-21 DIAGNOSIS — F41.1 GAD (GENERALIZED ANXIETY DISORDER): ICD-10-CM

## 2024-11-21 DIAGNOSIS — F34.1 PERSISTENT DEPRESSIVE DISORDER: Primary | ICD-10-CM

## 2024-11-21 DIAGNOSIS — G89.29 OTHER CHRONIC PAIN: ICD-10-CM

## 2024-11-21 ASSESSMENT — PAIN SCALES - GENERAL: PAINLEVEL_OUTOF10: MODERATE PAIN (5)

## 2024-11-21 ASSESSMENT — PATIENT HEALTH QUESTIONNAIRE - PHQ9
10. IF YOU CHECKED OFF ANY PROBLEMS, HOW DIFFICULT HAVE THESE PROBLEMS MADE IT FOR YOU TO DO YOUR WORK, TAKE CARE OF THINGS AT HOME, OR GET ALONG WITH OTHER PEOPLE: SOMEWHAT DIFFICULT
SUM OF ALL RESPONSES TO PHQ QUESTIONS 1-9: 4
SUM OF ALL RESPONSES TO PHQ QUESTIONS 1-9: 4

## 2024-11-21 NOTE — NURSING NOTE
Current patient location: [address] [place] [id number]    Is the patient currently in the state of [place]? NO    Visit mode:VIDEO    If the visit is dropped, the patient can be reconnected by:VIDEO VISIT: Send to e-mail at: [email or web address]    Will anyone else be joining the visit? NO  (If patient encounters technical issues they should call [phone]    Are changes needed to the allergy or medication list? No    Are refills needed on medications prescribed by this physician? Discuss with provider    Rooming Documentation:  Questionnaire(s) completed    Reason for visit: RECHECK    Nikole LEIGH

## 2024-11-21 NOTE — PATIENT INSTRUCTIONS
Treatment Plan:  Continue duloxetine 60 mg daily for chronic pain, depression, anxiety. Could consider increasing before next visit IF - you send me a couple blood pressure coughs and get you at-home BP monitor (as discussed at today's visit).   Continue trazodone 100-300 mg at bedtime for sleep.   Continue all other cares per primary care provider.   Continue all other medications as reviewed per electronic medical record today.   Safety plan reviewed. To the Emergency Department as needed or call after hours crisis line at [phone] or [phone]. [place] Crisis Text Line. Text [place] to 078046 or Suicide LifeLine Chat: [email or web address] [email or web address].org/chat  Consider individual psychotherapy as needed.  Schedule an appointment with me in 2 months or sooner as needed. Call [facility] at [phone] to schedule.  Follow up with primary care provider as planned or for acute medical concerns.  Call the psychiatric nurse line with medication questions or concerns at [phone].  Fanaticallhart may be used to communicate with your provider, but this is not intended to be used for emergencies.

## 2024-12-16 DIAGNOSIS — G89.4 CHRONIC PAIN SYNDROME: ICD-10-CM

## 2024-12-16 DIAGNOSIS — M81.0 AGE-RELATED OSTEOPOROSIS WITHOUT CURRENT PATHOLOGICAL FRACTURE: Primary | ICD-10-CM

## 2024-12-16 DIAGNOSIS — Z92.29 PERSONAL HISTORY OF OTHER DRUG THERAPY: ICD-10-CM

## 2024-12-16 RX ORDER — IBUPROFEN 800 MG/1
TABLET, FILM COATED ORAL
COMMUNITY
Start: 2024-08-04

## 2024-12-16 RX ORDER — ACETAMINOPHEN AND CODEINE PHOSPHATE 300; 60 MG/1; MG/1
TABLET ORAL
Qty: 180 TABLET | Refills: 0 | OUTPATIENT
Start: 2024-12-16

## 2024-12-18 RX ORDER — ACETAMINOPHEN AND CODEINE PHOSPHATE 300; 60 MG/1; MG/1
1-2 TABLET ORAL EVERY 8 HOURS PRN
Qty: 180 TABLET | Refills: 0 | Status: SHIPPED | OUTPATIENT
Start: 2024-12-18

## 2024-12-18 NOTE — TELEPHONE ENCOUNTER
Patient calling as she knows it is early- asking if Rx can be sent with fill date due to holidays  Patient has also had issues with stock so wants to ensure that pharmacy has in stock- always requests 1 week early due to this.

## 2025-01-09 ENCOUNTER — TELEPHONE (OUTPATIENT)
Dept: PHARMACY | Facility: CLINIC | Age: 70
End: 2025-01-09
Payer: COMMERCIAL

## 2025-01-09 NOTE — TELEPHONE ENCOUNTER
Called patient for MTM follow-up. She reports she has been able to discontinue many of her medications. Still hoping to get off of trazodone. Duloxetine has been very helpful but unable to increase further because of high blood pressure. She bought wrist cuff and blood pressure continues to be 150s. She does have follow-up with psychiatry and PCP this month to discuss this further.    We discussed she can bring in wrist cuff for nurse only visit and get blood pressure checked. She may need to add antihypertensive at this point, especially if considering increasing duloxetine.    Will reach out to patient in a couple more months.    Meseret Stout, PharmD  Medication Therapy Management (MTM) Pharmacist   Quality 110: Preventive Care And Screening: Influenza Immunization: Influenza Immunization previously received during influenza season Detail Level: Detailed Quality 111:Pneumonia Vaccination Status For Older Adults: Pneumococcal Vaccination Previously Received

## 2025-01-20 ENCOUNTER — PATIENT OUTREACH (OUTPATIENT)
Dept: CARE COORDINATION | Facility: CLINIC | Age: 70
End: 2025-01-20
Payer: COMMERCIAL

## 2025-01-20 DIAGNOSIS — G89.4 CHRONIC PAIN SYNDROME: ICD-10-CM

## 2025-01-20 DIAGNOSIS — N32.81 OVERACTIVE BLADDER: ICD-10-CM

## 2025-01-20 RX ORDER — MIRABEGRON 25 MG/1
25 TABLET, FILM COATED, EXTENDED RELEASE ORAL DAILY
Qty: 90 TABLET | Refills: 2 | Status: SHIPPED | OUTPATIENT
Start: 2025-01-20

## 2025-01-20 RX ORDER — ACETAMINOPHEN AND CODEINE PHOSPHATE 300; 60 MG/1; MG/1
1-2 TABLET ORAL EVERY 8 HOURS PRN
Qty: 180 TABLET | Refills: 0 | Status: SHIPPED | OUTPATIENT
Start: 2025-01-20

## 2025-01-20 NOTE — PROGRESS NOTES
[facility]    Behavioral Health Clinician Progress Note   Mental Health & Addiction Services      1/23/25    Patient Name: Conrad Zhu       Service Type:  Individual   Service Location:  [employer] / Email (patient reached)   Visit Start Time: 1030am  Visit End Time:  1055am  Session Length: 16 - 37    Attendees: Client   Service Modality: Video Visit:      Provider verified identity through the following two step process.  Patient provided:  Patient is known previously to provider    Telemedicine Visit: The patient's condition can be safely assessed and treated via synchronous audio and visual telemedicine encounter.      Reason for Telemedicine Visit: Services only offered telehealth    Originating Site (Patient Location): Patient's home    Distant Site (Provider Location): Provider Remote Setting- Home Office    Consent:  The patient/guardian has verbally consented to: the potential risks and benefits of telemedicine (video visit) versus in person care; bill my insurance or make self-payment for services provided; and responsibility for payment of non-covered services.     Patient would like the video invitation sent by:  My Chart    Mode of Communication:  Video Conference via [facility]    Distant Location (Provider):  Off-site    As the provider I attest to compliance with applicable laws and regulations related to telemedicine.   Visit number: 4    [facility] Visit Activities (Refresh list every visit): [facility]     [place] Diagnostic Assessment: 8/28/24 Chanelle Garcia MA [facility]  Treatment Plan Review Date: 11/21/24      DATA:    Extended Session (60+ minutes): No   Interactive Complexity: No   Crisis: No   [facility] Patient: No     Assessments completed prior to visit:   PHQ9:       5/28/2024     1:42 PM 8/27/2024     3:11 PM 8/28/2024     7:56 AM 10/3/2024     6:51 PM 10/10/2024    10:27 AM 11/21/2024     7:51 AM 1/23/2025    10:16 AM   PHQ-9 SCORE   PHQ-9 Total  Score MyChart  11 (Moderate depression) 11 (Moderate depression) 5 (Mild depression) 3 (Minimal depression) 4 (Minimal depression) 6 (Mild depression)   PHQ-9 Total Score 4 11    11 11 5 3 4  6        Patient-reported     GAD7:       2/10/2020     2:00 PM 2022    12:07 PM 2023     9:59 AM 2023    10:49 AM   ISIDORO-7 SCORE   Total Score   5 (mild anxiety) 8 (mild anxiety)   Total Score 16 6 5 8         Reason for Visit/Presenting Concern:  Anxiety and Depression    Current Stressors / Issues:  MH update:  Doing well.  Xmas went okay.  Relationship still struggling considering going to [place]. Up to 90mg.  Much better.  Pain is tolerable.  Even forgot pain meds today since she didn't wake up and need them ASAP.  Stresses:  considering a trip to [place].  Appetite: n/a  Sleep: n/a  Outpatient Provider updates: Denies   SI/SIB/HI:  Denies  SHANTE: Denies  Side effects/compliance:  Interventions:    C engaged in conversation about interpersonal relationship dynamics and feeling stuck in decision makin  Most important:  Blood pressure stuff?  Doing better.      MH update:  Feeling good.  Came off Lamictal well.  Got approved for medical THC to see if it would help with pain.  Doing some social events.   Brock (b/f) ETOH use high- things are difficult.   Stresses:  slight pain-weather?.   Started PT struggles with home exercises.  Cousin was just found . ETOH? Unknown why.  Misses family.  Worst time of year due to daughters previous death and fathers previous death.  Missing spiritual connection.   Appetite: n/a  Sleep: Still using Trazodone.   Outpatient Provider updates:   SI/SIB/HI:  Denies  SHANTE: Denies  Side effects/compliance:  Interventions:  [facility] engaged in discussing missing connection, community and current partnership not meeting needs.  Considering making moves/changes in life  Most important:  Doing well    10/10  MH update:  Started cymbalta.  Racing thoughts are better.  Mood better.  The fatigue  is way better.  Pain better.  Not worrying about things that don't matter.  Getting up and functioning, completing tasks!  Stresses:  Grief/loss hx, chronic pain (much better)  Appetite: Denies concerns  Sleep: Denies concerns  Outpatient Provider updates: Declines at this time  SI/SIB/HI:  Denies any!  SHANTE:  Denies concerns.  Hx of poly sub tx.  Side effects/compliance:  None!  Interventions:  [facility] discussed treatment options for sx mgmt  Most important:  can she take all at once?  Or does it have to be BID?  Feels great!!     8/28  MH update:  ROS above  Stresses:  is getting off morphine, chronic pain, grief/loss, resentment from needing caregiving/support  Appetite: n/a  Sleep: very poor/pain  Outpatient Provider updates: intake 11/14, sp[facility]er mac [facility]ated within [facility].  Decline MHS.  SI/SIB/HI:  Chronic passive ideation without plan or intent.  Denies previous attempts.  SHANTE:  Denies concerns.  Interested in medical THC.  Hx of cocaine/opiate abuse with tx/detox  Side effects/compliance:  Interventions:  [facility] engaged in completing DA with psychoeducation and tx planning  Most important:  meds aren't working, mind not shutting off.    Therapeutic Interventions:  Motivational Interviewing (MI): Validated patient's thoughts, feelings and experience. Expressed respect for patient's autonomy in decision making.  Asked open-ended questions to invite patient's self-reflection and self-direction around change and what is important for them in working towards their goals.     Response to treatment interventions:   Patient was receptive to interventions utilized.  Patient was engaged in the therapy process.       Progress on Treatment Objective(s) / Homework:   Stable - MAINTENANCE (Working to maintain change, with risk of relapse); Intervened by continuing to positively reinforce healthy behavior choice      Medication Review:   Changes to psychiatric medications, see updated Medication List in EPIC.      Medication Compliance:   Yes      Chemical Use Review:  Substance Use: Chemical use reviewed, no active concerns identified      Tobacco Use: No current tobacco use.       Assessment: Current Emotional / Mental Status (status of significant symptoms):    Risk status (Self / Other harm or suicidal ideation)   Patient has had a history of suicidal ideation: hx of passive ideation without planning or intent.  Denies previous attempts.  Denies need for safety plan or crisis resources    Patient denies current fears or concerns for personal safety.   Patient denies current or recent suicidal ideation or behaviors.   Patient denies current or recent homicidal ideation or behaviors.   Patient denies current or recent self injurious behavior or ideation.   Patient denies other safety concerns.   Recommended that patient call 911 or go to the local ED should there be a change in any of these risk factors      ASSESSMENT:   Mental Status:     Appearance:   Appropriate    Eye Contact:   Good    Psychomotor Behavior: Normal    Attitude:   Cooperative    Orientation:   All   Speech Rate / Production: Normal    Volume:   Normal    Mood:    Normal   Affect:    Appropriate    Thought Content:  Clear    Thought Form:  Coherent  Logical    Insight:    Good          Diagnoses:      ISIDORO (generalized anxiety disorder)  Persistent depressive disorder    Collateral Reports Completed:   Communicated with: Dr Dupont        Plan: (Homework, other):   Patient was provided Maintain Sobriety  Patient was given information about behavioral services and encouraged to schedule a follow up appointment with the clinic [facility] as needed.         JOSE Hernández, [facility]     _____________________________________________________________________________________________________________________________________                                              Individual Treatment Plan    Patient's Name: Conrad Zhu   YOB: 1955  Date of Creation: 11/21/24  Date Treatment Plan  Last Reviewed/Revised: 11/21/24    DSM5 Diagnoses:    Persistent depressive disorder  ISIDORO (generalized anxiety disorder)    Psychosocial / Contextual Factors: Medical Complexities, Substance Use history/concerns, Caregiver, and Grief/Loss  PROMIS (reviewed every 90 days):   The following assessments were completed by patient for this visit:  PROMIS 10-Global Health (only subscores and total score):       8/25/2024     9:01 AM 8/28/2024     7:57 AM 10/10/2024    10:29 AM 1/18/2025    10:09 AM   PROMIS-10 Scores Only   Global Mental Health Score 7    7 7 15 12    Global Physical Health Score 9    9 9 13 13    PROMIS TOTAL - SUBSCORES 16    16 16 28 25        Patient-reported        Referral / Collaboration:  Referral to another professional/service is not indicated at this time..    Anticipated number of session for this episode of care:  6-9 sessions  Anticipation frequency of session: Monthly  Anticipated Duration of each session: 16-37 minutes  Treatment plan will be reviewed in 90 days or when goals have been changed.       MeasurableTreatment Goal(s) related to diagnosis / functional impairment(s)  Goal 1: Patient will improve daily functioning.    I will know I've met my goal when I can con't to make decisions that align with my values.      Status: New - Date: 11/21/24      Intervention(s)  [facility] [place] will Motivational Interviewing (MI): Validate patient's thoughts, feelings and experience. Express respect for patient's autonomy in decision making.  Ask open-ended questions to invite patient's self-reflection and self-direction around change and what is important for them in working towards their goals.      Patient has reviewed and agreed to the above plan.     Written by  Chanelle Garcia LPCC, [facility] [place]

## 2025-01-22 NOTE — PROGRESS NOTES
"Telemedicine Visit: The patient's condition can be safely assessed and treated via synchronous audio and visual telemedicine encounter.      Reason for Telemedicine Visit: Patient has requested telehealth visit    Originating Site (Patient Location): Patient's home    Distant Location (provider location):  Off-site    Consent:  The patient/guardian has verbally consented to: the potential risks and benefits of telemedicine (video visit) versus in person care; bill my insurance or make self-payment for services provided; and responsibility for payment of non-covered services.     Mode of Communication:  Video Conference via [employer]ang[employer]    As the provider I attest to compliance with applicable laws and regulations related to telemedicine.         Outpatient Psychiatric Progress Note    Name: Conrad Zhu   : 1955                    Primary Care Provider: Ksenia Sandoval MD   Therapist: None    PHQ-9 scores:      10/3/2024     6:51 PM 10/10/2024    10:27 AM 2024     7:51 AM   PHQ-9 SCORE   PHQ-9 Total Score MyChart 5 (Mild depression) 3 (Minimal depression) 4 (Minimal depression)   PHQ-9 Total Score 5 3 4        Patient-reported       ISIDORO-7 scores:      2022    12:07 PM 2023     9:59 AM 2023    10:49 AM   ISIDORO-7 SCORE   Total Score  5 (mild anxiety) 8 (mild anxiety)   Total Score 6 5 8       Patient Identification:  Patient is a 69 year old, partnered / significant other  White Not  or  female  who presents for return visit with me.  Patient is currently disabled. Patient attended the phone/video session alone. Patient prefers to be called: \"Conrad\".    Interim History:  I last saw Conrad Zhu for outpatient psychiatry return visit on 2024. During that appointment, we:    Continue duloxetine 60 mg daily for chronic pain, depression, anxiety. Could consider increasing before next visit IF - you send me a couple blood pressure coughs and get you at-home " "BP monitor (as discussed at today's visit).   Continue trazodone 100-300 mg at bedtime for sleep.     1/23: Overall patient doing relatively well.  For the most part, symptoms quite stable.  Duloxetine continues to help with mood, anxiety, and chronic pain.  Patient had blood pressure checked in the clinic on the 10th of January.  Blood pressure at that visit was 134/70 (this was before duloxetine dose increase).  Patient then had a clinic visit on the 17th and blood pressure was 172/83 at an appt which was about a week after increasing duloxetine.  Since patient had not checked her blood pressure at home since the 17th, patient checked during the appointment.  Patient's blood pressure reading was 166/103.  Pulse was 74.  Patient denies any notable side effects from the duloxetine except possibly increased blood pressure.  Patient reports \"feeling better and better\" as the weeks keep passing by.  No acute safety concerns.  No SI.  No problematic drug or alcohol use.  See [facility] note below for additional details.    Per [facility], [facility], during today's team-based visit:  Current Stressors / Issues:  MH update:  Doing well.  Xmas went okay.  Relationship still struggling considering going to [place]. Up to 90mg.  Much better.  Pain is tolerable.  Even forgot pain meds today since she didn't wake up and need them ASAP.  Stresses:  considering a trip to [place].  Appetite: n/a  Sleep: n/a  Outpatient Provider updates: Denies   SI/SIB/HI:  Denies  SHANTE: Denies  Side effects/compliance:  Interventions:    [facility] engaged in conversation about interpersonal relationship dynamics and feeling stuck in decision makin  Most important:  Blood pressure stuff?  Doing better.    Past Psychiatric Med Trials:  Psych Meds at Intake:  Trazodone 300 mg daily at bedtime   Viibryd 20 mg   Lamotrigine 100 mg - several years to augment       Past Psych Meds:  Wellbutrin   Lexapro  Topamax  Paxil  zoloft    Psychiatric ROS:  See HPI above for all " pertinent positives and negatives. Rest of systems negative.     PHQ9 and GAD7 scores were reviewed today if completed.   Medication side effects: Increasing blood pressure  Current stressors include: Symptoms and see HPI above  Coping mechanisms and supports include: Family, Hobbies, and Friends    Current medications include:   Current Outpatient Medications   Medication Sig Dispense Refill    acetaminophen-codeine (TYLENOL #4) 300-60 MG per tablet Take 1-2 tablets by mouth every 8 hours as needed for severe pain. 180 tablet 0    cholecalciferol 50 MCG (2000 UT) tablet Take 4,000 Units by mouth Every other day      DULoxetine (CYMBALTA) 30 MG capsule Take 1 capsule (30 mg) by mouth daily. Take WITH 60 mg dose for total daily dose 90 mg. 90 capsule 0    DULoxetine (CYMBALTA) 30 MG capsule Take 2 capsules (60 mg) by mouth daily. 180 capsule 1    mirabegron (MYRBETRIQ) 25 MG 24 hr tablet Take 1 tablet (25 mg) by mouth daily. 90 tablet 2    naloxone (NARCAN) 4 MG/0.1ML nasal spray [NALOXONE (NARCAN) 4 MG/ACTUATION NASAL SPRAY] 1 spray (4 mg dose) into one nostril for opioid reversal. Call 911. May repeat if no response in 3 minutes. 1 each 1    ondansetron (ZOFRAN) 4 MG tablet Take 1 Tablet (4 mg) by mouth every 8 hours if needed for nausea/vomiting 30 tablet 1    rosuvastatin (CRESTOR) 5 MG tablet Take 1 tablet (5 mg) by mouth daily 90 tablet 3    traZODone (DESYREL) 100 MG tablet Take 1 to 3 tablets by mouth at bedtime as needed (Patient taking differently: Take 100 mg by mouth at bedtime. Take 1 to 3 tablets by mouth at bedtime as needed) 270 tablet 3     Current Facility-Administered Medications   Medication Dose Route Frequency Provider Last Rate Last Admin    denosumab (PROLIA) injection 60 mg  60 mg Subcutaneous Once         [START ON 2/18/2025] denosumab (PROLIA) injection 60 mg  60 mg Subcutaneous Q6 Months Blanca Duarte NP           Past Medical/Surgical History:  Past Medical History:   Diagnosis Date     Anemia     Anxiety     Chronic pain 08/14/2016    Depression     Former smoker     History of cocaine abuse (H)     Insomnia 08/14/2016    Liver disease     Low back pain 08/14/2016    Migraine headache 08/14/2016    Osteoporosis 08/14/2016    PN (peripheral neuropathy) 08/14/2016    PONV (postoperative nausea and vomiting)       has a past medical history of Anemia, Anxiety, Chronic pain (08/14/2016), Depression, Former smoker, History of cocaine abuse (H), Insomnia (08/14/2016), Liver disease, Low back pain (08/14/2016), Migraine headache (08/14/2016), Osteoporosis (08/14/2016), PN (peripheral neuropathy) (08/14/2016), and PONV (postoperative nausea and vomiting).    She has no past medical history of Arrhythmia, Arthritis, Cerebral artery occlusion with cerebral infarction (H), Chronic infection, Coagulation disorder, Congenital heart disease, Congestive heart failure (H), COPD (chronic obstructive pulmonary disease) (H), Coronary artery disease, Diabetes (H), Dialysis patient, Difficult intubation, Difficulty walking, Dyspnea on exertion, Gastroesophageal reflux disease, Heart attack (H), Heart murmur, Hepatitis, Hiatal hernia, History of angina, History of blood transfusion, Hypertension, Irregular heart beat, Malignant hyperthermia, Multiple sclerosis (H), Muscular dystrophy (H), Noninfectious ileitis, Obese, Orthopnea, Oxygen dependent, Pacemaker, Pancreatic disease, Parkinsons disease (H), Pneumonia, Pulmonary hypertension (H), Renal disease, Seizures (H), Sleep apnea, Spinal headache, Stented coronary artery, Thrombosis, Thyroid disease, Tracheostomy in place (H), Uncomplicated asthma, or Walking troubles.    Social History:  Reviewed. No changes to social history except as noted above in HPI.    Vital Signs:   None. This is phone/video visit.     Labs:  Most recent laboratory results reviewed and no new labs.     Review of Systems:  10 systems (general, cardiovascular, respiratory, eyes, ENT, endocrine,  GI, , M/S, neurological) were reviewed. Most pertinent finding(s) is/are: Chronic pain. The remaining systems are all unremarkable.    Mental Status Examination (limited as this is by phone/video):  Appearance: Awake, alert, appears stated age, no acute distress, well-groomed   Attitude:  cooperative, pleasant   Motor: No gross abnormalities observed via video, not formally tested   Oriented to:  person, place, time, and situation  Attention Span and Concentration:  normal  Speech:  clear, coherent, regular rate, rhythm, and volume  Language: intact  Mood: Pretty good  Affect:  appropriate and in normal range and mood congruent  Associations:  no loose associations  Thought Process:  logical, linear and goal oriented  Thought Content:  no evidence of suicidal ideation or homicidal ideation, no evidence of psychotic thought, no auditory hallucinations present and no visual hallucinations present  Recent and Remote Memory:  Intact to interview. Not formally assessed. No amnesia.  Fund of Knowledge: appropriate  Insight:  good  Judgment:  intact, adequate for safety  Impulse Control:  intact    Suicide Risk Assessment:  Today Conrad Zhu reports no suicidal ideation. Based on all available evidence including the factors cited above, Conrad Zhu does not appear to be at imminent risk for self-harm, does not meet criteria for a 72-hr hold, and therefore remains appropriate for ongoing outpatient level of care.  A thorough assessment of risk factors related to suicide and self-harm have been reviewed and are noted above. The patient convincingly denies suicidality on several occasions. Local community safety resources reviewed for patient to use if needed. There was no deceit detected, and the patient presented in a manner that was believable.     DSM5 Diagnosis:  Persistent Depressive Disorder, in remission  300.02 (F41.1) Generalized Anxiety Disorder    Medical comorbidities include:   Patient Active Problem  List    Diagnosis Date Noted    Chronic pain 08/14/2016     Priority: High    Encounter for Medicare annual wellness exam 10/04/2024     Priority: Medium    Elevated BP without diagnosis of hypertension 10/04/2024     Priority: Medium    Acute pain of left knee 10/04/2024     Priority: Medium    Mixed hyperlipidemia 05/28/2024     Priority: Medium    F11.2 - Continuous opioid dependence (H) 06/12/2023     Priority: Medium    Vitamin deficiency 10/23/2020     Priority: Medium    Depression 02/10/2020     Priority: Medium    Neck pain 02/10/2020     Priority: Medium    Age-related osteoporosis without current pathological fracture 11/14/2019     Priority: Medium    History of falling 11/14/2019     Priority: Medium    Unspecified fracture of shaft of left fibula, subsequent encounter for closed fracture with routine healing 11/14/2019     Priority: Medium    Displaced intertrochanteric fracture of left femur, subsequent encounter for closed fracture with routine healing 11/14/2019     Priority: Medium    Closed intertrochanteric fracture of hip, left, initial encounter (H) 11/11/2019     Priority: Medium    Left hip pain 11/11/2019     Priority: Medium     Added automatically from request for surgery [id number]        Closed nondisplaced fracture of body of right calcaneus, initial encounter 07/09/2019     Priority: Medium    Achilles tendinitis of right lower extremity 07/01/2019     Priority: Medium    Complex regional pain syndrome i of left lower limb 08/15/2018     Priority: Medium    PN (peripheral neuropathy) 08/14/2016     Priority: Medium    Former smoker      Priority: Medium    Nausea 06/07/2016     Priority: Medium    Left foot drop 06/01/2016     Priority: Medium    Closed fracture of metatarsal bone 02/08/2016     Priority: Medium     Formatting of this note might be different from the original.  Overview:   Created by Conversion      Complex regional pain syndrome type 2 of upper extremity 02/08/2016      Priority: Medium     Formatting of this note might be different from the original.  Overview:   Created by Conversion      Degeneration of intervertebral disc of cervical region 02/08/2016     Priority: Medium     Formatting of this note might be different from the original.  Overview:   Created by Conversion      Edema 02/08/2016     Priority: Medium     Formatting of this note might be different from the original.  Overview:   Created by Conversion  [facility] Annotation: Feb 25 2014  1:28PM - Sixto Griffith: Left foot      Anxiety 01/21/2015     Priority: Medium    Corneal scarring 09/25/2012     Priority: Medium    Menopause present 12/08/2011     Priority: Medium     Formatting of this note might be different from the original.  Decreased libido.      OAB (overactive bladder) 08/14/2016     Priority: Low    Insomnia 08/14/2016     Priority: Low       Psychosocial & Contextual Factors: see HPI above    Assessment:  From Intake, 8/28/2024:  Conrad Zhu is a 68-year-old with past psychiatric history including depression, anxiety, substance abuse in remission who presents today for psychiatric evaluation.  Patient with significant chronic pain and persistent depressive symptoms for many years.  Mood and anxiety often incredibly influenced by her pain symptoms.  Patient not sure her current depression medications are doing much for her mood.  Patient has met with a couple providers over the past few years who have suggested Cymbalta/duloxetine.  Patient is agreeable to tapering off Viibryd and onto duloxetine.  Discussed risks and benefits of the transition/change.  If duloxetine incredibly helpful, could consider tapering off lamotrigine.  Patient may also try to decrease trazodone in an attempt to continue to reduce polypharmacy.  Patient denies any concerns or struggles with substances for 20-30 years.  Occasional social alcohol use.  Patient may be interested in medicinal cannabis for her pain.  No  current cannabis use.  Passive suicidal thoughts with no plan or intent to harm self or past suicide attempts.  Psychotherapy encouraged.     10/10/2024:  Patient with significant improvements on duloxetine for pain, mood, anxiety.  Did well with the transition off Viibryd.  Patient now on duloxetine 60 mg total daily dose.  Will taper off lamotrigine.    11/21/2024:  Overall doing quite well.  Patient wondering about increasing duloxetine some.  Since blood pressure most recently documented as elevated, we will wait on increasing the dose until she is able to get in at home blood pressure cuff, which she has planned to do, and is able to ensure most recent blood pressures are not elevated.  I would like to see blood pressure at least 130/85 or less before increasing duloxetine.  Otherwise, mental health symptoms much more stable.  Off lamotrigine with no worsening of symptoms.  No acute safety concerns.  No SI.  No problematic drug or alcohol use.    1/23/2025:  Patient continues to do well.  Fluoxetine continues to be quite helpful.  Unfortunately duloxetine dose increased to 90 mg daily has impacted the patient's blood pressure.  Patient with high blood pressure and so we will go back down to 60 mg daily and watch for any worsening mental health and chronic pain symptoms.  If patient feels like she would really like to be on the 90 mg daily dose, will discuss with primary care provider regarding blood pressure options and recommendations.  No acute safety concerns.  No acute suicidality.  No problematic drug or alcohol use.    Medication side effects and alternatives were reviewed. Health promotion activities recommended and reviewed today. All questions addressed. Education and counseling completed regarding risks and benefits of medications and psychotherapy options. Recommend therapy for additional support.     Treatment Plan:  Decrease duloxetine back to 60 mg daily for chronic pain, depression, anxiety.    Continue trazodone 100-300 mg at bedtime for sleep.   Continue to monitor blood pressure.   Continue all other cares per primary care provider.   Continue all other medications as reviewed per electronic medical record today.   Safety plan reviewed. To the Emergency Department as needed or call after hours crisis line at [phone] or [phone]. [place] Crisis Text Line. Text [place] to 799934 or Suicide LifeLine Chat: suicidepreventionTicketsNowline.org/chat  Consider individual psychotherapy as needed.  Schedule an appointment with me in 6 weeks or sooner as needed. Call [facility] at [phone] to schedule.  Follow up with primary care provider as planned or for acute medical concerns.  Call the psychiatric nurse line with medication questions or concerns at [phone].  [employer] may be used to communicate with your provider, but this is not intended to be used for emergencies.    Administrative Billing:   Phone Call/Video Duration: 15 Minutes  Start: 11:07a  Stop: 11:22a    The longitudinal plan of care for the diagnosis(es)/condition(s) as documented were addressed during this visit. Due to the added complexity in care, I will continue to support Conrad in the subsequent management and with ongoing continuity of care.    Episode of Care #: 4    Patient Status:  Patient will continue to be seen for ongoing consultation and stabilization.    Signed:   Elodia Dupont DO  [facility]    Disclaimer: This note consists of symbols derived from keyboarding, dictation and/or voice recognition software. As a result, there may be errors in the script that have gone undetected. Please consider this when interpreting information found in this chart.

## 2025-01-23 ENCOUNTER — VIRTUAL VISIT (OUTPATIENT)
Dept: PSYCHIATRY | Facility: CLINIC | Age: 70
End: 2025-01-23
Payer: COMMERCIAL

## 2025-01-23 ENCOUNTER — VIRTUAL VISIT (OUTPATIENT)
Dept: BEHAVIORAL HEALTH | Facility: CLINIC | Age: 70
End: 2025-01-23
Payer: COMMERCIAL

## 2025-01-23 DIAGNOSIS — F34.1 PERSISTENT DEPRESSIVE DISORDER: Primary | ICD-10-CM

## 2025-01-23 DIAGNOSIS — F41.1 GAD (GENERALIZED ANXIETY DISORDER): Primary | ICD-10-CM

## 2025-01-23 DIAGNOSIS — F41.1 GAD (GENERALIZED ANXIETY DISORDER): ICD-10-CM

## 2025-01-23 DIAGNOSIS — F34.1 PERSISTENT DEPRESSIVE DISORDER: ICD-10-CM

## 2025-01-23 RX ORDER — DULOXETIN HYDROCHLORIDE 60 MG/1
60 CAPSULE, DELAYED RELEASE ORAL DAILY
Qty: 90 CAPSULE | Refills: 1 | Status: SHIPPED | OUTPATIENT
Start: 2025-01-23

## 2025-01-23 ASSESSMENT — PATIENT HEALTH QUESTIONNAIRE - PHQ9
SUM OF ALL RESPONSES TO PHQ QUESTIONS 1-9: 6
10. IF YOU CHECKED OFF ANY PROBLEMS, HOW DIFFICULT HAVE THESE PROBLEMS MADE IT FOR YOU TO DO YOUR WORK, TAKE CARE OF THINGS AT HOME, OR GET ALONG WITH OTHER PEOPLE: SOMEWHAT DIFFICULT
SUM OF ALL RESPONSES TO PHQ QUESTIONS 1-9: 6

## 2025-01-23 ASSESSMENT — PAIN SCALES - GENERAL: PAINLEVEL_OUTOF10: NO PAIN (0)

## 2025-01-23 NOTE — NURSING NOTE
Current patient location: [address] [place] [id number]    Is the patient currently in the state of [place]? YES    Visit mode: VIDEO    If the visit is dropped, the patient can be reconnected by:VIDEO VISIT: Text to cell phone:   Telephone Information:   Mobile [phone]    and VIDEO VISIT: Send to e-mail at: [email or web address]    Will anyone else be joining the visit? NO  (If patient encounters technical issues they should call [phone]    Are changes needed to the allergy or medication list? No    Are refills needed on medications prescribed by this physician? NO    Rooming Documentation:  Questionnaire(s) completed    Pt notes she had a high blood pressure reading at a recent in-person clinic visit. Vitals taken from 1/17 at [facility] read 172/83    Reason for visit: RECHECK    Kathrine LEIGH

## 2025-01-23 NOTE — PATIENT INSTRUCTIONS
Treatment Plan:  Decrease duloxetine back to 60 mg daily for chronic pain, depression, anxiety.   Continue trazodone 100-300 mg at bedtime for sleep.   Continue to monitor blood pressure.   Continue all other cares per primary care provider.   Continue all other medications as reviewed per electronic medical record today.   Safety plan reviewed. To the Emergency Department as needed or call after hours crisis line at [phone] or [phone]. [place] Crisis Text Line. Text [place] to 143257 or Suicide LifeLine Chat: suicidepreventionlifeline.org/chat  Consider individual psychotherapy as needed.  Schedule an appointment with me in 6 weeks or sooner as needed. Call [facility] at [phone] to schedule.  Follow up with primary care provider as planned or for acute medical concerns.  Call the psychiatric nurse line with medication questions or concerns at [phone].  MyChart may be used to communicate with your provider, but this is not intended to be used for emergencies.

## 2025-02-14 PROBLEM — I10 BENIGN ESSENTIAL HTN: Status: ACTIVE | Noted: 2025-02-14

## 2025-02-14 PROBLEM — R03.0 ELEVATED BP WITHOUT DIAGNOSIS OF HYPERTENSION: Status: RESOLVED | Noted: 2024-10-04 | Resolved: 2025-02-14

## 2025-02-28 ENCOUNTER — ANCILLARY PROCEDURE (OUTPATIENT)
Dept: MAMMOGRAPHY | Facility: CLINIC | Age: 70
End: 2025-02-28
Attending: INTERNAL MEDICINE
Payer: COMMERCIAL

## 2025-02-28 ENCOUNTER — HOSPITAL ENCOUNTER (OUTPATIENT)
Dept: BONE DENSITY | Facility: HOSPITAL | Age: 70
Discharge: HOME | End: 2025-02-28
Attending: NURSE PRACTITIONER
Payer: COMMERCIAL

## 2025-02-28 DIAGNOSIS — M81.0 AGE-RELATED OSTEOPOROSIS WITHOUT CURRENT PATHOLOGICAL FRACTURE: ICD-10-CM

## 2025-02-28 DIAGNOSIS — Z12.31 VISIT FOR SCREENING MAMMOGRAM: ICD-10-CM

## 2025-02-28 PROCEDURE — 77063 BREAST TOMOSYNTHESIS BI: CPT

## 2025-02-28 PROCEDURE — 77091 TBS TECHL CALCULATION ONLY: CPT

## 2025-02-28 PROCEDURE — 77080 DXA BONE DENSITY AXIAL: CPT

## 2025-03-06 ENCOUNTER — ALLIED HEALTH/NURSE VISIT (OUTPATIENT)
Dept: FAMILY MEDICINE | Facility: CLINIC | Age: 70
End: 2025-03-06
Payer: COMMERCIAL

## 2025-03-06 VITALS — DIASTOLIC BLOOD PRESSURE: 82 MMHG | OXYGEN SATURATION: 97 % | HEART RATE: 93 BPM | SYSTOLIC BLOOD PRESSURE: 138 MMHG

## 2025-03-06 DIAGNOSIS — Z01.30 BP CHECK: Primary | ICD-10-CM

## 2025-03-06 NOTE — PROGRESS NOTES
3/6/2025     9:01 AM 9:03 AM      /90BP. 145/90. Data is abnormal. The comment is Patient BP machine. Taken on 3/6/25 9:01 /82BP. 138/82. The comment is [place] BP. Taken on 3/6/25 9:03 AM   BP Location Left arm Left arm   Patient Position Sitting Sitting   Cuff Size Adult Regular --   Pulse 94 93   SpO2 -- 97 %   I met with Conrad Zhu at the request of  to recheck her blood pressure.  Blood pressure medications on the med list were reviewed with patient.    Patient has taken all medications as per usual regimen: Yes  Patient reports tolerating them without any issues or concerns: Yes    Vitals:    03/06/25 0901 03/06/25 0903   BP: (!) 145/90 138/82   BP Location: Left arm Left arm   Patient Position: Sitting Sitting   Cuff Size: Adult Regular    Pulse: 94 93   SpO2:  97%     Eliceo Middleton, CMA

## 2025-03-10 DIAGNOSIS — G89.4 CHRONIC PAIN SYNDROME: ICD-10-CM

## 2025-03-10 RX ORDER — ACETAMINOPHEN AND CODEINE PHOSPHATE 300; 60 MG/1; MG/1
1-2 TABLET ORAL EVERY 8 HOURS PRN
Qty: 180 TABLET | Refills: 0 | Status: SHIPPED | OUTPATIENT
Start: 2025-03-17

## 2025-03-12 DIAGNOSIS — R11.0 NAUSEA: ICD-10-CM

## 2025-03-12 RX ORDER — ONDANSETRON 4 MG/1
TABLET, FILM COATED ORAL
Qty: 30 TABLET | Refills: 0 | Status: SHIPPED | OUTPATIENT
Start: 2025-03-12

## 2025-03-12 ASSESSMENT — PATIENT HEALTH QUESTIONNAIRE - PHQ9
10. IF YOU CHECKED OFF ANY PROBLEMS, HOW DIFFICULT HAVE THESE PROBLEMS MADE IT FOR YOU TO DO YOUR WORK, TAKE CARE OF THINGS AT HOME, OR GET ALONG WITH OTHER PEOPLE: SOMEWHAT DIFFICULT
SUM OF ALL RESPONSES TO PHQ QUESTIONS 1-9: 5
SUM OF ALL RESPONSES TO PHQ QUESTIONS 1-9: 5

## 2025-03-12 NOTE — PROGRESS NOTES
[facility] [place]    Behavioral Health Clinician Progress Note   Mental Health & Addiction Services      3/13/25    Patient Name: Conrad Zhu       Service Type:  Individual   Service Location:  [facility] / Email (patient reached)   Visit Start Time: 10am  Visit End Time:  1026am  Session Length: 16 - 37    Attendees: Client   Service Modality: Video Visit:      Provider verified identity through the following two step process.  Patient provided:  Patient is known previously to provider    Telemedicine Visit: The patient's condition can be safely assessed and treated via synchronous audio and visual telemedicine encounter.      Reason for Telemedicine Visit: Services only offered telehealth    Originating Site (Patient Location): Patient's home    Distant Site (Provider Location): Provider Remote Setting- Home Office    Consent:  The patient/guardian has verbally consented to: the potential risks and benefits of telemedicine (video visit) versus in person care; bill my insurance or make self-payment for services provided; and responsibility for payment of non-covered services.     Patient would like the video invitation sent by:  My Chart    Mode of Communication:  [facility]    Distant Location (Provider):  Off-site    As the provider I attest to compliance with applicable laws and regulations related to telemedicine.   Visit number: 5    [facility] Visit Activities (Refresh list every visit): [facility]     [place] Diagnostic Assessment: 8/28/24 Chanelle Garcia MA [facility]  Treatment Plan Review Date: 3/13/25      DATA:    Extended Session (60+ minutes): No   Interactive Complexity: No   Crisis: No   [facility] Patient: No     Assessments completed prior to visit:   PHQ9:       8/27/2024     3:11 PM 8/28/2024     7:56 AM 10/3/2024     6:51 PM 10/10/2024    10:27 AM 11/21/2024     7:51 AM 1/23/2025    10:16 AM 3/12/2025    10:49 AM   PHQ-9 SCORE   PHQ-9 Total  Score MyChart 11 (Moderate depression) 11 (Moderate depression) 5 (Mild depression) 3 (Minimal depression) 4 (Minimal depression) 6 (Mild depression) 5 (Mild depression)   PHQ-9 Total Score 11    11 11 5 3 4  6  5        Patient-reported     GAD7:       2/10/2020     2:00 PM 2022    12:07 PM 2023     9:59 AM 2023    10:49 AM 2025     1:27 PM   ISIDORO-7 SCORE   Total Score   5 (mild anxiety) 8 (mild anxiety) 0 (minimal anxiety)   Total Score 16 6 5 8 0        Patient-reported         Reason for Visit/Presenting Concern:  Anxiety and Depression    Current Stressors / Issues:  MH update:  Doing well.  Not feeling good physically this week.  Stresses: n/a.  Wants to plan a trip to [place], political stress  Appetite: nauseous   Sleep: 1-2 trazodone  Outpatient Provider updates:  Denies  SI/SIB/HI: n/a  SHANTE:n/a  Side effects/compliance:  Interventions:  [facility] engaged in validation and support of stressors  Most important: 132/93 On the 60mg. Would really like to try the 90 again.  Blood pressure meds now.       update:  Doing well.  Xmas went okay.  Relationship still struggling considering going to [place]. Up to 90mg.  Much better.  Pain is tolerable.  Even forgot pain meds today since she didn't wake up and need them ASAP.  Stresses:  considering a trip to [place].  Appetite: n/a  Sleep: n/a  Outpatient Provider updates: Denies   SI/SIB/HI:  Denies  SHANTE: Denies  Side effects/compliance:  Interventions:    [facility] engaged in conversation about interpersonal relationship dynamics and feeling stuck in decision makin  Most important:  Blood pressure stuff?  Doing better.       update:  Feeling good.  Came off Lamictal well.  Got approved for medical THC to see if it would help with pain.  Doing some social events.   Brock (b/f) ETOH use high- things are difficult.   Stresses:  slight pain-weather?.   Started PT struggles with home exercises.  Cousin was just found . ETOH? Unknown why.  Misses family.   Worst time of year due to daughters previous death and fathers previous death.  Missing spiritual connection.   Appetite: n/a  Sleep: Still using Trazodone.   Outpatient Provider updates:   SI/SIB/HI:  Denies  SHANTE: Denies  Side effects/compliance:  Interventions:  [facility] engaged in discussing missing connection, community and current partnership not meeting needs.  Considering making moves/changes in life  Most important:  Doing well    10/10  MH update:  Started cymbalta.  Racing thoughts are better.  Mood better.  The fatigue is way better.  Pain better.  Not worrying about things that don't matter.  Getting up and functioning, completing tasks!  Stresses:  Grief/loss hx, chronic pain (much better)  Appetite: Denies concerns  Sleep: Denies concerns  Outpatient Provider updates: Declines at this time  SI/SIB/HI:  Denies any!  SHANTE:  Denies concerns.  Hx of poly sub tx.  Side effects/compliance:  None!  Interventions:  [facility] discussed treatment options for sx mgmt  Most important:  can she take all at once?  Or does it have to be BID?  Feels great!!     8/28  MH update:  ROS above  Stresses:  is getting off morphine, chronic pain, grief/loss, resentment from needing caregiving/support  Appetite: n/a  Sleep: very poor/pain  Outpatient Provider updates: intake 11/14, [facility].  Decline MHS.  SI/SIB/HI:  Chronic passive ideation without plan or intent.  Denies previous attempts.  SHANTE:  Denies concerns.  Interested in medical THC.  Hx of cocaine/opiate abuse with tx/detox  Side effects/compliance:  Interventions:  [facility] engaged in completing DA with psychoeducation and tx planning  Most important:  meds aren't working, mind not shutting off.    Therapeutic Interventions:  Motivational Interviewing (MI): Validated patient's thoughts, feelings and experience. Expressed respect for patient's autonomy in decision making.  Asked open-ended questions to invite patient's self-reflection and self-direction around change and what  is important for them in working towards their goals.     Response to treatment interventions:   Patient was receptive to interventions utilized.  Patient was engaged in the therapy process.       Progress on Treatment Objective(s) / Homework:   Stable - MAINTENANCE (Working to maintain change, with risk of relapse); Intervened by continuing to positively reinforce healthy behavior choice      Medication Review:   Changes to psychiatric medications, see updated Medication List in EPIC.      Medication Compliance:   Yes     Chemical Use Review:  Substance Use: Chemical use reviewed, no active concerns identified      Tobacco Use: No current tobacco use.       Assessment: Current Emotional / Mental Status (status of significant symptoms):    Risk status (Self / Other harm or suicidal ideation)   Patient has had a history of suicidal ideation: hx of passive ideation without planning or intent.  Denies previous attempts.  Denies need for safety plan or crisis resources    Patient denies current fears or concerns for personal safety.   Patient denies current or recent suicidal ideation or behaviors.   Patient denies current or recent homicidal ideation or behaviors.   Patient denies current or recent self injurious behavior or ideation.   Patient denies other safety concerns.   Recommended that patient call 911 or go to the local ED should there be a change in any of these risk factors      ASSESSMENT:   Mental Status:     Appearance:   Appropriate    Eye Contact:   Good    Psychomotor Behavior: Normal    Attitude:   Cooperative    Orientation:   All   Speech Rate / Production: Normal    Volume:   Normal    Mood:    Normal   Affect:    Appropriate    Thought Content:  Clear    Thought Form:  Coherent  Logical    Insight:    Good          Diagnoses:      ISIDORO (generalized anxiety disorder)  Persistent depressive disorder    Collateral Reports Completed:   Communicated with: Dr Dupont        Plan: (Homework, other):    Patient was provided Maintain Sobriety  Patient was given information about behavioral services and encouraged to schedule a follow up appointment with the clinic [facility] [place] as needed.         Chanelle Garcia [facility], [facility] [place]     _____________________________________________________________________________________________________________________________________                                              Individual Treatment Plan    Patient's Name: Conrad Zhu   YOB: 1955  Date of Creation: 11/21/24  Date Treatment Plan Last Reviewed/Revised: 3/13/25    DSM5 Diagnoses:    Persistent depressive disorder  ISIDORO (generalized anxiety disorder)    Psychosocial / Contextual Factors: Medical Complexities, Substance Use history/concerns, Caregiver, and Grief/Loss  PROMIS (reviewed every 90 days):   The following assessments were completed by patient for this visit:  PROMIS 10-Global Health (only subscores and total score):       8/25/2024     9:01 AM 8/28/2024     7:57 AM 10/10/2024    10:29 AM 1/18/2025    10:09 AM   PROMIS-10 Scores Only   Global Mental Health Score 7    7 7 15 12    Global Physical Health Score 9    9 9 13 13    PROMIS TOTAL - SUBSCORES 16    16 16 28 25        Patient-reported        Referral / Collaboration:  Referral to another professional/service is not indicated at this time..    Anticipated number of session for this episode of care:  6-9 sessions  Anticipation frequency of session: Monthly  Anticipated Duration of each session: 16-37 minutes  Treatment plan will be reviewed in 90 days or when goals have been changed.       MeasurableTreatment Goal(s) related to diagnosis / functional impairment(s)  Goal 1: Patient will improve daily functioning.    I will know I've met my goal when I can con't to make decisions that align with my values.      Status: Continued - Date(s):3/13/25     Intervention(s)  [facility] [place] will Motivational Interviewing (MI): Validate patient's thoughts, feelings and experience.  Express respect for patient's autonomy in decision making.  Ask open-ended questions to invite patient's self-reflection and self-direction around change and what is important for them in working towards their goals.      Patient has reviewed and agreed to the above plan.     Written by  Chanelle Garcia, [facility], [facility]

## 2025-03-13 ENCOUNTER — VIRTUAL VISIT (OUTPATIENT)
Dept: PSYCHIATRY | Facility: CLINIC | Age: 70
End: 2025-03-13
Payer: COMMERCIAL

## 2025-03-13 ENCOUNTER — VIRTUAL VISIT (OUTPATIENT)
Dept: BEHAVIORAL HEALTH | Facility: CLINIC | Age: 70
End: 2025-03-13
Payer: COMMERCIAL

## 2025-03-13 ENCOUNTER — MYC MEDICAL ADVICE (OUTPATIENT)
Dept: INTERNAL MEDICINE | Facility: CLINIC | Age: 70
End: 2025-03-13
Payer: COMMERCIAL

## 2025-03-13 VITALS — SYSTOLIC BLOOD PRESSURE: 132 MMHG | DIASTOLIC BLOOD PRESSURE: 93 MMHG

## 2025-03-13 DIAGNOSIS — F34.1 PERSISTENT DEPRESSIVE DISORDER: Primary | ICD-10-CM

## 2025-03-13 DIAGNOSIS — F34.1 PERSISTENT DEPRESSIVE DISORDER: ICD-10-CM

## 2025-03-13 DIAGNOSIS — F41.1 GAD (GENERALIZED ANXIETY DISORDER): Primary | ICD-10-CM

## 2025-03-13 DIAGNOSIS — F41.1 GAD (GENERALIZED ANXIETY DISORDER): ICD-10-CM

## 2025-03-13 DIAGNOSIS — G89.29 OTHER CHRONIC PAIN: ICD-10-CM

## 2025-03-13 DIAGNOSIS — I10 BENIGN ESSENTIAL HTN: ICD-10-CM

## 2025-03-13 PROCEDURE — 98006 SYNCH AUDIO-VIDEO EST MOD 30: CPT | Performed by: PSYCHIATRY & NEUROLOGY

## 2025-03-13 RX ORDER — DULOXETIN HYDROCHLORIDE 30 MG/1
30 CAPSULE, DELAYED RELEASE ORAL DAILY
COMMUNITY

## 2025-03-13 ASSESSMENT — PAIN SCALES - GENERAL: PAINLEVEL_OUTOF10: MODERATE PAIN (5)

## 2025-03-13 NOTE — PROGRESS NOTES
"Telemedicine Visit: The patient's condition can be safely assessed and treated via synchronous audio and visual telemedicine encounter.      Reason for Telemedicine Visit: Patient has requested telehealth visit    Originating Site (Patient Location): Patient's home    Distant Location (provider location):  Off-site    Consent:  The patient/guardian has verbally consented to: the potential risks and benefits of telemedicine (video visit) versus in person care; bill my insurance or make self-payment for services provided; and responsibility for payment of non-covered services.     Mode of Communication:  Video Conference via [employer]    As the provider I attest to compliance with applicable laws and regulations related to telemedicine.         Outpatient Psychiatric Progress Note    Name: Conrad Zhu   : 1955                    Primary Care Provider: Ksenia Sandoval MD   Therapist: None    PHQ-9 scores:      2024     7:51 AM 2025    10:16 AM 3/12/2025    10:49 AM   PHQ-9 SCORE   PHQ-9 Total Score MyChart 4 (Minimal depression) 6 (Mild depression) 5 (Mild depression)   PHQ-9 Total Score 4  6  5        Patient-reported       ISIDORO-7 scores:      2023     9:59 AM 2023    10:49 AM 2025     1:27 PM   ISIDORO-7 SCORE   Total Score 5 (mild anxiety) 8 (mild anxiety) 0 (minimal anxiety)   Total Score 5 8 0        Patient-reported       Patient Identification:  Patient is a 69 year old, partnered / significant other  White Not  or  female  who presents for return visit with me.  Patient is currently disabled. Patient attended the phone/video session alone. Patient prefers to be called: \"Conrad\".    Interim History:  I last saw Conrad Toribiocal for outpatient psychiatry return visit on 2025. During that appointment, we:    Continue duloxetine 60 mg daily for chronic pain, depression, anxiety. Could consider increasing before next visit IF - you send me a couple " blood pressure coughs and get you at-home BP monitor (as discussed at today's visit).   Continue trazodone 100-300 mg at bedtime for sleep.     3/13: Patient overall doing relatively well.  Patient very interested in going back up on duloxetine to 90 mg daily since it was more helpful for her chronic pain.  Dose was decreased due to significantly increased blood pressure on the 90 mg dose of duloxetine.  Patient overall managing psychosocial stressors okay.  No acute safety concerns.  No SI.  No problematic drug or alcohol use.  Taking 1 or 2 (100-200 mg) trazodone tabs at bedtime for sleep.  See [facility] note below for additional details.    Per [facility], [facility], during today's team-based visit:  MH update:  Doing well.  Not feeling good physically this week.  Stresses: n/a.  Wants to plan a trip to [place], political stress  Appetite: nauseous   Sleep: 1-2 trazodone  Outpatient Provider updates:  Denies  SI/SIB/HI: n/a  SHANTE:n/a  Side effects/compliance:  Interventions:  [facility] engaged in validation and support of stressors  Most important: 132/93 On the 60mg. Would really like to try the 90 again.  Blood pressure meds now.    Past Psychiatric Med Trials:  Psych Meds at Intake:  Trazodone 300 mg daily at bedtime   Viibryd 20 mg   Lamotrigine 100 mg - several years to augment       Past Psych Meds:  Wellbutrin   Lexapro  Topamax  Paxil  zoloft    Psychiatric ROS:  See HPI above for all pertinent positives and negatives. Rest of systems negative.     PHQ9 and GAD7 scores were reviewed today if completed.   Medication side effects: Increasing blood pressure  Current stressors include: Symptoms and see HPI above  Coping mechanisms and supports include: Family, Hobbies, and Friends    Current medications include:   Current Outpatient Medications   Medication Sig Dispense Refill    [START ON 3/17/2025] acetaminophen-codeine (TYLENOL #4) 300-60 MG per tablet Take 1-2 tablets by mouth every 8 hours as needed for severe  pain. 180 tablet 0    amLODIPine (NORVASC) 5 MG tablet Take 1 tablet (5 mg) by mouth daily. 90 tablet 1    cholecalciferol 50 MCG (2000 UT) tablet Take 4,000 Units by mouth Every other day      DULoxetine (CYMBALTA) 60 MG capsule Take 1 capsule (60 mg) by mouth daily. 90 capsule 1    mirabegron (MYRBETRIQ) 25 MG 24 hr tablet Take 1 tablet (25 mg) by mouth daily. 90 tablet 2    naloxone (NARCAN) 4 MG/0.1ML nasal spray [NALOXONE (NARCAN) 4 MG/ACTUATION NASAL SPRAY] 1 spray (4 mg dose) into one nostril for opioid reversal. Call 911. May repeat if no response in 3 minutes. 1 each 1    ondansetron (ZOFRAN) 4 MG tablet Take 1 Tablet (4 mg) by mouth every 8 hours if needed for nausea/vomiting 30 tablet 0    rosuvastatin (CRESTOR) 5 MG tablet Take 1 tablet (5 mg) by mouth daily. 90 tablet 3    traZODone (DESYREL) 100 MG tablet Take 1 to 3 tablets by mouth at bedtime as needed 270 tablet 3     Current Facility-Administered Medications   Medication Dose Route Frequency Provider Last Rate Last Admin    denosumab (PROLIA) injection 60 mg  60 mg Subcutaneous Once         denosumab (PROLIA) injection 60 mg  60 mg Subcutaneous Q6 Months Blacna Duarte NP           Past Medical/Surgical History:  Past Medical History:   Diagnosis Date    Anemia     Anxiety     Benign essential HTN 2/14/2025    Chronic pain 08/14/2016    Depression     Former smoker     History of cocaine abuse (H)     Insomnia 08/14/2016    Liver disease     Low back pain 08/14/2016    Migraine headache 08/14/2016    Osteoporosis 08/14/2016    PN (peripheral neuropathy) 08/14/2016    PONV (postoperative nausea and vomiting)       has a past medical history of Anemia, Anxiety, Benign essential HTN (2/14/2025), Chronic pain (08/14/2016), Depression, Former smoker, History of cocaine abuse (H), Insomnia (08/14/2016), Liver disease, Low back pain (08/14/2016), Migraine headache (08/14/2016), Osteoporosis (08/14/2016), PN (peripheral neuropathy) (08/14/2016), and PONV  (postoperative nausea and vomiting).    She has no past medical history of Arrhythmia, Arthritis, Cerebral artery occlusion with cerebral infarction (H), Chronic infection, Coagulation disorder, Congenital heart disease, Congestive heart failure (H), COPD (chronic obstructive pulmonary disease) (H), Coronary artery disease, Diabetes (H), Dialysis patient, Difficult intubation, Difficulty walking, Dyspnea on exertion, Gastroesophageal reflux disease, Heart attack (H), Heart murmur, Hepatitis, Hiatal hernia, History of angina, History of blood transfusion, Irregular heart beat, Malignant hyperthermia, Multiple sclerosis (H), Muscular dystrophy (H), Noninfectious ileitis, Obese, Orthopnea, Oxygen dependent, Pacemaker, Pancreatic disease, Parkinsons disease (H), Pneumonia, Pulmonary hypertension (H), Renal disease, Seizures (H), Sleep apnea, Spinal headache, Stented coronary artery, Thrombosis, Thyroid disease, Tracheostomy in place (H), Uncomplicated asthma, or Walking troubles.    Social History:  Reviewed. No changes to social history except as noted above in HPI.    Vital Signs:   None. This is phone/video visit.     Labs:  Most recent laboratory results reviewed and no new labs.     Review of Systems:  10 systems (general, cardiovascular, respiratory, eyes, ENT, endocrine, GI, , M/S, neurological) were reviewed. Most pertinent finding(s) is/are: Chronic pain. The remaining systems are all unremarkable.    Mental Status Examination (limited as this is by phone/video):  Appearance: Awake, alert, appears stated age, no acute distress, well-groomed   Attitude:  cooperative, pleasant   Motor: No gross abnormalities observed via video, not formally tested   Oriented to:  person, place, time, and situation  Attention Span and Concentration:  normal  Speech:  clear, coherent, regular rate, rhythm, and volume  Language: intact  Mood: Pretty good  Affect:  appropriate and in normal range and mood congruent  Associations:   no loose associations  Thought Process:  logical, linear and goal oriented  Thought Content:  no evidence of suicidal ideation or homicidal ideation, no evidence of psychotic thought, no auditory hallucinations present and no visual hallucinations present  Recent and Remote Memory:  Intact to interview. Not formally assessed. No amnesia.  Fund of Knowledge: appropriate  Insight:  good  Judgment:  intact, adequate for safety  Impulse Control:  intact    Suicide Risk Assessment:  Today Conrad Zhu reports no suicidal ideation. Based on all available evidence including the factors cited above, Conrad Zhu does not appear to be at imminent risk for self-harm, does not meet criteria for a 72-hr hold, and therefore remains appropriate for ongoing outpatient level of care.  A thorough assessment of risk factors related to suicide and self-harm have been reviewed and are noted above. The patient convincingly denies suicidality on several occasions. Local community safety resources reviewed for patient to use if needed. There was no deceit detected, and the patient presented in a manner that was believable.     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 Previously followed in pain clinic, transitioned to primary care 10/2021.   - Off of MS Contin since 8/2024  - Now just tylenol #4, #180/month   - UDS appropriate 5/2024  - CSA 5/2024      Benign essential HTN 02/14/2025     Priority: Medium    Encounter for Medicare annual wellness exam 10/04/2024     Priority: Medium    Acute pain of left knee 10/04/2024      Priority: Medium    Mixed hyperlipidemia 05/28/2024     Priority: Medium    F11.2 - Continuous opioid dependence (H) 06/12/2023     Priority: Medium    Vitamin deficiency 10/23/2020     Priority: Medium    Depression 02/10/2020     Priority: Medium    Neck pain 02/10/2020     Priority: Medium    Age-related osteoporosis without current pathological fracture 11/14/2019     Priority: Medium    History of falling 11/14/2019     Priority: Medium    Unspecified fracture of shaft of left fibula, subsequent encounter for closed fracture with routine healing 11/14/2019     Priority: Medium    Displaced intertrochanteric fracture of left femur, subsequent encounter for closed fracture with routine healing 11/14/2019     Priority: Medium    Closed intertrochanteric fracture of hip, left, initial encounter (H) 11/11/2019     Priority: Medium    Left hip pain 11/11/2019     Priority: Medium     Added automatically from request for surgery [id number]        Closed nondisplaced fracture of body of right calcaneus, initial encounter 07/09/2019     Priority: Medium    Achilles tendinitis of right lower extremity 07/01/2019     Priority: Medium    Complex regional pain syndrome i of left lower limb 08/15/2018     Priority: Medium    PN (peripheral neuropathy) 08/14/2016     Priority: Medium    Former smoker      Priority: Medium    Nausea 06/07/2016     Priority: Medium    Left foot drop 06/01/2016     Priority: Medium    Closed fracture of metatarsal bone 02/08/2016     Priority: Medium     Formatting of this note might be different from the original.  Overview:   Created by Conversion      Complex regional pain syndrome type 2 of upper extremity 02/08/2016     Priority: Medium     Formatting of this note might be different from the original.  Overview:   Created by Conversion      Degeneration of intervertebral disc of cervical region 02/08/2016     Priority: Medium     Formatting of this note might be different from the  original.  Overview:   Created by Conversion      Anxiety 01/21/2015     Priority: Medium    Corneal scarring 09/25/2012     Priority: Medium    Menopause present 12/08/2011     Priority: Medium     Formatting of this note might be different from the original.  Decreased libido.      OAB (overactive bladder) 08/14/2016     Priority: Low    Insomnia 08/14/2016     Priority: Low       Psychosocial & Contextual Factors: see HPI above    Assessment:  From Intake, 8/28/2024:  Conrad Zhu is a 68-year-old with past psychiatric history including depression, anxiety, substance abuse in remission who presents today for psychiatric evaluation.  Patient with significant chronic pain and persistent depressive symptoms for many years.  Mood and anxiety often incredibly influenced by her pain symptoms.  Patient not sure her current depression medications are doing much for her mood.  Patient has met with a couple providers over the past few years who have suggested Cymbalta/duloxetine.  Patient is agreeable to tapering off Viibryd and onto duloxetine.  Discussed risks and benefits of the transition/change.  If duloxetine incredibly helpful, could consider tapering off lamotrigine.  Patient may also try to decrease trazodone in an attempt to continue to reduce polypharmacy.  Patient denies any concerns or struggles with substances for 20-30 years.  Occasional social alcohol use.  Patient may be interested in medicinal cannabis for her pain.  No current cannabis use.  Passive suicidal thoughts with no plan or intent to harm self or past suicide attempts.  Psychotherapy encouraged.     10/10/2024:  Patient with significant improvements on duloxetine for pain, mood, anxiety.  Did well with the transition off Viibryd.  Patient now on duloxetine 60 mg total daily dose.  Will taper off lamotrigine.    11/21/2024:  Overall doing quite well.  Patient wondering about increasing duloxetine some.  Since blood pressure most recently  documented as elevated, we will wait on increasing the dose until she is able to get in at home blood pressure cuff, which she has planned to do, and is able to ensure most recent blood pressures are not elevated.  I would like to see blood pressure at least 130/85 or less before increasing duloxetine.  Otherwise, mental health symptoms much more stable.  Off lamotrigine with no worsening of symptoms.  No acute safety concerns.  No SI.  No problematic drug or alcohol use.    1/23/2025:  Patient continues to do well.  Fluoxetine continues to be quite helpful.  Unfortunately duloxetine dose increased to 90 mg daily has impacted the patient's blood pressure.  Patient with high blood pressure and so we will go back down to 60 mg daily and watch for any worsening mental health and chronic pain symptoms.  If patient feels like she would really like to be on the 90 mg daily dose, will discuss with primary care provider regarding blood pressure options and recommendations.  No acute safety concerns.  No acute suicidality.  No problematic drug or alcohol use.    3/13/2025:  Patient overall doing relatively well from mental health standpoint.  Patient interested in increasing duloxetine back to 90 mg daily since the higher dose was more helpful for chronic pain.  Blood pressures are not reduced enough at this time to increase back to 90 mg daily.  I will check in with primary care provider to get thoughts on adjustments to patient blood pressure medication in order to facilitate increasing duloxetine dose back to 90 mg daily.  No acute safety concerns.  No acute suicidality.  No problematic drug or alcohol use.    Medication side effects and alternatives were reviewed. Health promotion activities recommended and reviewed today. All questions addressed. Education and counseling completed regarding risks and benefits of medications and psychotherapy options. Recommend therapy for additional support.     Treatment  Plan:  Continue duloxetine 60 mg daily for chronic pain, depression, anxiety.  I will reach out to primary care provider regarding thoughts on blood pressure medication in the context of your desire to increase duloxetine back to 90 mg.  Continue trazodone 100-200 mg at bedtime for sleep.   Continue to monitor blood pressure.   Continue all other cares per primary care provider.   Continue all other medications as reviewed per electronic medical record today.   Safety plan reviewed. To the Emergency Department as needed or call after hours crisis line at [phone] or [phone]. [place] Crisis Text Line. Text [place] to 383327 or Suicide LifeLine Chat: suicidepreVILOOPline.org/chat  Consider individual psychotherapy as needed.  Schedule an appointment with me in 3 months or sooner as needed. Call [facility] at [phone] to schedule.  Follow up with primary care provider as planned or for acute medical concerns.  Call the psychiatric nurse line with medication questions or concerns at [phone].  [employer] may be used to communicate with your provider, but this is not intended to be used for emergencies.    Administrative Billing:   Phone Call/Video Duration: 20 Minutes  Start: 10:35a  Stop: 10:55a    The longitudinal plan of care for the diagnosis(es)/condition(s) as documented were addressed during this visit. Due to the added complexity in care, I will continue to support Conrad in the subsequent management and with ongoing continuity of care.    Episode of Care #: 5    Patient Status:  Patient will continue to be seen for ongoing consultation and stabilization.    Signed:   Elodia Dupont DO  [facility]    Disclaimer: This note consists of symbols derived from keyboarding, dictation and/or voice recognition software. As a result, there may be errors in the script that have gone undetected. Please consider this when interpreting information found in this chart.

## 2025-03-13 NOTE — PROGRESS NOTES
"Virtual Visit Details    Type of service:  Video Visit   Video Start Time: {video visit start/end time for provider to select:730732}  Video End Time:{video visit start/end time for provider to select:857125}    Originating Location (pt. Location): {video visit patient location:662931::\"Home\"}  {PROVIDER LOCATION On-site should be selected for visits conducted from your clinic location or adjoining [facility] hospital, academic office, or other nearby [facility] building. Off-site should be selected for all other provider locations, including home:664800}  Distant Location (provider location):  {virtual location provider:962725}  Platform used for Video Visit: {Virtual Visit Platforms:962355::\"[employer]\"}  "

## 2025-03-13 NOTE — NURSING NOTE
Current patient location: [address] [place] [id number]    Is the patient currently in the state of [place]? YES    Visit mode: VIDEO    If the visit is dropped, the patient can be reconnected by:VIDEO VISIT: Text to cell phone:   Telephone Information:   Mobile [phone]       Will anyone else be joining the visit? NO  (If patient encounters technical issues they should call [phone])    Are changes needed to the allergy or medication list? Pt stated no changes to allergies and Pt stated no med changes    Are refills needed on medications prescribed by this physician? Discuss with provider    Rooming Documentation:  Questionnaire(s) completed    Reason for visit: VIGNESH LEIGH

## 2025-03-13 NOTE — Clinical Note
Hi there! I saw Conrad a few days ago and she is very hopeful to increase duloxetine back to 90 mg (120 mg is max dose) since it did provide further chronic pain improvement. Unfortunately her BP was elevated quite a bit on the 90 mg dose (172/83, 166/103 for a couple readings at that time). Given most recent reading being borderline, I am not comfortable increasing again. I let her know I would reach out to you regarding thoughts on increasing amlodipine to facilitate the duloxetine 90 mg dose. Looks like she reached out on [facility] as well about the same. Let me know thoughts/concerns. I appreciate the collaboration. -Elodia  Plan: Consider injections Render In Strict Bullet Format?: No Detail Level: Zone Initiate Treatment: Triamcinolone ointment daily x 4 weeks

## 2025-03-16 NOTE — PATIENT INSTRUCTIONS
Treatment Plan:  Continue duloxetine 60 mg daily for chronic pain, depression, anxiety.  I will reach out to primary care provider regarding thoughts on blood pressure medication in the context of your desire to increase duloxetine back to 90 mg.  Continue trazodone 100-200 mg at bedtime for sleep.   Continue to monitor blood pressure.   Continue all other cares per primary care provider.   Continue all other medications as reviewed per electronic medical record today.   Safety plan reviewed. To the Emergency Department as needed or call after hours crisis line at [phone] or [phone]. [place] Crisis Text Line. Text [place] to 646057 or Suicide LifeLine Chat: suicidepreventionAirwide Solutionsline.org/chat  Consider individual psychotherapy as needed.  Schedule an appointment with me in 3 months or sooner as needed. Call [facility] at [phone] to schedule.  Follow up with primary care provider as planned or for acute medical concerns.  Call the psychiatric nurse line with medication questions or concerns at [phone].  Liquidnett may be used to communicate with your provider, but this is not intended to be used for emergencies.    Patient Education   Collaborative Care Psychiatry Service  What to Expect  Here's what to expect from your Collaborative Care Psychiatry Service (CCPS).   About CCPS  CCPS means 2 people work together to help you get better. You'll meet with a behavioral health clinician and a psychiatric doctor. A behavioral health clinician helps people with mental health problems by talking with them. A psychiatric doctor helps people by giving them medicine.  How it works  At every visit, you'll see the behavioral health clinician (BHC) first. They'll talk with you about how you're doing and teach you how to feel better.   Then you'll see the psychiatric doctor. This doctor can help you deal with troubling thoughts and feelings by giving you medicine. They'll make sure you know the plan  "for your care.   CCPS usually takes 3 to 6 visits. If you need more visits, we may have you start seeing a different psychiatric doctor for ongoing care.  If you have any questions or concerns, we'll be glad to talk with you.  About visits  Be open  At your visits, please talk openly about your problems. It may feel hard, but it's the best way for us to help you.  Cancelling visits  If you can't come to your visit, please call us right away at [phone]. If you don't cancel at least 24 hours (1 full day) before your visit, that's \"late cancellation.\"  Being late to visits  Being very late is the same as not showing up. You will be a \"no show\" if:  Your appointment starts with a C, and you're more than 15 minutes late for a 30-minute (half hour) visit. This will also cancel your appointment with the psychiatric doctor.  Your appointment is with a psychiatric doctor only, and you're more than 15 minutes late for a 30-minute (half hour) visit.  Your appointment is with a psychiatric doctor only, and you're more than 30 minutes late for a 60-minute (full hour) visit.  If you cancel late or don't show up 2 times within 6 months, we may end your care.   Getting help between visits  If you need help between visits, you can call us Monday to Friday from 8 a.m. to 4:30 p.m. at [phone].  Emergency care  Call 911 or go to the nearest emergency department if your life or someone else's life is in danger.  Call 988 anytime to reach the national Suicide and Crisis hotline.  Medicine refills  To refill your medicine, call your pharmacy. You can also call [facility]'s Behavioral Access at [phone], Monday to Friday, 8 a.m. to 4:30 p.m. It can take 1 to 3 business days to get a refill.   Forms, letters, and tests  You may have papers to fill out, like FMLA, short-term disability, and workability. We can help you with these forms at your visits, but you must have an appointment. You may need more than 1 " visit for this, to be in an intensive therapy program, or both.  Before we can give you medicine for ADHD, we may refer you to get tested for it or confirm it another way.  We may not be able to give you an emotional support animal letter.  We don't do mental health checks ordered by the court.   We don't do mental health testing, but we can refer you to get tested.   Thank you for choosing us for your care.  For informational purposes only. Not to replace the advice of your health care provider. Copyright   2022 [facility]. All rights reserved. Searchbox [id number] - Rev 11/24.

## 2025-03-17 RX ORDER — AMLODIPINE BESYLATE 10 MG/1
10 TABLET ORAL DAILY
Qty: 90 TABLET | Refills: 1 | Status: SHIPPED | OUTPATIENT
Start: 2025-03-17

## 2025-03-19 DIAGNOSIS — E78.2 MIXED HYPERLIPIDEMIA: ICD-10-CM

## 2025-03-19 RX ORDER — ROSUVASTATIN CALCIUM 5 MG/1
5 TABLET, COATED ORAL DAILY
Qty: 90 TABLET | Refills: 3 | OUTPATIENT
Start: 2025-03-19

## 2025-03-27 ENCOUNTER — ALLIED HEALTH/NURSE VISIT (OUTPATIENT)
Dept: ENDOCRINOLOGY | Facility: CLINIC | Age: 70
End: 2025-03-27
Payer: COMMERCIAL

## 2025-03-27 DIAGNOSIS — M81.8 OTHER OSTEOPOROSIS WITHOUT CURRENT PATHOLOGICAL FRACTURE: Primary | ICD-10-CM

## 2025-03-27 DIAGNOSIS — Z92.29 PERSONAL HISTORY OF OTHER DRUG THERAPY: ICD-10-CM

## 2025-03-27 NOTE — PROGRESS NOTES
Clinic Administered Medication Documentation      Prolia Documentation    Indication: Prolia  (denosumab) is a prescription medicine used to treat osteoporosis in patients who:   Are at high risk for fracture, meaning patients who have had a fracture related to osteoporosis, or who have multiple risk factors for fracture.  Cannot use another osteoporosis medicine or other osteoporosis medicines did not work well.  The timeline for early/late injections would be 4 weeks early and any time after the 6 month lucie. If a patient receives their injection late, then the subsequent injection would be 6 months from the date that they actually received the injection.    When was the last injection?  24  Was the last injection at least 6 months ago? Yes  Has the prior authorization been completed?  Yes  Is there an active order (written within the past 365 days, with administrations remaining, not ) in the chart?  Yes   GFR Estimate   Date Value Ref Range Status   01/10/2025 79 >60 mL/min/1.73m2 Final     Comment:     eGFR calculated using  CKD-EPI equation.   2021 >60 >60 mL/min/1.73m2 Final     Has patient had a GFR within the last 12 months? Yes   Is GFR under 30, or patient has a diagnosis of CKD4 or CKD5? No   Patient denies gastric bypass or parathyroid surgery in past 6 months? Yes - patient denies.   Patient denies undergoing any dental procedures involving drilling into the bone, such as implants, extractions, or oral surgery, within the past two months that have not yet healed?  Yes - patient denies  Patient denies plans for an emergency tooth extraction within the next week? Yes    The following steps were completed to comply with the REMS program for Prolia:  Reviewed information in the Medication Guide, including the serious risks of Prolia  and the symptoms of each risk.  Advised patient to seek prompt medical attention if they have signs or symptoms of any of the serious risks.  Provided  each patient a copy of the Medication Guide and Patient Guide.    Prior to injection, verified patient identity using patient's name and date of birth. Medication was administered. Please see MAR and medication order for additional information. Patient instructed to remain in clinic for 15 minutes and report any adverse reaction to staff immediately.    Vial/Syringe: Syringe  Was this medication supplied by the patient? No  Verified that the patient has administrations remaining in their prescription.

## 2025-04-15 ENCOUNTER — MYC REFILL (OUTPATIENT)
Dept: INTERNAL MEDICINE | Facility: CLINIC | Age: 70
End: 2025-04-15
Payer: COMMERCIAL

## 2025-04-15 DIAGNOSIS — G89.4 CHRONIC PAIN SYNDROME: ICD-10-CM

## 2025-04-15 RX ORDER — ACETAMINOPHEN AND CODEINE PHOSPHATE 300; 60 MG/1; MG/1
1-2 TABLET ORAL EVERY 8 HOURS PRN
Qty: 180 TABLET | Refills: 0 | Status: SHIPPED | OUTPATIENT
Start: 2025-04-15

## 2025-04-17 ENCOUNTER — TELEPHONE (OUTPATIENT)
Dept: PHARMACY | Facility: CLINIC | Age: 70
End: 2025-04-17
Payer: COMMERCIAL

## 2025-04-17 NOTE — TELEPHONE ENCOUNTER
Called patient to schedule MTM follow-up. She is having regular follow-up with provider and declines MTM at this point. PCP can place new referral if patient is interested again.    Meseret Stout, GeovaniD  Medication Therapy Management (MTM) Pharmacist

## 2025-05-13 DIAGNOSIS — G89.4 CHRONIC PAIN SYNDROME: ICD-10-CM

## 2025-05-13 RX ORDER — ACETAMINOPHEN AND CODEINE PHOSPHATE 300; 60 MG/1; MG/1
1-2 TABLET ORAL EVERY 8 HOURS PRN
Qty: 180 TABLET | Refills: 0 | Status: SHIPPED | OUTPATIENT
Start: 2025-05-13

## 2025-05-13 NOTE — TELEPHONE ENCOUNTER
FAX [facility] Controlled Substance Refill Request    Contacts       Contact Date/Time Type Contact Phone/Fax    05/13/2025 02:31 PM CDT Fax (Incoming) [facility] - [address] (Pharmacy) [phone]            What medication are you calling about (include dose and sig)?:       acetaminophen-codeine (TYLENOL #4) 300-60 MG per tablet 180 tablet 0 4/15/2025 -- No   Sig - Route: Take 1-2 tablets by mouth every 8 hours as needed for severe pain. - Oral       Next Visit with PCP = 05/14/2025    Preferred Pharmacy:    [address] [address]  Phone: [phone] Fax: [phone]      Controlled Substance Agreement on file:   CSA -- Patient Level:     [Media Unavailable] Controlled Substance Agreement - Opioid - Scan on 5/29/2024  8:13 AM   [Media Unavailable] Controlled Substance Agreement - Opioid - Scan on 1/3/2024  8:55 AM   [Media Unavailable] Controlled Substance Agreement - Opioid - Scan on 9/26/2023 10:27 AM   [Media Unavailable] Controlled Substance Agreement - Opioid - Scan on 6/16/2020: OUTSIDE RECORD   [Media Unavailable] Controlled Substance Agreement - Opioid - Scan on 7/19/2016: HE       Who prescribed the medication?: PCP Dr. Ksenia Sandoval    Do you need a refill? Yes

## 2025-05-14 ENCOUNTER — OFFICE VISIT (OUTPATIENT)
Dept: INTERNAL MEDICINE | Facility: CLINIC | Age: 70
End: 2025-05-14
Payer: COMMERCIAL

## 2025-05-14 VITALS
SYSTOLIC BLOOD PRESSURE: 118 MMHG | TEMPERATURE: 98.5 F | BODY MASS INDEX: 23.47 KG/M2 | OXYGEN SATURATION: 95 % | RESPIRATION RATE: 12 BRPM | DIASTOLIC BLOOD PRESSURE: 64 MMHG | HEART RATE: 77 BPM | WEIGHT: 116.4 LBS | HEIGHT: 59 IN

## 2025-05-14 DIAGNOSIS — Z23 ENCOUNTER FOR VACCINATION: ICD-10-CM

## 2025-05-14 DIAGNOSIS — G47.00 INSOMNIA, UNSPECIFIED TYPE: ICD-10-CM

## 2025-05-14 DIAGNOSIS — N32.81 OAB (OVERACTIVE BLADDER): ICD-10-CM

## 2025-05-14 DIAGNOSIS — G89.4 CHRONIC PAIN SYNDROME: ICD-10-CM

## 2025-05-14 DIAGNOSIS — R11.0 NAUSEA: ICD-10-CM

## 2025-05-14 DIAGNOSIS — F41.9 ANXIETY: ICD-10-CM

## 2025-05-14 DIAGNOSIS — F11.20 CONTINUOUS OPIOID DEPENDENCE (H): ICD-10-CM

## 2025-05-14 DIAGNOSIS — M81.0 AGE-RELATED OSTEOPOROSIS WITHOUT CURRENT PATHOLOGICAL FRACTURE: ICD-10-CM

## 2025-05-14 DIAGNOSIS — G56.42 COMPLEX REGIONAL PAIN SYNDROME TYPE 2 OF LEFT UPPER EXTREMITY: ICD-10-CM

## 2025-05-14 DIAGNOSIS — E78.2 MIXED HYPERLIPIDEMIA: ICD-10-CM

## 2025-05-14 DIAGNOSIS — I10 BENIGN ESSENTIAL HTN: Primary | ICD-10-CM

## 2025-05-14 LAB
AMPHETAMINES UR QL SCN: ABNORMAL
BARBITURATES UR QL SCN: ABNORMAL
BENZODIAZ UR QL SCN: ABNORMAL
BZE UR QL SCN: ABNORMAL
CANNABINOIDS UR QL SCN: ABNORMAL
CREAT UR-MCNC: 95 MG/DL
FENTANYL UR QL: ABNORMAL
OPIATES UR QL SCN: ABNORMAL
PCP QUAL URINE (ROCHE): ABNORMAL

## 2025-05-14 PROCEDURE — 80307 DRUG TEST PRSMV CHEM ANLYZR: CPT | Performed by: INTERNAL MEDICINE

## 2025-05-14 PROCEDURE — 90480 ADMN SARSCOV2 VAC 1/ONLY CMP: CPT | Performed by: INTERNAL MEDICINE

## 2025-05-14 PROCEDURE — 99214 OFFICE O/P EST MOD 30 MIN: CPT | Performed by: INTERNAL MEDICINE

## 2025-05-14 PROCEDURE — 91320 SARSCV2 VAC 30MCG TRS-SUC IM: CPT | Performed by: INTERNAL MEDICINE

## 2025-05-14 PROCEDURE — 3078F DIAST BP <80 MM HG: CPT | Performed by: INTERNAL MEDICINE

## 2025-05-14 PROCEDURE — G2211 COMPLEX E/M VISIT ADD ON: HCPCS | Performed by: INTERNAL MEDICINE

## 2025-05-14 PROCEDURE — 1126F AMNT PAIN NOTED NONE PRSNT: CPT | Performed by: INTERNAL MEDICINE

## 2025-05-14 PROCEDURE — 3074F SYST BP LT 130 MM HG: CPT | Performed by: INTERNAL MEDICINE

## 2025-05-14 RX ORDER — ONDANSETRON 4 MG/1
TABLET, FILM COATED ORAL
Qty: 60 TABLET | Refills: 3 | Status: SHIPPED | OUTPATIENT
Start: 2025-05-14

## 2025-05-14 RX ORDER — IBUPROFEN 800 MG/1
800 TABLET, FILM COATED ORAL PRN
COMMUNITY
Start: 2025-02-15

## 2025-05-14 ASSESSMENT — PAIN SCALES - GENERAL: PAINLEVEL_OUTOF10: NO PAIN (0)

## 2025-05-14 NOTE — ASSESSMENT & PLAN NOTE
Now following in collaborative care psychiatry clinic.  Notes her anxiety and worry are significantly improved with adding cymbalta. Did not tolerate 90 mg, back to 60 mg daily and feels good on this dose.   - Continue to follow with psychiatry and therapy  - Continue cymbalta 60 mg daily

## 2025-05-14 NOTE — LETTER
Opioid / Opioid Plus Controlled Substance Agreement    This is an agreement between you and your provider about the safe and appropriate use of controlled substance/opioids prescribed by your care team. Controlled substances are medicines that can cause physical and mental dependence (abuse).    There are strict laws about having and using these medicines. We here at [facility] are committing to working with you in your efforts to get better. To support you in this work, we ll help you schedule regular office appointments for medicine refills. If we must cancel or change your appointment for any reason, we ll make sure you have enough medicine to last until your next appointment.     As a Provider, I will:  Listen carefully to your concerns and treat you with respect.   Recommend a treatment plan that I believe is in your best interest. This plan may involve therapies other than opioid pain medication.   Talk with you often about the possible benefits, and the risk of harm of any medicine that we prescribe for you.   Provide a plan on how to taper (discontinue or go off) using this medicine if the decision is made to stop its use.    As a Patient, I understand that opioid(s):   Are a controlled substance prescribed by my care team to help me function or work and manage my condition(s).   Are strong medicines and can cause serious side effects such as:  Drowsiness, which can seriously affect my driving ability  A lower breathing rate, enough to cause death  Harm to my thinking ability   Depression   Abuse of and addiction to this medicine  Need to be taken exactly as prescribed. Combining opioids with certain medicines or chemicals (such as illegal drugs, sedatives, sleeping pills, and benzodiazepines) can be dangerous or even fatal. If I stop opioids suddenly, I may have severe withdrawal symptoms.  Do not work for all types of pain nor for all patients. If they re not helpful, I may be asked to stop  them.        The risks, benefits and side effects of these medicine(s) were explained to me. I agree that:  I will take part in other treatments as advised by my care team. This may be psychiatry or counseling, physical therapy, behavioral therapy, group treatment or a referral to a specialist.     I will keep all my appointments. I understand that this is part of the monitoring of opioids. My care team may require an office visit for EVERY opioid/controlled substance refill. If I miss appointments or don t follow instructions, my care team may stop my medicine.    I will take my medicines as prescribed. I will not change the dose or schedule unless my care team tells me to. There will be no refills if I run out early.     I may be asked to come to the clinic and complete a urine drug test or complete a pill count at any time. If I don t give a urine sample or participate in a pill count, the care team may stop my medicine.    I will only receive prescriptions from this clinic for chronic pain. If I am treated by another provider for acute pain issues, I will tell them that I am taking opioid pain medication for chronic pain and that I have a treatment agreement with this provider. I will inform my [facility] care team within one business day if I am given a prescription for any pain medication by another healthcare provider. My [facility] care team can contact other providers and pharmacists about my use of any medicines.    It is up to me to make sure that I don t run out of my medicines on weekends or holidays. If my care team is willing to refill my opioid prescription without a visit, I must request refills only during office hours. Refills may take up to 3 business days to process. I will use one pharmacy to fill all my opioid and other controlled substance prescriptions. I will notify the clinic about any changes to my insurance or medication availability.    I am responsible for my  prescriptions. If the medicine/prescription is lost, stolen or destroyed, it will not be replaced. I also agree not to share controlled substance medicines with anyone.    I am aware I should not use any illegal or recreational drugs. I agree not to drink alcohol unless my care team says I can.       If I enroll in the [place] Medical Cannabis program, I will tell my care team prior to my next refill.     I will tell my care team right away if I become pregnant, have a new medical problem treated outside of my regular clinic, or have a change in my medications.    I understand that this medicine can affect my thinking, judgment and reaction time. Alcohol and drugs affect the brain and body, which can affect the safety of my driving. Being under the influence of alcohol or drugs can affect my decision-making, behaviors, personal safety, and the safety of others. Driving while impaired (DWI) can occur if a person is driving, operating, or in physical control of a car, motorcycle, boat, snowmobile, ATV, motorbike, off-road vehicle, or any other motor vehicle ([place] Statute 169A.20). I understand the risk if I choose to drive or operate any vehicle or machinery.    I understand that if I do not follow any of the conditions above, my prescriptions or treatment may be stopped or changed.          Opioids  What You Need to Know    What are opioids?   Opioids are pain medicines that must be prescribed by a doctor. They are also known as narcotics.     Examples are:   morphine (MS Contin, Carmen)  oxycodone (Oxycontin)  oxycodone and acetaminophen (Percocet)  hydrocodone and acetaminophen (Vicodin, Norco)   fentanyl patch (Duragesic)   hydromorphone (Dilaudid)   methadone  codeine (Tylenol #3)     What do opioids do well?   Opioids are best for severe short-term pain such as after a surgery or injury. They may work well for cancer pain. They may help some people with long-lasting (chronic) pain.     What do opioids NOT do  well?   Opioids never get rid of pain entirely, and they don t work well for most patients with chronic pain. Opioids don t reduce swelling, one of the causes of pain.                                    Other ways to manage chronic pain and improve function include:     Treat the health problem that may be causing pain  Anti-inflammation medicines, which reduce swelling and tenderness, such as ibuprofen (Advil, Motrin) or naproxen (Aleve)  Acetaminophen (Tylenol)  Antidepressants and anti-seizure medicines, especially for nerve pain  Topical treatments such as patches or creams  Injections or nerve blocks  Chiropractic or osteopathic treatment  Acupuncture, massage, deep breathing, meditation, visual imagery, aromatherapy  Use heat or ice at the pain site  Physical therapy   Exercise  Stop smoking  Take part in therapy       Risks and side effects     Talk to your doctor before you start or decide to keep taking opioids. Possible side effects include:    Lowering your breathing rate enough to cause death  Overdose, including death, especially if taking higher than prescribed doses  Worse depression symptoms; less pleasure in things you usually enjoy  Feeling tired or sluggish  Slower thoughts or cloudy thinking  Being more sensitive to pain over time; pain is harder to control  Trouble sleeping or restless sleep  Changes in hormone levels (for example, less testosterone)  Changes in sex drive or ability to have sex  Constipation  Unsafe driving  Itching and sweating  Dizziness  Nausea, throwing up and dry mouth    What else should I know about opioids?    Opioids may lead to dependence, tolerance, or addiction.    Dependence means that if you stop or reduce the medicine too quickly, you will have withdrawal symptoms. These include loose poop (diarrhea), jitters, flu-like symptoms, nervousness and tremors. Dependence is not the same as addiction.                     Tolerance means needing higher doses over time to  get the same effect. This may increase the chance of serious side effects.    Addiction is when people improperly use a substance that harms their body, their mind or their relations with others. Use of opiates can cause a relapse of addiction if you have a history of drug or alcohol abuse.    People who have used opioids for a long time may have a lower quality of life, worse depression, higher levels of pain and more visits to doctors.    You can overdose on opioids. Take these steps to lower your risk of overdose:    Recognize the signs:  Signs of overdose include decrease or loss of consciousness (blackout), slowed breathing, trouble waking up and blue lips. If someone is worried about overdose, they should call 911.    Talk to your doctor about Narcan (naloxone).   If you are at risk for overdose, you may be given a prescription for Narcan. This medicine very quickly reverses the effects of opioids.   If you overdose, a friend or family member can give you Narcan while waiting for the ambulance. They need to know the signs of overdose and how to give Narcan.     Don't use alcohol or street drugs.   Taking them with opioids can cause death.    Do not take any of these medicines unless your doctor says it s OK. Taking these with opioids can cause death:  Benzodiazepines, such as lorazepam (Ativan), alprazolam (Xanax) or diazepam (Valium)  Muscle relaxers, such as cyclobenzaprine (Flexeril)  Sleeping pills like zolpidem (Ambien)   Other opioids      How to keep you and other people safe while taking opioids:    Never share your opioids with others.  Opioid medicines are regulated by the Drug Enforcement Agency (AJAY). Selling or sharing medications is a criminal act.    2. Be sure to store opioids in a secure place, locked up if possible. Young children can easily swallow them and overdose.    3. When you are traveling with your medicines, keep them in the original bottles. If you use a pill box, be sure you also  carry a copy of your medicine list from your clinic or pharmacy.    4. Safe disposal of opioids    Most pharmacies have places to get rid of medicine, called disposal kiosks. Medicine disposal options are also available in every [facility]. Search your county and  medication disposal  to find more options. You can find more details at:  https://[email or web address].[email or web address]./living-green/managing-unwanted-medications     I agree that my provider, clinic care team, and pharmacy may work with any city, state or federal law enforcement agency that investigates the misuse, sale, or other diversion of my controlled medicine. I will allow my provider to discuss my care with, or share a copy of, this agreement with any other treating provider, pharmacy or emergency room where I receive care.    I have read this agreement and have asked questions about anything I did not understand.    _______________________________________________________  Patient Signature - Conrad Zhu _____________________                   Date     _______________________________________________________  Provider Signature - Ksenia Sandoval MD   _____________________                   Date     _______________________________________________________  Witness Signature (required if provider not present while patient signing)   _____________________                   Date

## 2025-05-14 NOTE — ASSESSMENT & PLAN NOTE
Insomnia stable and she is working to reduce trazodone that she takes with cannabis.   - Continue current meds and agree with decreasing trazodone dose as able

## 2025-05-14 NOTE — ASSESSMENT & PLAN NOTE
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 Previously followed in pain clinic, transitioned to primary care 10/2021.   - Off of MS Contin since 8/2024  - We did certify her for medical cannabis and she has found this helpful   - Now just tylenol #4, #180/month; refill sent yesterday   - Continue duloxetine as well   - UDS today  - CSA signed today (5/2025)  - f/up 3 months

## 2025-05-14 NOTE — PROGRESS NOTES
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 Previously followed in pain clinic, transitioned to primary care 10/2021.   - Off of MS Contin since 8/2024  - We did certify her for medical cannabis and she has found this helpful   - Now just tylenol #4, #180/month; refill sent yesterday   - Continue duloxetine as well   - UDS today  - CSA signed today (5/2025)  - f/up 3 months          Complex regional pain syndrome type 2 of upper extremity    As per chronic pain.             Digestive    Nausea    Intermittent nausea is well controlled with PRN zofran. Refill provided today.         Relevant Medications    ondansetron (ZOFRAN) 4 MG tablet       Endocrine    Mixed hyperlipidemia    LDL 62 (10/2024) on crestor 5 mg. At goal.             Circulatory    Benign essential HTN - Primary    We added amlodipine at last visit and increased to 10 mg in March. Since then, home BPs 120-130s. Today 118/64. No edema.   - Will continue amlodipine 10 mg daily             Musculoskeletal and Integumentary    Age-related osteoporosis without current pathological fracture    Follows with endocrinology.  On Prolia, last injection 3/2025.         Relevant Medications    ibuprofen (ADVIL/MOTRIN) 800 MG tablet       Urinary    OAB (overactive bladder)    Stable with Myrbetriq 25 mg daily.            Behavioral    F11.2 - Continuous opioid dependence (H)    As per chronic pain.   - CSA signed today  - UDS today          Relevant Orders    Urine Drug Screen       Other    Anxiety    Now following in collaborative care psychiatry clinic.  Notes her anxiety and worry are significantly improved with adding cymbalta. Did not tolerate 90 mg, back to 60 mg daily and  feels good on this dose.   - Continue to follow with psychiatry and therapy  - Continue cymbalta 60 mg daily          Insomnia    Insomnia stable and she is working to reduce trazodone that she takes with cannabis.   - Continue current meds and agree with decreasing trazodone dose as able          Other Visit Diagnoses         Encounter for vaccination        Relevant Orders    COVID-19 12+ (PFIZER) (Completed)             The longitudinal plan of care for the diagnosis(es)/condition(s) as documented were addressed during this visit. Due to the added complexity in care, I will continue to support Conrad in the subsequent management and with ongoing continuity of care.     Follow-up  No follow-ups on file.    Subjective   Conrad is a 69 year old, presenting for the following health issues:  Follow Up (3 months from 02/14/25./Patient take on BP is 130/71 with the machine she brought.)        5/14/2025     8:49 AM   Additional Questions   Roomed by Oleg Jean Baptiste   Accompanied by Self     HPI      Here for f/up of BP. Doing well otherwise.     Pain: tylenol #4, #180/month. Refill sent yesterday. CSA and UDS due today. Pain is about the same in legs/feet. Back acting up more recently.     HLD: LDL 62 (10/2024) on crestor 5 mg daily     HTN: amlodipine 5 mg started last visit (2/14/25). Increased to 10 mg after her visit with Dr. Dupont b/c of goal of increasing duloxetine. Here for f/up   - Home pressures 126/78, 139/85   - No lightheadedness or dizziness  - No ankle swelling     Osteoporosis: prolia     OAB: myrbetriq 25 mg daily     ISIDORO / Depression: Psychiatry last visit 3/13/25, continue duloxetine 60 mg daily    Insomnia: trazodone 200 mg nightly, tincture and gummy at night       Review of Systems  Constitutional, neuro, ENT, endocrine, pulmonary, cardiac, gastrointestinal, genitourinary, musculoskeletal, integument and psychiatric systems are negative, except as otherwise noted.      Objective    /64   Pulse 77   " Temp 98.5  F (36.9  C) (Oral)   Resp 12   Ht 1.499 m (4' 11\")   Wt 52.8 kg (116 lb 6.4 oz)   LMP  (LMP Unknown)   SpO2 95%   BMI 23.51 kg/m    Body mass index is 23.51 kg/m .  Physical Exam   GENERAL: alert and no distress  EYES: Eyes grossly normal to inspection and conjunctivae and sclerae normal  HENT: nose and mouth without ulcers or lesions  RESP: lungs clear to auscultation - no rales, rhonchi or wheezes  CV: regular rate and rhythm, normal S1 S2, no S3 or S4, no murmur, click or rub, no peripheral edema  SKIN: no suspicious lesions or rashes on exposed skin  NEURO: No focal deficits, mentation intact and speech normal  PSYCH: mentation appears normal, affect normal/bright    No results found for this or any previous visit (from the past 24 hours).        Signed Electronically by: Ksenia Sandoval MD    "

## 2025-05-14 NOTE — ASSESSMENT & PLAN NOTE
We added amlodipine at last visit and increased to 10 mg in March. Since then, home BPs 120-130s. Today 118/64. No edema.   - Will continue amlodipine 10 mg daily

## 2025-05-20 ENCOUNTER — RESULTS FOLLOW-UP (OUTPATIENT)
Dept: INTERNAL MEDICINE | Facility: CLINIC | Age: 70
End: 2025-05-20

## 2025-05-20 LAB
CODEINE UR CFM-MCNC: >9000 NG/ML
CODEINE/CREAT UR: ABNORMAL
DHC UR CFM-MCNC: 62 NG/ML
DHC/CREAT UR: 65 NG/MG {CREAT}
HYDROCODONE UR CFM-MCNC: 260 NG/ML
HYDROCODONE/CREAT UR: 274 NG/MG {CREAT}
MORPHINE UR CFM-MCNC: 6420 NG/ML
MORPHINE/CREAT UR: 6758 NG/MG {CREAT}
NORCODEINE UR-MCNC: >7500 NG/ML
NORCODEINE/CREAT UR CFM: ABNORMAL NG/MG{CREAT}

## 2025-06-10 DIAGNOSIS — G89.4 CHRONIC PAIN SYNDROME: ICD-10-CM

## 2025-06-10 RX ORDER — ACETAMINOPHEN AND CODEINE PHOSPHATE 300; 60 MG/1; MG/1
1-2 TABLET ORAL EVERY 8 HOURS PRN
Qty: 180 TABLET | Refills: 0 | Status: SHIPPED | OUTPATIENT
Start: 2025-06-10

## 2025-06-10 NOTE — TELEPHONE ENCOUNTER
Medication Question or Refill    Patient will be out of town this Thursday     Looking to get sent in ASAP     What medication are you calling about (include dose and sig)?:    Disp Refills Start End LAQUITA   acetaminophen-codeine (TYLENOL #4) 300-60 MG per tablet 180 tablet 0 5/13/2025 -- No   Sig - Route: Take 1-2 tablets by mouth every 8 hours as needed for severe pain. - Oral   Sent to pharmacy as: Acetaminophen-Codeine 300-60 MG Oral Tablet (TYLENOL #4)   Class: E-Prescribe   Order: [id number]   E-Prescribing Status: Receipt confirmed by pharmacy (5/13/2025  3:02 PM CDT)   No prior authorization was found for this prescription.   Found prior authorization for another prescription for the same medication: Closed       Preferred Pharmacy:   [facility] [address] [address]  Phone: [phone] Fax: [phone]    Controlled Substance Agreement on file:   CSA -- Patient Level:     [Media Unavailable] Controlled Substance Agreement - Opioid - Scan on 5/14/2025 10:54 AM   [Media Unavailable] Controlled Substance Agreement - Opioid - Scan on 5/29/2024  8:13 AM   [Media Unavailable] Controlled Substance Agreement - Opioid - Scan on 1/3/2024  8:55 AM   [Media Unavailable] Controlled Substance Agreement - Opioid - Scan on 9/26/2023 10:27 AM   [Media Unavailable] Controlled Substance Agreement - Opioid - Scan on 6/16/2020: OUTSIDE RECORD   [Media Unavailable] Controlled Substance Agreement - Opioid - Scan on 7/19/2016: HE       Who prescribed the medication?:    Disp Refills Start End LAQUTIA   acetaminophen-codeine (TYLENOL #4) 300-60 MG per tablet 180 tablet 0 5/13/2025 -- No   Sig - Route: Take 1-2 tablets by mouth every 8 hours as needed for severe pain. - Oral   Sent to pharmacy as: Acetaminophen-Codeine 300-60 MG Oral Tablet (TYLENOL #4)   Class: E-Prescribe   Order: [id number]   E-Prescribing Status: Receipt confirmed by pharmacy  (5/13/2025  3:02 PM CDT)   No prior authorization was found for this prescription.   Found prior authorization for another prescription for the same medication: Closed       Do you need a refill? Yes    Could we send this information to you in [place] or would you prefer to receive a phone call?:   Patient would prefer a phone call   Okay to leave a detailed message?: Yes at Cell number on file:    Telephone Information:   Mobile [phone]

## 2025-06-16 ASSESSMENT — PATIENT HEALTH QUESTIONNAIRE - PHQ9
10. IF YOU CHECKED OFF ANY PROBLEMS, HOW DIFFICULT HAVE THESE PROBLEMS MADE IT FOR YOU TO DO YOUR WORK, TAKE CARE OF THINGS AT HOME, OR GET ALONG WITH OTHER PEOPLE: SOMEWHAT DIFFICULT
SUM OF ALL RESPONSES TO PHQ QUESTIONS 1-9: 3
SUM OF ALL RESPONSES TO PHQ QUESTIONS 1-9: 3

## 2025-06-16 NOTE — PROGRESS NOTES
[facility] [place]    Behavioral Health Clinician Progress Note   Mental Health & Addiction Services      6/17/25    Patient Name: Conrad Zhu       Service Type:  Individual   Service Location:  [place] / Email (patient reached)   Visit Start Time: 11am  Visit End Time:  1120  Session Length: 16 - 37    Attendees: Client   Service Modality: Video Visit:      Provider verified identity through the following two step process.  Patient provided:  Patient is known previously to provider    Telemedicine Visit: The patient's condition can be safely assessed and treated via synchronous audio and visual telemedicine encounter.      Reason for Telemedicine Visit: Services only offered telehealth    Originating Site (Patient Location): Patient's home    Distant Site (Provider Location): Provider Remote Setting- Home Office    Consent:  The patient/guardian has verbally consented to: the potential risks and benefits of telemedicine (video visit) versus in person care; bill my insurance or make self-payment for services provided; and responsibility for payment of non-covered services.     Patient would like the video invitation sent by:  My Chart    Mode of Communication:  Video Conference via [facility]    Distant Location (Provider):  Off-site    As the provider I attest to compliance with applicable laws and regulations related to telemedicine.   Visit number: 6    [facility] Visit Activities (Refresh list every visit): [facility] [employer]     [place] Diagnostic Assessment: 8/28/24 Chanelle Garcia MA [facility]  Treatment Plan Review Date: 6/17/25      DATA:    Extended Session (60+ minutes): No   Interactive Complexity: No   Crisis: No   [facility] Patient: No     Assessments completed prior to visit:   PHQ9:       8/28/2024     7:56 AM 10/3/2024     6:51 PM 10/10/2024    10:27 AM 11/21/2024     7:51 AM 1/23/2025    10:16 AM 3/12/2025    10:49 AM 6/16/2025     2:39 PM   PHQ-9 SCORE   PHQ-9 Total  Score MyChart 11 (Moderate depression) 5 (Mild depression) 3 (Minimal depression) 4 (Minimal depression) 6 (Mild depression) 5 (Mild depression) 3 (Minimal depression)   PHQ-9 Total Score 11 5 3 4  6  5  3        Patient-reported     GAD7:       2/10/2020     2:00 PM 5/9/2022    12:07 PM 9/25/2023     9:59 AM 12/21/2023    10:49 AM 2/14/2025     1:27 PM   ISIDORO-7 SCORE   Total Score   5 (mild anxiety) 8 (mild anxiety) 0 (minimal anxiety)   Total Score 16 6 5 8 0        Patient-reported         Reason for Visit/Presenting Concern:  Anxiety and Depression    Current Stressors / Issues:  MH update: Had friend up from [place] for a few days.  Known her for her entire life. Moods have been up and down.  Pain is down.   Stresses:  planning trip to [place].  Appetite: injection.  Lost weight 40lbs.  Done with injection.  Minimal appetite.   Down to 109lbs.  Sleep: per PCP note reducing trazodone with medical THC  Outpatient Provider updates: n/a  SI/SIB/HI:  Denies  SHANTE:  Medical THC  Side effects/compliance:  Interventions:  [facility] engaged in problem solving in order to support pts recreational goals and removing barriers   Most important:  Back to 60 daily.  On 10mg of BP meds 153/84 today.  Doing overall well.    3/13  MH update:  Doing well.  Not feeling good physically this week.  Stresses: n/a.  Wants to plan a trip to [place], political stress  Appetite: nauseous   Sleep: 1-2 trazodone  Outpatient Provider updates:  Denies  SI/SIB/HI: n/a  SHANTE:n/a  Side effects/compliance:  Interventions:  [facility] engaged in validation and support of stressors  Most important: 132/93 On the 60mg. Would really like to try the 90 again.  Blood pressure meds now.    1/23  MH update:  Doing well.  Xmas went okay.  Relationship still struggling considering going to [place]. Up to 90mg.  Much better.  Pain is tolerable.  Even forgot pain meds today since she didn't wake up and need them ASAP.  Stresses:  considering a trip to [place].  Appetite: n/a  Sleep:  n/a  Outpatient Provider updates: Denies   SI/SIB/HI:  Denies  SHANTE: Denies  Side effects/compliance:  Interventions:    C engaged in conversation about interpersonal relationship dynamics and feeling stuck in decision makin  Most important:  Blood pressure stuff?  Doing better.      MH update:  Feeling good.  Came off Lamictal well.  Got approved for medical THC to see if it would help with pain.  Doing some social events.   Brcok (b/f) ETOH use high- things are difficult.   Stresses:  slight pain-weather?.   Started PT struggles with home exercises.  Cousin was just found . ETOH? Unknown why.  Misses family.  Worst time of year due to daughters previous death and fathers previous death.  Missing spiritual connection.   Appetite: n/a  Sleep: Still using Trazodone.   Outpatient Provider updates:   SI/SIB/HI:  Denies  SHANTE: Denies  Side effects/compliance:  Interventions:  [facility] engaged in discussing missing connection, community and current partnership not meeting needs.  Considering making moves/changes in life  Most important:  Doing well    10/10  MH update:  Started cymbalta.  Racing thoughts are better.  Mood better.  The fatigue is way better.  Pain better.  Not worrying about things that don't matter.  Getting up and functioning, completing tasks!  Stresses:  Grief/loss hx, chronic pain (much better)  Appetite: Denies concerns  Sleep: Denies concerns  Outpatient Provider updates: Declines at this time  SI/SIB/HI:  Denies any!  SHANTE:  Denies concerns.  Hx of poly sub tx.  Side effects/compliance:  None!  Interventions:  [facility] discussed treatment options for sx mgmt  Most important:  can she take all at once?  Or does it have to be BID?  Feels great!!        update:  ROS above  Stresses:  is getting off morphine, chronic pain, grief/loss, resentment from needing caregiving/support  Appetite: n/a  Sleep: very poor/pain  Outpatient Provider updates: intake , [facility].  Decline  [facility].  SI/SIB/HI:  Chronic passive ideation without plan or intent.  Denies previous attempts.  SHANTE:  Denies concerns.  Interested in medical THC.  Hx of cocaine/opiate abuse with tx/detox  Side effects/compliance:  Interventions:  [facility] engaged in completing DA with psychoeducation and tx planning  Most important:  meds aren't working, mind not shutting off.    Therapeutic Interventions:  Motivational Interviewing (MI): Validated patient's thoughts, feelings and experience. Expressed respect for patient's autonomy in decision making.  Asked open-ended questions to invite patient's self-reflection and self-direction around change and what is important for them in working towards their goals.     Response to treatment interventions:   Patient was receptive to interventions utilized.  Patient was engaged in the therapy process.       Progress on Treatment Objective(s) / Homework:   Stable - MAINTENANCE (Working to maintain change, with risk of relapse); Intervened by continuing to positively reinforce healthy behavior choice      Medication Review:   Changes to psychiatric medications, see updated Medication List in EPIC.      Medication Compliance:   Yes     Chemical Use Review:  Substance Use: Chemical use reviewed, no active concerns identified      Tobacco Use: No current tobacco use.       Assessment: Current Emotional / Mental Status (status of significant symptoms):    Risk status (Self / Other harm or suicidal ideation)   Patient has had a history of suicidal ideation: hx of passive ideation without planning or intent.  Denies previous attempts.  Denies need for safety plan or crisis resources   Patient denies current fears or concerns for personal safety.   Patient denies current or recent suicidal ideation or behaviors.   Patient denies current or recent homicidal ideation or behaviors.   Patient denies current or recent self injurious behavior or ideation.   Patient denies other safety concerns.   Recommended  that patient call 911 or go to the local ED should there be a change in any of these risk factors      ASSESSMENT:   Mental Status:     Appearance:   Appropriate    Eye Contact:   Good    Psychomotor Behavior: Normal    Attitude:   Cooperative    Orientation:   All   Speech Rate / Production: Normal    Volume:   Normal    Mood:    Normal   Affect:    Appropriate    Thought Content:  Clear    Thought Form:  Coherent  Logical    Insight:    Good          Diagnoses:      Persistent depressive disorder  ISIDORO (generalized anxiety disorder)    Collateral Reports Completed:   Communicated with: Dr Dupont       Plan: (Homework, other):   Patient was provided Maintain Sobriety  Patient was given information about behavioral services and encouraged to schedule a follow up appointment with the clinic [facility] as needed.         Chanelle Garcia [facility], [facility]     _____________________________________________________________________________________________________________________________________                                              Individual Treatment Plan    Patient's Name: Conrad Zhu   YOB: 1955  Date of Creation: 11/21/24  Date Treatment Plan Last Reviewed/Revised: 6/17/25    DSM5 Diagnoses:    Persistent depressive disorder  ISIDORO (generalized anxiety disorder)    Psychosocial / Contextual Factors: Medical Complexities, Substance Use history/concerns, Caregiver, and Grief/Loss  PROMIS (reviewed every 90 days):   The following assessments were completed by patient for this visit:  PROMIS 10-Global Health (only subscores and total score):       8/25/2024     9:01 AM 8/28/2024     7:57 AM 10/10/2024    10:29 AM 1/18/2025    10:09 AM 6/16/2025     2:52 PM   PROMIS-10 Scores Only   Global Mental Health Score 7    7 7 15 12  12    Global Physical Health Score 9    9 9 13 13  12    PROMIS TOTAL - SUBSCORES 16    16 16 28 25  24        Patient-reported        Referral / Collaboration:  Referral to another  professional/service is not indicated at this time..    Anticipated number of session for this episode of care:  6-9 sessions  Anticipation frequency of session: Monthly  Anticipated Duration of each session: 16-37 minutes  Treatment plan will be reviewed in 90 days or when goals have been changed.       MeasurableTreatment Goal(s) related to diagnosis / functional impairment(s)  Goal 1: Patient will improve daily functioning.    I will know I've met my goal when I can con't to make decisions that align with my values.      Status: Continued - Date(s):3/13/25 , 6/17/25    Intervention(s)  [facility] will Motivational Interviewing (MI): Validate patient's thoughts, feelings and experience. Express respect for patient's autonomy in decision making.  Ask open-ended questions to invite patient's self-reflection and self-direction around change and what is important for them in working towards their goals.      Patient has reviewed and agreed to the above plan.     Written by  Chanelle Garcia Merged with [facility]

## 2025-06-17 ENCOUNTER — VIRTUAL VISIT (OUTPATIENT)
Dept: BEHAVIORAL HEALTH | Facility: CLINIC | Age: 70
End: 2025-06-17
Payer: COMMERCIAL

## 2025-06-17 ENCOUNTER — VIRTUAL VISIT (OUTPATIENT)
Dept: PSYCHIATRY | Facility: CLINIC | Age: 70
End: 2025-06-17
Payer: COMMERCIAL

## 2025-06-17 DIAGNOSIS — F41.1 GAD (GENERALIZED ANXIETY DISORDER): ICD-10-CM

## 2025-06-17 DIAGNOSIS — F34.1 PERSISTENT DEPRESSIVE DISORDER: Primary | ICD-10-CM

## 2025-06-17 DIAGNOSIS — G89.29 OTHER CHRONIC PAIN: ICD-10-CM

## 2025-06-17 PROCEDURE — 90832 PSYTX W PT 30 MINUTES: CPT | Mod: 95 | Performed by: COUNSELOR

## 2025-06-17 PROCEDURE — 98006 SYNCH AUDIO-VIDEO EST MOD 30: CPT | Performed by: PSYCHIATRY & NEUROLOGY

## 2025-06-17 RX ORDER — DULOXETIN HYDROCHLORIDE 60 MG/1
60 CAPSULE, DELAYED RELEASE ORAL DAILY
Qty: 90 CAPSULE | Refills: 1 | Status: SHIPPED | OUTPATIENT
Start: 2025-06-17

## 2025-06-17 NOTE — PROGRESS NOTES
"Virtual Visit Details    Type of service:  Video Visit   Video Start Time: {video visit start/end time for provider to select:626542}  Video End Time:{video visit start/end time for provider to select:500784}    Originating Location (pt. Location): {video visit patient location:380466::\"Home\"}  {PROVIDER LOCATION On-site should be selected for visits conducted from your clinic location or adjoining [facility] hospital, academic office, or other nearby [facility] building. Off-site should be selected for all other provider locations, including home:072931}  Distant Location (provider location):  {virtual location provider:782090}  Platform used for Video Visit: {Virtual Visit Platforms:923950::\"[employer]\"}  "

## 2025-06-17 NOTE — PATIENT INSTRUCTIONS
Treatment Plan:  Continue duloxetine 60 mg daily for chronic pain, depression, anxiety.  Continue trazodone 100-200 mg at bedtime for sleep.   Keep in mind trazodone can sometimes be a culprit for causing orthostatic hypotension.  Since on blood pressure medication, and since having lost weight, continue to monitor for feeling dizzy or lightheaded throughout the day.  May need to reduce trazodone dose.  Your psychiatric care is being returned back to your primary care provider for ongoing management.  Please reach out to your primary care provider with any questions or concerns about your mental health or mental health medications.   Continue all other cares per primary care provider.   Continue all other medications as reviewed per electronic medical record today.   Safety plan reviewed. To the Emergency Department as needed or call after hours crisis line at [phone] or [phone]. [place] Crisis Text Line. Text [place] to 944810 or Suicide LifeLine Chat: suicidepreventionCloud Theoryline.org/chat  Consider individual psychotherapy as needed.  Follow up with primary care provider as planned or for acute medical concerns.  Clipcopia may be used to communicate with your provider, but this is not intended to be used for emergencies.    Thank you for our work together in the Psychiatry Collaborative Care Model at [facility]. This is our last visit and I am returning your care back to your Primary Care Provider sKenia Sandoval MD . If you are not doing well, please contact your Primary Care Provider office.    Moving from: Collaborative Care Psychiatry Service ([facility])    Moving to: Referring Provider        We are returning your care back to your Referring Provider.      We will update your Referring Provider/Clinic that you've completed your care with [facility]. This way, they can help you build on the progress you've made in your mental health.        Here's what happens next:    Within the next 3 months:  Please set up a visit with your referring provider. Ask for a mental health check-in.  Stay on your current medications--do not change your doses. Your symptoms could get worse if you quickly stop or decrease your medicine.  We've refilled your mental health medications for the next 3 months (90 days). Future refills or dose changes should come from your Referring Provider Clinic.    If you're in therapy, keep it up! If you're not but would like to start, ask your Referring Provider clinic for a referral to therapy, or call [facility] ([phone]) to set up a visit with a therapist.        It's been a pleasure to work with you! If you and your clinic decide that you should return to [facility] in the future, we remain ready to serve you. Ask your clinic for a new referral if needed.

## 2025-06-17 NOTE — NURSING NOTE
Current patient location: [address] [place] [id number]    Is the patient currently in the state of [place]? YES    Visit mode: VIDEO    If the visit is dropped, the patient can be reconnected by:VIDEO VISIT: Text to cell phone:   Telephone Information:   Mobile [phone]       Will anyone else be joining the visit? NO  (If patient encounters technical issues they should call [phone])    Are changes needed to the allergy or medication list? Pt stated no changes to allergies and Pt stated no med changes    Are refills needed on medications prescribed by this physician? Discuss with provider    Rooming Documentation:  Questionnaire(s) completed    Reason for visit: VIGNESH LEIGH

## 2025-06-17 NOTE — PROGRESS NOTES
"Telemedicine Visit: The patient's condition can be safely assessed and treated via synchronous audio and visual telemedicine encounter.      Reason for Telemedicine Visit: Patient has requested telehealth visit    Originating Site (Patient Location): Patient's home    Distant Location (provider location):  Off-site    Consent:  The patient/guardian has verbally consented to: the potential risks and benefits of telemedicine (video visit) versus in person care; bill my insurance or make self-payment for services provided; and responsibility for payment of non-covered services.     Mode of Communication:  Video Conference via [employer]    As the provider I attest to compliance with applicable laws and regulations related to telemedicine.         Outpatient Psychiatric Progress Note    Name: Conrad Zhu   : 1955                    Primary Care Provider: Ksenia Sandoval MD   Therapist: None    PHQ-9 scores:      2025    10:16 AM 3/12/2025    10:49 AM 2025     2:39 PM   PHQ-9 SCORE   PHQ-9 Total Score MyChart 6 (Mild depression) 5 (Mild depression) 3 (Minimal depression)   PHQ-9 Total Score 6  5  3        Patient-reported       ISIDORO-7 scores:      2023     9:59 AM 2023    10:49 AM 2025     1:27 PM   ISIDORO-7 SCORE   Total Score 5 (mild anxiety) 8 (mild anxiety) 0 (minimal anxiety)   Total Score 5 8 0        Patient-reported       Patient Identification:  Patient is a 69 year old, partnered / significant other  White Not  or  female  who presents for return visit with me.  Patient is currently disabled. Patient attended the phone/video session alone. Patient prefers to be called: \"Conrad\".    Interim History:  I last saw Conrad Toribiocal for outpatient psychiatry return visit on 3/13/2025. During that appointment, we:    Continue duloxetine 60 mg daily for chronic pain, depression, anxiety. Could consider increasing before next visit IF - you send me a couple " blood pressure coughs and get you at-home BP monitor (as discussed at today's visit).   Continue trazodone 100-300 mg at bedtime for sleep.       6/17: Overall doing really well.  Lost 40 pounds on semaglutide.  Will trial going off the medication to see if he can maintain weight.  Overall feeling so much better, physically and mental health wise.  Mental health symptoms stable.  Pain improved with weight loss and on duloxetine.  Blood pressure under better control.  Did retrial 90 for 2 days but had side effects and increased pressure sensation in head and went back to 60 mg daily with resolution of side effects.  Feels like 60 mg daily adequate to treat symptoms.  No acute safety concerns.  No SI.  No problematic drug or alcohol use.  See [facility] note below for additional details.    Per [facility], Chanelle Garcia [facility], during today's team-based visit:  Current Stressors / Issues:  MH update: Had friend up from [place] for a few days.  Known her for her entire life. Moods have been up and down.  Pain is down.   Stresses:  planning trip to [place].  Appetite: injection.  Lost weight 40lbs.  Done with injection.  Minimal appetite.   Down to 109lbs.  Sleep: per PCP note reducing trazodone with medical THC  Outpatient Provider updates:   SI/SIB/HI:  Denies  SHANTE:  Medical THC  Side effects/compliance:  Interventions:  [facility] engaged in problem solving in order to support pts recreational goals and removing barriers   Most important:  Back to 60 daily.  On 10mg of BP meds 153/84 today.  Doing overall well.    Past Psychiatric Med Trials:  Psych Meds at Intake:  Trazodone 300 mg daily at bedtime   Viibryd 20 mg   Lamotrigine 100 mg - several years to augment       Past Psych Meds:  Wellbutrin   Lexapro  Topamax  Paxil  zoloft    Psychiatric ROS:  See HPI above for all pertinent positives and negatives. Rest of systems negative.     PHQ9 and GAD7 scores were reviewed today if completed.   Medication side effects: Denies  Current stressors  include: Symptoms and see HPI above  Coping mechanisms and supports include: Family, Hobbies, and Friends    Current medications include:   Current Outpatient Medications   Medication Sig Dispense Refill    acetaminophen-codeine (TYLENOL #4) 300-60 MG per tablet Take 1-2 tablets by mouth every 8 hours as needed for severe pain. 180 tablet 0    amLODIPine (NORVASC) 10 MG tablet Take 1 tablet (10 mg) by mouth daily. 90 tablet 1    cholecalciferol 50 MCG (2000 UT) tablet Take 4,000 Units by mouth Every other day      DULoxetine (CYMBALTA) 30 MG capsule Take 30 mg by mouth daily. Take WITH 60 mg cap for total dose 90 mg. (Patient not taking: Reported on 5/14/2025)      DULoxetine (CYMBALTA) 60 MG capsule Take 1 capsule (60 mg) by mouth daily. 90 capsule 1    ibuprofen (ADVIL/MOTRIN) 800 MG tablet Take 800 mg by mouth as needed.      mirabegron (MYRBETRIQ) 25 MG 24 hr tablet Take 1 tablet (25 mg) by mouth daily. 90 tablet 2    naloxone (NARCAN) 4 MG/0.1ML nasal spray [NALOXONE (NARCAN) 4 MG/ACTUATION NASAL SPRAY] 1 spray (4 mg dose) into one nostril for opioid reversal. Call 911. May repeat if no response in 3 minutes. 1 each 1    ondansetron (ZOFRAN) 4 MG tablet Take 1 Tablet (4 mg) by mouth every 8 hours if needed for nausea/vomiting 60 tablet 3    rosuvastatin (CRESTOR) 5 MG tablet Take 1 tablet (5 mg) by mouth daily. 90 tablet 3    traZODone (DESYREL) 100 MG tablet Take 1 to 3 tablets by mouth at bedtime as needed 270 tablet 3     Current Facility-Administered Medications   Medication Dose Route Frequency Provider Last Rate Last Admin    denosumab (PROLIA) injection 60 mg  60 mg Subcutaneous Once         denosumab (PROLIA) injection 60 mg  60 mg Subcutaneous Q6 Months Blanca Duarte NP   60 mg at 03/27/25 0951       Past Medical/Surgical History:  Past Medical History:   Diagnosis Date    Anemia     Anxiety     Benign essential HTN 02/14/2025    Chronic pain 08/14/2016    Depression     Depressive disorder 1995     Former smoker     History of cocaine abuse (H)     Insomnia 08/14/2016    Liver disease     Low back pain 08/14/2016    Migraine headache 08/14/2016    Osteoporosis 08/14/2016    PN (peripheral neuropathy) 08/14/2016    PONV (postoperative nausea and vomiting)       has a past medical history of Anemia, Anxiety, Benign essential HTN (02/14/2025), Chronic pain (08/14/2016), Depression, Depressive disorder (1995), Former smoker, History of cocaine abuse (H), Insomnia (08/14/2016), Liver disease, Low back pain (08/14/2016), Migraine headache (08/14/2016), Osteoporosis (08/14/2016), PN (peripheral neuropathy) (08/14/2016), and PONV (postoperative nausea and vomiting).    She has no past medical history of Arrhythmia, Arthritis, Cerebral artery occlusion with cerebral infarction (H), Chronic infection, Coagulation disorder, Congenital heart disease, Congestive heart failure (H), COPD (chronic obstructive pulmonary disease) (H), Coronary artery disease, Diabetes (H), Dialysis patient, Difficult intubation, Difficulty walking, Dyspnea on exertion, Gastroesophageal reflux disease, Heart attack (H), Heart murmur, Hepatitis, Hiatal hernia, History of angina, History of blood transfusion, Irregular heart beat, Malignant hyperthermia, Multiple sclerosis (H), Muscular dystrophy (H), Noninfectious ileitis, Obese, Orthopnea, Oxygen dependent, Pacemaker, Pancreatic disease, Parkinsons disease (H), Pneumonia, Pulmonary hypertension (H), Renal disease, Seizures (H), Sleep apnea, Spinal headache, Stented coronary artery, Thrombosis, Thyroid disease, Tracheostomy in place (H), Uncomplicated asthma, or Walking troubles.    Social History:  Reviewed. No changes to social history except as noted above in HPI.    Vital Signs:   None. This is phone/video visit.     Labs:  Most recent laboratory results reviewed and no new labs.     Review of Systems:  10 systems (general, cardiovascular, respiratory, eyes, ENT, endocrine, GI, , M/S,  neurological) were reviewed. Most pertinent finding(s) is/are: Chronic pain. The remaining systems are all unremarkable.    Mental Status Examination (limited as this is by phone/video):  Appearance: Awake, alert, appears stated age, no acute distress, well-groomed   Attitude:  cooperative, pleasant   Motor: No gross abnormalities observed via video, not formally tested   Oriented to:  person, place, time, and situation  Attention Span and Concentration:  normal  Speech:  clear, coherent, regular rate, rhythm, and volume  Language: intact  Mood: Good  Affect:  appropriate and in normal range and mood congruent  Associations:  no loose associations  Thought Process:  logical, linear and goal oriented  Thought Content:  no evidence of suicidal ideation or homicidal ideation, no evidence of psychotic thought, no auditory hallucinations present and no visual hallucinations present  Recent and Remote Memory:  Intact to interview. Not formally assessed. No amnesia.  Fund of Knowledge: appropriate  Insight:  good  Judgment:  intact, adequate for safety  Impulse Control:  intact    Suicide Risk Assessment:  Today Conrad Zhu reports no suicidal ideation. Based on all available evidence including the factors cited above, Conrad Zhu does not appear to be at imminent risk for self-harm, does not meet criteria for a 72-hr hold, and therefore remains appropriate for ongoing outpatient level of care.  A thorough assessment of risk factors related to suicide and self-harm have been reviewed and are noted above. The patient convincingly denies suicidality on several occasions. Local community safety resources reviewed for patient to use if needed. There was no deceit detected, and the patient presented in a manner that was believable.     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 Previously followed in pain clinic, transitioned to primary care 10/2021.   - Off of MS Contin since 8/2024  - Now just tylenol #4, #180/month   - UDS appropriate 5/2024  - CSA 5/2024      Benign essential HTN 02/14/2025     Priority: Medium    Encounter for Medicare annual wellness exam 10/04/2024     Priority: Medium    Acute pain of left knee 10/04/2024     Priority: Medium    Mixed hyperlipidemia 05/28/2024     Priority: Medium    F11.2 - Continuous opioid dependence (H) 06/12/2023     Priority: Medium    Vitamin deficiency 10/23/2020     Priority: Medium    Depression 02/10/2020     Priority: Medium    Neck pain 02/10/2020     Priority: Medium    Age-related osteoporosis without current pathological fracture 11/14/2019     Priority: Medium    History of falling 11/14/2019     Priority: Medium    Unspecified fracture of shaft of left fibula, subsequent encounter for closed fracture with routine healing 11/14/2019     Priority: Medium    Displaced intertrochanteric fracture of left femur, subsequent encounter for closed fracture with routine healing 11/14/2019     Priority: Medium    Closed intertrochanteric fracture of hip, left, initial encounter (H) 11/11/2019     Priority: Medium    Left hip pain 11/11/2019     Priority: Medium     Added automatically from request for surgery [id number]        Closed nondisplaced fracture of body of right calcaneus, initial encounter 07/09/2019     Priority: Medium    Achilles tendinitis of right lower extremity 07/01/2019     Priority: Medium    Complex regional pain syndrome i of left lower limb 08/15/2018     Priority: Medium    PN (peripheral neuropathy) 08/14/2016     Priority: Medium    Former smoker       Priority: Medium    Nausea 06/07/2016     Priority: Medium    Left foot drop 06/01/2016     Priority: Medium    Closed fracture of metatarsal bone 02/08/2016     Priority: Medium     Formatting of this note might be different from the original.  Overview:   Created by Conversion      Complex regional pain syndrome type 2 of upper extremity 02/08/2016     Priority: Medium     Formatting of this note might be different from the original.  Overview:   Created by Conversion      Degeneration of intervertebral disc of cervical region 02/08/2016     Priority: Medium     Formatting of this note might be different from the original.  Overview:   Created by Conversion      Anxiety 01/21/2015     Priority: Medium    Corneal scarring 09/25/2012     Priority: Medium    Menopause present 12/08/2011     Priority: Medium     Formatting of this note might be different from the original.  Decreased libido.      OAB (overactive bladder) 08/14/2016     Priority: Low    Insomnia 08/14/2016     Priority: Low       Psychosocial & Contextual Factors: see HPI above    Assessment:  From Intake, 8/28/2024:  Conrad Zhu is a 68-year-old with past psychiatric history including depression, anxiety, substance abuse in remission who presents today for psychiatric evaluation.  Patient with significant chronic pain and persistent depressive symptoms for many years.  Mood and anxiety often incredibly influenced by her pain symptoms.  Patient not sure her current depression medications are doing much for her mood.  Patient has met with a couple providers over the past few years who have suggested Cymbalta/duloxetine.  Patient is agreeable to tapering off Viibryd and onto duloxetine.  Discussed risks and benefits of the transition/change.  If duloxetine incredibly helpful, could consider tapering off lamotrigine.  Patient may also try to decrease trazodone in an attempt to continue to reduce polypharmacy.  Patient denies any concerns or  struggles with substances for 20-30 years.  Occasional social alcohol use.  Patient may be interested in medicinal cannabis for her pain.  No current cannabis use.  Passive suicidal thoughts with no plan or intent to harm self or past suicide attempts.  Psychotherapy encouraged.     10/10/2024:  Patient with significant improvements on duloxetine for pain, mood, anxiety.  Did well with the transition off Viibryd.  Patient now on duloxetine 60 mg total daily dose.  Will taper off lamotrigine.    11/21/2024:  Overall doing quite well.  Patient wondering about increasing duloxetine some.  Since blood pressure most recently documented as elevated, we will wait on increasing the dose until she is able to get in at home blood pressure cuff, which she has planned to do, and is able to ensure most recent blood pressures are not elevated.  I would like to see blood pressure at least 130/85 or less before increasing duloxetine.  Otherwise, mental health symptoms much more stable.  Off lamotrigine with no worsening of symptoms.  No acute safety concerns.  No SI.  No problematic drug or alcohol use.    1/23/2025:  Patient continues to do well.  Fluoxetine continues to be quite helpful.  Unfortunately duloxetine dose increased to 90 mg daily has impacted the patient's blood pressure.  Patient with high blood pressure and so we will go back down to 60 mg daily and watch for any worsening mental health and chronic pain symptoms.  If patient feels like she would really like to be on the 90 mg daily dose, will discuss with primary care provider regarding blood pressure options and recommendations.  No acute safety concerns.  No acute suicidality.  No problematic drug or alcohol use.    3/13/2025:  Patient overall doing relatively well from mental health standpoint.  Patient interested in increasing duloxetine back to 90 mg daily since the higher dose was more helpful for chronic pain.  Blood pressures are not reduced enough at this  time to increase back to 90 mg daily.  I will check in with primary care provider to get thoughts on adjustments to patient blood pressure medication in order to facilitate increasing duloxetine dose back to 90 mg daily.  No acute safety concerns.  No acute suicidality.  No problematic drug or alcohol use.    6/17/2025:  Patient doing very well.  Patient feeling much more confident at a healthy weight after losing 40 pounds on semaglutide.  Blood pressure under better control.  Mental health symptoms under good control on duloxetine.  Pain improved with weight loss and duloxetine.  Due to stability of symptoms, patient's psychiatric care will be returned back to primary care provider for ongoing management.  Patient agreeable to this plan.  Patient has follow-up with primary care provider already scheduled in October.  No acute safety concerns.  No acute suicidality.  No problematic drug or alcohol use.    Medication side effects and alternatives were reviewed. Health promotion activities recommended and reviewed today. All questions addressed. Education and counseling completed regarding risks and benefits of medications and psychotherapy options. Recommend therapy for additional support.     Treatment Plan:  Continue duloxetine 60 mg daily for chronic pain, depression, anxiety.  Continue trazodone 100-200 mg at bedtime for sleep.   Keep in mind trazodone can sometimes be a culprit for causing orthostatic hypotension.  Since on blood pressure medication, and since having lost weight, continue to monitor for feeling dizzy or lightheaded throughout the day.  May need to reduce trazodone dose.  Your psychiatric care is being returned back to your primary care provider for ongoing management.  Please reach out to your primary care provider with any questions or concerns about your mental health or mental health medications.   Continue all other cares per primary care provider.   Continue all other medications as  reviewed per electronic medical record today.   Safety plan reviewed. To the Emergency Department as needed or call after hours crisis line at [phone] or [phone]. [place] Crisis Text Line. Text [place] to 606523 or Suicide LifeLine Chat: suicidepreventionlifeline.org/chat  Consider individual psychotherapy as needed.  Follow up with primary care provider as planned or for acute medical concerns.  MyChart may be used to communicate with your provider, but this is not intended to be used for emergencies.    Thank you for our work together in the Psychiatry Collaborative Care Model at [facility]. This is our last visit and I am returning your care back to your Primary Care Provider Ksenia Sandoval MD . If you are not doing well, please contact your Primary Care Provider office.      Administrative Billing:   Phone Call/Video Duration: 14 Minutes  Start: 11:34a  Stop: 11:48a    Episode of Care #: 6    Patient Status:  The patient is being returned to the referring provider for ongoing care and medication prescribing.  The patient can be re-referred back to this service for further consultation if needed in the future.    Signed:   Elodia Dupont DO  [facility] Psychiatry    Disclaimer: This note consists of symbols derived from keyboarding, dictation and/or voice recognition software. As a result, there may be errors in the script that have gone undetected. Please consider this when interpreting information found in this chart.

## 2025-06-17 NOTE — PROGRESS NOTES
RETURN TO REFERRING PROVIDER FINAL TREATMENT PLAN  The Psychiatric provider and/or Behavioral health clinician (BHC) have completed consultation with the patient and are returning to referring provider care for continued care. For worsening symptoms/condition recommendations include:    Psychiatry Recommendations   Continue duloxetine 60 mg daily for chronic pain, depression, anxiety.  Could retrial 90 mg daily in the future if mental health symptoms, pain worsen.  Monitor blood pressure and for other side effects.  Continue trazodone 100-200 mg at bedtime for sleep.   Keep in mind trazodone can sometimes be a culprit for causing orthostatic hypotension.  Since on blood pressure medication, and since having lost weight, continue to monitor for feeling dizzy or lightheaded throughout the day.  May need to reduce trazodone dose.    Behavioral Recommendations  -Consider individual psychotherapy for additional support and ongoing development of coping skills and strategies if symptoms worsen.      Consider pharmacologic and nonpharmacologic recommendations, if the patient has followed through on recommendations/referrals and any other helpful pertinent information (e.g., recent stressors, med adherence, enough time on the medication or high enough dose etc.)      If post consult recommendations fail, use any of the following options   Reach out to the [facility] provider through Epic staff message for guidance   Order an Adult Behavioral Health eConsult (BIYH311) or Pediatric eConsult (UMHD505)   Refer for another [facility] consultation (after 6 months) or refer to Psychiatry/Long-term management (Mental Health Referral 9035, [facility]/Med management and Long-term management)  Psychiatry [place] Referral [9050.025] to review medications/side effects with patient    Elodia Dupont DO on 6/17/2025 at 12:07 PM

## 2025-07-09 DIAGNOSIS — G89.4 CHRONIC PAIN SYNDROME: ICD-10-CM

## 2025-07-09 RX ORDER — ACETAMINOPHEN AND CODEINE PHOSPHATE 300; 60 MG/1; MG/1
1-2 TABLET ORAL EVERY 8 HOURS PRN
Qty: 180 TABLET | Refills: 0 | Status: SHIPPED | OUTPATIENT
Start: 2025-07-09

## 2025-07-09 NOTE — TELEPHONE ENCOUNTER
Medication Question or Refill        What medication are you calling about (include dose and sig)?:   acetaminophen-codeine (TYLENOL #4) 300-60 MG per tablet 180 tablet 0 6/10/2025 -- No   Sig - Route: Take 1-2 tablets by mouth every 8 hours as needed for severe pain. - Oral   Sent to pharmacy as: Acetaminophen-Codeine 300-60 MG Oral Tablet (TYLENOL #4)   Class: E-Prescribe   Order: [id number]   E-Prescribing Status: Receipt confirmed by pharmacy (6/10/2025  3:25 PM CDT)   No prior authorization was found for this prescription.   Found prior authorization for another prescription for the same medication: Closed       Preferred Pharmacy:   [facility] - [address] [address]  Phone: [phone] Fax: [phone]      Controlled Substance Agreement on file:   CSA -- Patient Level:     [Media Unavailable] Controlled Substance Agreement - Opioid - Scan on 5/14/2025 10:54 AM   [Media Unavailable] Controlled Substance Agreement - Opioid - Scan on 5/29/2024  8:13 AM   [Media Unavailable] Controlled Substance Agreement - Opioid - Scan on 1/3/2024  8:55 AM   [Media Unavailable] Controlled Substance Agreement - Opioid - Scan on 9/26/2023 10:27 AM   [Media Unavailable] Controlled Substance Agreement - Opioid - Scan on 6/16/2020: OUTSIDE RECORD   [Media Unavailable] Controlled Substance Agreement - Opioid - Scan on 7/19/2016: HE       Who prescribed the medication?: PCP    Do you need a refill? Yes      Could we send this information to you in [facility] or would you prefer to receive a phone call?:   Patient would prefer a phone call   Okay to leave a detailed message?: Yes at Cell number on file:    Telephone Information:   Mobile [phone]

## 2025-08-22 ENCOUNTER — TELEPHONE (OUTPATIENT)
Dept: ENDOCRINOLOGY | Facility: CLINIC | Age: 70
End: 2025-08-22
Payer: COMMERCIAL

## 2025-08-22 DIAGNOSIS — M81.8 OTHER OSTEOPOROSIS WITHOUT CURRENT PATHOLOGICAL FRACTURE: Primary | ICD-10-CM
